# Patient Record
Sex: MALE | Race: BLACK OR AFRICAN AMERICAN | NOT HISPANIC OR LATINO | Employment: OTHER | URBAN - NONMETROPOLITAN AREA
[De-identification: names, ages, dates, MRNs, and addresses within clinical notes are randomized per-mention and may not be internally consistent; named-entity substitution may affect disease eponyms.]

---

## 2017-01-09 RX ORDER — METFORMIN HYDROCHLORIDE 750 MG/1
TABLET, EXTENDED RELEASE ORAL
Qty: 30 TABLET | Refills: 0 | Status: SHIPPED | OUTPATIENT
Start: 2017-01-09 | End: 2017-02-11 | Stop reason: SDUPTHER

## 2017-01-16 ENCOUNTER — OFFICE VISIT (OUTPATIENT)
Dept: FAMILY MEDICINE CLINIC | Facility: CLINIC | Age: 52
End: 2017-01-16

## 2017-01-16 DIAGNOSIS — E11.49 DM (DIABETES MELLITUS), TYPE 2 WITH NEUROLOGICAL COMPLICATIONS (HCC): ICD-10-CM

## 2017-01-16 DIAGNOSIS — Z23 ENCOUNTER FOR IMMUNIZATION: Primary | ICD-10-CM

## 2017-01-16 PROCEDURE — 90746 HEPB VACCINE 3 DOSE ADULT IM: CPT | Performed by: FAMILY MEDICINE

## 2017-01-16 PROCEDURE — G0010 ADMIN HEPATITIS B VACCINE: HCPCS | Performed by: FAMILY MEDICINE

## 2017-01-16 NOTE — MR AVS SNAPSHOT
Nirav Cordova Lelo   1/16/2017 9:15 AM   Office Visit    Dept Phone:  [phone]   Encounter #:  [id number]    Provider:  Brian Hinojosa MD   Department:  [facility]                Your Full Care Plan              Your Updated Medication List          This list is accurate as of: 1/16/17  3:13 PM.  Always use your most recent med list.                Alcohol Swabs 70 % pads       amitriptyline 25 MG tablet   Commonly known as:  ELAVIL       amLODIPine 10 MG tablet   Commonly known as:  NORVASC       ASPIRIN LOW DOSE 81 MG EC tablet   Generic drug:  aspirin   TAKE 1 TABLET BY MOUTH EVERY DAY       B-D UF III MINI PEN NEEDLES 31G X 5 MM misc   Generic drug:  Insulin Pen Needle       carvedilol 3.125 MG tablet   Commonly known as:  COREG       cyclobenzaprine 5 MG tablet   Commonly known as:  FLEXERIL   Take 1 tablet by mouth 3 (Three) Times a Day As Needed for muscle spasms.       diclofenac 50 MG EC tablet   Commonly known as:  VOLTAREN   Take 1 tablet by mouth 2 (Two) Times a Day.       freestyle lancets       FREESTYLE LITE test strip   Generic drug:  glucose blood       gabapentin 400 MG capsule   Commonly known as:  NEURONTIN   Take 1 capsule by mouth 3 (Three) Times a Day.       lisinopril 2.5 MG tablet   Commonly known as:  PRINIVIL,ZESTRIL   TAKE 1 TABLET BY MOUTH DAILY       metFORMIN  MG 24 hr tablet   Commonly known as:  GLUCOPHAGE-XR   TAKE 1 TABLET BY MOUTH DAILY WITH BREAKFAST       OLANZapine 10 MG tablet   Commonly known as:  zyPREXA       ondansetron 4 MG tablet   Commonly known as:  ZOFRAN   Take 1 tablet by mouth every 8 (eight) hours as needed for nausea or vomiting.       pantoprazole 40 MG EC tablet   Commonly known as:  PROTONIX       pravastatin 20 MG tablet   Commonly known as:  PRAVACHOL       sertraline 100 MG tablet   Commonly known as:  ZOLOFT       SYMBICORT 160-4.5 MCG/ACT inhaler   Generic drug:  budesonide-formoterol       TOUJEO SOLOSTAR 300 UNIT/ML solution pen-injector   Generic drug:  Insulin Glargine       VENTOLIN  (90 BASE) MCG/ACT inhaler   Generic drug:  albuterol               We Performed the Following     Hepatitis B Vaccine Adult IM       You Were Diagnosed With        Codes Comments    Encounter for immunization    -  Primary ICD-10-CM: Z23  ICD-9-CM: V03.89     DM (diabetes mellitus), type 2 with neurological complications     ICD-10-CM: E11.49  ICD-9-CM: 250.60       Instructions     None    Patient Instructions History      Upcoming Appointments     Visit Type Date Time Department    WALK-IN 1/16/2017  9:15 AM [facility] RESIDENT [facility]    OFFICE VISIT 3/17/2017  9:30 AM [facility]    OFFICE VISIT 5/18/2017  1:00 PM [facility]      MyChart Signup     Our records indicate that you have declined [facility] [employer] signup. If you would like to sign up for [employer], please email [facility] [email or web address] or call [phone] to obtain an activation code.             Other Info from Your Visit           Your Appointments     Mar 17, 2017  9:30 AM CDT   Office Visit with FLYNN Lund   [facility] ENDOCRINOLOGY (--)    [address]  [address] [place] [address] [phone]           Arrive 15 minutes prior to appointment.            May 18, 2017  1:00 PM CDT   Office Visit with Jerry Crowley MD PhD   [facility] CARDIOLOGY (--)    [address] [place] [address] [address]  [address] [place] [id number] [phone]           Arrive 15 minutes prior to appointment.              Other Notes About Your Plan     Risk Score - 4        Allergies     No Known Allergies      Vital Signs     Smoking Status                   Never Smoker           Problems and Diagnoses Noted     DM (diabetes mellitus), type 2 with neurological complications    Encounter for immunization    -  Primary      Immunizations Administered     Name Date     Hepatitis B

## 2017-01-16 NOTE — PROGRESS NOTES
I have reviewed the notes, assessments, and/or procedures performed by Dr. Hinojosa, I concur with her/his documentation of Nirav Lind.        This document has been electronically signed by Nannette Carcamo MD on January 16, 2017 9:56 AM

## 2017-01-17 ENCOUNTER — TELEPHONE (OUTPATIENT)
Dept: ENDOCRINOLOGY | Facility: CLINIC | Age: 52
End: 2017-01-17

## 2017-01-17 DIAGNOSIS — E11.9 CONTROLLED TYPE 2 DIABETES MELLITUS WITHOUT COMPLICATION, UNSPECIFIED LONG TERM INSULIN USE STATUS: ICD-10-CM

## 2017-01-17 DIAGNOSIS — E78.2 MIXED HYPERLIPIDEMIA: Primary | ICD-10-CM

## 2017-01-17 DIAGNOSIS — E55.9 VITAMIN D DEFICIENCY: ICD-10-CM

## 2017-01-17 NOTE — TELEPHONE ENCOUNTER
----- Message from FLYNN Lund sent at 1/17/2017  8:48 AM CST -----  Call patient with result---A1c was 6.0,. Kidney function normal,

## 2017-01-26 ENCOUNTER — TELEPHONE (OUTPATIENT)
Dept: ENDOCRINOLOGY | Facility: CLINIC | Age: 52
End: 2017-01-26

## 2017-01-26 NOTE — TELEPHONE ENCOUNTER
----- Message from FLYNN Lund sent at 1/26/2017  1:56 PM CST -----  Have him go to office and  a sample   ----- Message -----     From: Bernice Murguia MA     Sent: 1/26/2017   1:25 PM       To: FLYNN Lund    All are around 350$.  It is his deductible.  He is going to have to pay some of it down first.   ----- Message -----     From: FLYNN Lund     Sent: 1/26/2017  12:24 PM       To: Bernice Murguia MA    Having the pharmacy try tresiba, basaglar, lantus or levemir         ----- Message -----     From: Celina Rogers     Sent: 1/26/2017  11:57 AM       To: FLYNN Lund CALLED.   TOUJEO IS GOING TO COST MR HADLEY $335.84 OUT OF POCKET BECAUSE HE HASN'T MET HIS DEDUCTIBLE FOR THE YEAR YET.   HE CAN'T USE THE SAVINGS CARD BECAUSE MEDICARE DOESN'T ALLOW IT.  IS THERE  SOMETHING ELSE HE CAN TAKE FOR NOW?

## 2017-02-13 RX ORDER — METFORMIN HYDROCHLORIDE 750 MG/1
TABLET, EXTENDED RELEASE ORAL
Qty: 30 TABLET | Refills: 3 | Status: SHIPPED | OUTPATIENT
Start: 2017-02-13 | End: 2017-03-08

## 2017-03-08 ENCOUNTER — HOSPITAL ENCOUNTER (OUTPATIENT)
Facility: HOSPITAL | Age: 52
Setting detail: OBSERVATION
Discharge: HOME | End: 2017-03-09
Attending: EMERGENCY MEDICINE | Admitting: FAMILY MEDICINE

## 2017-03-08 DIAGNOSIS — R07.9 CHEST PAIN, UNSPECIFIED TYPE: Primary | ICD-10-CM

## 2017-03-08 PROBLEM — I20.0 UNSTABLE ANGINA (HCC): Status: ACTIVE | Noted: 2017-03-08

## 2017-03-08 PROBLEM — F32.5 MAJOR DEPRESSIVE DISORDER WITH SINGLE EPISODE, IN FULL REMISSION (HCC): Chronic | Status: ACTIVE | Noted: 2017-03-08

## 2017-03-08 PROBLEM — J44.9 COPD (CHRONIC OBSTRUCTIVE PULMONARY DISEASE): Chronic | Status: ACTIVE | Noted: 2017-03-08

## 2017-03-08 LAB
ALBUMIN SERPL-MCNC: 4.6 G/DL (ref 3.4–4.8)
ALBUMIN/GLOB SERPL: 1.4 G/DL (ref 1.1–1.8)
ALP SERPL-CCNC: 111 U/L (ref 38–126)
ALT SERPL W P-5'-P-CCNC: 68 U/L (ref 21–72)
ANION GAP SERPL CALCULATED.3IONS-SCNC: 11 MMOL/L (ref 5–15)
ANION GAP SERPL CALCULATED.3IONS-SCNC: 8 MMOL/L (ref 5–15)
AST SERPL-CCNC: 34 U/L (ref 17–59)
BASOPHILS # BLD AUTO: 0.03 10*3/MM3 (ref 0–0.2)
BASOPHILS # BLD AUTO: 0.04 10*3/MM3 (ref 0–0.2)
BASOPHILS NFR BLD AUTO: 0.3 % (ref 0–2)
BASOPHILS NFR BLD AUTO: 0.4 % (ref 0–2)
BILIRUB SERPL-MCNC: 0.4 MG/DL (ref 0.2–1.3)
BUN BLD-MCNC: 10 MG/DL (ref 7–21)
BUN BLD-MCNC: 11 MG/DL (ref 7–21)
BUN/CREAT SERPL: 11.1 (ref 7–25)
BUN/CREAT SERPL: 11.6 (ref 7–25)
CALCIUM SPEC-SCNC: 9.4 MG/DL (ref 8.4–10.2)
CALCIUM SPEC-SCNC: 9.7 MG/DL (ref 8.4–10.2)
CHLORIDE SERPL-SCNC: 100 MMOL/L (ref 95–110)
CHLORIDE SERPL-SCNC: 103 MMOL/L (ref 95–110)
CK MB SERPL-CCNC: 3.47 NG/ML (ref 0–5)
CK SERPL-CCNC: 244 U/L (ref 55–170)
CO2 SERPL-SCNC: 26 MMOL/L (ref 22–31)
CO2 SERPL-SCNC: 26 MMOL/L (ref 22–31)
CREAT BLD-MCNC: 0.9 MG/DL (ref 0.7–1.3)
CREAT BLD-MCNC: 0.95 MG/DL (ref 0.7–1.3)
DEPRECATED RDW RBC AUTO: 40.3 FL (ref 35.1–43.9)
DEPRECATED RDW RBC AUTO: 42.2 FL (ref 35.1–43.9)
EOSINOPHIL # BLD AUTO: 0.33 10*3/MM3 (ref 0–0.7)
EOSINOPHIL # BLD AUTO: 0.5 10*3/MM3 (ref 0–0.7)
EOSINOPHIL NFR BLD AUTO: 3.2 % (ref 0–7)
EOSINOPHIL NFR BLD AUTO: 4.7 % (ref 0–7)
ERYTHROCYTE [DISTWIDTH] IN BLOOD BY AUTOMATED COUNT: 12.9 % (ref 11.5–14.5)
ERYTHROCYTE [DISTWIDTH] IN BLOOD BY AUTOMATED COUNT: 13.2 % (ref 11.5–14.5)
GFR SERPL CREATININE-BSD FRML MDRD: 101 ML/MIN/1.73 (ref 56–130)
GFR SERPL CREATININE-BSD FRML MDRD: 107 ML/MIN/1.73 (ref 56–130)
GLOBULIN UR ELPH-MCNC: 3.4 GM/DL (ref 2.3–3.5)
GLUCOSE BLD-MCNC: 123 MG/DL (ref 60–100)
GLUCOSE BLD-MCNC: 99 MG/DL (ref 60–100)
GLUCOSE BLDC GLUCOMTR-MCNC: 129 MG/DL (ref 70–130)
HCT VFR BLD AUTO: 39.5 % (ref 39–49)
HCT VFR BLD AUTO: 43.5 % (ref 39–49)
HGB BLD-MCNC: 12.9 G/DL (ref 13.7–17.3)
HGB BLD-MCNC: 14.9 G/DL (ref 13.7–17.3)
HOLD SPECIMEN: NORMAL
IMM GRANULOCYTES # BLD: 0.03 10*3/MM3 (ref 0–0.02)
IMM GRANULOCYTES # BLD: 0.05 10*3/MM3 (ref 0–0.02)
IMM GRANULOCYTES NFR BLD: 0.3 % (ref 0–0.5)
IMM GRANULOCYTES NFR BLD: 0.5 % (ref 0–0.5)
INR PPP: 0.98 (ref 0.8–1.2)
LYMPHOCYTES # BLD AUTO: 2.16 10*3/MM3 (ref 0.6–4.2)
LYMPHOCYTES # BLD AUTO: 2.61 10*3/MM3 (ref 0.6–4.2)
LYMPHOCYTES NFR BLD AUTO: 20.8 % (ref 10–50)
LYMPHOCYTES NFR BLD AUTO: 24.3 % (ref 10–50)
MCH RBC QN AUTO: 28.4 PG (ref 26.5–34)
MCH RBC QN AUTO: 29.4 PG (ref 26.5–34)
MCHC RBC AUTO-ENTMCNC: 32.7 G/DL (ref 31.5–36.3)
MCHC RBC AUTO-ENTMCNC: 34.3 G/DL (ref 31.5–36.3)
MCV RBC AUTO: 85.8 FL (ref 80–98)
MCV RBC AUTO: 86.8 FL (ref 80–98)
MONOCYTES # BLD AUTO: 0.64 10*3/MM3 (ref 0–0.9)
MONOCYTES # BLD AUTO: 0.78 10*3/MM3 (ref 0–0.9)
MONOCYTES NFR BLD AUTO: 6 % (ref 0–12)
MONOCYTES NFR BLD AUTO: 7.5 % (ref 0–12)
NEUTROPHILS # BLD AUTO: 6.89 10*3/MM3 (ref 2–8.6)
NEUTROPHILS # BLD AUTO: 7.05 10*3/MM3 (ref 2–8.6)
NEUTROPHILS NFR BLD AUTO: 64.2 % (ref 37–80)
NEUTROPHILS NFR BLD AUTO: 67.8 % (ref 37–80)
NT-PROBNP SERPL-MCNC: 24.5 PG/ML (ref 0–900)
PLAT MORPH BLD: NORMAL
PLATELET # BLD AUTO: 279 10*3/MM3 (ref 150–450)
PLATELET # BLD AUTO: 298 10*3/MM3 (ref 150–450)
PMV BLD AUTO: 10.6 FL (ref 8–12)
PMV BLD AUTO: 9.3 FL (ref 8–12)
POTASSIUM BLD-SCNC: 4.3 MMOL/L (ref 3.5–5.1)
POTASSIUM BLD-SCNC: 4.9 MMOL/L (ref 3.5–5.1)
PROT SERPL-MCNC: 8 G/DL (ref 6.3–8.6)
PROTHROMBIN TIME: 13 SECONDS (ref 11.1–15.3)
RBC # BLD AUTO: 4.55 10*6/MM3 (ref 4.37–5.74)
RBC # BLD AUTO: 5.07 10*6/MM3 (ref 4.37–5.74)
RBC MORPH BLD: NORMAL
SODIUM BLD-SCNC: 137 MMOL/L (ref 137–145)
SODIUM BLD-SCNC: 137 MMOL/L (ref 137–145)
TROPONIN I SERPL-MCNC: <0.012 NG/ML
TROPONIN I SERPL-MCNC: <0.012 NG/ML
WBC MORPH BLD: NORMAL
WBC NRBC COR # BLD: 10.39 10*3/MM3 (ref 3.2–9.8)
WBC NRBC COR # BLD: 10.72 10*3/MM3 (ref 3.2–9.8)
WHOLE BLOOD HOLD SPECIMEN: NORMAL
WHOLE BLOOD HOLD SPECIMEN: NORMAL

## 2017-03-08 PROCEDURE — 93005 ELECTROCARDIOGRAM TRACING: CPT

## 2017-03-08 PROCEDURE — 85025 COMPLETE CBC W/AUTO DIFF WBC: CPT | Performed by: FAMILY MEDICINE

## 2017-03-08 PROCEDURE — G0378 HOSPITAL OBSERVATION PER HR: HCPCS

## 2017-03-08 PROCEDURE — 82962 GLUCOSE BLOOD TEST: CPT

## 2017-03-08 PROCEDURE — 99284 EMERGENCY DEPT VISIT MOD MDM: CPT

## 2017-03-08 PROCEDURE — 84484 ASSAY OF TROPONIN QUANT: CPT | Performed by: FAMILY MEDICINE

## 2017-03-08 PROCEDURE — 83880 ASSAY OF NATRIURETIC PEPTIDE: CPT | Performed by: EMERGENCY MEDICINE

## 2017-03-08 PROCEDURE — 85007 BL SMEAR W/DIFF WBC COUNT: CPT | Performed by: FAMILY MEDICINE

## 2017-03-08 PROCEDURE — 94640 AIRWAY INHALATION TREATMENT: CPT

## 2017-03-08 PROCEDURE — 84484 ASSAY OF TROPONIN QUANT: CPT | Performed by: EMERGENCY MEDICINE

## 2017-03-08 PROCEDURE — 82550 ASSAY OF CK (CPK): CPT | Performed by: EMERGENCY MEDICINE

## 2017-03-08 PROCEDURE — 93010 ELECTROCARDIOGRAM REPORT: CPT | Performed by: INTERNAL MEDICINE

## 2017-03-08 PROCEDURE — 85025 COMPLETE CBC W/AUTO DIFF WBC: CPT | Performed by: EMERGENCY MEDICINE

## 2017-03-08 PROCEDURE — 85610 PROTHROMBIN TIME: CPT | Performed by: EMERGENCY MEDICINE

## 2017-03-08 PROCEDURE — 94799 UNLISTED PULMONARY SVC/PX: CPT

## 2017-03-08 PROCEDURE — 82553 CREATINE MB FRACTION: CPT | Performed by: EMERGENCY MEDICINE

## 2017-03-08 PROCEDURE — 80053 COMPREHEN METABOLIC PANEL: CPT | Performed by: EMERGENCY MEDICINE

## 2017-03-08 RX ORDER — MORPHINE SULFATE 2 MG/ML
1 INJECTION, SOLUTION INTRAMUSCULAR; INTRAVENOUS EVERY 4 HOURS PRN
Status: DISCONTINUED | OUTPATIENT
Start: 2017-03-08 | End: 2017-03-09 | Stop reason: HOSPADM

## 2017-03-08 RX ORDER — ACETAMINOPHEN 325 MG/1
650 TABLET ORAL EVERY 4 HOURS PRN
Status: DISCONTINUED | OUTPATIENT
Start: 2017-03-08 | End: 2017-03-09 | Stop reason: HOSPADM

## 2017-03-08 RX ORDER — GABAPENTIN 400 MG/1
400 CAPSULE ORAL 3 TIMES DAILY
Status: DISCONTINUED | OUTPATIENT
Start: 2017-03-08 | End: 2017-03-09 | Stop reason: HOSPADM

## 2017-03-08 RX ORDER — ASPIRIN 81 MG/1
324 TABLET, CHEWABLE ORAL ONCE
Status: DISCONTINUED | OUTPATIENT
Start: 2017-03-08 | End: 2017-03-08

## 2017-03-08 RX ORDER — LISINOPRIL 2.5 MG/1
2.5 TABLET ORAL
Status: DISCONTINUED | OUTPATIENT
Start: 2017-03-09 | End: 2017-03-09 | Stop reason: HOSPADM

## 2017-03-08 RX ORDER — ALBUTEROL SULFATE 90 UG/1
2 AEROSOL, METERED RESPIRATORY (INHALATION) 2 TIMES DAILY
Status: DISCONTINUED | OUTPATIENT
Start: 2017-03-08 | End: 2017-03-08

## 2017-03-08 RX ORDER — NICOTINE POLACRILEX 4 MG
15 LOZENGE BUCCAL
Status: DISCONTINUED | OUTPATIENT
Start: 2017-03-08 | End: 2017-03-09 | Stop reason: HOSPADM

## 2017-03-08 RX ORDER — DEXTROSE MONOHYDRATE 25 G/50ML
25 INJECTION, SOLUTION INTRAVENOUS
Status: DISCONTINUED | OUTPATIENT
Start: 2017-03-08 | End: 2017-03-09 | Stop reason: HOSPADM

## 2017-03-08 RX ORDER — ALBUTEROL SULFATE 2.5 MG/3ML
2.5 SOLUTION RESPIRATORY (INHALATION)
Status: DISCONTINUED | OUTPATIENT
Start: 2017-03-08 | End: 2017-03-09 | Stop reason: HOSPADM

## 2017-03-08 RX ORDER — AMITRIPTYLINE HYDROCHLORIDE 25 MG/1
25 TABLET, FILM COATED ORAL 3 TIMES DAILY
Status: CANCELLED | OUTPATIENT
Start: 2017-03-08

## 2017-03-08 RX ORDER — PRAVASTATIN SODIUM 20 MG
20 TABLET ORAL NIGHTLY
Status: DISCONTINUED | OUTPATIENT
Start: 2017-03-08 | End: 2017-03-09 | Stop reason: HOSPADM

## 2017-03-08 RX ORDER — NALOXONE HCL 0.4 MG/ML
0.4 VIAL (ML) INJECTION
Status: DISCONTINUED | OUTPATIENT
Start: 2017-03-08 | End: 2017-03-09 | Stop reason: HOSPADM

## 2017-03-08 RX ORDER — OLANZAPINE 10 MG/1
10 TABLET ORAL DAILY
Status: DISCONTINUED | OUTPATIENT
Start: 2017-03-09 | End: 2017-03-09 | Stop reason: HOSPADM

## 2017-03-08 RX ORDER — SODIUM CHLORIDE 0.9 % (FLUSH) 0.9 %
10 SYRINGE (ML) INJECTION AS NEEDED
Status: DISCONTINUED | OUTPATIENT
Start: 2017-03-08 | End: 2017-03-09 | Stop reason: HOSPADM

## 2017-03-08 RX ORDER — CARVEDILOL 3.12 MG/1
3.12 TABLET ORAL 2 TIMES DAILY
Status: DISCONTINUED | OUTPATIENT
Start: 2017-03-08 | End: 2017-03-09 | Stop reason: HOSPADM

## 2017-03-08 RX ORDER — CYCLOBENZAPRINE HCL 5 MG
5 TABLET ORAL 3 TIMES DAILY PRN
Status: DISCONTINUED | OUTPATIENT
Start: 2017-03-08 | End: 2017-03-09 | Stop reason: HOSPADM

## 2017-03-08 RX ORDER — FAMOTIDINE 20 MG/1
20 TABLET, FILM COATED ORAL 2 TIMES DAILY
Status: DISCONTINUED | OUTPATIENT
Start: 2017-03-08 | End: 2017-03-09 | Stop reason: HOSPADM

## 2017-03-08 RX ORDER — AMLODIPINE BESYLATE 10 MG/1
10 TABLET ORAL DAILY
Status: DISCONTINUED | OUTPATIENT
Start: 2017-03-09 | End: 2017-03-09 | Stop reason: HOSPADM

## 2017-03-08 RX ORDER — ASPIRIN 81 MG/1
81 TABLET ORAL DAILY
Status: DISCONTINUED | OUTPATIENT
Start: 2017-03-09 | End: 2017-03-09 | Stop reason: HOSPADM

## 2017-03-08 RX ORDER — ONDANSETRON 2 MG/ML
4 INJECTION INTRAMUSCULAR; INTRAVENOUS EVERY 6 HOURS PRN
Status: DISCONTINUED | OUTPATIENT
Start: 2017-03-08 | End: 2017-03-09 | Stop reason: HOSPADM

## 2017-03-08 RX ORDER — SODIUM CHLORIDE 0.9 % (FLUSH) 0.9 %
1-10 SYRINGE (ML) INJECTION AS NEEDED
Status: DISCONTINUED | OUTPATIENT
Start: 2017-03-08 | End: 2017-03-09 | Stop reason: HOSPADM

## 2017-03-08 RX ADMIN — FAMOTIDINE 20 MG: 20 TABLET ORAL at 22:09

## 2017-03-08 RX ADMIN — GABAPENTIN 400 MG: 400 CAPSULE ORAL at 22:08

## 2017-03-08 RX ADMIN — NITROGLYCERIN 1 INCH: 20 OINTMENT TOPICAL at 19:06

## 2017-03-08 RX ADMIN — ALBUTEROL SULFATE 2.5 MG: 2.5 SOLUTION RESPIRATORY (INHALATION) at 22:50

## 2017-03-08 NOTE — ED NOTES
Pt states he drove home today from [facility]. Pt states that he went to [facility] [employer] and after eating he began to have chest pain. Pt states he thought it was indigestion. Pt states that his wife called the ambulance, pt was given X3 nitro in route along with X4 81mg ASA.       Ame Santos RN  03/08/17 3151

## 2017-03-09 VITALS
SYSTOLIC BLOOD PRESSURE: 138 MMHG | WEIGHT: 225.06 LBS | RESPIRATION RATE: 16 BRPM | OXYGEN SATURATION: 96 % | TEMPERATURE: 97.7 F | HEIGHT: 72 IN | DIASTOLIC BLOOD PRESSURE: 83 MMHG | BODY MASS INDEX: 30.48 KG/M2 | HEART RATE: 69 BPM

## 2017-03-09 LAB
ANION GAP SERPL CALCULATED.3IONS-SCNC: 6 MMOL/L (ref 5–15)
BASOPHILS # BLD AUTO: 0.03 10*3/MM3 (ref 0–0.2)
BASOPHILS NFR BLD AUTO: 0.4 % (ref 0–2)
BUN BLD-MCNC: 10 MG/DL (ref 7–21)
BUN/CREAT SERPL: 11.2 (ref 7–25)
CALCIUM SPEC-SCNC: 8.8 MG/DL (ref 8.4–10.2)
CHLORIDE SERPL-SCNC: 103 MMOL/L (ref 95–110)
CO2 SERPL-SCNC: 25 MMOL/L (ref 22–31)
CREAT BLD-MCNC: 0.89 MG/DL (ref 0.7–1.3)
DEPRECATED RDW RBC AUTO: 42 FL (ref 35.1–43.9)
EOSINOPHIL # BLD AUTO: 0.33 10*3/MM3 (ref 0–0.7)
EOSINOPHIL NFR BLD AUTO: 3.9 % (ref 0–7)
ERYTHROCYTE [DISTWIDTH] IN BLOOD BY AUTOMATED COUNT: 13.2 % (ref 11.5–14.5)
GFR SERPL CREATININE-BSD FRML MDRD: 108 ML/MIN/1.73 (ref 56–130)
GLUCOSE BLD-MCNC: 136 MG/DL (ref 60–100)
GLUCOSE BLDC GLUCOMTR-MCNC: 132 MG/DL (ref 70–130)
HCT VFR BLD AUTO: 37.5 % (ref 39–49)
HGB BLD-MCNC: 12.4 G/DL (ref 13.7–17.3)
IMM GRANULOCYTES # BLD: 0.02 10*3/MM3 (ref 0–0.02)
IMM GRANULOCYTES NFR BLD: 0.2 % (ref 0–0.5)
LYMPHOCYTES # BLD AUTO: 2.06 10*3/MM3 (ref 0.6–4.2)
LYMPHOCYTES NFR BLD AUTO: 24.3 % (ref 10–50)
MCH RBC QN AUTO: 28.5 PG (ref 26.5–34)
MCHC RBC AUTO-ENTMCNC: 33.1 G/DL (ref 31.5–36.3)
MCV RBC AUTO: 86.2 FL (ref 80–98)
MONOCYTES # BLD AUTO: 0.69 10*3/MM3 (ref 0–0.9)
MONOCYTES NFR BLD AUTO: 8.1 % (ref 0–12)
NEUTROPHILS # BLD AUTO: 5.34 10*3/MM3 (ref 2–8.6)
NEUTROPHILS NFR BLD AUTO: 63.1 % (ref 37–80)
PLATELET # BLD AUTO: 257 10*3/MM3 (ref 150–450)
PMV BLD AUTO: 9.7 FL (ref 8–12)
POTASSIUM BLD-SCNC: 3.9 MMOL/L (ref 3.5–5.1)
RBC # BLD AUTO: 4.35 10*6/MM3 (ref 4.37–5.74)
SODIUM BLD-SCNC: 134 MMOL/L (ref 137–145)
TROPONIN I SERPL-MCNC: <0.012 NG/ML
WBC NRBC COR # BLD: 8.47 10*3/MM3 (ref 3.2–9.8)

## 2017-03-09 PROCEDURE — 99219 PR INITIAL OBSERVATION CARE/DAY 50 MINUTES: CPT | Performed by: FAMILY MEDICINE

## 2017-03-09 PROCEDURE — G0378 HOSPITAL OBSERVATION PER HR: HCPCS

## 2017-03-09 PROCEDURE — 93010 ELECTROCARDIOGRAM REPORT: CPT | Performed by: INTERNAL MEDICINE

## 2017-03-09 PROCEDURE — 94799 UNLISTED PULMONARY SVC/PX: CPT

## 2017-03-09 PROCEDURE — 85025 COMPLETE CBC W/AUTO DIFF WBC: CPT | Performed by: FAMILY MEDICINE

## 2017-03-09 PROCEDURE — 80048 BASIC METABOLIC PNL TOTAL CA: CPT | Performed by: FAMILY MEDICINE

## 2017-03-09 PROCEDURE — 82962 GLUCOSE BLOOD TEST: CPT

## 2017-03-09 PROCEDURE — 94760 N-INVAS EAR/PLS OXIMETRY 1: CPT

## 2017-03-09 PROCEDURE — 84484 ASSAY OF TROPONIN QUANT: CPT | Performed by: FAMILY MEDICINE

## 2017-03-09 PROCEDURE — 93005 ELECTROCARDIOGRAM TRACING: CPT | Performed by: FAMILY MEDICINE

## 2017-03-09 RX ADMIN — OLANZAPINE 10 MG: 10 TABLET, FILM COATED ORAL at 08:41

## 2017-03-09 RX ADMIN — CARVEDILOL 3.12 MG: 3.12 TABLET, FILM COATED ORAL at 08:41

## 2017-03-09 RX ADMIN — AMLODIPINE BESYLATE 10 MG: 10 TABLET ORAL at 08:41

## 2017-03-09 RX ADMIN — FAMOTIDINE 20 MG: 20 TABLET ORAL at 08:41

## 2017-03-09 RX ADMIN — GABAPENTIN 400 MG: 400 CAPSULE ORAL at 08:42

## 2017-03-09 RX ADMIN — ASPIRIN 81 MG: 81 TABLET ORAL at 08:41

## 2017-03-09 RX ADMIN — SERTRALINE HYDROCHLORIDE 100 MG: 50 TABLET ORAL at 08:41

## 2017-03-09 RX ADMIN — ALBUTEROL SULFATE 2.5 MG: 2.5 SOLUTION RESPIRATORY (INHALATION) at 10:15

## 2017-03-09 RX ADMIN — LISINOPRIL 2.5 MG: 2.5 TABLET ORAL at 08:41

## 2017-03-09 NOTE — H&P
Patient Care Team:  Jacobo Burris MD as PCP - General (Family Medicine)  Bernice Murguia MA as Medical Assistant    Chief complaint Chest pain      Subjective      Onset of dull precordial pain yesterday after a meal. Pain accompanied by diaphoresis and dyspnea.    Chest Pain    This is a new problem. The current episode started yesterday. The onset quality is sudden. The problem occurs rarely. The problem has been resolved. The pain is present in the substernal region. The pain is at a severity of 5/10. The pain is moderate. The quality of the pain is described as burning. The pain does not radiate. Associated symptoms include diaphoresis, malaise/fatigue, near-syncope and shortness of breath. Pertinent negatives include no abdominal pain, cough, dizziness, fever, headaches, nausea, palpitations or weakness. The pain is aggravated by nothing. He has tried nitroglycerin and analgesics for the symptoms. The treatment provided significant relief. Risk factors include lack of exercise, male gender, stress and smoking/tobacco exposure.   His past medical history is significant for hypertension.   His family medical history is significant for CAD, heart disease, hypertension, early MI, stroke and sudden death. Prior diagnostic workup includes cardiac catherization.       Review of Systems   Constitutional: Positive for diaphoresis and malaise/fatigue. Negative for activity change, appetite change, fatigue and fever.   HENT: Negative for ear pain and sore throat.    Eyes: Negative for pain and visual disturbance.   Respiratory: Positive for shortness of breath. Negative for cough.    Cardiovascular: Positive for chest pain and near-syncope. Negative for palpitations.   Gastrointestinal: Negative for abdominal pain and nausea.   Endocrine: Negative for cold intolerance and heat intolerance.   Genitourinary: Negative for difficulty urinating and dysuria.   Musculoskeletal: Negative for arthralgias and gait  problem.   Skin: Negative for color change and rash.   Neurological: Negative for dizziness, weakness and headaches.   Hematological: Negative for adenopathy. Does not bruise/bleed easily.   Psychiatric/Behavioral: Negative for agitation, confusion and sleep disturbance.        Past Medical History   Diagnosis Date   • Abnormal computed tomography scan    • Adenomatous polyp of colon    • Allergic rhinitis    • Anemia    • Chronic back pain    • Coronary arteriosclerosis    • Diabetes mellitus    • Dizziness    • Dyspnea      unspecified   • Epigastric pain    • Essential hypertension    • Ex-smoker    • Hemangioma of liver    • Hyperlipidemia    • Infected hydrocele      with orchitis and epididymis   • Nausea    • Nausea with vomiting    • Obesity with body mass index of 30.0 - 39.9    • Pain in right knee    • Right upper quadrant pain    • Type II diabetes mellitus, uncontrolled    • Upper respiratory infection    • Urgency of urination    • Villous adenoma of colon      Past Surgical History   Procedure Laterality Date   • Cardiac catheterization  11/30/2015     No evidence of any obstructive disease in the circumflex, the RCA , or LAD. 1st diagonal branch in the midportion with 20-30% stenosis. Preserved LV systolic function with EF 55%.   • Colonoscopy w/ polypectomy  10/13/2014     A single polyp was found in the colon; removed by snare cautery polypectomy.   • Colonoscopy  01/21/2013     Normal   • Endoscopy  06/15/2016     Mildly severe esophagitis. Several biopsies obtained in the upper third of the esophagus.   • Incision and drainage abscess  10/29/2014     Incision and drainage of scrotal abscess. Hydrocelectomy, bottle procedure.   • Injection of medication  01/26/2016     Kenalog     Family History   Problem Relation Age of Onset   • Cancer Mother    • Heart disease Sister      Social History   Substance Use Topics   • Smoking status: Never Smoker   • Smokeless tobacco: None   • Alcohol use None      Prescriptions Prior to Admission   Medication Sig Dispense Refill Last Dose   • albuterol (VENTOLIN HFA) 108 (90 BASE) MCG/ACT inhaler Inhale 2 puffs 2 (two) times a day.   Past Month at Unknown time   • Alcohol Swabs 70 % pads USE UTD QID  11 3/8/2017 at Unknown time   • amitriptyline (ELAVIL) 25 MG tablet Take 25 mg by mouth 3 (three) times a day.   3/8/2017 at 1200   • ASPIRIN LOW DOSE 81 MG EC tablet TAKE 1 TABLET BY MOUTH EVERY DAY 30 tablet 0 3/8/2017 at 0800   • B-D UF III MINI PEN NEEDLES 31G X 5 MM misc USE QID UTD  11 3/8/2017 at Unknown time   • budesonide-formoterol (SYMBICORT) 160-4.5 MCG/ACT inhaler Inhale 2 puffs 2 (two) times a day.   3/7/2017 at Unknown time   • carvedilol (COREG) 3.125 MG tablet Take 3.125 mg by mouth 2 (two) times a day.   3/8/2017 at 1700    • cyclobenzaprine (FLEXERIL) 5 MG tablet Take 1 tablet by mouth 3 (Three) Times a Day As Needed for muscle spasms. 90 tablet 3 3/8/2017 at 1200   • diclofenac (VOLTAREN) 50 MG EC tablet Take 1 tablet by mouth 2 (Two) Times a Day. 30 tablet 0 3/8/2017 at 1700   • FREESTYLE LITE test strip USE TO CHECK BLOOD SUGAR QID  11 3/8/2017 at Unknown time   • gabapentin (NEURONTIN) 400 MG capsule Take 1 capsule by mouth 3 (Three) Times a Day. 90 capsule 2 3/8/2017 at 1200   • Insulin Glargine (TOUJEO SOLOSTAR) 300 UNIT/ML solution pen-injector Inject  under the skin daily.   3/8/2017 at 0800   • Lancets (FREESTYLE) lancets USE TO CHECK BLOOD SUGAR QID  11 3/8/2017 at Unknown time   • lisinopril (PRINIVIL,ZESTRIL) 2.5 MG tablet TAKE 1 TABLET BY MOUTH DAILY 30 tablet 3 3/8/2017 at 0800   • OLANZapine (ZYPREXA) 10 MG tablet Take 10 mg by mouth daily.   3/8/2017 at 0800   • ondansetron (ZOFRAN) 4 MG tablet Take 1 tablet by mouth every 8 (eight) hours as needed for nausea or vomiting. 30 tablet 0 Past Month at Unknown time   • pantoprazole (PROTONIX) 40 MG EC tablet Take 40 mg by mouth daily.   3/8/2017 at 0800   • pravastatin (PRAVACHOL) 20 MG tablet  Take 20 mg by mouth every night.   3/7/2017 at 2100   • sertraline (ZOLOFT) 100 MG tablet Take 100 mg by mouth daily.   Past Month at Unknown time   • amLODIPine (NORVASC) 10 MG tablet Take 10 mg by mouth daily.   More than a month at 0800     Allergies:  Review of patient's allergies indicates no known allergies.    Objective      Vital Signs  Temp:  [97.6 °F (36.4 °C)-98.6 °F (37 °C)] 97.9 °F (36.6 °C)  Heart Rate:  [56-73] 73  Resp:  [18-20] 18  BP: (102-140)/(65-94) 121/68    Physical Exam   Constitutional: He is oriented to person, place, and time. He appears well-developed and well-nourished.   HENT:   Head: Normocephalic and atraumatic.   Right Ear: External ear normal.   Left Ear: External ear normal.   Nose: Nose normal.   Mouth/Throat: Oropharynx is clear and moist.   Eyes: Conjunctivae and EOM are normal. Pupils are equal, round, and reactive to light. Right eye exhibits no discharge. Left eye exhibits no discharge. No scleral icterus.   Neck: Normal range of motion. Neck supple. No JVD present. No tracheal deviation present. No thyromegaly present.   Cardiovascular: Normal rate, regular rhythm, normal heart sounds and intact distal pulses.  Exam reveals no gallop and no friction rub.    No murmur heard.  Pulmonary/Chest: Effort normal and breath sounds normal. No stridor. No respiratory distress. He has no wheezes. He has no rales. He exhibits no tenderness.   Abdominal: Soft. Bowel sounds are normal. He exhibits no distension and no mass. There is no tenderness. There is no rebound and no guarding. No hernia.   Musculoskeletal: Normal range of motion. He exhibits no edema or deformity.   Lymphadenopathy:     He has no cervical adenopathy.   Neurological: He is alert and oriented to person, place, and time. He exhibits normal muscle tone. Coordination normal.   Skin: Skin is warm and dry. No erythema.   Psychiatric: He has a normal mood and affect. His behavior is normal. Judgment and thought content  normal.   Nursing note and vitals reviewed.    Results for NORMAN HADLEY (MRN [id number]) as of 3/9/2017 07:02   Ref. Range 2/1/2016 02:04 2/1/2016 06:45 7/15/2016 10:20 7/15/2016 13:17 3/8/2017 17:14   Creatine Kinase Latest Ref Range: 55 - 170 U/L 178 (H) 172 (H) 327 (H) 257 (H) 244 (H)       Results Review:   I reviewed the patient's new clinical results.  I reviewed the patient's new imaging results and agree with the interpretation.  I reviewed the patient's other test results and agree with the interpretation  I personally viewed and interpreted the patient's EKG/Telemetry data      Assessment/Plan     Principal Problem:    Unstable angina  Active Problems:    DM (diabetes mellitus), type 2 with neurological complications    Hyperlipidemia    Essential hypertension    Nausea    Chronic back pain    Major depressive disorder with single episode, in full remission    COPD (chronic obstructive pulmonary disease)      Assessment & Plan     Chest pain with negative cardiac enzymes. Will monitor and discuss with cardiology.    I discussed the patients findings and my recommendations with patient    Nguyễn Moran DO  03/09/17  7:03 AM             This document has been electronically signed by Nguyễn Moran DO on March 9, 2017 7:07 AM

## 2017-03-09 NOTE — DISCHARGE SUMMARY
[address] [address]  T - [phone]     DISCHARGE SUMMARY         PATIENT  DEMOGRAPHICS   PATIENT NAME: Nirav Lind                      PHYSICIAN: Jacobo Burris MD  : 1965  MRN: [id number]    ADMISSION/DISCHARGE INFO   ADMISSION DATE: 3/8/2017   DISCHARGE DATE: 3/9/2017    ADMISSION DIAGNOSES: Chest pain, unspecified type [R07.9]  DISCHARGE DIAGNOSES:     Principal Problem:    Unstable angina  Active Problems:    DM (diabetes mellitus), type 2 with neurological complications    Hyperlipidemia    Essential hypertension    Nausea    Chronic back pain    Major depressive disorder with single episode, in full remission    COPD (chronic obstructive pulmonary disease)      SERVICE: Family Medicine  ATTENDING PROVIDER: Dr. Moran  RESIDENT: Jacobo Burris MD     CONSULTS   Consult Orders (all)     Start     Ordered    17  Inpatient Consult to Cardiology  Once     Specialty:  Cardiology  Provider:  Bruce Holt MD    17  Family Practice - Resident (on-call MD unless specified)  Once     Specialty:  Family Medicine  Provider:  (Not yet assigned)    17          PROCEDURES     Imaging Results (last 24 hours)     ** No results found for the last 24 hours. **          No results found.    HISTORY OF PRESENT ILLNESS   Nirav Lind is a 51 y.o. male who presents with chest pain.  Medical history significant for diabetes mellitus type 2, hypertension, hyperlipidemia, coronary artery disease, history of tobacco abuse, presented to the emergency room with about a 2 hour history of worsening left sided substernal chest pain.     Patient states that he was eating [employer] at his house developed some left sided substernal chest pain, diaphoresis, some numbness and tingling down his left arm as well as bilateral lower extremities that lasted for approximately 1 hour before he decided that he needed to call EMS  and come to the hospital. Patient states that while on the ambulance. They gave him 3-4 nitroglycerin sublingual and his left-sided substernal chest pain started to ease. However, it did not alleviate completely until he got to the emergency department and they placed a nitroglycerin patch with paced on him.     Patient complains of some nausea, however, this is a chronic symptom for him. He denies any vomiting as stated above, had diaphoresis as well as a sharp squeezing chest pressure.    DIAGNOSTIC DATA     Lab Results   Component Value Date    CKTOTAL 244 (H) 03/08/2017    CKMB 3.47 03/08/2017    TROPONINI <0.012 03/09/2017         HOSPITAL COURSE   Pt was admitted for observation. His 3 sets of tropi were negative. Pt had EKG which did not have any acute changes of concern. Dr. Holt was consulted and stated that pt does not need any further cardiac work up. Pt chest wall is tender to touch. Will follow up on out patient basis.   At the time of discharge pt is stable, no shortness of air or dizziness.      DISCHARGE CONDITION   Stable    DISPOSITION   To Home      DISCHARGE MEDICATIONS      Nirav Lind   Home Medication Instructions KAROL:[id number]    Printed on:03/09/17 1148   Medication Information                      albuterol (VENTOLIN HFA) 108 (90 BASE) MCG/ACT inhaler  Inhale 2 puffs 2 (two) times a day.             Alcohol Swabs 70 % pads  USE UTD QID             amitriptyline (ELAVIL) 25 MG tablet  Take 25 mg by mouth 3 (three) times a day.             amLODIPine (NORVASC) 10 MG tablet  Take 10 mg by mouth daily.             ASPIRIN LOW DOSE 81 MG EC tablet  TAKE 1 TABLET BY MOUTH EVERY DAY             B-D UF III MINI PEN NEEDLES 31G X 5 MM misc  USE QID UTD             budesonide-formoterol (SYMBICORT) 160-4.5 MCG/ACT inhaler  Inhale 2 puffs 2 (two) times a day.             carvedilol (COREG) 3.125 MG tablet  Take 3.125 mg by mouth 2 (two) times a day.             cyclobenzaprine (FLEXERIL) 5  MG tablet  Take 1 tablet by mouth 3 (Three) Times a Day As Needed for muscle spasms.             diclofenac (VOLTAREN) 50 MG EC tablet  Take 1 tablet by mouth 2 (Two) Times a Day.             FREESTYLE LITE test strip  USE TO CHECK BLOOD SUGAR QID             gabapentin (NEURONTIN) 400 MG capsule  Take 1 capsule by mouth 3 (Three) Times a Day.             Insulin Glargine (TOUJEO SOLOSTAR) 300 UNIT/ML solution pen-injector  Inject  under the skin daily.             Lancets (FREESTYLE) lancets  USE TO CHECK BLOOD SUGAR QID             lisinopril (PRINIVIL,ZESTRIL) 2.5 MG tablet  TAKE 1 TABLET BY MOUTH DAILY             OLANZapine (ZYPREXA) 10 MG tablet  Take 10 mg by mouth daily.             ondansetron (ZOFRAN) 4 MG tablet  Take 1 tablet by mouth every 8 (eight) hours as needed for nausea or vomiting.             pantoprazole (PROTONIX) 40 MG EC tablet  Take 40 mg by mouth daily.             pravastatin (PRAVACHOL) 20 MG tablet  Take 20 mg by mouth every night.             sertraline (ZOLOFT) 100 MG tablet  Take 100 mg by mouth daily.                   INSTRUCTIONS   Activity: as tolerated     Diet: ADA, Heart healthy.     Special Instructions: Patient instructed to call M.D. or return to ED with worsening shortness of breath, chest pain, fever greater than 100.4°F or any other medical concerns.    FOLLOW UP   Follow-up Information     Follow up with Jacobo Burris MD Follow up in 1 day(s).    Specialties:  Family Medicine, Emergency Medicine    Contact information:    [address] [place] [address] [place] [address] [phone]          Follow up with Bruce Holt MD Follow up in 4 week(s).    Specialty:  Cardiology    Contact information:    [address] [place] [address] [address] [place] [address] [phone]           PENDING TEST RESULTS AT DISCHARGE      TIME   Time: 45 minutes were spent planning this discharge.          Dr. Moran  is the attending at time of discharge, He is aware of the patient's status and agrees with the  above discharge summary.             This document has been electronically signed by Jacobo Burris MD on March 9, 2017 11:48 AM

## 2017-03-09 NOTE — H&P
HISTORY AND PHYSICAL  NAME: Nirav Lind  : 1965  MRN: [id number]    DATE OF ADMISSION: 17    DATE & TIME SEEN: 17 8:56 PM    PCP: Jacobo Burris MD    CODE STATUS: Full Code    CHIEF COMPLAINT chest pain    HPI:  Nirav Lind is a 51 y.o. male who presents with chest pain.  Medical history significant for diabetes mellitus type 2, hypertension, hyperlipidemia, coronary artery disease, history of tobacco abuse, presented to the emergency room with about a 2 hour history of worsening left sided substernal chest pain.    Patient states that he was eating [employer] at his house developed some left sided substernal chest pain, diaphoresis, some numbness and tingling down his left arm as well as bilateral lower extremities that lasted for approximately 1 hour before he decided that he needed to call EMS and come to the hospital.  Patient states that while on the ambulance.  They gave him 3-4 nitroglycerin sublingual and his left-sided substernal chest pain started to ease.  However, it did not alleviate completely until he got to the emergency department and they placed a nitroglycerin patch with paced on him.    Patient complains of some nausea, however, this is a chronic symptom for him.  He denies any vomiting as stated above, had diaphoresis as well as a sharp squeezing chest pressure.    CONCURRENT MEDICAL HISTORY:  Past Medical History   Diagnosis Date   • Abnormal computed tomography scan    • Adenomatous polyp of colon    • Allergic rhinitis    • Anemia    • Chronic back pain    • Coronary arteriosclerosis    • Diabetes mellitus    • Dizziness    • Dyspnea      unspecified   • Epigastric pain    • Essential hypertension    • Ex-smoker    • Hemangioma of liver    • Hyperlipidemia    • Infected hydrocele      with orchitis and epididymis   • Nausea    • Nausea with vomiting    • Obesity with body mass index of 30.0 - 39.9    • Pain in right knee    • Right upper quadrant pain    • Type II  diabetes mellitus, uncontrolled    • Upper respiratory infection    • Urgency of urination    • Villous adenoma of colon        PAST SURGICAL HISTORY:  Past Surgical History   Procedure Laterality Date   • Cardiac catheterization  11/30/2015     No evidence of any obstructive disease in the circumflex, the RCA , or LAD. 1st diagonal branch in the midportion with 20-30% stenosis. Preserved LV systolic function with EF 55%.   • Colonoscopy w/ polypectomy  10/13/2014     A single polyp was found in the colon; removed by snare cautery polypectomy.   • Colonoscopy  01/21/2013     Normal   • Endoscopy  06/15/2016     Mildly severe esophagitis. Several biopsies obtained in the upper third of the esophagus.   • Incision and drainage abscess  10/29/2014     Incision and drainage of scrotal abscess. Hydrocelectomy, bottle procedure.   • Injection of medication  01/26/2016     Kenalog       FAMILY HISTORY:  Family History   Problem Relation Age of Onset   • Cancer Mother    • Heart disease Sister         SOCIAL HISTORY:  Social History     Social History   • Marital status:      Spouse name: N/A   • Number of children: N/A   • Years of education: N/A     Occupational History   • Not on file.     Social History Main Topics   • Smoking status: Never Smoker   • Smokeless tobacco: Not on file   • Alcohol use Not on file   • Drug use: Not on file   • Sexual activity: Not on file     Other Topics Concern   • Not on file     Social History Narrative       HOME MEDICATIONS:    (Not in a hospital admission)  Prior to Admission medications    Medication Sig Start Date End Date Taking? Authorizing Provider   albuterol (VENTOLIN HFA) 108 (90 BASE) MCG/ACT inhaler Inhale 2 puffs 2 (two) times a day.    Historical Provider, MD   Alcohol Swabs 70 % pads USE UTD QID 8/1/16   Historical Provider, MD   amitriptyline (ELAVIL) 25 MG tablet Take 25 mg by mouth 3 (three) times a day.    Historical Provider, MD   amLODIPine (NORVASC) 10 MG  tablet Take 10 mg by mouth daily.    Historical Provider, MD   ASPIRIN LOW DOSE 81 MG EC tablet TAKE 1 TABLET BY MOUTH EVERY DAY 8/31/16   Jacobo Burris MD   B-D UF III MINI PEN NEEDLES 31G X 5 MM misc USE QID UTD 7/2/16   Historical Provider, MD   budesonide-formoterol (SYMBICORT) 160-4.5 MCG/ACT inhaler Inhale 2 puffs 2 (two) times a day.    Historical Provider, MD   carvedilol (COREG) 3.125 MG tablet Take 3.125 mg by mouth 2 (two) times a day.    Historical Provider, MD   cyclobenzaprine (FLEXERIL) 5 MG tablet Take 1 tablet by mouth 3 (Three) Times a Day As Needed for muscle spasms. 11/4/16   Jacobo Burris MD   diclofenac (VOLTAREN) 50 MG EC tablet Take 1 tablet by mouth 2 (Two) Times a Day. 10/7/16   MD CHEN SandovalSTYLE LITE test strip USE TO CHECK BLOOD SUGAR QID 7/2/16   Historical Provider, MD   gabapentin (NEURONTIN) 400 MG capsule Take 1 capsule by mouth 3 (Three) Times a Day. 11/4/16   Jacobo Burris MD   Insulin Glargine (TOUJEO SOLOSTAR) 300 UNIT/ML solution pen-injector Inject  under the skin daily.    Historical Provider, MD   Lancets (FREESTYLE) lancets USE TO CHECK BLOOD SUGAR QID 7/2/16   Historical Provider, MD   lisinopril (PRINIVIL,ZESTRIL) 2.5 MG tablet TAKE 1 TABLET BY MOUTH DAILY 12/29/16   Jacobo Burris MD   OLANZapine (ZYPREXA) 10 MG tablet Take 10 mg by mouth daily.    Historical Provider, MD   ondansetron (ZOFRAN) 4 MG tablet Take 1 tablet by mouth every 8 (eight) hours as needed for nausea or vomiting. 9/9/16   Phillip Aguilar MD   pantoprazole (PROTONIX) 40 MG EC tablet Take 40 mg by mouth daily.    Historical Provider, MD   pravastatin (PRAVACHOL) 20 MG tablet Take 20 mg by mouth every night.    Historical Provider, MD   sertraline (ZOLOFT) 100 MG tablet Take 100 mg by mouth daily.    Historical Provider, MD   metFORMIN ER (GLUCOPHAGE-XR) 750 MG 24 hr tablet TAKE 1 TABLET BY MOUTH DAILY WITH BREAKFAST 2/13/17 3/8/17  Jacobo Burris MD     ALLERGIES:  Review of patient's allergies  indicates no known allergies.    REVIEW OF SYSTEMS  Review of Systems   Constitutional: Negative for activity change, appetite change, fatigue and fever.   HENT: Negative for ear pain and sore throat.    Eyes: Negative for pain and visual disturbance.   Respiratory: Positive for chest tightness. Negative for cough and shortness of breath.    Cardiovascular: Positive for chest pain. Negative for palpitations.   Gastrointestinal: Negative for abdominal pain and nausea.   Endocrine: Negative for cold intolerance and heat intolerance.   Genitourinary: Negative for difficulty urinating and dysuria.   Musculoskeletal: Negative for arthralgias and gait problem.   Skin: Negative for color change and rash.   Neurological: Negative for dizziness, weakness and headaches.   Hematological: Negative for adenopathy. Does not bruise/bleed easily.   Psychiatric/Behavioral: Negative for agitation, confusion and sleep disturbance.       PHYSICAL EXAM:  Temp:  [97.6 °F (36.4 °C)] 97.6 °F (36.4 °C)  Heart Rate:  [58-70] 58  Resp:  [18] 18  BP: (102-140)/(65-94) 102/65  Body mass index is 30.65 kg/(m^2).  Physical Exam   Constitutional: He is oriented to person, place, and time and well-developed, well-nourished, and in no distress. No distress.   HENT:   Head: Normocephalic and atraumatic.   Right Ear: External ear normal.   Left Ear: External ear normal.   Nose: Nose normal.   Mouth/Throat: Oropharynx is clear and moist.   Eyes: Conjunctivae and EOM are normal. Pupils are equal, round, and reactive to light.   Neck: Normal range of motion. Neck supple. No tracheal deviation present. No thyromegaly present.   Cardiovascular: Normal rate, regular rhythm, normal heart sounds and intact distal pulses.  Exam reveals no gallop and no friction rub.    No murmur heard.  Pulmonary/Chest: Effort normal and breath sounds normal. No respiratory distress. He has no wheezes. He has no rales. He exhibits no tenderness.   Abdominal: Soft. Bowel sounds  are normal. He exhibits no distension and no mass. There is no tenderness. There is no rebound and no guarding.   Musculoskeletal: Normal range of motion. He exhibits no edema or tenderness.    Nirav had a diabetic foot exam performed ([place] Test - Normal bilateral    . ) today.    Vascular Status -  His exam exhibits right foot vasculature normal. His exam exhibits no right foot edema. His exam exhibits left foot vasculature normal. His exam exhibits no left foot edema.   Skin Integrity  -  His right foot skin is intact.     Nirav 's left foot skin is intact. .  Neurological: He is alert and oriented to person, place, and time. Gait normal. GCS score is 15.   Skin: Skin is warm and dry. No rash noted. He is not diaphoretic. No erythema. No pallor.       DIAGNOSTIC DATA:   Lab Results (last 24 hours)     Procedure Component Value Units Date/Time    [facility] Horse Draw [[id number]] Collected:  03/08/17 1714    Specimen:  Blood Updated:  03/08/17 1724    Narrative:       The following orders were created for panel order [facility] Draw.  Procedure                               Abnormality         Status                     ---------                               -----------         ------                     Light Blue Top[[id number]]                                    In process                 Green Top (Gel)[[id number]]                                   In process                 Lavender Top[[id number]]                                      In process                 Gold Top - SST[[id number]]                                    In process                   Please view results for these tests on the individual orders.    Light Blue Top [[id number]] Collected:  03/08/17 1714    Specimen:  Blood Updated:  03/08/17 1724    Green Top (Gel) [[id number]] Collected:  03/08/17 1714    Specimen:  Blood Updated:  03/08/17 1725    Gold Top - SST [[id number]] Collected:  03/08/17 1714    Specimen:  Blood Updated:  03/08/17 1725    Lavender Top  [[id number]] Collected:  03/08/17 1714    Specimen:  Blood Updated:  03/08/17 1725    CK [[id number]]  (Abnormal) Collected:  03/08/17 1714    Specimen:  Blood Updated:  03/08/17 1745     Creatine Kinase 244 (H) U/L     CK-MB [[id number]]  (Normal) Collected:  03/08/17 1714    Specimen:  Blood Updated:  03/08/17 1759     CKMB 3.47 ng/mL     Troponin [[id number]]  (Normal) Collected:  03/08/17 1714    Specimen:  Blood Updated:  03/08/17 1759     Troponin I <0.012 ng/mL     Extra Tubes [[id number]] Collected:  03/08/17 1714    Specimen:  Blood from Blood, Venous Line Updated:  03/08/17 1825    Narrative:       The following orders were created for panel order Extra Tubes.  Procedure                               Abnormality         Status                     ---------                               -----------         ------                     Gold Top - SST[[id number]]                                    In process                   Please view results for these tests on the individual orders.    Gold Top - SST [[id number]] Collected:  03/08/17 1714    Specimen:  Blood Updated:  03/08/17 1825    CBC & Differential [[id number]] Collected:  03/08/17 1714    Specimen:  Blood Updated:  03/08/17 1829    Narrative:       The following orders were created for panel order CBC & Differential.  Procedure                               Abnormality         Status                     ---------                               -----------         ------                     CBC Auto Differential[[id number]]         Abnormal            Final result                 Please view results for these tests on the individual orders.    CBC Auto Differential [[id number]]  (Abnormal) Collected:  03/08/17 1714    Specimen:  Blood Updated:  03/08/17 1829     WBC 10.72 (H) 10*3/mm3      RBC 5.07 10*6/mm3      Hemoglobin 14.9 g/dL      Hematocrit 43.5 %      MCV 85.8 fL      MCH 29.4 pg      MCHC 34.3 g/dL      RDW 12.9 %      RDW-SD 40.3 fl      MPV 10.6 fL       Platelets 298 10*3/mm3      Neutrophil % 64.2 %      Lymphocyte % 24.3 %      Monocyte % 6.0 %      Eosinophil % 4.7 %      Basophil % 0.3 %      Immature Grans % 0.5 %      Neutrophils, Absolute 6.89 10*3/mm3      Lymphocytes, Absolute 2.61 10*3/mm3      Monocytes, Absolute 0.64 10*3/mm3      Eosinophils, Absolute 0.50 10*3/mm3      Basophils, Absolute 0.03 10*3/mm3      Immature Grans, Absolute 0.05 (H) 10*3/mm3     Protime-INR [[id number]]  (Normal) Collected:  03/08/17 1714    Specimen:  Blood Updated:  03/08/17 1832     Protime 13.0 Seconds      INR 0.98     Narrative:       Therapeutic range for most indications is 2.0-3.0 INR,  or 2.5-3.5 for mechanical heart valves.    Comprehensive Metabolic Panel [[id number]]  (Abnormal) Collected:  03/08/17 1714    Specimen:  Blood Updated:  03/08/17 1837     Glucose 123 (H) mg/dL      BUN 11 mg/dL      Creatinine 0.95 mg/dL      Sodium 137 mmol/L      Potassium 4.3 mmol/L      Chloride 100 mmol/L      CO2 26.0 mmol/L      Calcium 9.7 mg/dL      Total Protein 8.0 g/dL      Albumin 4.60 g/dL      ALT (SGPT) 68 U/L      AST (SGOT) 34 U/L      Alkaline Phosphatase 111 U/L      Total Bilirubin 0.4 mg/dL      eGFR  African Amer 101 mL/min/1.73      Globulin 3.4 gm/dL      A/G Ratio 1.4 g/dL      BUN/Creatinine Ratio 11.6      Anion Gap 11.0 mmol/L     BNP [[id number]]  (Normal) Collected:  03/08/17 1714    Specimen:  Blood Updated:  03/08/17 1848     proBNP 24.5 pg/mL            Imaging Results (last 24 hours)     ** No results found for the last 24 hours. **        I reviewed the patient's new clinical results.    ASSESSMENT AND PLAN: This is a 51 y.o. male with:    Active Hospital Problems (** Indicates Principal Problem)    Diagnosis Date Noted   • **Unstable angina [I20.0] 03/08/2017     Chest pain resolved by time of exam.  Will place patient in observation.  Cardiology, Dr. Holt has been consulted, discussed case with him, he'll see patient.  Will start FIORELLA protocol.   First troponin negative.  We will trend enzymes.  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  We'll give sliding scale moderate to high intensity fingerstick blood sugars 4 times a day.  Will adjust as needed.  Gabapentin started per patient's home medications for diabetic neuropathy     • Hyperlipidemia [E78.5] 08/19/2016     We'll start patient's home statin     • Essential hypertension [I10] 08/19/2016     *Patient's home antihypertensive medication.  We will add hydralazine as needed for systolic blood pressure greater than 160        Resolved Hospital Problems    Diagnosis Date Noted Date Resolved   No resolved problems to display.       Will monitor patient's hospital course and adjust treatment as hospital course dictates    DVT prophylaxis: SCDs and teds  Code status is Full Code   [facility] # [id number], reviewed and consistent with patient reported medications.    I discussed the patients findings and my recommendations with patient.     Nguyễn Moran MD is the attending on record at time of admission, he is aware of the patient's status and agrees with the above history and physical.          This document has been electronically signed by Phillip Aguilar MD on March 8, 2017 8:56 PM

## 2017-03-09 NOTE — ED PROVIDER NOTES
Subjective   Patient is a 51 y.o. male presenting with chest pain.   Chest Pain   Pain location:  L chest  Pain quality: aching and pressure    Pain radiates to:  Does not radiate  Pain severity:  Moderate  Onset quality:  Gradual  Duration:  3 hours  Timing:  Intermittent  Progression:  Waxing and waning  Chronicity:  Recurrent  Context: at rest    Context: not drug use, not eating, not intercourse, not lifting, not movement, not raising an arm, not stress and not trauma    Relieved by:  Nothing  Worsened by:  Nothing  Ineffective treatments:  Aspirin and nitroglycerin  Associated symptoms: diaphoresis, palpitations and shortness of breath    Associated symptoms: no abdominal pain, no altered mental status, no anorexia, no anxiety, no back pain, no claudication, no cough, no dizziness, no dysphagia, no fatigue, no fever, no headache, no heartburn, no lower extremity edema, no nausea, no near-syncope, no numbness, no orthopnea, no PND, no syncope, no vomiting and no weakness    Risk factors: coronary artery disease, hypertension and male sex    Risk factors: no diabetes mellitus, not obese and no smoking        Review of Systems   Constitutional: Positive for diaphoresis. Negative for activity change, appetite change, chills, fatigue and fever.   HENT: Negative for congestion, ear pain, sore throat and trouble swallowing.    Eyes: Negative.  Negative for discharge and redness.   Respiratory: Positive for shortness of breath. Negative for cough and chest tightness.    Cardiovascular: Positive for chest pain and palpitations. Negative for orthopnea, claudication, leg swelling, syncope, PND and near-syncope.   Gastrointestinal: Negative.  Negative for abdominal pain, anorexia, diarrhea, heartburn, nausea and vomiting.   Genitourinary: Negative for difficulty urinating, dysuria, flank pain and urgency.   Musculoskeletal: Negative.  Negative for arthralgias, back pain, joint swelling, myalgias and neck pain.   Skin:  Negative.  Negative for color change and rash.   Neurological: Negative.  Negative for dizziness, seizures, speech difficulty, weakness, numbness and headaches.   Psychiatric/Behavioral: Negative for behavioral problems.   All other systems reviewed and are negative.      Past Medical History   Diagnosis Date   • Abnormal computed tomography scan    • Adenomatous polyp of colon    • Allergic rhinitis    • Anemia    • Chronic back pain    • Coronary arteriosclerosis    • Diabetes mellitus    • Dizziness    • Dyspnea      unspecified   • Epigastric pain    • Essential hypertension    • Ex-smoker    • Hemangioma of liver    • Hyperlipidemia    • Infected hydrocele      with orchitis and epididymis   • Nausea    • Nausea with vomiting    • Obesity with body mass index of 30.0 - 39.9    • Pain in right knee    • Right upper quadrant pain    • Type II diabetes mellitus, uncontrolled    • Upper respiratory infection    • Urgency of urination    • Villous adenoma of colon        No Known Allergies    Past Surgical History   Procedure Laterality Date   • Cardiac catheterization  11/30/2015     No evidence of any obstructive disease in the circumflex, the RCA , or LAD. 1st diagonal branch in the midportion with 20-30% stenosis. Preserved LV systolic function with EF 55%.   • Colonoscopy w/ polypectomy  10/13/2014     A single polyp was found in the colon; removed by snare cautery polypectomy.   • Colonoscopy  01/21/2013     Normal   • Endoscopy  06/15/2016     Mildly severe esophagitis. Several biopsies obtained in the upper third of the esophagus.   • Incision and drainage abscess  10/29/2014     Incision and drainage of scrotal abscess. Hydrocelectomy, bottle procedure.   • Injection of medication  01/26/2016     Kenalog       Family History   Problem Relation Age of Onset   • Cancer Mother    • Heart disease Sister        Social History     Social History   • Marital status:      Spouse name: N/A   • Number of  children: N/A   • Years of education: N/A     Social History Main Topics   • Smoking status: Never Smoker   • Smokeless tobacco: None   • Alcohol use None   • Drug use: None   • Sexual activity: Not Asked     Other Topics Concern   • None     Social History Narrative           Objective   Physical Exam   Constitutional: He is oriented to person, place, and time. He appears well-developed and well-nourished.   HENT:   Head: Normocephalic and atraumatic.   Mouth/Throat: Oropharynx is clear and moist.   Eyes: Conjunctivae and EOM are normal. Pupils are equal, round, and reactive to light.   Neck: Normal range of motion. Neck supple.   Cardiovascular: Normal rate, regular rhythm and normal heart sounds.  Exam reveals no gallop and no friction rub.    No murmur heard.  Pulmonary/Chest: Effort normal and breath sounds normal. He has no wheezes. He has no rales.   Abdominal: Soft. Bowel sounds are normal. He exhibits no mass. There is no tenderness. There is no rebound and no guarding.   Musculoskeletal: Normal range of motion. He exhibits no tenderness.   Neurological: He is alert and oriented to person, place, and time. He has normal reflexes. No cranial nerve deficit.   Skin: Skin is warm and dry.   Nursing note and vitals reviewed.      Procedures         ED Course  ED Course      Labs Reviewed   CK - Abnormal; Notable for the following:        Result Value    Creatine Kinase 244 (*)     All other components within normal limits   COMPREHENSIVE METABOLIC PANEL - Abnormal; Notable for the following:     Glucose 123 (*)     All other components within normal limits   CBC WITH AUTO DIFFERENTIAL - Abnormal; Notable for the following:     WBC 10.72 (*)     Immature Grans, Absolute 0.05 (*)     All other components within normal limits   CBC WITH AUTO DIFFERENTIAL - Abnormal; Notable for the following:     WBC 10.39 (*)     Hemoglobin 12.9 (*)     Immature Grans, Absolute 0.03 (*)     All other components within normal  limits   BASIC METABOLIC PANEL - Abnormal; Notable for the following:     Glucose 136 (*)     Sodium 134 (*)     All other components within normal limits   CBC WITH AUTO DIFFERENTIAL - Abnormal; Notable for the following:     RBC 4.35 (*)     Hemoglobin 12.4 (*)     Hematocrit 37.5 (*)     All other components within normal limits   POCT GLUCOSE FINGERSTICK - Abnormal; Notable for the following:     Glucose 132 (*)     All other components within normal limits   CK MB - Normal   TROPONIN (IN-HOUSE) - Normal   BNP (IN-HOUSE) - Normal   PROTIME-INR - Normal    Narrative:     Therapeutic range for most indications is 2.0-3.0 INR,  or 2.5-3.5 for mechanical heart valves.   TROPONIN (IN-HOUSE) - Normal   TROPONIN (IN-HOUSE) - Normal   BASIC METABOLIC PANEL - Normal   SCAN SLIDE - Normal   POCT GLUCOSE FINGERSTICK - Normal   RAINBOW DRAW    Narrative:     The following orders were created for panel order [place] Draw.  Procedure                               Abnormality         Status                     ---------                               -----------         ------                     Light Blue Top[[id number]]                                    Final result               Green Top (Gel)[[id number]]                                   Final result               Lavender Top[[id number]]                                      Final result               Gold Top - SST[[id number]]                                    Final result                 Please view results for these tests on the individual orders.   LIGHT BLUE TOP   GREEN TOP   LAVENDER TOP   GOLD TOP - SST   EXTRA TUBES    Narrative:     The following orders were created for panel order Extra Tubes.  Procedure                               Abnormality         Status                     ---------                               -----------         ------                     Gold Top - SST[[id number]]                                    Final result                 Please view  results for these tests on the individual orders.   GOLD TOP - SST   CBC AND DIFFERENTIAL    Narrative:     The following orders were created for panel order CBC & Differential.  Procedure                               Abnormality         Status                     ---------                               -----------         ------                     CBC Auto Differential[[id number]]         Abnormal            Final result                 Please view results for these tests on the individual orders.   CBC AND DIFFERENTIAL    Narrative:     The following orders were created for panel order CBC & Differential.  Procedure                               Abnormality         Status                     ---------                               -----------         ------                     Scan Slide[[id number]]                    Normal              Final result               CBC Auto Differential[[id number]]         Abnormal            Final result                 Please view results for these tests on the individual orders.   CBC AND DIFFERENTIAL    Narrative:     The following orders were created for panel order CBC & Differential.  Procedure                               Abnormality         Status                     ---------                               -----------         ------                     CBC Auto Differential[[id number]]         Abnormal            Final result                 Please view results for these tests on the individual orders.       No orders to display       spoke with Dr Phillip Aguilar and will admit for CP, r/o        MDM    Final diagnoses:   Chest pain, unspecified type            Dayne Zimmer MD  03/09/17 2006

## 2017-03-09 NOTE — PLAN OF CARE
Problem: Patient Care Overview (Adult)  Goal: Plan of Care Review  Outcome: Ongoing (interventions implemented as appropriate)    03/09/17 0625   Coping/Psychosocial Response Interventions   Plan Of Care Reviewed With patient   Patient Care Overview   Progress improving   Outcome Evaluation   Outcome Summary/Follow up Plan pt rested comfortably throughout the night with no chest pain       Goal: Adult Individualization and Mutuality  Outcome: Ongoing (interventions implemented as appropriate)  Goal: Discharge Needs Assessment  Outcome: Ongoing (interventions implemented as appropriate)    Problem: Acute Coronary Syndrome (ACS) (Adult)  Goal: Signs and Symptoms of Listed Potential Problems Will be Absent or Manageable (Acute Coronary Syndrome)  Outcome: Ongoing (interventions implemented as appropriate)

## 2017-03-09 NOTE — PROGRESS NOTES
[address] [address]  T - [phone]     PROGRESS NOTE         SUBJECTIVE:   Patient Care Team:  Jacobo Burris MD as PCP - General (Family Medicine)  Bernice Murguia MA as Medical Assistant    Chief Complaint:     Chief Complaint   Patient presents with   • Chest Pain       Subjective     Patient is 51 y.o. male presents with chest pain, doing well. Cardio consult by Dr. Holt recommended to be evaluated for non cardiac etiology. Three sets of tropi is negative. Pt is chest pain free other than on palpation.       ROS/HISTORY/ CURRENT MEDICATIONS/OBJECTIVE/VS/PE:   Review of Systems:   Review of Systems   Constitutional: Negative for activity change, appetite change, fatigue and fever.   HENT: Negative for ear pain and sore throat.    Eyes: Negative for pain and visual disturbance.   Respiratory: Negative for cough and shortness of breath.    Cardiovascular: Negative for chest pain and palpitations.   Gastrointestinal: Negative for abdominal pain and nausea.   Endocrine: Negative for cold intolerance and heat intolerance.   Genitourinary: Negative for difficulty urinating and dysuria.   Musculoskeletal: Negative for arthralgias and gait problem.   Skin: Negative for color change and rash.   Neurological: Negative for dizziness, weakness and headaches.   Hematological: Negative for adenopathy. Does not bruise/bleed easily.   Psychiatric/Behavioral: Negative for agitation, confusion and sleep disturbance.         Current Medications:     Current Facility-Administered Medications   Medication Dose Route Frequency Provider Last Rate Last Dose   • acetaminophen (TYLENOL) tablet 650 mg  650 mg Oral Q4H PRN Phillip Aguilar MD       • albuterol (PROVENTIL) nebulizer solution 0.083% 2.5 mg/3mL  2.5 mg Nebulization BID - RT Phillip Aguilar MD       • [START ON 3/9/2017] amLODIPine (NORVASC) tablet 10 mg  10 mg Oral Daily Phillip Aguilar MD       • [START ON 3/9/2017]  aspirin EC tablet 81 mg  81 mg Oral Daily Phillip Aguilar MD       • carvedilol (COREG) tablet 3.125 mg  3.125 mg Oral BID Phillip Aguilar MD   3.125 mg at 03/08/17 2211   • cyclobenzaprine (FLEXERIL) tablet 5 mg  5 mg Oral TID PRN Phillip Aguilar MD       • dextrose (D50W) solution 25 g  25 g Intravenous Q15 Min PRN Phillip Aguilar MD       • dextrose (GLUTOSE) oral gel 15 g  15 g Oral Q15 Min PRN Phillip Aguilar MD       • famotidine (PEPCID) tablet 20 mg  20 mg Oral BID Phillip Aguilar MD   20 mg at 03/08/17 2209   • gabapentin (NEURONTIN) capsule 400 mg  400 mg Oral TID Phillip Aguilar MD   400 mg at 03/08/17 2208   • glucagon (human recombinant) (GLUCAGEN DIAGNOSTIC) injection 1 mg  1 mg Subcutaneous Q15 Min PRN Phillip Aguilar MD       • insulin aspart (novoLOG) injection 0-14 Units  0-14 Units Subcutaneous 4x Daily AC & at Bedtime Phillip Aguilar MD   0 Units at 03/08/17 2211   • [START ON 3/9/2017] lisinopril (PRINIVIL,ZESTRIL) tablet 2.5 mg  2.5 mg Oral Q24H Phillip Aguilar MD       • Morphine sulfate (PF) injection 1 mg  1 mg Intravenous Q4H PRN Phillip Aguilar MD        And   • naloxone (NARCAN) injection 0.4 mg  0.4 mg Intravenous Q5 Min PRN Phillip Aguilar MD       • [START ON 3/9/2017] OLANZapine (zyPREXA) tablet 10 mg  10 mg Oral Daily Phillip Aguilar MD       • ondansetron (ZOFRAN) injection 4 mg  4 mg Intravenous Q6H PRN Phillip Aguilar MD       • pravastatin (PRAVACHOL) tablet 20 mg  20 mg Oral Nightly Phillip Aguilar MD       • [START ON 3/9/2017] sertraline (ZOLOFT) tablet 100 mg  100 mg Oral Daily Phillip Aguilar MD       • sodium chloride 0.9 % flush 1-10 mL  1-10 mL Intravenous PRN Phillip Aguilar MD       • sodium chloride 0.9 % flush 10 mL  10 mL Intravenous PRN Dayne Zimmer MD           Objective     Physical Exam:     Vital Sign Min/Max for last 24 hours  Temp  Min: 97.6 °F (36.4 °C)  Max: 98 °F (36.7 °C)   BP  Min: 102/65  Max: 140/94   Pulse  Min: 56  Max: 70   Resp  Min: 18  Max: 18   SpO2  Min: 98 %  Max: 99 %    Flow (L/min)  Min: 2  Max: 2   Weight  Min: 225 lb 1 oz (102 kg)  Max: 226 lb (103 kg)       Physical Exam:    Physical Exam   Constitutional: He is oriented to person, place, and time. He appears well-developed and well-nourished. No distress.   HENT:   Head: Normocephalic.   Right Ear: External ear normal.   Left Ear: External ear normal.   Nose: Nose normal.   Mouth/Throat: Oropharynx is clear and moist. No oropharyngeal exudate.   Eyes: Conjunctivae and EOM are normal. Pupils are equal, round, and reactive to light. Right eye exhibits no discharge. Left eye exhibits no discharge.   Neck: Normal range of motion.   Cardiovascular: Normal rate, regular rhythm and normal heart sounds.    Left chest tenderness to palpation.    Pulmonary/Chest: Effort normal and breath sounds normal. No respiratory distress. He has no wheezes. He has no rales.   Abdominal: Soft. Bowel sounds are normal. He exhibits no distension. There is no tenderness.   Musculoskeletal: Normal range of motion. He exhibits no edema, tenderness or deformity.   Neurological: He is alert and oriented to person, place, and time.   Skin: Skin is warm and dry. He is not diaphoretic.   Psychiatric: He has a normal mood and affect. His behavior is normal.   Nursing note and vitals reviewed.           Results Review:     WBC WBC   Date Value Ref Range Status   03/08/2017 10.39 (H) 3.20 - 9.80 10*3/mm3 Final   03/08/2017 10.72 (H) 3.20 - 9.80 10*3/mm3 Final      HGB HEMOGLOBIN   Date Value Ref Range Status   03/08/2017 12.9 (L) 13.7 - 17.3 g/dL Final   03/08/2017 14.9 13.7 - 17.3 g/dL Final      HCT HEMATOCRIT   Date Value Ref Range Status   03/08/2017 39.5 39.0 - 49.0 % Final   03/08/2017 43.5 39.0 - 49.0 % Final      Platlets PLATELETS   Date Value Ref Range Status   03/08/2017 279 150 - 450 10*3/mm3 Final   03/08/2017 298 150 - 450 10*3/mm3 Final      MCV MCV   Date Value Ref Range Status   03/08/2017 86.8 80.0 - 98.0 fL Final   03/08/2017 85.8 80.0 -  98.0 fL Final            GLUCOSE   Date Value Ref Range Status   03/08/2017 99 60 - 100 mg/dL Final     SODIUM   Date Value Ref Range Status   03/08/2017 137 137 - 145 mmol/L Final     POTASSIUM   Date Value Ref Range Status   03/08/2017 4.9 3.5 - 5.1 mmol/L Final     CO2   Date Value Ref Range Status   03/08/2017 26.0 22.0 - 31.0 mmol/L Final     CHLORIDE   Date Value Ref Range Status   03/08/2017 103 95 - 110 mmol/L Final     ANION GAP   Date Value Ref Range Status   03/08/2017 8.0 5.0 - 15.0 mmol/L Final     CREATININE   Date Value Ref Range Status   03/08/2017 0.90 0.70 - 1.30 mg/dL Final     BUN   Date Value Ref Range Status   03/08/2017 10 7 - 21 mg/dL Final     BUN/CREATININE RATIO   Date Value Ref Range Status   03/08/2017 11.1 7.0 - 25.0 Final     CALCIUM   Date Value Ref Range Status   03/08/2017 9.4 8.4 - 10.2 mg/dL Final     ALKALINE PHOSPHATASE   Date Value Ref Range Status   03/08/2017 111 38 - 126 U/L Final     TOTAL PROTEIN   Date Value Ref Range Status   03/08/2017 8.0 6.3 - 8.6 g/dL Final     ALT (SGPT)   Date Value Ref Range Status   03/08/2017 68 21 - 72 U/L Final     AST (SGOT)   Date Value Ref Range Status   03/08/2017 34 17 - 59 U/L Final     TOTAL BILIRUBIN   Date Value Ref Range Status   03/08/2017 0.4 0.2 - 1.3 mg/dL Final     ALBUMIN   Date Value Ref Range Status   03/08/2017 4.60 3.40 - 4.80 g/dL Final     GLOBULIN   Date Value Ref Range Status   03/08/2017 3.4 2.3 - 3.5 gm/dL Final     A/G RATIO   Date Value Ref Range Status   03/08/2017 1.4 1.1 - 1.8 g/dL Final     Lab Results   Component Value Date    CKTOTAL 244 (H) 03/08/2017    CKMB 3.47 03/08/2017    TROPONINI <0.012 03/08/2017           Imaging Results (last 24 hours)     ** No results found for the last 24 hours. **           I reviewed the patient's new clinical results.  I reviewed the patient's new imaging results and agree with the interpretation.     ASSESSMENT/PLAN:   Assessment/Plan   Active Hospital Problems (** Indicates  Principal Problem)    Diagnosis Date Noted   • **Unstable angina [I20.0] 03/08/2017     Chest pain resolved by time of exam.  Will place patient in observation.  Cardiology, Dr. Holt has been consulted, discussed case with him, he'll see patient.  Will start FIORELLA protocol.  First troponin negative.  We will trend enzymes.  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  We'll give sliding scale moderate to high intensity fingerstick blood sugars 4 times a day.  Will adjust as needed.  Gabapentin started per patient's home medications for diabetic neuropathy     • Hyperlipidemia [E78.5] 08/19/2016     We'll start patient's home statin     • Essential hypertension [I10] 08/19/2016     *Patient's home antihypertensive medication.  We will add hydralazine as needed for systolic blood pressure greater than 160        Resolved Hospital Problems    Diagnosis Date Noted Date Resolved   No resolved problems to display.       DVT ppx: SCD/TEDs   I discussed the patients findings and my recommendations with patient.        Time: 20 mins           This document has been electronically signed by Jacobo Burris MD on March 8, 2017 10:13 PM
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
## 2017-03-16 ENCOUNTER — OFFICE VISIT (OUTPATIENT)
Dept: FAMILY MEDICINE CLINIC | Facility: CLINIC | Age: 52
End: 2017-03-16

## 2017-03-16 VITALS
HEART RATE: 67 BPM | WEIGHT: 222.2 LBS | OXYGEN SATURATION: 98 % | SYSTOLIC BLOOD PRESSURE: 148 MMHG | DIASTOLIC BLOOD PRESSURE: 90 MMHG | BODY MASS INDEX: 30.1 KG/M2 | HEIGHT: 72 IN

## 2017-03-16 DIAGNOSIS — Z78.9 INPATIENT HOSPITALIZATION WITHIN LAST 30 DAYS: Primary | ICD-10-CM

## 2017-03-16 DIAGNOSIS — G89.29 CHRONIC MIDLINE THORACIC BACK PAIN: ICD-10-CM

## 2017-03-16 DIAGNOSIS — M54.6 CHRONIC MIDLINE THORACIC BACK PAIN: ICD-10-CM

## 2017-03-16 PROCEDURE — 99213 OFFICE O/P EST LOW 20 MIN: CPT | Performed by: FAMILY MEDICINE

## 2017-03-16 RX ORDER — GABAPENTIN 400 MG/1
400 CAPSULE ORAL 3 TIMES DAILY
Qty: 90 CAPSULE | Refills: 5 | Status: SHIPPED | OUTPATIENT
Start: 2017-03-16 | End: 2018-01-26

## 2017-03-16 RX ORDER — METFORMIN HYDROCHLORIDE 750 MG/1
750 TABLET, EXTENDED RELEASE ORAL DAILY
COMMUNITY
Start: 2017-03-12 | End: 2017-09-14 | Stop reason: SDUPTHER

## 2017-03-17 ENCOUNTER — OFFICE VISIT (OUTPATIENT)
Dept: ENDOCRINOLOGY | Facility: CLINIC | Age: 52
End: 2017-03-17

## 2017-03-17 VITALS
BODY MASS INDEX: 29.47 KG/M2 | HEART RATE: 80 BPM | SYSTOLIC BLOOD PRESSURE: 132 MMHG | HEIGHT: 72 IN | DIASTOLIC BLOOD PRESSURE: 70 MMHG | WEIGHT: 217.6 LBS

## 2017-03-17 DIAGNOSIS — I10 ESSENTIAL HYPERTENSION: ICD-10-CM

## 2017-03-17 DIAGNOSIS — E78.2 MIXED HYPERLIPIDEMIA: ICD-10-CM

## 2017-03-17 DIAGNOSIS — IMO0002 UNCONTROLLED TYPE 2 DIABETES MELLITUS WITH COMPLICATION, WITH LONG-TERM CURRENT USE OF INSULIN: Primary | ICD-10-CM

## 2017-03-17 DIAGNOSIS — E55.9 VITAMIN D DEFICIENCY: ICD-10-CM

## 2017-03-17 PROCEDURE — 99214 OFFICE O/P EST MOD 30 MIN: CPT | Performed by: NURSE PRACTITIONER

## 2017-03-17 NOTE — PROGRESS NOTES
Subjective    Nirav Lind is a 51 y.o. male. he is here today for follow-up.    History of Present Illness     Duration/Timing: Diabetes mellitus type 2, onset of symptoms gradual   dm dx at age 51 y      constant     not controlled      severity high     Patient was admitted to the hospital for chest pain but cardiac was ruled out      Severity (Complications/Hospitalizations)  Secondary Macrovascular Complications: No CAD, No CVA, No PAD  Secondary Microvascular Complications: No Microalbuminuria, No Diabetic Retinopathy, No Diabetic Neuropathy     Context  Diabetes Regimen: Insulin, Oral Medications, Last HgbA1c 6.0 from Jan. 2017      Blood Glucose Readings     Blood glucose log ranging from 88 up to 144      Diet  adhering to 1800 calorie diet   Exercise: Does not exercise     Associated Signs/Symptoms  Hyperglycemic Symptoms: No polyuria, No polydipsia, No polyphagia  Hypoglycemic Episodes: No documented hypoglycemia since last visit                 The following portions of the patient's history were reviewed and updated as appropriate:   Past Medical History   Diagnosis Date   • Abnormal computed tomography scan    • Adenomatous polyp of colon    • Allergic rhinitis    • Anemia    • Chronic back pain    • Coronary arteriosclerosis    • Diabetes mellitus    • Dizziness    • Dyspnea      unspecified   • Epigastric pain    • Essential hypertension    • Ex-smoker    • Hemangioma of liver    • Hyperlipidemia    • Infected hydrocele      with orchitis and epididymis   • Nausea    • Nausea with vomiting    • Obesity with body mass index of 30.0 - 39.9    • Pain in right knee    • Right upper quadrant pain    • Type II diabetes mellitus, uncontrolled    • Upper respiratory infection    • Urgency of urination    • Villous adenoma of colon      Past Surgical History   Procedure Laterality Date   • Cardiac catheterization  11/30/2015     No evidence of any obstructive disease in the circumflex, the RCA , or LAD.  1st diagonal branch in the midportion with 20-30% stenosis. Preserved LV systolic function with EF 55%.   • Colonoscopy w/ polypectomy  10/13/2014     A single polyp was found in the colon; removed by snare cautery polypectomy.   • Colonoscopy  01/21/2013     Normal   • Endoscopy  06/15/2016     Mildly severe esophagitis. Several biopsies obtained in the upper third of the esophagus.   • Incision and drainage abscess  10/29/2014     Incision and drainage of scrotal abscess. Hydrocelectomy, bottle procedure.   • Injection of medication  01/26/2016     Kenalog     Family History   Problem Relation Age of Onset   • Cancer Mother    • Heart disease Sister        Current Outpatient Prescriptions   Medication Sig Dispense Refill   • albuterol (VENTOLIN HFA) 108 (90 BASE) MCG/ACT inhaler Inhale 2 puffs 2 (two) times a day.     • Alcohol Swabs 70 % pads USE UTD QID  11   • amitriptyline (ELAVIL) 25 MG tablet Take 25 mg by mouth 3 (three) times a day.     • amLODIPine (NORVASC) 10 MG tablet Take 10 mg by mouth daily.     • ASPIRIN LOW DOSE 81 MG EC tablet TAKE 1 TABLET BY MOUTH EVERY DAY 30 tablet 0   • B-D UF III MINI PEN NEEDLES 31G X 5 MM misc USE QID UTD  11   • budesonide-formoterol (SYMBICORT) 160-4.5 MCG/ACT inhaler Inhale 2 puffs 2 (two) times a day.     • carvedilol (COREG) 3.125 MG tablet Take 3.125 mg by mouth 2 (two) times a day.     • cyclobenzaprine (FLEXERIL) 5 MG tablet Take 1 tablet by mouth 3 (Three) Times a Day As Needed for muscle spasms. 90 tablet 3   • diclofenac (VOLTAREN) 50 MG EC tablet Take 1 tablet by mouth 2 (Two) Times a Day. 30 tablet 0   • FREESTYLE LITE test strip USE TO CHECK BLOOD SUGAR QID  11   • gabapentin (NEURONTIN) 400 MG capsule Take 1 capsule by mouth 3 (Three) Times a Day. 90 capsule 5   • Insulin Glargine (TOUJEO SOLOSTAR) 300 UNIT/ML solution pen-injector Inject  under the skin daily.     • Lancets (FREESTYLE) lancets USE TO CHECK BLOOD SUGAR QID  11   • lisinopril  (PRINIVIL,ZESTRIL) 2.5 MG tablet TAKE 1 TABLET BY MOUTH DAILY 30 tablet 3   • metFORMIN ER (GLUCOPHAGE-XR) 750 MG 24 hr tablet Take 750 mg by mouth Daily.     • OLANZapine (ZYPREXA) 10 MG tablet Take 10 mg by mouth daily.     • ondansetron (ZOFRAN) 4 MG tablet Take 1 tablet by mouth every 8 (eight) hours as needed for nausea or vomiting. 30 tablet 0   • pantoprazole (PROTONIX) 40 MG EC tablet Take 40 mg by mouth daily.     • pravastatin (PRAVACHOL) 20 MG tablet Take 20 mg by mouth every night.     • sertraline (ZOLOFT) 100 MG tablet Take 100 mg by mouth daily.       No current facility-administered medications for this visit.      No Known Allergies  Social History     Social History   • Marital status:      Spouse name: N/A   • Number of children: N/A   • Years of education: N/A     Social History Main Topics   • Smoking status: Never Smoker   • Smokeless tobacco: None   • Alcohol use None   • Drug use: None   • Sexual activity: Not Asked     Other Topics Concern   • None     Social History Narrative       Review of Systems  Review of Systems   Constitutional: Negative for activity change, appetite change, chills, diaphoresis and fatigue.   HENT: Negative for congestion, dental problem, drooling, ear discharge, ear pain, facial swelling, sneezing, sore throat, tinnitus, trouble swallowing and voice change.    Eyes: Negative for photophobia, pain, discharge, redness, itching and visual disturbance.   Respiratory: Negative for apnea, cough, choking, chest tightness and shortness of breath.    Cardiovascular: Negative for chest pain, palpitations and leg swelling.   Gastrointestinal: Negative for abdominal distention, abdominal pain, constipation, diarrhea, nausea and vomiting.   Endocrine: Negative for cold intolerance, heat intolerance, polydipsia, polyphagia and polyuria.   Genitourinary: Negative for difficulty urinating, dysuria, frequency, hematuria and urgency.   Musculoskeletal: Negative for  "arthralgias, back pain, gait problem, joint swelling, myalgias, neck pain and neck stiffness.   Skin: Negative for color change, pallor, rash and wound.   Allergic/Immunologic: Negative for environmental allergies, food allergies and immunocompromised state.   Neurological: Negative for dizziness, tremors, facial asymmetry, weakness, light-headedness, numbness and headaches.   Hematological: Negative for adenopathy. Does not bruise/bleed easily.   Psychiatric/Behavioral: Negative for agitation, behavioral problems, confusion, decreased concentration, dysphoric mood and sleep disturbance.        Objective      Visit Vitals   • /70   • Pulse 80   • Ht 72\" (182.9 cm)   • Wt 217 lb 9.6 oz (98.7 kg)   • BMI 29.51 kg/m2     Physical Exam   Constitutional: He is oriented to person, place, and time. He appears well-developed and well-nourished. No distress.   HENT:   Head: Normocephalic and atraumatic.   Right Ear: External ear normal.   Left Ear: External ear normal.   Nose: Nose normal.   Eyes: Conjunctivae and EOM are normal. Pupils are equal, round, and reactive to light.   Neck: Normal range of motion. Neck supple. No tracheal deviation present. No thyromegaly present.   Cardiovascular: Normal rate, regular rhythm and normal heart sounds.    No murmur heard.  Pulmonary/Chest: Effort normal and breath sounds normal. No respiratory distress. He has no wheezes.   Abdominal: Soft. Bowel sounds are normal. There is no tenderness. There is no rebound and no guarding.   Musculoskeletal: Normal range of motion. He exhibits no edema, tenderness or deformity.   Neurological: He is alert and oriented to person, place, and time. No cranial nerve deficit.   Skin: Skin is warm and dry. No rash noted.   Psychiatric: He has a normal mood and affect. His behavior is normal. Judgment and thought content normal.       Lab Review  GLUCOSE   Date Value   03/09/2017 136 mg/dL (H)   03/08/2017 99 mg/dL   03/08/2017 123 mg/dL (H) "   01/16/2017 93 mg/dl   11/04/2016 111 mg/dl (H)   07/18/2016 78 mg/dl     SODIUM (mmol/L)   Date Value   03/09/2017 134 (L)   03/08/2017 137   03/08/2017 137   01/16/2017 143   11/04/2016 140   07/18/2016 140     POTASSIUM (mmol/L)   Date Value   03/09/2017 3.9   03/08/2017 4.9   03/08/2017 4.3   01/16/2017 4.0   11/04/2016 4.2   07/18/2016 4.6     CHLORIDE (mmol/L)   Date Value   03/09/2017 103   03/08/2017 103   03/08/2017 100   01/16/2017 105   11/04/2016 105   07/18/2016 101     CO2 (mmol/L)   Date Value   03/09/2017 25.0   03/08/2017 26.0   03/08/2017 26.0   01/16/2017 27   11/04/2016 29   07/18/2016 27     BUN   Date Value   03/09/2017 10 mg/dL   03/08/2017 10 mg/dL   03/08/2017 11 mg/dL   01/16/2017 15 mg/dl   11/04/2016 12 mg/dl   07/18/2016 10 mg/dl     CREATININE   Date Value   03/09/2017 0.89 mg/dL   03/08/2017 0.90 mg/dL   03/08/2017 0.95 mg/dL   01/16/2017 1.0 mg/dl   11/04/2016 1.0 mg/dl   07/18/2016 0.9 mg/dl     HEMOGLOBIN A1C (%TotHgb)   Date Value   01/16/2017 6.0 (H)   11/04/2016 6.1 (H)   07/01/2016 >14.0 (H)     TRIGLYCERIDES (mg/dl)   Date Value   11/04/2016 111       Assessment/Plan      1. Uncontrolled type 2 diabetes mellitus with complication, with long-term current use of insulin    2. Mixed hyperlipidemia    3. Essential hypertension    4. Vitamin D deficiency    .    Medications prescribed:  Outpatient Encounter Prescriptions as of 3/17/2017   Medication Sig Dispense Refill   • albuterol (VENTOLIN HFA) 108 (90 BASE) MCG/ACT inhaler Inhale 2 puffs 2 (two) times a day.     • Alcohol Swabs 70 % pads USE UTD QID  11   • amitriptyline (ELAVIL) 25 MG tablet Take 25 mg by mouth 3 (three) times a day.     • amLODIPine (NORVASC) 10 MG tablet Take 10 mg by mouth daily.     • ASPIRIN LOW DOSE 81 MG EC tablet TAKE 1 TABLET BY MOUTH EVERY DAY 30 tablet 0   • B-D UF III MINI PEN NEEDLES 31G X 5 MM misc USE QID UTD  11   • budesonide-formoterol (SYMBICORT) 160-4.5 MCG/ACT inhaler Inhale 2 puffs 2  (two) times a day.     • carvedilol (COREG) 3.125 MG tablet Take 3.125 mg by mouth 2 (two) times a day.     • cyclobenzaprine (FLEXERIL) 5 MG tablet Take 1 tablet by mouth 3 (Three) Times a Day As Needed for muscle spasms. 90 tablet 3   • diclofenac (VOLTAREN) 50 MG EC tablet Take 1 tablet by mouth 2 (Two) Times a Day. 30 tablet 0   • FREESTYLE LITE test strip USE TO CHECK BLOOD SUGAR QID  11   • gabapentin (NEURONTIN) 400 MG capsule Take 1 capsule by mouth 3 (Three) Times a Day. 90 capsule 5   • Insulin Glargine (TOUJEO SOLOSTAR) 300 UNIT/ML solution pen-injector Inject  under the skin daily.     • Lancets (FREESTYLE) lancets USE TO CHECK BLOOD SUGAR QID  11   • lisinopril (PRINIVIL,ZESTRIL) 2.5 MG tablet TAKE 1 TABLET BY MOUTH DAILY 30 tablet 3   • metFORMIN ER (GLUCOPHAGE-XR) 750 MG 24 hr tablet Take 750 mg by mouth Daily.     • OLANZapine (ZYPREXA) 10 MG tablet Take 10 mg by mouth daily.     • ondansetron (ZOFRAN) 4 MG tablet Take 1 tablet by mouth every 8 (eight) hours as needed for nausea or vomiting. 30 tablet 0   • pantoprazole (PROTONIX) 40 MG EC tablet Take 40 mg by mouth daily.     • pravastatin (PRAVACHOL) 20 MG tablet Take 20 mg by mouth every night.     • sertraline (ZOLOFT) 100 MG tablet Take 100 mg by mouth daily.       No facility-administered encounter medications on file as of 3/17/2017.        Orders placed during this encounter include:  No orders of the defined types were placed in this encounter.        Glycemic Management  Toujeo 25 units in the morning---taking 20 units ---taking 13 units      if you ever wake up with a sugar less than 100 - back off by 3 units     ---------------------     Invokana 100 mg before breakfast --not taking            Metformin 500 mg tablets---take one with supper ----taking 750 mg                     invokamet 150/1000 mg twice daily ( it combines metformin and invokana )---stopped the invokamet due to low blood sugars      --------------------        once you  run out of nicoleuvia , you will use in its place trulicity 0.75 mg weekly ---stopped due to low Blood sugars   if nausea try to eat less      ------------------     novolog --not using --stopped      you are doing sliding scale   and  2 units for every 15 grams of carbohydrate     once glucose readings are staying in the low 100s we can try 1 unit for every 15 grams and if it stays in the low 100s we will only use sliding scale         Lipid Management  on pravachol 20 mg qhs      Nov. 2016     Lipids at goal         Blood Pressure Management     on amlodipine     On lisinopril 2.5 mg daily      added invokana--stopped        Microvascular Complication Monitoring: No Microalbuminuria, No Diabetic Retinopathy, No Diabetic Neuropathy        Preventive Care: Patient is not smoking        Weight Related: Obesity, Counseled on nutrition, Counseled on physical activity     Bone Health     Vitamin d def     Nov. 2016      Vit d - normal                    4. Follow-up: Return in about 4 months (around 7/17/2017) for Recheck.

## 2017-03-27 RX ORDER — CYCLOBENZAPRINE HCL 5 MG
5 TABLET ORAL 3 TIMES DAILY PRN
Qty: 90 TABLET | Refills: 3 | Status: SHIPPED | OUTPATIENT
Start: 2017-03-27 | End: 2017-09-14 | Stop reason: SDUPTHER

## 2017-03-28 RX ORDER — CYCLOBENZAPRINE HCL 5 MG
TABLET ORAL
Qty: 90 TABLET | Refills: 0 | OUTPATIENT
Start: 2017-03-28

## 2017-04-16 ENCOUNTER — HOSPITAL ENCOUNTER (EMERGENCY)
Facility: HOSPITAL | Age: 52
Discharge: HOME | End: 2017-04-16
Attending: EMERGENCY MEDICINE | Admitting: EMERGENCY MEDICINE

## 2017-04-16 ENCOUNTER — APPOINTMENT (OUTPATIENT)
Dept: GENERAL RADIOLOGY | Facility: HOSPITAL | Age: 52
End: 2017-04-16

## 2017-04-16 VITALS
BODY MASS INDEX: 29.12 KG/M2 | DIASTOLIC BLOOD PRESSURE: 87 MMHG | RESPIRATION RATE: 16 BRPM | HEIGHT: 72 IN | WEIGHT: 215 LBS | HEART RATE: 78 BPM | SYSTOLIC BLOOD PRESSURE: 128 MMHG | TEMPERATURE: 98.2 F | OXYGEN SATURATION: 98 %

## 2017-04-16 DIAGNOSIS — S62.609A: Primary | ICD-10-CM

## 2017-04-16 DIAGNOSIS — S67.22XA CRUSHING INJURY OF FINGER OF LEFT HAND, INITIAL ENCOUNTER: ICD-10-CM

## 2017-04-16 PROCEDURE — 99283 EMERGENCY DEPT VISIT LOW MDM: CPT

## 2017-04-16 PROCEDURE — 73140 X-RAY EXAM OF FINGER(S): CPT

## 2017-04-16 RX ORDER — HYDROCODONE BITARTRATE AND ACETAMINOPHEN 5; 325 MG/1; MG/1
1 TABLET ORAL ONCE
Status: COMPLETED | OUTPATIENT
Start: 2017-04-16 | End: 2017-04-16

## 2017-04-16 RX ORDER — HYDROCODONE BITARTRATE AND ACETAMINOPHEN 5; 325 MG/1; MG/1
1 TABLET ORAL EVERY 4 HOURS PRN
Qty: 20 TABLET | Refills: 0 | Status: SHIPPED | OUTPATIENT
Start: 2017-04-16 | End: 2018-10-03

## 2017-04-16 RX ADMIN — HYDROCODONE BITARTRATE AND ACETAMINOPHEN 1 TABLET: 5; 325 TABLET ORAL at 21:39

## 2017-04-17 ENCOUNTER — TELEPHONE (OUTPATIENT)
Dept: ORTHOPEDIC SURGERY | Facility: CLINIC | Age: 52
End: 2017-04-17

## 2017-04-17 NOTE — ED PROVIDER NOTES
Subjective   Patient is a 51 y.o. male presenting with upper extremity pain.   History provided by:  Patient  Upper Extremity Issue   Location:  Finger  Finger location:  L ring finger  Injury: yes    Time since incident:  2 hours  Mechanism of injury: crush    Crush injury:     Mechanism:  Door    Duration of crushing force:  2 hours    Approximate weight of object:  House door  Pain details:     Quality:  Pressure    Radiates to:  Does not radiate    Severity:  Mild    Onset quality:  Gradual    Duration:  2 hours    Timing:  Constant    Progression:  Unchanged  Handedness:  Right-handed  Dislocation: no    Foreign body present:  No foreign bodies  Prior injury to area:  No  Relieved by:  Nothing  Worsened by:  Movement  Ineffective treatments:  None tried  Associated symptoms: no back pain, no fatigue, no fever and no neck pain        Review of Systems   Constitutional: Negative for activity change, appetite change, fatigue and fever.   HENT: Negative for congestion, facial swelling, mouth sores, nosebleeds, sore throat and trouble swallowing.    Eyes: Negative for discharge, redness and itching.   Respiratory: Negative for apnea, cough and wheezing.    Cardiovascular: Negative for chest pain and palpitations.   Gastrointestinal: Negative for blood in stool and nausea.   Endocrine: Negative for cold intolerance, heat intolerance, polydipsia, polyphagia and polyuria.   Genitourinary: Negative for difficulty urinating, dysuria, flank pain, frequency and hematuria.   Musculoskeletal: Negative for back pain, gait problem, joint swelling and neck pain.        Pain to left fourth finger with a small bruise in the area   Skin: Negative.  Negative for color change, pallor and rash.   Allergic/Immunologic: Negative for environmental allergies.   Neurological: Negative for dizziness, seizures, syncope, speech difficulty, light-headedness, numbness and headaches.   Hematological: Negative for adenopathy.    Psychiatric/Behavioral: Negative for agitation, behavioral problems, confusion and sleep disturbance. The patient is not nervous/anxious.        Past Medical History:   Diagnosis Date   • Abnormal computed tomography scan    • Adenomatous polyp of colon    • Allergic rhinitis    • Anemia    • Chronic back pain    • Coronary arteriosclerosis    • Diabetes mellitus    • Dizziness    • Dyspnea     unspecified   • Epigastric pain    • Essential hypertension    • Ex-smoker    • Hemangioma of liver    • Hyperlipidemia    • Infected hydrocele     with orchitis and epididymis   • Nausea    • Nausea with vomiting    • Obesity with body mass index of 30.0 - 39.9    • Pain in right knee    • Right upper quadrant pain    • Type II diabetes mellitus, uncontrolled    • Upper respiratory infection    • Urgency of urination    • Villous adenoma of colon        No Known Allergies    Past Surgical History:   Procedure Laterality Date   • CARDIAC CATHETERIZATION  11/30/2015    No evidence of any obstructive disease in the circumflex, the RCA , or LAD. 1st diagonal branch in the midportion with 20-30% stenosis. Preserved LV systolic function with EF 55%.   • COLONOSCOPY  01/21/2013    Normal   • COLONOSCOPY W/ POLYPECTOMY  10/13/2014    A single polyp was found in the colon; removed by snare cautery polypectomy.   • ENDOSCOPY  06/15/2016    Mildly severe esophagitis. Several biopsies obtained in the upper third of the esophagus.   • INCISION AND DRAINAGE ABSCESS  10/29/2014    Incision and drainage of scrotal abscess. Hydrocelectomy, bottle procedure.   • INJECTION OF MEDICATION  01/26/2016    Kenalog       Family History   Problem Relation Age of Onset   • Cancer Mother    • Heart disease Sister        Social History     Social History   • Marital status:      Spouse name: N/A   • Number of children: N/A   • Years of education: N/A     Social History Main Topics   • Smoking status: Never Smoker   • Smokeless tobacco: None   •  Alcohol use Yes   • Drug use: Defer   • Sexual activity: Not Asked     Other Topics Concern   • None     Social History Narrative   • None           Objective   Physical Exam   Constitutional: He is oriented to person, place, and time. He appears well-developed and well-nourished.   HENT:   Head: Normocephalic and atraumatic.   Nose: Nose normal.   Mouth/Throat: Oropharynx is clear and moist.   Eyes: Conjunctivae and EOM are normal. Pupils are equal, round, and reactive to light.   Neck: Normal range of motion. Neck supple.   Cardiovascular: Normal rate, regular rhythm, normal heart sounds and intact distal pulses.    Pulmonary/Chest: Effort normal and breath sounds normal.   Abdominal: Soft. Bowel sounds are normal.   Musculoskeletal: Normal range of motion. He exhibits tenderness.   Pain tenderness to the left fourth finger with a small bruise status post injury with a house door   Neurological: He is alert and oriented to person, place, and time.   Skin: Skin is warm and dry.   Psychiatric: He has a normal mood and affect. His behavior is normal. Judgment and thought content normal.   Nursing note and vitals reviewed.      Procedures         ED Course  ED Course      Labs Reviewed - No data to display     XR Finger 2+ View Left   Final Result   Nondisplaced oblique fracture middle phalanx of   fourth finger.         Electronically signed by:  Abiel Kim MD  4/16/2017 9:19 PM   CDT Workstation: [id number]                    [facility]    Final diagnoses:   Fracture of finger of left hand, closed, initial encounter   Crushing injury of finger of left hand, initial encounter            Juancarlos Ovalle MD  04/17/17 0424

## 2017-04-18 ENCOUNTER — OFFICE VISIT (OUTPATIENT)
Dept: FAMILY MEDICINE CLINIC | Facility: CLINIC | Age: 52
End: 2017-04-18

## 2017-04-18 VITALS
SYSTOLIC BLOOD PRESSURE: 124 MMHG | DIASTOLIC BLOOD PRESSURE: 78 MMHG | OXYGEN SATURATION: 99 % | HEART RATE: 88 BPM | BODY MASS INDEX: 29.81 KG/M2 | HEIGHT: 72 IN | WEIGHT: 220.1 LBS

## 2017-04-18 DIAGNOSIS — J44.9 CHRONIC OBSTRUCTIVE PULMONARY DISEASE, UNSPECIFIED COPD TYPE (HCC): Chronic | ICD-10-CM

## 2017-04-18 DIAGNOSIS — E11.49 DM (DIABETES MELLITUS), TYPE 2 WITH NEUROLOGICAL COMPLICATIONS (HCC): Primary | ICD-10-CM

## 2017-04-18 PROCEDURE — 99213 OFFICE O/P EST LOW 20 MIN: CPT | Performed by: FAMILY MEDICINE

## 2017-04-18 RX ORDER — GABAPENTIN 300 MG/1
CAPSULE ORAL
Refills: 3 | COMMUNITY
Start: 2017-01-25 | End: 2017-06-19

## 2017-04-18 RX ORDER — FEXOFENADINE HCL 180 MG/1
180 TABLET ORAL DAILY
Qty: 30 TABLET | Refills: 1 | Status: SHIPPED | OUTPATIENT
Start: 2017-04-18 | End: 2018-05-31 | Stop reason: SDUPTHER

## 2017-04-18 RX ORDER — METOPROLOL SUCCINATE 25 MG/1
TABLET, EXTENDED RELEASE ORAL
Refills: 3 | COMMUNITY
Start: 2017-04-07 | End: 2018-10-29 | Stop reason: SDUPTHER

## 2017-04-18 RX ORDER — ATORVASTATIN CALCIUM 20 MG/1
20 TABLET, FILM COATED ORAL DAILY
Qty: 30 TABLET | Refills: 2 | Status: SHIPPED | OUTPATIENT
Start: 2017-04-18 | End: 2017-07-27 | Stop reason: SDUPTHER

## 2017-04-18 NOTE — PROGRESS NOTES
Subjective   Nirav Lind is a 51 y.o. male.     Patient is presented for his diabetes and COPD follow-up.  States that his diabetes is well-controlled his blood sugar usually runs between 110 to 130s.  His eye exam is current.  Patient was seen in the ED for a fall, was found to have nondisplaced oblique fracture middle phalanx of fourth finger.  Patient does have a splint placed on his finger.  State the pain is well-controlled.  Patient has a follow-up with orthopedic surgeon in 1 week.  Patient has no other complaint today is COPD is well controlled.    Diabetes   He presents for his follow-up diabetic visit. He has type 2 diabetes mellitus. Pertinent negatives for hypoglycemia include no confusion, dizziness or headaches. Pertinent negatives for diabetes include no blurred vision, no chest pain, no fatigue, no foot paresthesias, no foot ulcerations, no polydipsia, no polyphagia, no polyuria and no visual change. Pertinent negatives for hypoglycemia complications include no blackouts and no hospitalization. He is following a diabetic diet. Meal planning includes ADA exchanges. His breakfast blood glucose range is generally 110-130 mg/dl. Eye exam is not current.   COPD   This is a chronic problem. The current episode started more than 1 year ago. The problem occurs intermittently. Associated symptoms include arthralgias. Pertinent negatives include no abdominal pain, anorexia, change in bowel habit, chest pain, chills, congestion, coughing, fatigue, fever, headaches, joint swelling, neck pain, numbness, rash, sore throat, swollen glands, urinary symptoms or visual change. Nothing aggravates the symptoms. He has tried nothing for the symptoms.        The following portions of the patient's history were reviewed and updated as appropriate:   He  has a past medical history of Abnormal computed tomography scan; Adenomatous polyp of colon; Allergic rhinitis; Anemia; Chronic back pain; Coronary arteriosclerosis;  Diabetes mellitus; Dizziness; Dyspnea; Epigastric pain; Essential hypertension; Ex-smoker; Hemangioma of liver; Hyperlipidemia; Infected hydrocele; Nausea; Nausea with vomiting; Obesity with body mass index of 30.0 - 39.9; Pain in right knee; Right upper quadrant pain; Type II diabetes mellitus, uncontrolled; Upper respiratory infection; Urgency of urination; and Villous adenoma of colon.  He  does not have any pertinent problems on file.  He  has a past surgical history that includes Cardiac catheterization (11/30/2015); Colonoscopy w/ polypectomy (10/13/2014); Colonoscopy (01/21/2013); Esophagogastroduodenoscopy (06/15/2016); Incision and Drainage Abscess (10/29/2014); and Injection of Medication (01/26/2016).  His family history includes Cancer in his mother; Heart disease in his sister.  He  reports that he has never smoked. He does not have any smokeless tobacco history on file. He reports that he drinks alcohol. Drug use questions deferred to the physician.  Current Outpatient Prescriptions   Medication Sig Dispense Refill   • albuterol (VENTOLIN HFA) 108 (90 BASE) MCG/ACT inhaler Inhale 2 puffs 2 (two) times a day.     • Alcohol Swabs 70 % pads USE UTD QID  11   • amitriptyline (ELAVIL) 25 MG tablet Take 25 mg by mouth 3 (three) times a day.     • amLODIPine (NORVASC) 10 MG tablet Take 10 mg by mouth daily.     • ASPIRIN LOW DOSE 81 MG EC tablet TAKE 1 TABLET BY MOUTH EVERY DAY 30 tablet 0   • atorvastatin (LIPITOR) 20 MG tablet Take 1 tablet by mouth Daily. 30 tablet 2   • B-D UF III MINI PEN NEEDLES 31G X 5 MM misc USE QID UTD  11   • budesonide-formoterol (SYMBICORT) 160-4.5 MCG/ACT inhaler Inhale 2 puffs 2 (two) times a day.     • carvedilol (COREG) 3.125 MG tablet Take 3.125 mg by mouth 2 (two) times a day.     • cyclobenzaprine (FLEXERIL) 5 MG tablet Take 1 tablet by mouth 3 (Three) Times a Day As Needed for Muscle Spasms. 90 tablet 3   • diclofenac (VOLTAREN) 50 MG EC tablet Take 1 tablet by mouth 2  (Two) Times a Day. 30 tablet 0   • fexofenadine (ALLEGRA ALLERGY) 180 MG tablet Take 1 tablet by mouth Daily. 30 tablet 1   • FREESTYLE LITE test strip USE TO CHECK BLOOD SUGAR QID  11   • gabapentin (NEURONTIN) 300 MG capsule TK 1 C PO TID.  3   • gabapentin (NEURONTIN) 400 MG capsule Take 1 capsule by mouth 3 (Three) Times a Day. 90 capsule 5   • HYDROcodone-acetaminophen (NORCO) 5-325 MG per tablet Take 1 tablet by mouth Every 4 (Four) Hours As Needed for Moderate Pain (4-6). 20 tablet 0   • Insulin Glargine (TOUJEO SOLOSTAR) 300 UNIT/ML solution pen-injector Inject  under the skin daily.     • Lancets (FREESTYLE) lancets USE TO CHECK BLOOD SUGAR QID  11   • lisinopril (PRINIVIL,ZESTRIL) 2.5 MG tablet TAKE 1 TABLET BY MOUTH DAILY 30 tablet 3   • MAGNESIUM-OXIDE 400 (241.3 MG) MG tablet tablet TK 1 T PO BID  12   • metFORMIN ER (GLUCOPHAGE-XR) 750 MG 24 hr tablet Take 750 mg by mouth Daily.     • metoprolol succinate XL (TOPROL-XL) 25 MG 24 hr tablet TK 1 T PO BID  3   • OLANZapine (ZYPREXA) 10 MG tablet Take 10 mg by mouth daily.     • ondansetron (ZOFRAN) 4 MG tablet Take 1 tablet by mouth every 8 (eight) hours as needed for nausea or vomiting. 30 tablet 0   • pantoprazole (PROTONIX) 40 MG EC tablet Take 40 mg by mouth daily.     • sertraline (ZOLOFT) 100 MG tablet Take 100 mg by mouth daily.     • tiotropium (SPIRIVA HANDIHALER) 18 MCG per inhalation capsule Place 1 capsule into inhaler and inhale Daily. 30 capsule 2     No current facility-administered medications for this visit.      Current Outpatient Prescriptions on File Prior to Visit   Medication Sig   • albuterol (VENTOLIN HFA) 108 (90 BASE) MCG/ACT inhaler Inhale 2 puffs 2 (two) times a day.   • Alcohol Swabs 70 % pads USE UTD QID   • amitriptyline (ELAVIL) 25 MG tablet Take 25 mg by mouth 3 (three) times a day.   • amLODIPine (NORVASC) 10 MG tablet Take 10 mg by mouth daily.   • ASPIRIN LOW DOSE 81 MG EC tablet TAKE 1 TABLET BY MOUTH EVERY DAY   • B-D  UF III MINI PEN NEEDLES 31G X 5 MM misc USE QID UTD   • budesonide-formoterol (SYMBICORT) 160-4.5 MCG/ACT inhaler Inhale 2 puffs 2 (two) times a day.   • carvedilol (COREG) 3.125 MG tablet Take 3.125 mg by mouth 2 (two) times a day.   • cyclobenzaprine (FLEXERIL) 5 MG tablet Take 1 tablet by mouth 3 (Three) Times a Day As Needed for Muscle Spasms.   • diclofenac (VOLTAREN) 50 MG EC tablet Take 1 tablet by mouth 2 (Two) Times a Day.   • FREESTYLE LITE test strip USE TO CHECK BLOOD SUGAR QID   • gabapentin (NEURONTIN) 400 MG capsule Take 1 capsule by mouth 3 (Three) Times a Day.   • HYDROcodone-acetaminophen (NORCO) 5-325 MG per tablet Take 1 tablet by mouth Every 4 (Four) Hours As Needed for Moderate Pain (4-6).   • Insulin Glargine (TOUJEO SOLOSTAR) 300 UNIT/ML solution pen-injector Inject  under the skin daily.   • Lancets (FREESTYLE) lancets USE TO CHECK BLOOD SUGAR QID   • lisinopril (PRINIVIL,ZESTRIL) 2.5 MG tablet TAKE 1 TABLET BY MOUTH DAILY   • metFORMIN ER (GLUCOPHAGE-XR) 750 MG 24 hr tablet Take 750 mg by mouth Daily.   • OLANZapine (ZYPREXA) 10 MG tablet Take 10 mg by mouth daily.   • ondansetron (ZOFRAN) 4 MG tablet Take 1 tablet by mouth every 8 (eight) hours as needed for nausea or vomiting.   • pantoprazole (PROTONIX) 40 MG EC tablet Take 40 mg by mouth daily.   • sertraline (ZOLOFT) 100 MG tablet Take 100 mg by mouth daily.     No current facility-administered medications on file prior to visit.      He has No Known Allergies..    Review of Systems   Constitutional: Negative for chills, fatigue and fever.   HENT: Negative for congestion and sore throat.    Eyes: Negative for blurred vision.   Respiratory: Negative for cough.    Cardiovascular: Negative for chest pain.   Gastrointestinal: Negative for abdominal pain, anorexia and change in bowel habit.   Endocrine: Negative for polydipsia, polyphagia and polyuria.   Musculoskeletal: Positive for arthralgias. Negative for joint swelling and neck pain.    Skin: Negative for rash.   Neurological: Negative for dizziness, numbness and headaches.   Psychiatric/Behavioral: Negative for confusion.       Objective    Vitals:    04/18/17 1027   BP: 124/78   Pulse: 88   SpO2: 99%       Physical Exam   Constitutional: He is oriented to person, place, and time. He appears well-developed and well-nourished.   HENT:   Head: Normocephalic.   Eyes: EOM are normal. Pupils are equal, round, and reactive to light.   Neck: Normal range of motion.   Cardiovascular: Normal rate, regular rhythm and normal heart sounds.    No murmur heard.  Pulmonary/Chest: Effort normal and breath sounds normal. No respiratory distress. He has no wheezes.   Abdominal: Soft. Bowel sounds are normal.   Musculoskeletal: Normal range of motion.   Splint present on the finger.    Neurological: He is alert and oriented to person, place, and time. He has normal reflexes.   Vitals reviewed.      Assessment/Plan   Nirav was seen today for follow-up, diabetes and copd.    Diagnoses and all orders for this visit:    DM (diabetes mellitus), type 2 with neurological complications    Chronic obstructive pulmonary disease, unspecified COPD type    Other orders  -     atorvastatin (LIPITOR) 20 MG tablet; Take 1 tablet by mouth Daily.  -     tiotropium (SPIRIVA HANDIHALER) 18 MCG per inhalation capsule; Place 1 capsule into inhaler and inhale Daily.  -     fexofenadine (ALLEGRA ALLERGY) 180 MG tablet; Take 1 tablet by mouth Daily.    We'll continue with current medication.  Patient to continue follow-up with endocrinology.  Recommended to continue with ADA diet.  Also continue with exercise as tolerated.  Also continue with her current medication of albuterol Symbicort and Spiriva.  Recommended to continue have a follow-up with orthopedic for these fracture of his finger.          This document has been electronically signed by Jacobo Burris MD on April 26, 2017 9:57 AM      EMR Dragon/Transcription disclaimer:   Much  of this encounter note is an electronic transcription/translation of spoken language to printed text. The electronic translation of spoken language may permit erroneous, or at times, nonsensical words or phrases to be inadvertently transcribed; Although I have reviewed the note for such errors, some may still exist.

## 2017-04-27 ENCOUNTER — OFFICE VISIT (OUTPATIENT)
Dept: ORTHOPEDIC SURGERY | Facility: CLINIC | Age: 52
End: 2017-04-27

## 2017-04-27 VITALS — HEIGHT: 72 IN | WEIGHT: 222 LBS | BODY MASS INDEX: 30.07 KG/M2

## 2017-04-27 DIAGNOSIS — M79.645 FINGER PAIN, LEFT: Primary | ICD-10-CM

## 2017-04-27 DIAGNOSIS — S62.655A CLOSED NONDISPLACED FRACTURE OF MIDDLE PHALANX OF LEFT RING FINGER, INITIAL ENCOUNTER: ICD-10-CM

## 2017-04-27 PROCEDURE — 99213 OFFICE O/P EST LOW 20 MIN: CPT | Performed by: NURSE PRACTITIONER

## 2017-04-27 PROCEDURE — 26720 TREAT FINGER FRACTURE EACH: CPT | Performed by: NURSE PRACTITIONER

## 2017-04-27 NOTE — PROGRESS NOTES
Nirav Lind is a 52 y.o. male   Primary provider:  Jacobo Burris MD       Chief Complaint   Patient presents with   • Left Hand - Pain, Fracture       HISTORY OF PRESENT ILLNESS:    HPI Comments: Patient smashed finger in door on 4/16/2017 was seen in the ER and placed in splint.     Fracture   This is a new problem. The current episode started 1 to 4 weeks ago. The problem occurs constantly. The problem has been unchanged. Associated symptoms include numbness. Nothing aggravates the symptoms. Treatments tried: splint.        CONCURRENT MEDICAL HISTORY:    Past Medical History:   Diagnosis Date   • Abnormal computed tomography scan    • Adenomatous polyp of colon    • Allergic rhinitis    • Anemia    • Chronic back pain    • Coronary arteriosclerosis    • Diabetes mellitus    • Dizziness    • Dyspnea     unspecified   • Epigastric pain    • Essential hypertension    • Ex-smoker    • Hemangioma of liver    • Hyperlipidemia    • Infected hydrocele     with orchitis and epididymis   • Nausea    • Nausea with vomiting    • Obesity with body mass index of 30.0 - 39.9    • Pain in right knee    • Right upper quadrant pain    • Type II diabetes mellitus, uncontrolled    • Upper respiratory infection    • Urgency of urination    • Villous adenoma of colon        No Known Allergies      Current Outpatient Prescriptions:   •  albuterol (VENTOLIN HFA) 108 (90 BASE) MCG/ACT inhaler, Inhale 2 puffs 2 (two) times a day., Disp: , Rfl:   •  Alcohol Swabs 70 % pads, USE UTD QID, Disp: , Rfl: 11  •  amitriptyline (ELAVIL) 25 MG tablet, Take 25 mg by mouth 3 (three) times a day., Disp: , Rfl:   •  amLODIPine (NORVASC) 10 MG tablet, Take 10 mg by mouth daily., Disp: , Rfl:   •  ASPIRIN LOW DOSE 81 MG EC tablet, TAKE 1 TABLET BY MOUTH EVERY DAY, Disp: 30 tablet, Rfl: 0  •  atorvastatin (LIPITOR) 20 MG tablet, Take 1 tablet by mouth Daily., Disp: 30 tablet, Rfl: 2  •  B-D UF III MINI PEN NEEDLES 31G X 5 MM misc, USE QID UTD, Disp: ,  Rfl: 11  •  budesonide-formoterol (SYMBICORT) 160-4.5 MCG/ACT inhaler, Inhale 2 puffs 2 (two) times a day., Disp: , Rfl:   •  carvedilol (COREG) 3.125 MG tablet, Take 3.125 mg by mouth 2 (two) times a day., Disp: , Rfl:   •  cyclobenzaprine (FLEXERIL) 5 MG tablet, Take 1 tablet by mouth 3 (Three) Times a Day As Needed for Muscle Spasms., Disp: 90 tablet, Rfl: 3  •  diclofenac (VOLTAREN) 50 MG EC tablet, Take 1 tablet by mouth 2 (Two) Times a Day., Disp: 30 tablet, Rfl: 0  •  fexofenadine (ALLEGRA ALLERGY) 180 MG tablet, Take 1 tablet by mouth Daily., Disp: 30 tablet, Rfl: 1  •  FREESTYLE LITE test strip, USE TO CHECK BLOOD SUGAR QID, Disp: , Rfl: 11  •  gabapentin (NEURONTIN) 300 MG capsule, TK 1 C PO TID., Disp: , Rfl: 3  •  gabapentin (NEURONTIN) 400 MG capsule, Take 1 capsule by mouth 3 (Three) Times a Day., Disp: 90 capsule, Rfl: 5  •  HYDROcodone-acetaminophen (NORCO) 5-325 MG per tablet, Take 1 tablet by mouth Every 4 (Four) Hours As Needed for Moderate Pain (4-6)., Disp: 20 tablet, Rfl: 0  •  Insulin Glargine (TOUJEO SOLOSTAR) 300 UNIT/ML solution pen-injector, Inject  under the skin daily., Disp: , Rfl:   •  Lancets (FREESTYLE) lancets, USE TO CHECK BLOOD SUGAR QID, Disp: , Rfl: 11  •  lisinopril (PRINIVIL,ZESTRIL) 2.5 MG tablet, TAKE 1 TABLET BY MOUTH DAILY, Disp: 30 tablet, Rfl: 3  •  MAGNESIUM-OXIDE 400 (241.3 MG) MG tablet tablet, TK 1 T PO BID, Disp: , Rfl: 12  •  metFORMIN ER (GLUCOPHAGE-XR) 750 MG 24 hr tablet, Take 750 mg by mouth Daily., Disp: , Rfl:   •  metoprolol succinate XL (TOPROL-XL) 25 MG 24 hr tablet, TK 1 T PO BID, Disp: , Rfl: 3  •  OLANZapine (ZYPREXA) 10 MG tablet, Take 10 mg by mouth daily., Disp: , Rfl:   •  ondansetron (ZOFRAN) 4 MG tablet, Take 1 tablet by mouth every 8 (eight) hours as needed for nausea or vomiting., Disp: 30 tablet, Rfl: 0  •  pantoprazole (PROTONIX) 40 MG EC tablet, Take 40 mg by mouth daily., Disp: , Rfl:   •  sertraline (ZOLOFT) 100 MG tablet, Take 100 mg by  "mouth daily., Disp: , Rfl:   •  tiotropium (SPIRIVA HANDIHALER) 18 MCG per inhalation capsule, Place 1 capsule into inhaler and inhale Daily., Disp: 30 capsule, Rfl: 2    Past Surgical History:   Procedure Laterality Date   • CARDIAC CATHETERIZATION  11/30/2015    No evidence of any obstructive disease in the circumflex, the RCA , or LAD. 1st diagonal branch in the midportion with 20-30% stenosis. Preserved LV systolic function with EF 55%.   • COLONOSCOPY  01/21/2013    Normal   • COLONOSCOPY W/ POLYPECTOMY  10/13/2014    A single polyp was found in the colon; removed by snare cautery polypectomy.   • ENDOSCOPY  06/15/2016    Mildly severe esophagitis. Several biopsies obtained in the upper third of the esophagus.   • INCISION AND DRAINAGE ABSCESS  10/29/2014    Incision and drainage of scrotal abscess. Hydrocelectomy, bottle procedure.   • INJECTION OF MEDICATION  01/26/2016    Kenalog       Family History   Problem Relation Age of Onset   • Cancer Mother    • Heart disease Sister         Social History     Social History   • Marital status:      Spouse name: N/A   • Number of children: N/A   • Years of education: N/A     Occupational History   • Not on file.     Social History Main Topics   • Smoking status: Never Smoker   • Smokeless tobacco: Not on file   • Alcohol use Yes   • Drug use: Defer   • Sexual activity: Not on file     Other Topics Concern   • Not on file     Social History Narrative        Review of Systems   Respiratory: Positive for shortness of breath.    Musculoskeletal: Positive for back pain.   Neurological: Positive for numbness.   Psychiatric/Behavioral: Positive for sleep disturbance.   All other systems reviewed and are negative.      PHYSICAL EXAMINATION:       Ht 72\" (182.9 cm)  Wt 222 lb (101 kg)  BMI 30.11 kg/m2    Physical Exam   Constitutional: He is oriented to person, place, and time. Vital signs are normal. He appears well-developed and well-nourished. He is cooperative. "   HENT:   Head: Normocephalic and atraumatic.   Neck: Trachea normal and phonation normal.   Pulmonary/Chest: Effort normal. No respiratory distress.   Abdominal: Soft. Normal appearance. He exhibits no distension.   Neurological: He is alert and oriented to person, place, and time. GCS eye subscore is 4. GCS verbal subscore is 5. GCS motor subscore is 6.   Skin: Skin is warm, dry and intact.   Psychiatric: He has a normal mood and affect. His speech is normal and behavior is normal. Judgment and thought content normal. Cognition and memory are normal.   Vitals reviewed.      GAIT:     [x]  Normal  []  Antalgic    Assistive device: [x]  None  []  Walker     []  Crutches  []  Cane     []  Wheelchair  []  Stretcher    Right Hand Exam   Right hand exam is normal.      Left Hand Exam     Tenderness   Left hand tenderness location: middle phalanx      Range of Motion     Wrist   Extension: normal   Flexion: normal   Pronation: normal   Supination: normal     Muscle Strength   :  4/5     Other   Erythema: absent  Scars: absent  Sensation: normal  Pulse: present              Xr Finger 2+ View Left    Result Date: 4/28/2017  Narrative: AP lateral oblique view of the left fourth digit reveals a stable oblique fracture of the middle phalanx without any other acute radiological abnormality seen. 04/28/17 at 4:07 PM by FLYNN Rascon     Xr Finger 2+ View Left    Result Date: 4/16/2017  Narrative: [facility] [place] Patient Name: NORMAN HADLEY ATTENDING: MELITON OWENS REFERRING: MELITON OWENS ORDERING: MELITON OWENS ----------------------- PROCEDURE: Right fourth finger Date of exam: 4/16/2017 HISTORY: Smashed finger in door. Three views of the finger there obtained. A nondisplaced oblique fracture involving the middle phalanx of the fourth finger is seen. There is adjacent soft tissue swelling over the mid and distal aspect of the finger. No foreign bodies are seen.     Impression: Nondisplaced  oblique fracture middle phalanx of fourth finger. Electronically signed by:  Abiel Kmi MD  4/16/2017 9:19 PM CDT Workstation: [id number]          ASSESSMENT:    Diagnoses and all orders for this visit:    Finger pain, left    Closed nondisplaced fracture of middle phalanx of left ring finger, initial encounter          PLAN  Recommended conservative treatment of the nondisplaced oblique medial phalanx fracture today with continued use of the 4 mary alice splint.  Follow-up recommended in 2 weeks for recheck and repeat x-ray.  No Follow-up on file.    Lupillo Hernandez, APRN

## 2017-04-28 NOTE — PROGRESS NOTES
I have reviewed the notes, assessments, and/or procedures performed by Jacobo Burris MD , I concur with her/his documentation of Nirav Lind.

## 2017-05-01 RX ORDER — LISINOPRIL 2.5 MG/1
TABLET ORAL
Qty: 30 TABLET | Refills: 2 | Status: SHIPPED | OUTPATIENT
Start: 2017-05-01 | End: 2017-09-14 | Stop reason: SDUPTHER

## 2017-05-18 ENCOUNTER — OFFICE VISIT (OUTPATIENT)
Dept: CARDIOLOGY | Facility: CLINIC | Age: 52
End: 2017-05-18

## 2017-05-18 VITALS
OXYGEN SATURATION: 98 % | SYSTOLIC BLOOD PRESSURE: 129 MMHG | WEIGHT: 237 LBS | HEIGHT: 72 IN | DIASTOLIC BLOOD PRESSURE: 74 MMHG | HEART RATE: 69 BPM | BODY MASS INDEX: 32.1 KG/M2

## 2017-05-18 DIAGNOSIS — E78.2 MIXED HYPERLIPIDEMIA: Primary | ICD-10-CM

## 2017-05-18 DIAGNOSIS — I34.0 NON-RHEUMATIC MITRAL REGURGITATION: ICD-10-CM

## 2017-05-18 DIAGNOSIS — R07.2 PRECORDIAL PAIN: ICD-10-CM

## 2017-05-18 DIAGNOSIS — I36.1 NON-RHEUMATIC TRICUSPID VALVE INSUFFICIENCY: ICD-10-CM

## 2017-05-18 DIAGNOSIS — I10 ESSENTIAL HYPERTENSION: ICD-10-CM

## 2017-05-18 DIAGNOSIS — E78.5 HYPERLIPIDEMIA, UNSPECIFIED HYPERLIPIDEMIA TYPE: Primary | ICD-10-CM

## 2017-05-18 DIAGNOSIS — I25.10 CORONARY ARTERY DISEASE INVOLVING NATIVE CORONARY ARTERY OF NATIVE HEART WITHOUT ANGINA PECTORIS: ICD-10-CM

## 2017-05-18 PROCEDURE — 99214 OFFICE O/P EST MOD 30 MIN: CPT | Performed by: INTERNAL MEDICINE

## 2017-05-18 PROCEDURE — 93000 ELECTROCARDIOGRAM COMPLETE: CPT | Performed by: INTERNAL MEDICINE

## 2017-05-24 LAB
BH CV ECHO MEAS - ACS: 2.1 CM
BH CV ECHO MEAS - AO ISTHMUS: 2.8 CM
BH CV ECHO MEAS - AO MAX PG (FULL): 6.6 MMHG
BH CV ECHO MEAS - AO MAX PG: 13.4 MMHG
BH CV ECHO MEAS - AO MEAN PG (FULL): 2 MMHG
BH CV ECHO MEAS - AO MEAN PG: 6 MMHG
BH CV ECHO MEAS - AO ROOT AREA: 7.1 CM^2
BH CV ECHO MEAS - AO ROOT DIAM: 3 CM
BH CV ECHO MEAS - AO V2 MAX: 183 CM/SEC
BH CV ECHO MEAS - AO V2 MEAN: 112 CM/SEC
BH CV ECHO MEAS - AO V2 VTI: 35.3 CM
BH CV ECHO MEAS - ASC AORTA: 2.9 CM
BH CV ECHO MEAS - AVA(I,A): 2.2 CM^2
BH CV ECHO MEAS - AVA(I,D): 2.2 CM^2
BH CV ECHO MEAS - AVA(V,A): 2.2 CM^2
BH CV ECHO MEAS - AVA(V,D): 2.2 CM^2
BH CV ECHO MEAS - EDV(CUBED): 125 ML
BH CV ECHO MEAS - EDV(TEICH): 118.2 ML
BH CV ECHO MEAS - EF(CUBED): 80.5 %
BH CV ECHO MEAS - EF(TEICH): 72.8 %
BH CV ECHO MEAS - ESV(CUBED): 24.4 ML
BH CV ECHO MEAS - ESV(TEICH): 32.2 ML
BH CV ECHO MEAS - FS: 42 %
BH CV ECHO MEAS - IVS/LVPW: 1
BH CV ECHO MEAS - IVSD: 1.2 CM
BH CV ECHO MEAS - LA DIMENSION: 4.1 CM
BH CV ECHO MEAS - LA/AO: 1.4
BH CV ECHO MEAS - LV MASS(C)D: 233.7 GRAMS
BH CV ECHO MEAS - LV MAX PG: 6.8 MMHG
BH CV ECHO MEAS - LV MEAN PG: 4 MMHG
BH CV ECHO MEAS - LV V1 MAX: 130 CM/SEC
BH CV ECHO MEAS - LV V1 MEAN: 86.5 CM/SEC
BH CV ECHO MEAS - LV V1 VTI: 25 CM
BH CV ECHO MEAS - LVIDD: 5 CM
BH CV ECHO MEAS - LVIDS: 2.9 CM
BH CV ECHO MEAS - LVOT AREA (M): 3.1 CM^2
BH CV ECHO MEAS - LVOT AREA: 3.1 CM^2
BH CV ECHO MEAS - LVOT DIAM: 2 CM
BH CV ECHO MEAS - LVPWD: 1.2 CM
BH CV ECHO MEAS - MR MAX PG: 96.4 MMHG
BH CV ECHO MEAS - MR MAX VEL: 491 CM/SEC
BH CV ECHO MEAS - MV A MAX VEL: 57.8 CM/SEC
BH CV ECHO MEAS - MV DEC SLOPE: 719 CM/SEC^2
BH CV ECHO MEAS - MV E MAX VEL: 79 CM/SEC
BH CV ECHO MEAS - MV E/A: 1.4
BH CV ECHO MEAS - MV MAX PG: 4.5 MMHG
BH CV ECHO MEAS - MV MEAN PG: 1 MMHG
BH CV ECHO MEAS - MV P1/2T MAX VEL: 105 CM/SEC
BH CV ECHO MEAS - MV P1/2T: 42.8 MSEC
BH CV ECHO MEAS - MV V2 MAX: 106 CM/SEC
BH CV ECHO MEAS - MV V2 MEAN: 39.4 CM/SEC
BH CV ECHO MEAS - MV V2 VTI: 27 CM
BH CV ECHO MEAS - MVA P1/2T LCG: 2.1 CM^2
BH CV ECHO MEAS - MVA(P1/2T): 5.1 CM^2
BH CV ECHO MEAS - MVA(VTI): 2.9 CM^2
BH CV ECHO MEAS - PA MAX PG: 7.8 MMHG
BH CV ECHO MEAS - PA V2 MAX: 140 CM/SEC
BH CV ECHO MEAS - RAP SYSTOLE: 10 MMHG
BH CV ECHO MEAS - RVDD: 2.5 CM
BH CV ECHO MEAS - RVSP: 39 MMHG
BH CV ECHO MEAS - SV(AO): 249.5 ML
BH CV ECHO MEAS - SV(CUBED): 100.6 ML
BH CV ECHO MEAS - SV(LVOT): 78.5 ML
BH CV ECHO MEAS - SV(TEICH): 86 ML
BH CV ECHO MEAS - TR MAX VEL: 280 CM/SEC

## 2017-06-01 ENCOUNTER — HOSPITAL ENCOUNTER (OUTPATIENT)
Dept: NUCLEAR MEDICINE | Facility: HOSPITAL | Age: 52
Discharge: HOME | End: 2017-06-01
Attending: INTERNAL MEDICINE

## 2017-06-01 ENCOUNTER — HOSPITAL ENCOUNTER (OUTPATIENT)
Dept: CARDIOLOGY | Facility: HOSPITAL | Age: 52
Discharge: HOME | End: 2017-06-01
Attending: INTERNAL MEDICINE

## 2017-06-01 LAB
BH CV STRESS BP STAGE 1: NORMAL
BH CV STRESS COMMENTS STAGE 1: NORMAL
BH CV STRESS DOSE REGADENOSON STAGE 1: 0.4
BH CV STRESS DURATION MIN STAGE 1: 0
BH CV STRESS DURATION SEC STAGE 1: 10
BH CV STRESS HR STAGE 1: 53
BH CV STRESS PROTOCOL 1: NORMAL
BH CV STRESS RECOVERY BP: NORMAL MMHG
BH CV STRESS RECOVERY HR: 73 BPM
BH CV STRESS STAGE 1: 1
LV EF NUC BP: 72 %
MAXIMAL PREDICTED HEART RATE: 166 BPM
PERCENT MAX PREDICTED HR: 62.05 %
STRESS BASELINE BP: NORMAL MMHG
STRESS BASELINE HR: 52 BPM
STRESS PERCENT HR: 73 %
STRESS POST ESTIMATED WORKLOAD: 1 METS
STRESS POST PEAK BP: NORMAL MMHG
STRESS POST PEAK HR: 103 BPM
STRESS TARGET HR: 141 BPM

## 2017-06-01 PROCEDURE — 25010000002 REGADENOSON 0.4 MG/5ML SOLUTION: Performed by: INTERNAL MEDICINE

## 2017-06-01 PROCEDURE — 0 TECHNETIUM SESTAMIBI: Performed by: INTERNAL MEDICINE

## 2017-06-01 PROCEDURE — A9500 TC99M SESTAMIBI: HCPCS | Performed by: INTERNAL MEDICINE

## 2017-06-01 PROCEDURE — 93017 CV STRESS TEST TRACING ONLY: CPT

## 2017-06-01 PROCEDURE — 78452 HT MUSCLE IMAGE SPECT MULT: CPT

## 2017-06-01 PROCEDURE — 93016 CV STRESS TEST SUPVJ ONLY: CPT | Performed by: INTERNAL MEDICINE

## 2017-06-01 PROCEDURE — 93018 CV STRESS TEST I&R ONLY: CPT | Performed by: INTERNAL MEDICINE

## 2017-06-01 PROCEDURE — 78452 HT MUSCLE IMAGE SPECT MULT: CPT | Performed by: INTERNAL MEDICINE

## 2017-06-01 RX ORDER — 0.9 % SODIUM CHLORIDE 0.9 %
10 VIAL (ML) INJECTION AS NEEDED
Status: DISCONTINUED | OUTPATIENT
Start: 2017-06-01 | End: 2017-06-02 | Stop reason: HOSPADM

## 2017-06-01 RX ADMIN — SODIUM CHLORIDE 10 ML: 9 INJECTION INTRAMUSCULAR; INTRAVENOUS; SUBCUTANEOUS at 09:05

## 2017-06-01 RX ADMIN — Medication 1 DOSE: at 07:40

## 2017-06-01 RX ADMIN — REGADENOSON 0.4 MG: 0.08 INJECTION, SOLUTION INTRAVENOUS at 09:05

## 2017-06-01 RX ADMIN — Medication 1 DOSE: at 09:05

## 2017-06-13 RX ORDER — METFORMIN HYDROCHLORIDE 750 MG/1
TABLET, EXTENDED RELEASE ORAL
Qty: 30 TABLET | Refills: 2 | Status: SHIPPED | OUTPATIENT
Start: 2017-06-13 | End: 2017-06-29

## 2017-06-19 ENCOUNTER — OFFICE VISIT (OUTPATIENT)
Dept: FAMILY MEDICINE CLINIC | Facility: CLINIC | Age: 52
End: 2017-06-19

## 2017-06-19 VITALS
DIASTOLIC BLOOD PRESSURE: 82 MMHG | BODY MASS INDEX: 31.41 KG/M2 | WEIGHT: 231.9 LBS | OXYGEN SATURATION: 99 % | SYSTOLIC BLOOD PRESSURE: 122 MMHG | HEIGHT: 72 IN | TEMPERATURE: 97 F | HEART RATE: 63 BPM

## 2017-06-19 DIAGNOSIS — K21.9 GASTROESOPHAGEAL REFLUX DISEASE, ESOPHAGITIS PRESENCE NOT SPECIFIED: ICD-10-CM

## 2017-06-19 DIAGNOSIS — H66.91 RIGHT OTITIS MEDIA, UNSPECIFIED CHRONICITY, UNSPECIFIED OTITIS MEDIA TYPE: Primary | ICD-10-CM

## 2017-06-19 PROCEDURE — 99213 OFFICE O/P EST LOW 20 MIN: CPT | Performed by: FAMILY MEDICINE

## 2017-06-19 RX ORDER — AMOXICILLIN 500 MG/1
1000 CAPSULE ORAL 2 TIMES DAILY
Qty: 20 CAPSULE | Refills: 0 | Status: SHIPPED | OUTPATIENT
Start: 2017-06-19 | End: 2017-06-29

## 2017-06-19 RX ORDER — TOBRAMYCIN AND DEXAMETHASONE 3; 1 MG/ML; MG/ML
SUSPENSION/ DROPS OPHTHALMIC
COMMUNITY
Start: 2017-06-17 | End: 2017-06-29

## 2017-06-19 RX ORDER — PANTOPRAZOLE SODIUM 40 MG/1
40 TABLET, DELAYED RELEASE ORAL DAILY
Qty: 30 TABLET | Refills: 1 | Status: SHIPPED | OUTPATIENT
Start: 2017-06-19 | End: 2017-09-14 | Stop reason: SDUPTHER

## 2017-06-19 RX ORDER — IBUPROFEN 600 MG/1
TABLET ORAL
Refills: 0 | COMMUNITY
Start: 2017-05-05 | End: 2017-11-17

## 2017-06-19 NOTE — PROGRESS NOTES
Subjective   Nirav Lind is a 52 y.o. male.     Presented today complaining of right ear pain, started about 3 days ago, and continue to persist. Pt states that he has not notice any fever, and denies hearing loss.   States that his GERD was under control, but he has stopped taking his medication, he has been eating late at night and spicy foods, which has made his GERD worse.     Earache    There is pain in the right ear. This is a new problem. The current episode started in the past 7 days. The problem occurs constantly. The problem has been unchanged. There has been no fever. The pain is at a severity of 6/10. The pain is moderate. Associated symptoms include headaches. Pertinent negatives include no abdominal pain, coughing, diarrhea, ear discharge, hearing loss, neck pain, rash, rhinorrhea, sore throat or vomiting. He has tried nothing for the symptoms. The treatment provided no relief. There is no history of a chronic ear infection, hearing loss or a tympanostomy tube.   Heartburn   He complains of globus sensation and heartburn. He reports no abdominal pain, no belching, no chest pain, no choking, no coughing, no dysphagia, no early satiety, no hoarse voice, no nausea, no sore throat, no tooth decay, no water brash or no wheezing. This is a recurrent problem. The current episode started in the past 7 days. The problem occurs constantly. The heartburn wakes him from sleep. The heartburn limits his activity. Exacerbated by: spicy food. Associated symptoms include weight loss. Pertinent negatives include no anemia, fatigue, melena, muscle weakness or orthopnea. Risk factors include obesity and caffeine use. He has tried nothing for the symptoms. The treatment provided no relief. Past procedures include an EGD. Past procedures do not include an abdominal ultrasound, esophageal manometry or esophageal pH monitoring. Past invasive treatments do not include gastroplasty or gastroplication.        The following  portions of the patient's history were reviewed and updated as appropriate:   He  has a past medical history of Abnormal computed tomography scan; Adenomatous polyp of colon; Allergic rhinitis; Anemia; Chronic back pain; Coronary arteriosclerosis; Diabetes mellitus; Dizziness; Dyspnea; Epigastric pain; Essential hypertension; Ex-smoker; Hemangioma of liver; Hyperlipidemia; Infected hydrocele; Nausea; Nausea with vomiting; Obesity with body mass index of 30.0 - 39.9; Pain in right knee; Right upper quadrant pain; Type II diabetes mellitus, uncontrolled; Upper respiratory infection; Urgency of urination; and Villous adenoma of colon.  He  does not have any pertinent problems on file.  He  has a past surgical history that includes Cardiac catheterization (11/30/2015); Colonoscopy w/ polypectomy (10/13/2014); Colonoscopy (01/21/2013); Esophagogastroduodenoscopy (06/15/2016); Incision and Drainage Abscess (10/29/2014); and Injection of Medication (01/26/2016).  His family history includes Cancer in his mother; Heart disease in his sister.  He  reports that he has never smoked. He does not have any smokeless tobacco history on file. He reports that he drinks alcohol. Drug use questions deferred to the physician.  Current Outpatient Prescriptions   Medication Sig Dispense Refill   • albuterol (VENTOLIN HFA) 108 (90 BASE) MCG/ACT inhaler Inhale 2 puffs 2 (two) times a day.     • Alcohol Swabs 70 % pads USE UTD QID  11   • amitriptyline (ELAVIL) 25 MG tablet Take 25 mg by mouth 3 (three) times a day.     • amLODIPine (NORVASC) 10 MG tablet Take 10 mg by mouth daily.     • amoxicillin (AMOXIL) 500 MG capsule Take 2 capsules by mouth 2 (Two) Times a Day. 20 capsule 0   • ASPIRIN LOW DOSE 81 MG EC tablet TAKE 1 TABLET BY MOUTH EVERY DAY 30 tablet 0   • atorvastatin (LIPITOR) 20 MG tablet Take 1 tablet by mouth Daily. 30 tablet 2   • B-D UF III MINI PEN NEEDLES 31G X 5 MM misc USE QID UTD  11   • budesonide-formoterol  (SYMBICORT) 160-4.5 MCG/ACT inhaler Inhale 2 puffs 2 (two) times a day.     • carvedilol (COREG) 3.125 MG tablet Take 3.125 mg by mouth 2 (two) times a day.     • cyclobenzaprine (FLEXERIL) 5 MG tablet Take 1 tablet by mouth 3 (Three) Times a Day As Needed for Muscle Spasms. 90 tablet 3   • diclofenac (VOLTAREN) 50 MG EC tablet Take 1 tablet by mouth 2 (Two) Times a Day. 30 tablet 0   • fexofenadine (ALLEGRA ALLERGY) 180 MG tablet Take 1 tablet by mouth Daily. 30 tablet 1   • FREESTYLE LITE test strip USE TO CHECK BLOOD SUGAR QID  11   • gabapentin (NEURONTIN) 400 MG capsule Take 1 capsule by mouth 3 (Three) Times a Day. 90 capsule 5   • HYDROcodone-acetaminophen (NORCO) 5-325 MG per tablet Take 1 tablet by mouth Every 4 (Four) Hours As Needed for Moderate Pain (4-6). 20 tablet 0   • ibuprofen (ADVIL,MOTRIN) 600 MG tablet TK 1 T PO Q 8 H  0   • Insulin Glargine (TOUJEO SOLOSTAR) 300 UNIT/ML solution pen-injector Inject  under the skin daily.     • Lancets (FREESTYLE) lancets USE TO CHECK BLOOD SUGAR QID  11   • lisinopril (PRINIVIL,ZESTRIL) 2.5 MG tablet TAKE 1 TABLET BY MOUTH DAILY 30 tablet 2   • MAGNESIUM-OXIDE 400 (241.3 MG) MG tablet tablet TK 1 T PO BID  12   • metFORMIN ER (GLUCOPHAGE-XR) 750 MG 24 hr tablet Take 750 mg by mouth Daily.     • metFORMIN ER (GLUCOPHAGE-XR) 750 MG 24 hr tablet TAKE 1 TABLET BY MOUTH DAILY WITH BREAKFAST 30 tablet 2   • metoprolol succinate XL (TOPROL-XL) 25 MG 24 hr tablet TK 1 T PO BID  3   • OLANZapine (ZYPREXA) 10 MG tablet Take 10 mg by mouth daily.     • ondansetron (ZOFRAN) 4 MG tablet Take 1 tablet by mouth every 8 (eight) hours as needed for nausea or vomiting. 30 tablet 0   • pantoprazole (PROTONIX) 40 MG EC tablet Take 1 tablet by mouth Daily. 30 tablet 1   • sertraline (ZOLOFT) 100 MG tablet Take 100 mg by mouth daily.     • tiotropium (SPIRIVA HANDIHALER) 18 MCG per inhalation capsule Place 1 capsule into inhaler and inhale Daily. 30 capsule 2   •  tobramycin-dexamethasone (TOBRADEX) 0.3-0.1 % ophthalmic suspension        No current facility-administered medications for this visit.      Current Outpatient Prescriptions on File Prior to Visit   Medication Sig   • albuterol (VENTOLIN HFA) 108 (90 BASE) MCG/ACT inhaler Inhale 2 puffs 2 (two) times a day.   • Alcohol Swabs 70 % pads USE UTD QID   • amitriptyline (ELAVIL) 25 MG tablet Take 25 mg by mouth 3 (three) times a day.   • amLODIPine (NORVASC) 10 MG tablet Take 10 mg by mouth daily.   • ASPIRIN LOW DOSE 81 MG EC tablet TAKE 1 TABLET BY MOUTH EVERY DAY   • atorvastatin (LIPITOR) 20 MG tablet Take 1 tablet by mouth Daily.   • B-D UF III MINI PEN NEEDLES 31G X 5 MM misc USE QID UTD   • budesonide-formoterol (SYMBICORT) 160-4.5 MCG/ACT inhaler Inhale 2 puffs 2 (two) times a day.   • carvedilol (COREG) 3.125 MG tablet Take 3.125 mg by mouth 2 (two) times a day.   • cyclobenzaprine (FLEXERIL) 5 MG tablet Take 1 tablet by mouth 3 (Three) Times a Day As Needed for Muscle Spasms.   • diclofenac (VOLTAREN) 50 MG EC tablet Take 1 tablet by mouth 2 (Two) Times a Day.   • fexofenadine (ALLEGRA ALLERGY) 180 MG tablet Take 1 tablet by mouth Daily.   • FREESTYLE LITE test strip USE TO CHECK BLOOD SUGAR QID   • gabapentin (NEURONTIN) 400 MG capsule Take 1 capsule by mouth 3 (Three) Times a Day.   • HYDROcodone-acetaminophen (NORCO) 5-325 MG per tablet Take 1 tablet by mouth Every 4 (Four) Hours As Needed for Moderate Pain (4-6).   • Insulin Glargine (TOUJEO SOLOSTAR) 300 UNIT/ML solution pen-injector Inject  under the skin daily.   • Lancets (FREESTYLE) lancets USE TO CHECK BLOOD SUGAR QID   • lisinopril (PRINIVIL,ZESTRIL) 2.5 MG tablet TAKE 1 TABLET BY MOUTH DAILY   • MAGNESIUM-OXIDE 400 (241.3 MG) MG tablet tablet TK 1 T PO BID   • metFORMIN ER (GLUCOPHAGE-XR) 750 MG 24 hr tablet Take 750 mg by mouth Daily.   • metFORMIN ER (GLUCOPHAGE-XR) 750 MG 24 hr tablet TAKE 1 TABLET BY MOUTH DAILY WITH BREAKFAST   • metoprolol  succinate XL (TOPROL-XL) 25 MG 24 hr tablet TK 1 T PO BID   • OLANZapine (ZYPREXA) 10 MG tablet Take 10 mg by mouth daily.   • ondansetron (ZOFRAN) 4 MG tablet Take 1 tablet by mouth every 8 (eight) hours as needed for nausea or vomiting.   • sertraline (ZOLOFT) 100 MG tablet Take 100 mg by mouth daily.   • tiotropium (SPIRIVA HANDIHALER) 18 MCG per inhalation capsule Place 1 capsule into inhaler and inhale Daily.   • [DISCONTINUED] gabapentin (NEURONTIN) 300 MG capsule TK 1 C PO TID.   • [DISCONTINUED] pantoprazole (PROTONIX) 40 MG EC tablet Take 40 mg by mouth daily.     No current facility-administered medications on file prior to visit.      He has No Known Allergies..    Review of Systems   Constitutional: Positive for weight loss. Negative for chills and fatigue.   HENT: Positive for ear pain. Negative for ear discharge, hearing loss, hoarse voice, rhinorrhea and sore throat.    Respiratory: Negative for cough, choking and wheezing.    Cardiovascular: Negative for chest pain, palpitations and leg swelling.   Gastrointestinal: Positive for heartburn. Negative for abdominal pain, diarrhea, dysphagia, melena, nausea and vomiting.   Genitourinary: Negative for dysuria, frequency and urgency.   Musculoskeletal: Negative for muscle weakness and neck pain.   Skin: Negative for rash.   Neurological: Positive for headaches.       Objective    Vitals:    06/19/17 0913   BP: 122/82   Pulse: 63   Temp: 97 °F (36.1 °C)   SpO2: 99%       Physical Exam   Constitutional: He is oriented to person, place, and time. He appears well-developed.   HENT:   Head: Normocephalic.   Right Ear: No foreign bodies. Tympanic membrane is injected.   Eyes: Pupils are equal, round, and reactive to light.   Neck: Normal range of motion.   Cardiovascular: Normal rate, regular rhythm and normal heart sounds.    Pulmonary/Chest: Effort normal and breath sounds normal.   Abdominal: Soft. Bowel sounds are normal. He exhibits no distension. There is  no tenderness.   Musculoskeletal: Normal range of motion.   Neurological: He is alert and oriented to person, place, and time.   Vitals reviewed.      Assessment/Plan   Nirav was seen today for earache, heartburn and follow-up.    Diagnoses and all orders for this visit:    Right otitis media, unspecified chronicity, unspecified otitis media type    Gastroesophageal reflux disease, esophagitis presence not specified    Other orders  -     amoxicillin (AMOXIL) 500 MG capsule; Take 2 capsules by mouth 2 (Two) Times a Day.  -     pantoprazole (PROTONIX) 40 MG EC tablet; Take 1 tablet by mouth Daily.      Start with amoxicillin, call back in 1 week if it does not get better.   Start protonix for GERD, avoid the foods listed in the hand out. Avoid late night meals.           This document has been electronically signed by Jacobo Burris MD on June 19, 2017 9:45 AM

## 2017-06-19 NOTE — PATIENT INSTRUCTIONS
Food Choices for Gastroesophageal Reflux Disease, Adult  When you have gastroesophageal reflux disease (GERD), the foods you eat and your eating habits are very important. Choosing the right foods can help ease the discomfort of GERD.  WHAT GENERAL GUIDELINES DO I NEED TO FOLLOW?  · Choose fruits, vegetables, whole grains, low-fat dairy products, and low-fat meat, fish, and poultry.  · Limit fats such as oils, salad dressings, butter, nuts, and avocado.  · Keep a food diary to identify foods that cause symptoms.  · Avoid foods that cause reflux. These may be different for different people.  · Eat frequent small meals instead of three large meals each day.  · Eat your meals slowly, in a relaxed setting.  · Limit fried foods.  · Cook foods using methods other than frying.  · Avoid drinking alcohol.  · Avoid drinking large amounts of liquids with your meals.  · Avoid bending over or lying down until 2-3 hours after eating.  WHAT FOODS ARE NOT RECOMMENDED?  The following are some foods and drinks that may worsen your symptoms:  Vegetables  Tomatoes. Tomato juice. Tomato and spaghetti sauce. Chili peppers. Onion and garlic. Horseradish.  Fruits  Oranges, grapefruit, and lemon (fruit and juice).  Meats  High-fat meats, fish, and poultry. This includes hot dogs, ribs, ham, sausage, salami, and alvarez.  Dairy  Whole milk and chocolate milk. Sour cream. Cream. Butter. Ice cream. Cream cheese.   Beverages  Coffee and tea, with or without caffeine. Carbonated beverages or energy drinks.  Condiments  Hot sauce. Barbecue sauce.   Sweets/Desserts  Chocolate and cocoa. Donuts. Peppermint and spearmint.  Fats and Oils  High-fat foods, including French fries and potato chips.  Other  Vinegar. Strong spices, such as black pepper, white pepper, red pepper, cayenne, reeves powder, cloves, ginger, and chili powder.  The items listed above may not be a complete list of foods and beverages to avoid. Contact your dietitian for more  information.     This information is not intended to replace advice given to you by your health care provider. Make sure you discuss any questions you have with your health care provider.     Document Released: 12/18/2006 Document Revised: 01/08/2016 Document Reviewed: 10/22/2014  [employer] Patient Education ©2017 [employer]

## 2017-06-19 NOTE — PROGRESS NOTES
I discussed the case with the resident and I agree with their assessment includes GE Reflux and acute Otitis Media and plan as outlined by Dr. Burris .  Andrew Whitaker MD.

## 2017-06-22 ENCOUNTER — TELEPHONE (OUTPATIENT)
Dept: ENDOCRINOLOGY | Facility: CLINIC | Age: 52
End: 2017-06-22

## 2017-06-22 RX ORDER — BLOOD-GLUCOSE METER
200 KIT MISCELLANEOUS AS NEEDED
Qty: 30 EACH | Refills: 0 | Status: SHIPPED | OUTPATIENT
Start: 2017-06-22 | End: 2017-07-18 | Stop reason: SDUPTHER

## 2017-06-29 ENCOUNTER — APPOINTMENT (OUTPATIENT)
Dept: LAB | Facility: HOSPITAL | Age: 52
End: 2017-06-29

## 2017-06-29 ENCOUNTER — OFFICE VISIT (OUTPATIENT)
Dept: ORTHOPEDIC SURGERY | Facility: CLINIC | Age: 52
End: 2017-06-29

## 2017-06-29 VITALS — WEIGHT: 232 LBS | HEIGHT: 72 IN | BODY MASS INDEX: 31.42 KG/M2

## 2017-06-29 DIAGNOSIS — M79.645 FINGER PAIN, LEFT: ICD-10-CM

## 2017-06-29 DIAGNOSIS — S62.655D CLOSED NONDISPLACED FRACTURE OF MIDDLE PHALANX OF LEFT RING FINGER WITH ROUTINE HEALING, SUBSEQUENT ENCOUNTER: Primary | ICD-10-CM

## 2017-06-29 LAB
25(OH)D3 SERPL-MCNC: 34.5 NG/ML (ref 30–100)
ALBUMIN SERPL-MCNC: 4.6 G/DL (ref 3.4–4.8)
ALBUMIN UR-MCNC: 5 MG/L
ALBUMIN/GLOB SERPL: 1.2 G/DL (ref 1.1–1.8)
ALP SERPL-CCNC: 119 U/L (ref 38–126)
ALT SERPL W P-5'-P-CCNC: 77 U/L (ref 21–72)
ANION GAP SERPL CALCULATED.3IONS-SCNC: 15 MMOL/L (ref 5–15)
ARTICHOKE IGE QN: 41 MG/DL (ref 1–129)
AST SERPL-CCNC: 51 U/L (ref 17–59)
BILIRUB SERPL-MCNC: 0.6 MG/DL (ref 0.2–1.3)
BUN BLD-MCNC: 14 MG/DL (ref 7–21)
BUN/CREAT SERPL: 13.7 (ref 7–25)
CALCIUM SPEC-SCNC: 9.1 MG/DL (ref 8.4–10.2)
CHLORIDE SERPL-SCNC: 103 MMOL/L (ref 95–110)
CHOLEST SERPL-MCNC: 86 MG/DL (ref 0–199)
CO2 SERPL-SCNC: 25 MMOL/L (ref 22–31)
CREAT BLD-MCNC: 1.02 MG/DL (ref 0.7–1.3)
CREAT UR-MCNC: 484.6 MG/DL
CREAT UR-MCNC: 484.6 MG/DL
DEPRECATED RDW RBC AUTO: 42.3 FL (ref 35.1–43.9)
ERYTHROCYTE [DISTWIDTH] IN BLOOD BY AUTOMATED COUNT: 13.3 % (ref 11.5–14.5)
GFR SERPL CREATININE-BSD FRML MDRD: 92 ML/MIN/1.73 (ref 56–130)
GLOBULIN UR ELPH-MCNC: 3.9 GM/DL (ref 2.3–3.5)
GLUCOSE BLD-MCNC: 85 MG/DL (ref 60–100)
HBA1C MFR BLD: 6.15 % (ref 4–5.6)
HCT VFR BLD AUTO: 43.5 % (ref 39–49)
HDLC SERPL-MCNC: 30 MG/DL (ref 60–200)
HGB BLD-MCNC: 14.2 G/DL (ref 13.7–17.3)
LDLC/HDLC SERPL: 1.45 {RATIO} (ref 0–3.55)
MCH RBC QN AUTO: 28.4 PG (ref 26.5–34)
MCHC RBC AUTO-ENTMCNC: 32.6 G/DL (ref 31.5–36.3)
MCV RBC AUTO: 87 FL (ref 80–98)
MICROALBUMIN/CREAT UR: 10.3 MG/G (ref 0–30)
PLATELET # BLD AUTO: 255 10*3/MM3 (ref 150–450)
PMV BLD AUTO: 10 FL (ref 8–12)
POTASSIUM BLD-SCNC: 4.1 MMOL/L (ref 3.5–5.1)
PROT SERPL-MCNC: 8.5 G/DL (ref 6.3–8.6)
PROT UR-MCNC: 9.5 MG/DL
PROT/CREAT UR: 19.6 MG/G CREA (ref 0–200)
RBC # BLD AUTO: 5 10*6/MM3 (ref 4.37–5.74)
SODIUM BLD-SCNC: 143 MMOL/L (ref 137–145)
TRIGL SERPL-MCNC: 63 MG/DL (ref 20–199)
WBC NRBC COR # BLD: 8.67 10*3/MM3 (ref 3.2–9.8)

## 2017-06-29 PROCEDURE — 83036 HEMOGLOBIN GLYCOSYLATED A1C: CPT | Performed by: NURSE PRACTITIONER

## 2017-06-29 PROCEDURE — 80053 COMPREHEN METABOLIC PANEL: CPT | Performed by: NURSE PRACTITIONER

## 2017-06-29 PROCEDURE — 82043 UR ALBUMIN QUANTITATIVE: CPT | Performed by: NURSE PRACTITIONER

## 2017-06-29 PROCEDURE — 84156 ASSAY OF PROTEIN URINE: CPT | Performed by: NURSE PRACTITIONER

## 2017-06-29 PROCEDURE — 82570 ASSAY OF URINE CREATININE: CPT | Performed by: NURSE PRACTITIONER

## 2017-06-29 PROCEDURE — 80061 LIPID PANEL: CPT | Performed by: NURSE PRACTITIONER

## 2017-06-29 PROCEDURE — 85027 COMPLETE CBC AUTOMATED: CPT | Performed by: NURSE PRACTITIONER

## 2017-06-29 PROCEDURE — 82306 VITAMIN D 25 HYDROXY: CPT | Performed by: NURSE PRACTITIONER

## 2017-06-29 PROCEDURE — 99024 POSTOP FOLLOW-UP VISIT: CPT | Performed by: ORTHOPAEDIC SURGERY

## 2017-06-29 PROCEDURE — 36415 COLL VENOUS BLD VENIPUNCTURE: CPT | Performed by: NURSE PRACTITIONER

## 2017-07-05 ENCOUNTER — OFFICE VISIT (OUTPATIENT)
Dept: CARDIOLOGY | Facility: CLINIC | Age: 52
End: 2017-07-05

## 2017-07-05 VITALS
OXYGEN SATURATION: 94 % | SYSTOLIC BLOOD PRESSURE: 138 MMHG | BODY MASS INDEX: 31.02 KG/M2 | HEIGHT: 72 IN | DIASTOLIC BLOOD PRESSURE: 74 MMHG | WEIGHT: 229 LBS | HEART RATE: 71 BPM

## 2017-07-05 DIAGNOSIS — R07.2 PRECORDIAL PAIN: Primary | ICD-10-CM

## 2017-07-05 DIAGNOSIS — I25.10 CORONARY ARTERIOSCLEROSIS: ICD-10-CM

## 2017-07-05 PROCEDURE — 99213 OFFICE O/P EST LOW 20 MIN: CPT | Performed by: NURSE PRACTITIONER

## 2017-07-05 NOTE — PROGRESS NOTES
Subjective:     Chief Complaint   Patient presents with   • Chest Pain     Follow up for procedure results     History of Present Illness  Mr. Lind is a 52-year-old gentleman who presents to the office as indicated above.  Patient was seen in office by Dr. Crowley on May 18 regarding chest discomfort in the setting of moderate risk for progression of coronary artery disease.   The patient had undergone left heart catheterization in December 2015 noting first diagonal with lesion at 30%.     Dr. Crowley recommended myocardial perfusion scan as well as transthoracic echocardiogram which was carried out.  Review of Systems   Cardiovascular: Negative for chest pain and dyspnea on exertion.     Past Medical History:   Diagnosis Date   • Abnormal computed tomography scan    • Adenomatous polyp of colon    • Allergic rhinitis    • Anemia    • Chronic back pain    • Coronary arteriosclerosis    • Diabetes mellitus    • Dizziness    • Dyspnea     unspecified   • Epigastric pain    • Essential hypertension    • Ex-smoker    • Hemangioma of liver    • Hyperlipidemia    • Infected hydrocele     with orchitis and epididymis   • Nausea    • Nausea with vomiting    • Obesity with body mass index of 30.0 - 39.9    • Pain in right knee    • Right upper quadrant pain    • Type II diabetes mellitus, uncontrolled    • Upper respiratory infection    • Urgency of urination    • Villous adenoma of colon      Past Surgical History:   Procedure Laterality Date   • CARDIAC CATHETERIZATION  11/30/2015    No evidence of any obstructive disease in the circumflex, the RCA , or LAD. 1st diagonal branch in the midportion with 20-30% stenosis. Preserved LV systolic function with EF 55%.   • COLONOSCOPY  01/21/2013    Normal   • COLONOSCOPY W/ POLYPECTOMY  10/13/2014    A single polyp was found in the colon; removed by snare cautery polypectomy.   • ENDOSCOPY  06/15/2016    Mildly severe esophagitis. Several biopsies obtained in the upper  third of the esophagus.   • INCISION AND DRAINAGE ABSCESS  10/29/2014    Incision and drainage of scrotal abscess. Hydrocelectomy, bottle procedure.   • INJECTION OF MEDICATION  01/26/2016    Kenalog     Social History     Social History   • Marital status:      Spouse name: N/A   • Number of children: N/A   • Years of education: N/A     Social History Main Topics   • Smoking status: Never Smoker   • Smokeless tobacco: None   • Alcohol use Yes   • Drug use: Defer   • Sexual activity: Not Asked     Other Topics Concern   • None     Social History Narrative     Current Outpatient Prescriptions   Medication Sig Dispense Refill   • albuterol (VENTOLIN HFA) 108 (90 BASE) MCG/ACT inhaler Inhale 2 puffs 2 (two) times a day.     • Alcohol Swabs 70 % pads USE UTD QID  11   • amitriptyline (ELAVIL) 25 MG tablet Take 25 mg by mouth 3 (three) times a day.     • ASPIRIN LOW DOSE 81 MG EC tablet TAKE 1 TABLET BY MOUTH EVERY DAY 30 tablet 0   • atorvastatin (LIPITOR) 20 MG tablet Take 1 tablet by mouth Daily. 30 tablet 2   • B-D UF III MINI PEN NEEDLES 31G X 5 MM misc USE QID UTD  11   • budesonide-formoterol (SYMBICORT) 160-4.5 MCG/ACT inhaler Inhale 2 puffs 2 (two) times a day.     • cyclobenzaprine (FLEXERIL) 5 MG tablet Take 1 tablet by mouth 3 (Three) Times a Day As Needed for Muscle Spasms. 90 tablet 3   • diclofenac (VOLTAREN) 50 MG EC tablet Take 1 tablet by mouth 2 (Two) Times a Day. 30 tablet 0   • fexofenadine (ALLEGRA ALLERGY) 180 MG tablet Take 1 tablet by mouth Daily. 30 tablet 1   • FREESTYLE LITE test strip 200 each by Other route As Needed (prn). Use as instructed 30 each 0   • gabapentin (NEURONTIN) 400 MG capsule Take 1 capsule by mouth 3 (Three) Times a Day. 90 capsule 5   • HYDROcodone-acetaminophen (NORCO) 5-325 MG per tablet Take 1 tablet by mouth Every 4 (Four) Hours As Needed for Moderate Pain (4-6). 20 tablet 0   • ibuprofen (ADVIL,MOTRIN) 600 MG tablet TK 1 T PO Q 8 H  0   • Insulin Glargine  "(TOUJEO SOLOSTAR) 300 UNIT/ML solution pen-injector Inject  under the skin daily.     • Lancets (FREESTYLE) lancets USE TO CHECK BLOOD SUGAR QID  11   • lisinopril (PRINIVIL,ZESTRIL) 2.5 MG tablet TAKE 1 TABLET BY MOUTH DAILY 30 tablet 2   • MAGNESIUM-OXIDE 400 (241.3 MG) MG tablet tablet TK 1 T PO BID  12   • metFORMIN ER (GLUCOPHAGE-XR) 750 MG 24 hr tablet Take 750 mg by mouth Daily.     • metoprolol succinate XL (TOPROL-XL) 25 MG 24 hr tablet TK 1 T PO BID  3   • OLANZapine (ZYPREXA) 10 MG tablet Take 10 mg by mouth daily.     • ondansetron (ZOFRAN) 4 MG tablet Take 1 tablet by mouth every 8 (eight) hours as needed for nausea or vomiting. 30 tablet 0   • pantoprazole (PROTONIX) 40 MG EC tablet Take 1 tablet by mouth Daily. 30 tablet 1   • sertraline (ZOLOFT) 100 MG tablet Take 100 mg by mouth daily.     • tiotropium (SPIRIVA HANDIHALER) 18 MCG per inhalation capsule Place 1 capsule into inhaler and inhale Daily. 30 capsule 2     No current facility-administered medications for this visit.      Objective:     Vitals:    07/05/17 1458   BP: 138/74   BP Location: Left arm   Patient Position: Sitting   Cuff Size: Adult   Pulse: 71   SpO2: 94%   Weight: 229 lb (104 kg)   Height: 72\" (182.9 cm)      Physical Exam   Constitutional: He is oriented to person, place, and time. He appears well-developed and well-nourished. No distress.   HENT:   Head: Normocephalic.   Pulmonary/Chest: Effort normal. No respiratory distress.   Neurological: He is alert and oriented to person, place, and time.   Skin: Skin is warm and dry. He is not diaphoretic.   Psychiatric: He has a normal mood and affect. His behavior is normal. Judgment and thought content normal.   Vitals reviewed.    Cardiographics  Echocardiogram: 05/24/2017  · Left Ventricle: Left ventricular systolic function is normal. Estimated EF appears to be in the range of 61 - 65%.  · Left ventricular wall thickness is consistent with mild concentric hypertrophy  · Left " ventricular diastolic function is normal  · Right Ventricle: Normal right ventricular cavity size, wall thickness, systolic function and septal motion noted  · Trace-to-mild mitral valve regurgitation is present.  · Trace to mild tricuspid valve regurgitation is present  · Estimated right ventricular systolic pressure from tricuspid regurgitation is mildly elevated (35-45 mmHg).  · No evidence of pulmonary hypertension is present  · There is no evidence of pericardial effusion.      MPS: 06/01/2017  · Nuclear Study Description: A 1-day rest/stress protocol myocardial perfusion imaging study was performed  · Nuclear Perfusion Images: Overall image quality is excellent. Diaphragmatic attenuation artifact is present.  · Stress Description: A pharmacological stress test was performed using regadenoson without low-level exercise.  · Stress Findings: No ECG evidence of myocardial ischemia  · Study Impression: Myocardial perfusion imaging indicates a normal myocardial perfusion study with no evidence of ischemia  · Ventricle Size / Description: Left ventricular ejection fraction is hyperdynamic (Calculated EF > 70%).  · Impressions are consistent with a low risk study    Lab Results   Component Value Date    GLUCOSE 85 06/29/2017    CALCIUM 9.1 06/29/2017     06/29/2017    K 4.1 06/29/2017    CO2 25.0 06/29/2017     06/29/2017    BUN 14 06/29/2017    CREATININE 1.02 06/29/2017    EGFRIFAFRI 92 06/29/2017    BCR 13.7 06/29/2017    ANIONGAP 15.0 06/29/2017     Lab Results   Component Value Date    WBC 8.67 06/29/2017    HGB 14.2 06/29/2017    HCT 43.5 06/29/2017    MCV 87.0 06/29/2017     06/29/2017     Lab Results   Component Value Date    CHOL 86 06/29/2017     Lab Results   Component Value Date    TRIG 63 06/29/2017    TRIG 111 11/04/2016     Lab Results   Component Value Date    HDL 30 (L) 06/29/2017    HDL 29 (L) 11/04/2016     Lab Results   Component Value Date    LDLCALC 88 11/04/2016     No results  "found for: LDL  No results found for: HDLLDLRATIO  No components found for: CHOLHDL  Lab Results   Component Value Date    TSH 1.26 11/04/2016     The following portions of the patient's history were reviewed and updated as appropriate: allergies, current medications, past family history, past medical history, past social history, past surgical history and problem list.     Assessment:      Diagnosis Plan   1. Precordial pain     2. Coronary arteriosclerosis        Discussion with the patient regarding procedural results (transthoracic echocardiogram and myocardial perfusion study) all questions answered.     I have strongly encouraged the patient and continuation of following with his primary care provider and keeping his \"numbers under control\" as pertains to diabetes, hypertension, hyperlipidemia in order to better control progression of coronary artery disease.    We will be glad to see the patient back in cardiology as needed.     "

## 2017-07-17 ENCOUNTER — OFFICE VISIT (OUTPATIENT)
Dept: ENDOCRINOLOGY | Facility: CLINIC | Age: 52
End: 2017-07-17

## 2017-07-17 VITALS
HEART RATE: 66 BPM | SYSTOLIC BLOOD PRESSURE: 156 MMHG | HEIGHT: 72 IN | WEIGHT: 230 LBS | DIASTOLIC BLOOD PRESSURE: 70 MMHG | BODY MASS INDEX: 31.15 KG/M2

## 2017-07-17 DIAGNOSIS — E78.2 MIXED HYPERLIPIDEMIA: ICD-10-CM

## 2017-07-17 DIAGNOSIS — E55.9 VITAMIN D DEFICIENCY: ICD-10-CM

## 2017-07-17 DIAGNOSIS — E11.42 DIABETIC POLYNEUROPATHY ASSOCIATED WITH TYPE 2 DIABETES MELLITUS (HCC): ICD-10-CM

## 2017-07-17 DIAGNOSIS — IMO0002 UNCONTROLLED TYPE 2 DIABETES MELLITUS WITH COMPLICATION, WITH LONG-TERM CURRENT USE OF INSULIN: Primary | ICD-10-CM

## 2017-07-17 DIAGNOSIS — I10 ESSENTIAL HYPERTENSION: ICD-10-CM

## 2017-07-17 PROCEDURE — 99214 OFFICE O/P EST MOD 30 MIN: CPT | Performed by: NURSE PRACTITIONER

## 2017-07-17 RX ORDER — GABAPENTIN 300 MG/1
CAPSULE ORAL
Refills: 3 | COMMUNITY
Start: 2017-07-13 | End: 2017-10-18

## 2017-07-17 NOTE — PROGRESS NOTES
Subjective    Nirav Lind is a 52 y.o. male. he is here today for follow-up.    History of Present Illness     Duration/Timing: Diabetes mellitus type 2, onset of symptoms gradual   dm dx at age 51 y      constant     not controlled      severity high      Patient was admitted to the hospital for chest pain but cardiac was ruled out      Severity (Complications/Hospitalizations)  Secondary Macrovascular Complications: No CAD, No CVA, No PAD  Secondary Microvascular Complications: No Microalbuminuria, No Diabetic Retinopathy, No Diabetic Neuropathy     Context  Diabetes Regimen: Insulin, Oral Medications, Last HgbA1c 6.0 from Jan. 2017      Blood Glucose Readings     Blood glucose log ranging from 88 up to 144      Diet  adhering to 1800 calorie diet   Exercise: Does not exercise     Associated Signs/Symptoms  Hyperglycemic Symptoms: No polyuria, No polydipsia, No polyphagia  Hypoglycemic Episodes: No documented hypoglycemia since last visit                     The following portions of the patient's history were reviewed and updated as appropriate:   Past Medical History:   Diagnosis Date   • Abnormal computed tomography scan    • Adenomatous polyp of colon    • Allergic rhinitis    • Anemia    • Chronic back pain    • Coronary arteriosclerosis    • Diabetes mellitus    • Dizziness    • Dyspnea     unspecified   • Epigastric pain    • Essential hypertension    • Ex-smoker    • Hemangioma of liver    • Hyperlipidemia    • Infected hydrocele     with orchitis and epididymis   • Nausea    • Nausea with vomiting    • Obesity with body mass index of 30.0 - 39.9    • Pain in right knee    • Right upper quadrant pain    • Type II diabetes mellitus, uncontrolled    • Upper respiratory infection    • Urgency of urination    • Villous adenoma of colon      Past Surgical History:   Procedure Laterality Date   • CARDIAC CATHETERIZATION  11/30/2015    No evidence of any obstructive disease in the circumflex, the RCA , or  LAD. 1st diagonal branch in the midportion with 20-30% stenosis. Preserved LV systolic function with EF 55%.   • COLONOSCOPY  01/21/2013    Normal   • COLONOSCOPY W/ POLYPECTOMY  10/13/2014    A single polyp was found in the colon; removed by snare cautery polypectomy.   • ENDOSCOPY  06/15/2016    Mildly severe esophagitis. Several biopsies obtained in the upper third of the esophagus.   • INCISION AND DRAINAGE ABSCESS  10/29/2014    Incision and drainage of scrotal abscess. Hydrocelectomy, bottle procedure.   • INJECTION OF MEDICATION  01/26/2016    Kenalog     Family History   Problem Relation Age of Onset   • Cancer Mother    • Heart disease Sister        Current Outpatient Prescriptions   Medication Sig Dispense Refill   • albuterol (VENTOLIN HFA) 108 (90 BASE) MCG/ACT inhaler Inhale 2 puffs 2 (two) times a day.     • Alcohol Swabs 70 % pads USE UTD QID  11   • amitriptyline (ELAVIL) 25 MG tablet Take 25 mg by mouth 3 (three) times a day.     • ASPIRIN LOW DOSE 81 MG EC tablet TAKE 1 TABLET BY MOUTH EVERY DAY 30 tablet 0   • atorvastatin (LIPITOR) 20 MG tablet Take 1 tablet by mouth Daily. 30 tablet 2   • B-D UF III MINI PEN NEEDLES 31G X 5 MM misc USE QID UTD  11   • budesonide-formoterol (SYMBICORT) 160-4.5 MCG/ACT inhaler Inhale 2 puffs 2 (two) times a day.     • cyclobenzaprine (FLEXERIL) 5 MG tablet Take 1 tablet by mouth 3 (Three) Times a Day As Needed for Muscle Spasms. 90 tablet 3   • diclofenac (VOLTAREN) 50 MG EC tablet Take 1 tablet by mouth 2 (Two) Times a Day. 30 tablet 0   • fexofenadine (ALLEGRA ALLERGY) 180 MG tablet Take 1 tablet by mouth Daily. 30 tablet 1   • FREESTYLE LITE test strip 200 each by Other route As Needed (prn). Use as instructed 30 each 0   • gabapentin (NEURONTIN) 300 MG capsule TK 1 C PO TID  3   • gabapentin (NEURONTIN) 400 MG capsule Take 1 capsule by mouth 3 (Three) Times a Day. 90 capsule 5   • HYDROcodone-acetaminophen (NORCO) 5-325 MG per tablet Take 1 tablet by mouth  Every 4 (Four) Hours As Needed for Moderate Pain (4-6). 20 tablet 0   • ibuprofen (ADVIL,MOTRIN) 600 MG tablet TK 1 T PO Q 8 H  0   • Insulin Glargine (TOUJEO SOLOSTAR) 300 UNIT/ML solution pen-injector Inject  under the skin daily.     • Lancets (FREESTYLE) lancets USE TO CHECK BLOOD SUGAR QID  11   • lisinopril (PRINIVIL,ZESTRIL) 2.5 MG tablet TAKE 1 TABLET BY MOUTH DAILY 30 tablet 2   • MAGNESIUM-OXIDE 400 (241.3 MG) MG tablet tablet TK 1 T PO BID  12   • metFORMIN ER (GLUCOPHAGE-XR) 750 MG 24 hr tablet Take 750 mg by mouth Daily.     • metoprolol succinate XL (TOPROL-XL) 25 MG 24 hr tablet TK 1 T PO BID  3   • OLANZapine (ZYPREXA) 10 MG tablet Take 10 mg by mouth daily.     • ondansetron (ZOFRAN) 4 MG tablet Take 1 tablet by mouth every 8 (eight) hours as needed for nausea or vomiting. 30 tablet 0   • pantoprazole (PROTONIX) 40 MG EC tablet Take 1 tablet by mouth Daily. 30 tablet 1   • sertraline (ZOLOFT) 100 MG tablet Take 100 mg by mouth daily.     • tiotropium (SPIRIVA HANDIHALER) 18 MCG per inhalation capsule Place 1 capsule into inhaler and inhale Daily. 30 capsule 2     No current facility-administered medications for this visit.      No Known Allergies  Social History     Social History   • Marital status:      Spouse name: N/A   • Number of children: N/A   • Years of education: N/A     Social History Main Topics   • Smoking status: Never Smoker   • Smokeless tobacco: Never Used   • Alcohol use Yes   • Drug use: Defer   • Sexual activity: Not Asked     Other Topics Concern   • None     Social History Narrative       Review of Systems  Review of Systems   Constitutional: Negative for activity change, appetite change, chills, diaphoresis and fatigue.   HENT: Negative for congestion, dental problem, drooling, ear discharge, ear pain, facial swelling, sneezing, sore throat, tinnitus, trouble swallowing and voice change.    Eyes: Negative for photophobia, pain, discharge, redness, itching and visual  "disturbance.   Respiratory: Negative for apnea, cough, choking, chest tightness and shortness of breath.    Cardiovascular: Negative for chest pain, palpitations and leg swelling.   Gastrointestinal: Negative for abdominal distention, abdominal pain, constipation, diarrhea, nausea and vomiting.   Endocrine: Negative for cold intolerance, heat intolerance, polydipsia, polyphagia and polyuria.   Genitourinary: Negative for difficulty urinating, dysuria, frequency, hematuria and urgency.   Musculoskeletal: Negative for arthralgias, back pain, gait problem, joint swelling, myalgias, neck pain and neck stiffness.   Skin: Negative for color change, pallor, rash and wound.   Allergic/Immunologic: Negative for environmental allergies, food allergies and immunocompromised state.   Neurological: Negative for dizziness, tremors, facial asymmetry, weakness, light-headedness, numbness and headaches.   Hematological: Negative for adenopathy. Does not bruise/bleed easily.   Psychiatric/Behavioral: Negative for agitation, behavioral problems, confusion, decreased concentration and sleep disturbance.        Objective    /70 (BP Location: Left arm, Patient Position: Sitting, Cuff Size: Adult)  Pulse 66  Ht 72\" (182.9 cm)  Wt 230 lb (104 kg)  BMI 31.19 kg/m2  Physical Exam   Constitutional: He is oriented to person, place, and time. He appears well-developed and well-nourished. No distress.   HENT:   Head: Normocephalic and atraumatic.   Right Ear: External ear normal.   Left Ear: External ear normal.   Nose: Nose normal.   Eyes: Conjunctivae and EOM are normal. Pupils are equal, round, and reactive to light.   Neck: Normal range of motion. Neck supple. No tracheal deviation present. No thyromegaly present.   Cardiovascular: Normal rate, regular rhythm and normal heart sounds.    No murmur heard.  Pulmonary/Chest: Effort normal and breath sounds normal. No respiratory distress. He has no wheezes.   Abdominal: Soft. Bowel " sounds are normal. There is no tenderness. There is no rebound and no guarding.   Musculoskeletal: Normal range of motion. He exhibits no edema, tenderness or deformity.    Nirav had a diabetic foot exam performed today.   During the foot exam he had a monofilament test performed (decreased sensatin bilateral all points).    Neurological Sensory Findings - Unaltered hot/cold right ankle/foot discrimination and unaltered hot/cold left ankle/foot discrimination. Unaltered sharp/dull right ankle/foot discrimination and unaltered sharp/dull left ankle/foot discrimination. No right ankle/foot altered proprioception and no left ankle/foot altered proprioception    Vascular Status -  His exam exhibits no right foot edema. His exam exhibits no left foot edema.   Skin Integrity  -  He has right heel is dry and cracked.He has left heel dry and cracked..  Neurological: He is alert and oriented to person, place, and time. No cranial nerve deficit.   Skin: Skin is warm and dry. No rash noted.   Psychiatric: He has a normal mood and affect. His behavior is normal. Judgment and thought content normal.       Lab Review  Glucose (mg/dL)   Date Value   06/29/2017 85   03/09/2017 136 (H)   03/08/2017 99     Sodium (mmol/L)   Date Value   06/29/2017 143   03/09/2017 134 (L)   03/08/2017 137     Potassium (mmol/L)   Date Value   06/29/2017 4.1   03/09/2017 3.9   03/08/2017 4.9     Chloride (mmol/L)   Date Value   06/29/2017 103   03/09/2017 103   03/08/2017 103     CO2 (mmol/L)   Date Value   06/29/2017 25.0   03/09/2017 25.0   03/08/2017 26.0     BUN (mg/dL)   Date Value   06/29/2017 14   03/09/2017 10   03/08/2017 10     Creatinine (mg/dL)   Date Value   06/29/2017 1.02   03/09/2017 0.89   03/08/2017 0.90     Hemoglobin A1C   Date Value   06/29/2017 6.15 % (H)   01/16/2017 6.0 %TotHgb (H)   11/04/2016 6.1 %TotHgb (H)   07/01/2016 >14.0 %TotHgb (H)     Triglycerides   Date Value   06/29/2017 63 mg/dL   11/04/2016 111 mg/dl        Assessment/Plan      1. Uncontrolled type 2 diabetes mellitus with complication, with long-term current use of insulin    2. Mixed hyperlipidemia    3. Diabetic polyneuropathy associated with type 2 diabetes mellitus    4. Essential hypertension    5. Vitamin D deficiency     .    Medications prescribed:  Outpatient Encounter Prescriptions as of 7/17/2017   Medication Sig Dispense Refill   • albuterol (VENTOLIN HFA) 108 (90 BASE) MCG/ACT inhaler Inhale 2 puffs 2 (two) times a day.     • Alcohol Swabs 70 % pads USE UTD QID  11   • amitriptyline (ELAVIL) 25 MG tablet Take 25 mg by mouth 3 (three) times a day.     • ASPIRIN LOW DOSE 81 MG EC tablet TAKE 1 TABLET BY MOUTH EVERY DAY 30 tablet 0   • atorvastatin (LIPITOR) 20 MG tablet Take 1 tablet by mouth Daily. 30 tablet 2   • B-D UF III MINI PEN NEEDLES 31G X 5 MM misc USE QID UTD  11   • budesonide-formoterol (SYMBICORT) 160-4.5 MCG/ACT inhaler Inhale 2 puffs 2 (two) times a day.     • cyclobenzaprine (FLEXERIL) 5 MG tablet Take 1 tablet by mouth 3 (Three) Times a Day As Needed for Muscle Spasms. 90 tablet 3   • diclofenac (VOLTAREN) 50 MG EC tablet Take 1 tablet by mouth 2 (Two) Times a Day. 30 tablet 0   • fexofenadine (ALLEGRA ALLERGY) 180 MG tablet Take 1 tablet by mouth Daily. 30 tablet 1   • FREESTYLE LITE test strip 200 each by Other route As Needed (prn). Use as instructed 30 each 0   • gabapentin (NEURONTIN) 300 MG capsule TK 1 C PO TID  3   • gabapentin (NEURONTIN) 400 MG capsule Take 1 capsule by mouth 3 (Three) Times a Day. 90 capsule 5   • HYDROcodone-acetaminophen (NORCO) 5-325 MG per tablet Take 1 tablet by mouth Every 4 (Four) Hours As Needed for Moderate Pain (4-6). 20 tablet 0   • ibuprofen (ADVIL,MOTRIN) 600 MG tablet TK 1 T PO Q 8 H  0   • Insulin Glargine (TOUJEO SOLOSTAR) 300 UNIT/ML solution pen-injector Inject  under the skin daily.     • Lancets (FREESTYLE) lancets USE TO CHECK BLOOD SUGAR QID  11   • lisinopril (PRINIVIL,ZESTRIL) 2.5  MG tablet TAKE 1 TABLET BY MOUTH DAILY 30 tablet 2   • MAGNESIUM-OXIDE 400 (241.3 MG) MG tablet tablet TK 1 T PO BID  12   • metFORMIN ER (GLUCOPHAGE-XR) 750 MG 24 hr tablet Take 750 mg by mouth Daily.     • metoprolol succinate XL (TOPROL-XL) 25 MG 24 hr tablet TK 1 T PO BID  3   • OLANZapine (ZYPREXA) 10 MG tablet Take 10 mg by mouth daily.     • ondansetron (ZOFRAN) 4 MG tablet Take 1 tablet by mouth every 8 (eight) hours as needed for nausea or vomiting. 30 tablet 0   • pantoprazole (PROTONIX) 40 MG EC tablet Take 1 tablet by mouth Daily. 30 tablet 1   • sertraline (ZOLOFT) 100 MG tablet Take 100 mg by mouth daily.     • tiotropium (SPIRIVA HANDIHALER) 18 MCG per inhalation capsule Place 1 capsule into inhaler and inhale Daily. 30 capsule 2     No facility-administered encounter medications on file as of 7/17/2017.        Orders placed during this encounter include:  Orders Placed This Encounter   Procedures   • Comprehensive Metabolic Panel   • Hemoglobin A1c   • Vitamin D 25 Hydroxy   • CBC & Differential     Order Specific Question:   Manual Differential     Answer:   No   Glycemic Management    Lab Results   Component Value Date    HGBA1C 6.15 (H) 06/29/2017       Toujeo 25 units in the morning---taking 20 units ---taking 13 units      if you ever wake up with a sugar less than 100 - back off by 3 units     ---------------------     Invokana 100 mg before breakfast --not taking            Metformin 500 mg tablets---take one with supper ----taking 750 mg                     invokamet 150/1000 mg twice daily ( it combines metformin and invokana )---stopped the invokamet due to low blood sugars      --------------------        once you run out of januvia , you will use in its place trulicity 0.75 mg weekly ---stopped due to low Blood sugars   if nausea try to eat less      ------------------     novolog --not using --stopped      you are doing sliding scale   and  2 units for every 15 grams of carbohydrate      once glucose readings are staying in the low 100s we can try 1 unit for every 15 grams and if it stays in the low 100s we will only use sliding scale         Lipid Management  on pravachol 20 mg qhs      Nov. 2016     Lipids at goal     Total Cholesterol   Date Value Ref Range Status   06/29/2017 86 0 - 199 mg/dL Final     Triglycerides   Date Value Ref Range Status   06/29/2017 63 20 - 199 mg/dL Final     HDL Cholesterol   Date Value Ref Range Status   06/29/2017 30 (L) 60 - 200 mg/dL Final     LDL Cholesterol    Date Value Ref Range Status   06/29/2017 41 1 - 129 mg/dL Final             Blood Pressure Management     on amlodipine     On lisinopril 2.5 mg daily      added invokana--stopped        Microvascular Complication Monitoring: No Microalbuminuria, No Diabetic Retinopathy, positive  Diabetic Neuropathy       Neuropathy    Taking Neurontin     rx for shoes     Preventive Care: Patient is not smoking        Weight Related: Obesity, Counseled on nutrition, Counseled on physical activity     Bone Health     Vitamin d def     Nov. 2016      Vit d - normal                  4. Follow-up: Return in about 4 months (around 11/17/2017) for Recheck.

## 2017-07-18 ENCOUNTER — LAB (OUTPATIENT)
Dept: LAB | Facility: HOSPITAL | Age: 52
End: 2017-07-18

## 2017-07-18 ENCOUNTER — OFFICE VISIT (OUTPATIENT)
Dept: FAMILY MEDICINE CLINIC | Facility: CLINIC | Age: 52
End: 2017-07-18

## 2017-07-18 VITALS
SYSTOLIC BLOOD PRESSURE: 130 MMHG | BODY MASS INDEX: 30.88 KG/M2 | HEIGHT: 72 IN | WEIGHT: 228 LBS | HEART RATE: 68 BPM | DIASTOLIC BLOOD PRESSURE: 80 MMHG | OXYGEN SATURATION: 98 %

## 2017-07-18 DIAGNOSIS — Z11.59 NEED FOR HEPATITIS C SCREENING TEST: ICD-10-CM

## 2017-07-18 DIAGNOSIS — E11.49 DM (DIABETES MELLITUS), TYPE 2 WITH NEUROLOGICAL COMPLICATIONS (HCC): Primary | ICD-10-CM

## 2017-07-18 DIAGNOSIS — Z76.89 ENCOUNTER TO ESTABLISH CARE: ICD-10-CM

## 2017-07-18 LAB — HBA1C MFR BLD: 6.22 % (ref 4–5.6)

## 2017-07-18 PROCEDURE — 86803 HEPATITIS C AB TEST: CPT | Performed by: FAMILY MEDICINE

## 2017-07-18 PROCEDURE — 83036 HEMOGLOBIN GLYCOSYLATED A1C: CPT | Performed by: FAMILY MEDICINE

## 2017-07-18 PROCEDURE — 36415 COLL VENOUS BLD VENIPUNCTURE: CPT | Performed by: FAMILY MEDICINE

## 2017-07-18 PROCEDURE — 99213 OFFICE O/P EST LOW 20 MIN: CPT | Performed by: FAMILY MEDICINE

## 2017-07-18 RX ORDER — BLOOD-GLUCOSE METER
200 KIT MISCELLANEOUS AS NEEDED
Qty: 30 EACH | Refills: 0 | Status: SHIPPED | OUTPATIENT
Start: 2017-07-18 | End: 2017-12-26 | Stop reason: SDUPTHER

## 2017-07-18 NOTE — PROGRESS NOTES
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 Pt has a history of COPD, Depression, DM and HTN. Pt is seen by Tato David for his DM. DM is well controlled with last HbA1c of 6.15. Pts checks his sugars 2x a day and they usually run between . Pt is currently on Toujeo 13 units qAM and Metformin 750mg daily. Pt is agreeable to HbA1c. Pts COPD, HTN and depression are well controlled on current medication regimen. Pt is due for hepatitis C screening and is agreeable. Pt requested refills on test strips. No new complaints.    Preventative:  Over the past 2 weeks, have you felt down, depressed, or hopeless?No   Over the past 2 weeks, have you felt little interest or pleasure in doing things?No  Clinical depression screening refused by patient.No     Past Medical Hx:  Past Medical History:   Diagnosis Date   • Abnormal computed tomography scan    • Adenomatous polyp of colon    • Allergic rhinitis    • Anemia    • Chronic back pain    • Coronary arteriosclerosis    • Diabetes mellitus    • Dizziness    • Dyspnea     unspecified   • Epigastric pain    • Essential hypertension    • Ex-smoker    • Hemangioma of liver    • Hyperlipidemia    • Infected hydrocele     with orchitis and epididymis   • Nausea    • Nausea with vomiting    • Obesity with body mass index of 30.0 - 39.9    • Pain in right knee    • Right upper quadrant pain    • Type II diabetes mellitus, uncontrolled    • Upper respiratory infection    • Urgency of urination    • Villous adenoma of colon        Past Surgical Hx:  Past Surgical History:   Procedure Laterality Date   • CARDIAC CATHETERIZATION  11/30/2015    No evidence of any obstructive disease in the circumflex, the RCA , or LAD. 1st diagonal branch in the midportion with 20-30% stenosis. Preserved LV systolic function with EF 55%.   • COLONOSCOPY  01/21/2013    Normal   • COLONOSCOPY W/ POLYPECTOMY  10/13/2014     A single polyp was found in the colon; removed by snare cautery polypectomy.   • ENDOSCOPY  06/15/2016    Mildly severe esophagitis. Several biopsies obtained in the upper third of the esophagus.   • INCISION AND DRAINAGE ABSCESS  10/29/2014    Incision and drainage of scrotal abscess. Hydrocelectomy, bottle procedure.   • INJECTION OF MEDICATION  01/26/2016    Kenalog       Health Maintenance:  Health Maintenance   Topic Date Due   • MEDICARE ANNUAL WELLNESS  09/09/2016   • DIABETIC EYE EXAM  07/20/2017   • INFLUENZA VACCINE  08/01/2017   • HEMOGLOBIN A1C  12/29/2017   • LIPID PANEL  06/29/2018   • URINE MICROALBUMIN  06/29/2018   • DIABETIC FOOT EXAM  07/17/2018   • COLONOSCOPY  04/05/2026   • TDAP/TD VACCINES (2 - Td) 09/09/2026   • PNEUMOCOCCAL VACCINE (19-64 MEDIUM RISK)  Addressed   • HEPATITIS C SCREENING  Completed       Current Meds:    Current Outpatient Prescriptions:   •  albuterol (VENTOLIN HFA) 108 (90 BASE) MCG/ACT inhaler, Inhale 2 puffs 2 (two) times a day., Disp: , Rfl:   •  Alcohol Swabs 70 % pads, USE UTD QID, Disp: , Rfl: 11  •  amitriptyline (ELAVIL) 25 MG tablet, Take 25 mg by mouth 3 (three) times a day., Disp: , Rfl:   •  ASPIRIN LOW DOSE 81 MG EC tablet, TAKE 1 TABLET BY MOUTH EVERY DAY, Disp: 30 tablet, Rfl: 0  •  atorvastatin (LIPITOR) 20 MG tablet, Take 1 tablet by mouth Daily., Disp: 30 tablet, Rfl: 2  •  B-D UF III MINI PEN NEEDLES 31G X 5 MM misc, USE QID UTD, Disp: , Rfl: 11  •  budesonide-formoterol (SYMBICORT) 160-4.5 MCG/ACT inhaler, Inhale 2 puffs 2 (two) times a day., Disp: , Rfl:   •  cyclobenzaprine (FLEXERIL) 5 MG tablet, Take 1 tablet by mouth 3 (Three) Times a Day As Needed for Muscle Spasms., Disp: 90 tablet, Rfl: 3  •  diclofenac (VOLTAREN) 50 MG EC tablet, Take 1 tablet by mouth 2 (Two) Times a Day., Disp: 30 tablet, Rfl: 0  •  fexofenadine (ALLEGRA ALLERGY) 180 MG tablet, Take 1 tablet by mouth Daily., Disp: 30 tablet, Rfl: 1  •  FREESTYLE LITE test strip, 200 each  by Other route As Needed (prn). Use as instructed, Disp: 30 each, Rfl: 0  •  gabapentin (NEURONTIN) 400 MG capsule, Take 1 capsule by mouth 3 (Three) Times a Day., Disp: 90 capsule, Rfl: 5  •  Insulin Glargine (TOUJEO SOLOSTAR) 300 UNIT/ML solution pen-injector, Inject  under the skin daily., Disp: , Rfl:   •  Lancets (FREESTYLE) lancets, USE TO CHECK BLOOD SUGAR QID, Disp: , Rfl: 11  •  lisinopril (PRINIVIL,ZESTRIL) 2.5 MG tablet, TAKE 1 TABLET BY MOUTH DAILY, Disp: 30 tablet, Rfl: 2  •  MAGNESIUM-OXIDE 400 (241.3 MG) MG tablet tablet, TK 1 T PO BID, Disp: , Rfl: 12  •  metFORMIN ER (GLUCOPHAGE-XR) 750 MG 24 hr tablet, Take 750 mg by mouth Daily., Disp: , Rfl:   •  metoprolol succinate XL (TOPROL-XL) 25 MG 24 hr tablet, TK 1 T PO BID, Disp: , Rfl: 3  •  OLANZapine (ZYPREXA) 10 MG tablet, Take 10 mg by mouth daily., Disp: , Rfl:   •  ondansetron (ZOFRAN) 4 MG tablet, Take 1 tablet by mouth every 8 (eight) hours as needed for nausea or vomiting., Disp: 30 tablet, Rfl: 0  •  pantoprazole (PROTONIX) 40 MG EC tablet, Take 1 tablet by mouth Daily., Disp: 30 tablet, Rfl: 1  •  sertraline (ZOLOFT) 100 MG tablet, Take 100 mg by mouth daily., Disp: , Rfl:   •  tiotropium (SPIRIVA HANDIHALER) 18 MCG per inhalation capsule, Place 1 capsule into inhaler and inhale Daily., Disp: 30 capsule, Rfl: 2  •  gabapentin (NEURONTIN) 300 MG capsule, TK 1 C PO TID, Disp: , Rfl: 3  •  HYDROcodone-acetaminophen (NORCO) 5-325 MG per tablet, Take 1 tablet by mouth Every 4 (Four) Hours As Needed for Moderate Pain (4-6)., Disp: 20 tablet, Rfl: 0  •  ibuprofen (ADVIL,MOTRIN) 600 MG tablet, TK 1 T PO Q 8 H, Disp: , Rfl: 0    Allergies:  Review of patient's allergies indicates no known allergies.    Family Hx:  Family History   Problem Relation Age of Onset   • Cancer Mother    • Heart disease Sister         Social History:  Social History     Social History   • Marital status:      Spouse name: N/A   • Number of children: N/A   • Years of  "education: N/A     Occupational History   • Not on file.     Social History Main Topics   • Smoking status: Never Smoker   • Smokeless tobacco: Never Used   • Alcohol use Yes   • Drug use: Defer   • Sexual activity: Not on file     Other Topics Concern   • Not on file     Social History Narrative       Review of Systems  Review of Systems   Constitutional: Negative for chills and fever.   HENT: Negative for congestion and sore throat.    Eyes: Negative for pain, discharge and itching.   Respiratory: Negative for cough, shortness of breath, wheezing and stridor.    Cardiovascular: Negative for chest pain, palpitations and leg swelling.   Gastrointestinal: Negative for abdominal pain, blood in stool, constipation, diarrhea, nausea and vomiting.   Genitourinary: Negative for dysuria, flank pain and hematuria.   Musculoskeletal: Negative for neck pain and neck stiffness.   Skin: Negative for rash and wound.   Neurological: Negative for seizures and syncope.   Psychiatric/Behavioral: Negative for agitation and confusion.         Objective:     /80  Pulse 68  Ht 72\" (182.9 cm)  Wt 228 lb (103 kg)  SpO2 98%  BMI 30.92 kg/m2  Physical Exam   Constitutional: He is oriented to person, place, and time. He appears well-developed and well-nourished. No distress.   HENT:   Head: Normocephalic and atraumatic.   Right Ear: External ear normal.   Left Ear: External ear normal.   Nose: Nose normal.   Eyes: Conjunctivae are normal. Right eye exhibits no discharge. Left eye exhibits no discharge. No scleral icterus.   Neck: Normal range of motion. Neck supple. No tracheal deviation present. No thyromegaly present.   Cardiovascular: Normal rate, regular rhythm and normal heart sounds.  Exam reveals no gallop and no friction rub.    No murmur heard.  Pulmonary/Chest: Effort normal and breath sounds normal. No stridor. No respiratory distress. He has no wheezes. He has no rales.   Abdominal: Soft. Bowel sounds are normal. He " exhibits no distension. There is no tenderness.   Musculoskeletal: Normal range of motion. He exhibits no edema, tenderness or deformity.   Neurological: He is alert and oriented to person, place, and time.   Skin: Skin is warm and dry. No rash noted. He is not diaphoretic. No erythema.   Psychiatric: He has a normal mood and affect. His behavior is normal. Judgment and thought content normal.   Nursing note and vitals reviewed.                                                                     Assessment/Plan:     Diagnoses and all orders for this visit:    DM (diabetes mellitus), type 2 with neurological complications  -     FREESTYLE LITE test strip; 200 each by Other route As Needed (prn). Use as instructed  -     Hemoglobin A1c    Need for hepatitis C screening test  -     Hepatitis C antibody; Future    Encounter to establish care         Follow-up:     Return in about 3 months (around 10/18/2017).        GOALS:  Maintain medication compliance    Preventative:  Male Preventative: Cholesterol screening up to date  Vaccines:   Tetanus vaccine: not up to date - declined  Annual influenza vaccine: up to date   Pneumococcal vaccine: not up to date - declined    never smoker  occasional/rare  eat more fruits and vegetables, decrease soda or juice intake, increase water intake and increase physical activity    RISK SCORE: 3    Jeremy Mahmood MD PGY2  [place] [address]  Office: [phone]      This document has been electronically signed by Jeremy Mahmood MD on July 18, 2017 4:24 PM

## 2017-07-19 LAB — HCV AB SER DONR QL: NEGATIVE

## 2017-07-19 NOTE — PROGRESS NOTES
I have reviewed the notes, assessments, and/or procedures performed. I concur with her/his documentation of Nirav Lind.     Nguyễn Moran, DO

## 2017-07-26 ENCOUNTER — TELEPHONE (OUTPATIENT)
Dept: FAMILY MEDICINE CLINIC | Facility: CLINIC | Age: 52
End: 2017-07-26

## 2017-07-26 ENCOUNTER — TELEPHONE (OUTPATIENT)
Dept: ENDOCRINOLOGY | Facility: CLINIC | Age: 52
End: 2017-07-26

## 2017-07-26 NOTE — TELEPHONE ENCOUNTER
----- Message from Estrella Basurto sent at 7/26/2017  8:31 AM CDT -----  Regarding: MED ORDER  Contact: [phone]  PT IS NEEDING A REFILL ON HIS FREESTYLE LITE test strip HE IS COMPLETELY OUT AND IS NEEDING FOR THEM TO BE SENT TO [facility] [place]. HE ALSO SAID THAT HE HAS BEEN TRYING TO GET THESE FOR 6 WEEKS SO HE HAS NOT BEEN ABLE TO TEST. THANK YOU TP.

## 2017-07-27 RX ORDER — ATORVASTATIN CALCIUM 20 MG/1
20 TABLET, FILM COATED ORAL DAILY
Qty: 30 TABLET | Refills: 5 | Status: SHIPPED | OUTPATIENT
Start: 2017-07-27 | End: 2017-12-21 | Stop reason: SDUPTHER

## 2017-08-03 RX ORDER — FLURBIPROFEN SODIUM 0.3 MG/ML
SOLUTION/ DROPS OPHTHALMIC
Qty: 200 EACH | Refills: 11 | Status: SHIPPED | OUTPATIENT
Start: 2017-08-03 | End: 2018-01-07 | Stop reason: SDUPTHER

## 2017-09-14 ENCOUNTER — TELEPHONE (OUTPATIENT)
Dept: FAMILY MEDICINE CLINIC | Facility: CLINIC | Age: 52
End: 2017-09-14

## 2017-09-14 RX ORDER — CYCLOBENZAPRINE HCL 5 MG
5 TABLET ORAL 3 TIMES DAILY PRN
Qty: 90 TABLET | Refills: 3 | Status: SHIPPED | OUTPATIENT
Start: 2017-09-14 | End: 2018-01-07 | Stop reason: SDUPTHER

## 2017-09-14 RX ORDER — METFORMIN HYDROCHLORIDE 750 MG/1
750 TABLET, EXTENDED RELEASE ORAL DAILY
Qty: 30 TABLET | Refills: 6 | Status: SHIPPED | OUTPATIENT
Start: 2017-09-14 | End: 2018-01-15 | Stop reason: SDUPTHER

## 2017-09-14 RX ORDER — PANTOPRAZOLE SODIUM 40 MG/1
40 TABLET, DELAYED RELEASE ORAL DAILY
Qty: 30 TABLET | Refills: 2 | Status: SHIPPED | OUTPATIENT
Start: 2017-09-14 | End: 2017-12-20 | Stop reason: SDUPTHER

## 2017-09-14 RX ORDER — LISINOPRIL 2.5 MG/1
2.5 TABLET ORAL DAILY
Qty: 30 TABLET | Refills: 6 | Status: SHIPPED | OUTPATIENT
Start: 2017-09-14 | End: 2017-11-17

## 2017-09-14 NOTE — TELEPHONE ENCOUNTER
PT is requesting a refill of: METFORMIN, FLEXERIL, PANTOPRAZOLE, LISINOPRIL    Sent to: [facility]    Please send this at your earliest convenience or let PT know if there is any issues filling this Prescription.    Thanks.

## 2017-10-14 DIAGNOSIS — G89.29 CHRONIC MIDLINE THORACIC BACK PAIN: ICD-10-CM

## 2017-10-14 DIAGNOSIS — M54.6 CHRONIC MIDLINE THORACIC BACK PAIN: ICD-10-CM

## 2017-10-16 RX ORDER — GABAPENTIN 400 MG/1
CAPSULE ORAL
Qty: 90 CAPSULE | Refills: 0 | OUTPATIENT
Start: 2017-10-16

## 2017-10-16 RX ORDER — INSULIN GLARGINE 300 U/ML
INJECTION, SOLUTION SUBCUTANEOUS
Refills: 0 | OUTPATIENT
Start: 2017-10-16

## 2017-10-17 ENCOUNTER — TELEPHONE (OUTPATIENT)
Dept: ENDOCRINOLOGY | Facility: CLINIC | Age: 52
End: 2017-10-17

## 2017-10-17 NOTE — TELEPHONE ENCOUNTER
PT LEFT MSG ON ANSWERING MACHINE.   SAID IT'S ABOUT TOUJEO AND TO CALL HIM. [phone] (Routing comment)

## 2017-10-18 ENCOUNTER — OFFICE VISIT (OUTPATIENT)
Dept: FAMILY MEDICINE CLINIC | Facility: CLINIC | Age: 52
End: 2017-10-18

## 2017-10-18 ENCOUNTER — APPOINTMENT (OUTPATIENT)
Dept: LAB | Facility: HOSPITAL | Age: 52
End: 2017-10-18

## 2017-10-18 VITALS
DIASTOLIC BLOOD PRESSURE: 98 MMHG | HEART RATE: 68 BPM | OXYGEN SATURATION: 98 % | SYSTOLIC BLOOD PRESSURE: 140 MMHG | BODY MASS INDEX: 30.79 KG/M2 | HEIGHT: 72 IN | WEIGHT: 227.3 LBS

## 2017-10-18 DIAGNOSIS — F32.5 MAJOR DEPRESSIVE DISORDER WITH SINGLE EPISODE, IN FULL REMISSION (HCC): Chronic | ICD-10-CM

## 2017-10-18 DIAGNOSIS — M54.50 LOW BACK PAIN WITHOUT SCIATICA, UNSPECIFIED BACK PAIN LATERALITY, UNSPECIFIED CHRONICITY: ICD-10-CM

## 2017-10-18 DIAGNOSIS — E11.49 DM (DIABETES MELLITUS), TYPE 2 WITH NEUROLOGICAL COMPLICATIONS (HCC): Primary | ICD-10-CM

## 2017-10-18 DIAGNOSIS — J44.9 CHRONIC OBSTRUCTIVE PULMONARY DISEASE, UNSPECIFIED COPD TYPE (HCC): Chronic | ICD-10-CM

## 2017-10-18 LAB
25(OH)D3 SERPL-MCNC: 29.2 NG/ML (ref 30–100)
ALBUMIN SERPL-MCNC: 4.5 G/DL (ref 3.4–4.8)
ALBUMIN/GLOB SERPL: 1.2 G/DL (ref 1.1–1.8)
ALP SERPL-CCNC: 106 U/L (ref 38–126)
ALT SERPL W P-5'-P-CCNC: 63 U/L (ref 21–72)
ANION GAP SERPL CALCULATED.3IONS-SCNC: 11 MMOL/L (ref 5–15)
AST SERPL-CCNC: 48 U/L (ref 17–59)
BASOPHILS # BLD AUTO: 0.03 10*3/MM3 (ref 0–0.2)
BASOPHILS NFR BLD AUTO: 0.3 % (ref 0–2)
BILIRUB SERPL-MCNC: 0.4 MG/DL (ref 0.2–1.3)
BUN BLD-MCNC: 16 MG/DL (ref 7–21)
BUN/CREAT SERPL: 15.1 (ref 7–25)
CALCIUM SPEC-SCNC: 9.5 MG/DL (ref 8.4–10.2)
CHLORIDE SERPL-SCNC: 104 MMOL/L (ref 95–110)
CO2 SERPL-SCNC: 26 MMOL/L (ref 22–31)
CREAT BLD-MCNC: 1.06 MG/DL (ref 0.7–1.3)
DEPRECATED RDW RBC AUTO: 41.6 FL (ref 35.1–43.9)
EOSINOPHIL # BLD AUTO: 0.35 10*3/MM3 (ref 0–0.7)
EOSINOPHIL NFR BLD AUTO: 3.3 % (ref 0–7)
ERYTHROCYTE [DISTWIDTH] IN BLOOD BY AUTOMATED COUNT: 13.3 % (ref 11.5–14.5)
GFR SERPL CREATININE-BSD FRML MDRD: 88 ML/MIN/1.73 (ref 56–130)
GLOBULIN UR ELPH-MCNC: 3.9 GM/DL (ref 2.3–3.5)
GLUCOSE BLD-MCNC: 81 MG/DL (ref 60–100)
HBA1C MFR BLD: 6.2 % (ref 4–5.6)
HCT VFR BLD AUTO: 43 % (ref 39–49)
HGB BLD-MCNC: 14.5 G/DL (ref 13.7–17.3)
IMM GRANULOCYTES # BLD: 0.03 10*3/MM3 (ref 0–0.02)
IMM GRANULOCYTES NFR BLD: 0.3 % (ref 0–0.5)
LYMPHOCYTES # BLD AUTO: 2.11 10*3/MM3 (ref 0.6–4.2)
LYMPHOCYTES NFR BLD AUTO: 19.9 % (ref 10–50)
MCH RBC QN AUTO: 28.7 PG (ref 26.5–34)
MCHC RBC AUTO-ENTMCNC: 33.7 G/DL (ref 31.5–36.3)
MCV RBC AUTO: 85 FL (ref 80–98)
MONOCYTES # BLD AUTO: 0.75 10*3/MM3 (ref 0–0.9)
MONOCYTES NFR BLD AUTO: 7.1 % (ref 0–12)
NEUTROPHILS # BLD AUTO: 7.34 10*3/MM3 (ref 2–8.6)
NEUTROPHILS NFR BLD AUTO: 69.1 % (ref 37–80)
PLATELET # BLD AUTO: 304 10*3/MM3 (ref 150–450)
PMV BLD AUTO: 9.9 FL (ref 8–12)
POTASSIUM BLD-SCNC: 4.1 MMOL/L (ref 3.5–5.1)
PROT SERPL-MCNC: 8.4 G/DL (ref 6.3–8.6)
RBC # BLD AUTO: 5.06 10*6/MM3 (ref 4.37–5.74)
SODIUM BLD-SCNC: 141 MMOL/L (ref 137–145)
WBC NRBC COR # BLD: 10.61 10*3/MM3 (ref 3.2–9.8)

## 2017-10-18 PROCEDURE — 80053 COMPREHEN METABOLIC PANEL: CPT | Performed by: NURSE PRACTITIONER

## 2017-10-18 PROCEDURE — 99213 OFFICE O/P EST LOW 20 MIN: CPT | Performed by: FAMILY MEDICINE

## 2017-10-18 PROCEDURE — 85025 COMPLETE CBC W/AUTO DIFF WBC: CPT | Performed by: NURSE PRACTITIONER

## 2017-10-18 PROCEDURE — 83036 HEMOGLOBIN GLYCOSYLATED A1C: CPT | Performed by: NURSE PRACTITIONER

## 2017-10-18 PROCEDURE — 36415 COLL VENOUS BLD VENIPUNCTURE: CPT | Performed by: NURSE PRACTITIONER

## 2017-10-18 PROCEDURE — 82306 VITAMIN D 25 HYDROXY: CPT | Performed by: NURSE PRACTITIONER

## 2017-10-18 RX ORDER — ALBUTEROL SULFATE 90 UG/1
2 AEROSOL, METERED RESPIRATORY (INHALATION) 2 TIMES DAILY
Qty: 1 INHALER | Refills: 3 | Status: SHIPPED | OUTPATIENT
Start: 2017-10-18 | End: 2018-10-03 | Stop reason: SDUPTHER

## 2017-10-18 RX ORDER — BUDESONIDE AND FORMOTEROL FUMARATE DIHYDRATE 160; 4.5 UG/1; UG/1
2 AEROSOL RESPIRATORY (INHALATION)
Qty: 1 INHALER | Refills: 3 | Status: SHIPPED | OUTPATIENT
Start: 2017-10-18 | End: 2018-10-03 | Stop reason: SDUPTHER
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
## 2017-10-18 NOTE — PROGRESS NOTES
I discussed the case with the resident and I agree with their assessment which includes Diabetes ,Depression and COPD and plan as outlined by Dr. Mahmood.  Andrew Whitaker MD.

## 2017-11-17 ENCOUNTER — OFFICE VISIT (OUTPATIENT)
Dept: ENDOCRINOLOGY | Facility: CLINIC | Age: 52
End: 2017-11-17

## 2017-11-17 VITALS
HEART RATE: 70 BPM | DIASTOLIC BLOOD PRESSURE: 90 MMHG | OXYGEN SATURATION: 97 % | SYSTOLIC BLOOD PRESSURE: 150 MMHG | HEIGHT: 72 IN | BODY MASS INDEX: 30.36 KG/M2 | WEIGHT: 224.13 LBS

## 2017-11-17 DIAGNOSIS — E11.49 DM (DIABETES MELLITUS), TYPE 2 WITH NEUROLOGICAL COMPLICATIONS (HCC): Primary | ICD-10-CM

## 2017-11-17 DIAGNOSIS — E78.2 MIXED HYPERLIPIDEMIA: ICD-10-CM

## 2017-11-17 DIAGNOSIS — E55.9 VITAMIN D DEFICIENCY: ICD-10-CM

## 2017-11-17 DIAGNOSIS — I10 ESSENTIAL HYPERTENSION: ICD-10-CM

## 2017-11-17 PROCEDURE — 99214 OFFICE O/P EST MOD 30 MIN: CPT | Performed by: NURSE PRACTITIONER

## 2017-11-17 RX ORDER — LOSARTAN POTASSIUM 25 MG/1
25 TABLET ORAL DAILY
Refills: 12 | COMMUNITY
Start: 2017-11-03 | End: 2018-01-15

## 2017-12-06 ENCOUNTER — CLINICAL SUPPORT (OUTPATIENT)
Dept: FAMILY MEDICINE CLINIC | Facility: CLINIC | Age: 52
End: 2017-12-06

## 2017-12-06 DIAGNOSIS — Z23 NEED FOR INFLUENZA VACCINE: ICD-10-CM

## 2017-12-06 PROCEDURE — 90686 IIV4 VACC NO PRSV 0.5 ML IM: CPT | Performed by: FAMILY MEDICINE

## 2017-12-06 PROCEDURE — G0008 ADMIN INFLUENZA VIRUS VAC: HCPCS | Performed by: FAMILY MEDICINE

## 2017-12-21 RX ORDER — PANTOPRAZOLE SODIUM 40 MG/1
TABLET, DELAYED RELEASE ORAL
Qty: 30 TABLET | Refills: 0 | Status: SHIPPED | OUTPATIENT
Start: 2017-12-21 | End: 2018-01-21 | Stop reason: SDUPTHER

## 2017-12-21 RX ORDER — ATORVASTATIN CALCIUM 20 MG/1
TABLET, FILM COATED ORAL
Qty: 30 TABLET | Refills: 5 | Status: SHIPPED | OUTPATIENT
Start: 2017-12-21 | End: 2018-06-12 | Stop reason: SDUPTHER

## 2017-12-21 NOTE — TELEPHONE ENCOUNTER
Refill of patient's atorvastatin was sent to patient's pharmacy, [facility] in [place], [place]. Of note, patient is currently prescribed Olanzapine 10mg PO daily by Francisco J Horan MD which may contribute to an increase in triglycerides. Last lipid panel was in June 2017 where the only concerning finding was low levels of HDL.     Signature  Leslie Urban MD  [facility] Family Medicine Resident, PGY II        This document has been electronically signed by Leslie Urban MD on December 21, 2017 4:33 PM

## 2017-12-26 DIAGNOSIS — E11.49 DM (DIABETES MELLITUS), TYPE 2 WITH NEUROLOGICAL COMPLICATIONS (HCC): ICD-10-CM

## 2018-01-08 RX ORDER — FLURBIPROFEN SODIUM 0.3 MG/ML
SOLUTION/ DROPS OPHTHALMIC
Qty: 200 EACH | Refills: 0 | Status: SHIPPED | OUTPATIENT
Start: 2018-01-08 | End: 2018-04-04 | Stop reason: SDUPTHER

## 2018-01-08 RX ORDER — CYCLOBENZAPRINE HCL 5 MG
TABLET ORAL
Qty: 90 TABLET | Refills: 0 | Status: SHIPPED | OUTPATIENT
Start: 2018-01-08 | End: 2018-03-12 | Stop reason: SDUPTHER

## 2018-01-15 ENCOUNTER — OFFICE VISIT (OUTPATIENT)
Dept: FAMILY MEDICINE CLINIC | Facility: CLINIC | Age: 53
End: 2018-01-15

## 2018-01-15 VITALS
BODY MASS INDEX: 30.94 KG/M2 | WEIGHT: 228.4 LBS | DIASTOLIC BLOOD PRESSURE: 102 MMHG | SYSTOLIC BLOOD PRESSURE: 142 MMHG | HEIGHT: 72 IN | HEART RATE: 67 BPM | OXYGEN SATURATION: 96 %

## 2018-01-15 DIAGNOSIS — E11.42 DIABETIC POLYNEUROPATHY ASSOCIATED WITH TYPE 2 DIABETES MELLITUS (HCC): ICD-10-CM

## 2018-01-15 DIAGNOSIS — M25.522 LEFT ELBOW PAIN: ICD-10-CM

## 2018-01-15 DIAGNOSIS — I10 ESSENTIAL HYPERTENSION: ICD-10-CM

## 2018-01-15 DIAGNOSIS — E11.42 DIABETIC POLYNEUROPATHY ASSOCIATED WITH TYPE 2 DIABETES MELLITUS (HCC): Primary | ICD-10-CM

## 2018-01-15 PROCEDURE — 99213 OFFICE O/P EST LOW 20 MIN: CPT | Performed by: FAMILY MEDICINE

## 2018-01-15 RX ORDER — ISOPROPYL ALCOHOL 0.7 ML/1
SWAB TOPICAL
Refills: 3 | COMMUNITY
Start: 2017-11-26 | End: 2018-05-01 | Stop reason: SDUPTHER

## 2018-01-15 RX ORDER — MELOXICAM 7.5 MG/1
7.5 TABLET ORAL DAILY
Qty: 30 TABLET | Refills: 1 | Status: SHIPPED | OUTPATIENT
Start: 2018-01-15 | End: 2018-10-29

## 2018-01-15 RX ORDER — METFORMIN HYDROCHLORIDE 750 MG/1
750 TABLET, EXTENDED RELEASE ORAL DAILY
Qty: 30 TABLET | Refills: 6 | Status: SHIPPED | OUTPATIENT
Start: 2018-01-15 | End: 2018-08-15 | Stop reason: SDUPTHER

## 2018-01-15 RX ORDER — CARVEDILOL 3.12 MG/1
TABLET ORAL
Refills: 2 | COMMUNITY
Start: 2017-12-09 | End: 2018-01-26 | Stop reason: ALTCHOICE

## 2018-01-15 RX ORDER — BUSPIRONE HYDROCHLORIDE 5 MG/1
5 TABLET ORAL 2 TIMES DAILY
Refills: 3 | COMMUNITY
Start: 2017-12-06 | End: 2021-06-03 | Stop reason: SDUPTHER

## 2018-01-15 RX ORDER — LOSARTAN POTASSIUM 50 MG/1
50 TABLET ORAL DAILY
Qty: 30 TABLET | Refills: 3 | Status: SHIPPED | OUTPATIENT
Start: 2018-01-15 | End: 2018-05-15 | Stop reason: SDUPTHER

## 2018-01-15 RX ORDER — METFORMIN HYDROCHLORIDE 750 MG/1
750 TABLET, EXTENDED RELEASE ORAL DAILY
Qty: 30 TABLET | Refills: 6 | Status: SHIPPED | OUTPATIENT
Start: 2018-01-15 | End: 2018-01-15 | Stop reason: SDUPTHER

## 2018-01-15 RX ORDER — LANCETS 28 GAUGE
1 EACH MISCELLANEOUS 3 TIMES DAILY
Qty: 100 EACH | Refills: 11 | Status: SHIPPED | OUTPATIENT
Start: 2018-01-15 | End: 2018-01-15 | Stop reason: SDUPTHER

## 2018-01-15 RX ORDER — FLUTICASONE PROPIONATE 50 MCG
2 SPRAY, SUSPENSION (ML) NASAL DAILY
Qty: 9.9 ML | Refills: 3 | Status: SHIPPED | OUTPATIENT
Start: 2018-01-15 | End: 2020-06-12 | Stop reason: SDDI

## 2018-01-15 RX ORDER — LISINOPRIL 2.5 MG/1
TABLET ORAL
Refills: 6 | COMMUNITY
Start: 2017-12-12 | End: 2018-01-15 | Stop reason: CLARIF

## 2018-01-15 RX ORDER — LANCETS 28 GAUGE
1 EACH MISCELLANEOUS 3 TIMES DAILY
Qty: 100 EACH | Refills: 11 | Status: SHIPPED | OUTPATIENT
Start: 2018-01-15 | End: 2018-01-26 | Stop reason: CLARIF

## 2018-01-15 NOTE — PROGRESS NOTES
Subjective:     Nirav Lind is a 52 y.o. male who presents for follow up for DM, left elbow pain, decreased libido and sore left nostril. Pt has a history of well controlled DM and is currently on Toujeo 13 units qAM and metformin 750mg daily. Pt states his sugars have been running between 100-150 and his last HbA1c was 6.2 on 10/18. Pt reports he needs refills of his metformin and on the lancets. Pt reports that he has had intermittent pain in his left nostril over the last 2 months. Pt denies any epistaxis or trauma and has not treated it. Pt is agreeable to placing some vaseline on the painful area. Pt reports a 1 month history of pain in his left elbow which he thinks is do to sleeping on it funny. Pt has full range of motion and no point tenderness. Pt is agreeable to trying mobic at this time. Pt reports that recently he has had decreased libido. He also reports that his psychiatrist recently changed him to elavil. Pt counseled to speak to his psychiatrist concerning possible medication changes due to this side effect. Pt has no other current complaints.    Preventative:  Over the past 2 weeks, have you felt down, depressed, or hopeless?No   Over the past 2 weeks, have you felt little interest or pleasure in doing things?No  Clinical depression screening refused by patient.No     Past Medical Hx:  Past Medical History:   Diagnosis Date   • Abnormal computed tomography scan    • Adenomatous polyp of colon    • Allergic rhinitis    • Anemia    • Chronic back pain    • Coronary arteriosclerosis    • Diabetes mellitus    • Dizziness    • Dyspnea     unspecified   • Epigastric pain    • Essential hypertension    • Ex-smoker    • Hemangioma of liver    • Hyperlipidemia    • Infected hydrocele     with orchitis and epididymis   • Nausea    • Nausea with vomiting    • Obesity with body mass index of 30.0 - 39.9    • Pain in right knee    • Right upper quadrant pain    • Type II diabetes mellitus, uncontrolled     • Upper respiratory infection    • Urgency of urination    • Villous adenoma of colon        Past Surgical Hx:  Past Surgical History:   Procedure Laterality Date   • CARDIAC CATHETERIZATION  11/30/2015    No evidence of any obstructive disease in the circumflex, the RCA , or LAD. 1st diagonal branch in the midportion with 20-30% stenosis. Preserved LV systolic function with EF 55%.   • COLONOSCOPY  01/21/2013    Normal   • COLONOSCOPY W/ POLYPECTOMY  10/13/2014    A single polyp was found in the colon; removed by snare cautery polypectomy.   • ENDOSCOPY  06/15/2016    Mildly severe esophagitis. Several biopsies obtained in the upper third of the esophagus.   • INCISION AND DRAINAGE ABSCESS  10/29/2014    Incision and drainage of scrotal abscess. Hydrocelectomy, bottle procedure.   • INJECTION OF MEDICATION  01/26/2016    Kenalog       Health Maintenance:  Health Maintenance   Topic Date Due   • MEDICARE ANNUAL WELLNESS  09/09/2016   • HEMOGLOBIN A1C  04/18/2018   • LIPID PANEL  06/29/2018   • URINE MICROALBUMIN  06/29/2018   • DIABETIC FOOT EXAM  10/18/2018   • DIABETIC EYE EXAM  11/16/2018   • COLONOSCOPY  04/05/2026   • TDAP/TD VACCINES (2 - Td) 09/09/2026   • PNEUMOCOCCAL VACCINE (19-64 MEDIUM RISK)  Addressed   • HEPATITIS C SCREENING  Completed   • INFLUENZA VACCINE  Completed       Current Meds:    Current Outpatient Prescriptions:   •  albuterol (VENTOLIN HFA) 108 (90 Base) MCG/ACT inhaler, Inhale 2 puffs 2 (Two) Times a Day., Disp: 1 inhaler, Rfl: 3  •  Alcohol Swabs 70 % pads, USE UTD QID, Disp: , Rfl: 11  •  amitriptyline (ELAVIL) 25 MG tablet, Take 25 mg by mouth 3 (three) times a day., Disp: , Rfl:   •  ASPIRIN LOW DOSE 81 MG EC tablet, TAKE 1 TABLET BY MOUTH EVERY DAY, Disp: 30 tablet, Rfl: 0  •  atorvastatin (LIPITOR) 20 MG tablet, TAKE 1 TABLET BY MOUTH DAILY, Disp: 30 tablet, Rfl: 5  •  B-D UF III MINI PEN NEEDLES 31G X 5 MM misc, USE WITH INSULIN FOUR TIMES DAILY, Disp: 200 each, Rfl: 0  •   budesonide-formoterol (SYMBICORT) 160-4.5 MCG/ACT inhaler, Inhale 2 puffs 2 (Two) Times a Day., Disp: 1 inhaler, Rfl: 3  •  cyclobenzaprine (FLEXERIL) 5 MG tablet, TAKE 1 TABLET BY MOUTH THREE TIMES DAILY AS NEEDED FOR MUSCLE SPASMS, Disp: 90 tablet, Rfl: 0  •  fexofenadine (ALLEGRA ALLERGY) 180 MG tablet, Take 1 tablet by mouth Daily., Disp: 30 tablet, Rfl: 1  •  HYDROcodone-acetaminophen (NORCO) 5-325 MG per tablet, Take 1 tablet by mouth Every 4 (Four) Hours As Needed for Moderate Pain (4-6)., Disp: 20 tablet, Rfl: 0  •  Insulin Glargine (TOUJEO SOLOSTAR) 300 UNIT/ML solution pen-injector, Inject  under the skin daily., Disp: , Rfl:   •  MAGNESIUM-OXIDE 400 (241.3 MG) MG tablet tablet, TK 1 T PO BID, Disp: , Rfl: 12  •  metoprolol succinate XL (TOPROL-XL) 25 MG 24 hr tablet, TK 1 T PO BID, Disp: , Rfl: 3  •  OLANZapine (ZYPREXA) 10 MG tablet, Take 10 mg by mouth daily., Disp: , Rfl:   •  ondansetron (ZOFRAN) 4 MG tablet, Take 1 tablet by mouth every 8 (eight) hours as needed for nausea or vomiting., Disp: 30 tablet, Rfl: 0  •  ONE TOUCH ULTRA TEST test strip, TEST BLOOD SUGAR THREE TIMES DAILY., Disp: 200 each, Rfl: 3  •  pantoprazole (PROTONIX) 40 MG EC tablet, TAKE 1 TABLET BY MOUTH DAILY, Disp: 30 tablet, Rfl: 0  •  sertraline (ZOLOFT) 100 MG tablet, Take 100 mg by mouth daily., Disp: , Rfl:   •  tiotropium (SPIRIVA HANDIHALER) 18 MCG per inhalation capsule, Place 1 capsule into inhaler and inhale Daily., Disp: 30 capsule, Rfl: 2  •  Alcohol Swabs ( STERILE ALCOHOL PREP) pads, USE UTD QID, Disp: , Rfl: 3  •  busPIRone (BUSPAR) 5 MG tablet, TK 1 T PO BID, Disp: , Rfl: 3  •  carvedilol (COREG) 3.125 MG tablet, TK 1 T PO  BID, Disp: , Rfl: 2  •  fluticasone (FLONASE) 50 MCG/ACT nasal spray, 2 sprays into each nostril Daily., Disp: 9.9 mL, Rfl: 3  •  gabapentin (NEURONTIN) 400 MG capsule, Take 1 capsule by mouth 3 (Three) Times a Day., Disp: 90 capsule, Rfl: 5  •  Lancets (FREESTYLE) lancets, 1 each by Other  "route 3 (Three) Times a Day. Use as instructed, Disp: 100 each, Rfl: 11  •  losartan (COZAAR) 50 MG tablet, Take 1 tablet by mouth Daily., Disp: 30 tablet, Rfl: 3  •  meloxicam (MOBIC) 7.5 MG tablet, Take 1 tablet by mouth Daily., Disp: 30 tablet, Rfl: 1  •  metFORMIN ER (GLUCOPHAGE-XR) 750 MG 24 hr tablet, Take 1 tablet by mouth Daily., Disp: 30 tablet, Rfl: 6    Allergies:  Review of patient's allergies indicates no known allergies.    Family Hx:  Family History   Problem Relation Age of Onset   • Cancer Mother    • Heart disease Sister         Social History:  Social History     Social History   • Marital status:      Spouse name: N/A   • Number of children: N/A   • Years of education: N/A     Occupational History   • Not on file.     Social History Main Topics   • Smoking status: Never Smoker   • Smokeless tobacco: Never Used   • Alcohol use Yes   • Drug use: Defer   • Sexual activity: Not on file     Other Topics Concern   • Not on file     Social History Narrative       Review of Systems  Review of Systems   Constitutional: Negative for chills and fever.   HENT: Negative for congestion, nosebleeds and sore throat.         Nose pain   Eyes: Negative for discharge and itching.   Respiratory: Negative for cough, shortness of breath and wheezing.    Cardiovascular: Negative for chest pain, palpitations and leg swelling.   Gastrointestinal: Negative for diarrhea and vomiting.   Genitourinary: Negative for dysuria and hematuria.   Musculoskeletal: Positive for arthralgias (left elbow pain) and back pain (chronic low back pain). Negative for neck pain and neck stiffness.   Skin: Negative for rash and wound.   Neurological: Negative for seizures and syncope.   Psychiatric/Behavioral: Negative for agitation and confusion.        Decreased libido         Objective:     BP (!) 142/102 (BP Location: Right arm, Patient Position: Sitting, Cuff Size: Adult)  Pulse 67  Ht 182.9 cm (72\")  Wt 104 kg (228 lb 6.3 oz)  " SpO2 96%  BMI 30.98 kg/m2  Physical Exam   Constitutional: He is oriented to person, place, and time. He appears well-developed and well-nourished. No distress.   HENT:   Head: Normocephalic and atraumatic.   Right Ear: External ear normal.   Left Ear: External ear normal.   Nose: Nose normal. No nose lacerations, sinus tenderness or nasal deformity. No epistaxis.   Eyes: Conjunctivae and EOM are normal. Pupils are equal, round, and reactive to light. Right eye exhibits no discharge. Left eye exhibits no discharge. No scleral icterus.   Neck: Normal range of motion. Neck supple.   Cardiovascular: Normal rate, regular rhythm and normal heart sounds.    Pulmonary/Chest: Effort normal and breath sounds normal. No respiratory distress. He has no wheezes. He has no rales.   Abdominal: Soft. Bowel sounds are normal.   Musculoskeletal: Normal range of motion. He exhibits no edema, tenderness or deformity.   Neurological: He is alert and oriented to person, place, and time.   Skin: Skin is warm and dry. He is not diaphoretic.   Psychiatric: He has a normal mood and affect. His behavior is normal. Judgment and thought content normal.   Nursing note and vitals reviewed.                                                                     Assessment/Plan:     Diagnoses and all orders for this visit:    Diabetic polyneuropathy associated with type 2 diabetes mellitus  -     Discontinue: Lancets (FREESTYLE) lancets; 1 each by Other route 3 (Three) Times a Day. Use as instructed  -     Discontinue: metFORMIN ER (GLUCOPHAGE-XR) 750 MG 24 hr tablet; Take 1 tablet by mouth Daily.    Left elbow pain  -     meloxicam (MOBIC) 7.5 MG tablet; Take 1 tablet by mouth Daily.    Essential hypertension    Other orders  -     busPIRone (BUSPAR) 5 MG tablet; TK 1 T PO BID  -     carvedilol (COREG) 3.125 MG tablet; TK 1 T PO  BID  -     Discontinue: lisinopril (PRINIVIL,ZESTRIL) 2.5 MG tablet; TK 1 T PO D  -     Alcohol Swabs ( STERILE ALCOHOL  PREP) pads; USE UTD QID  -     fluticasone (FLONASE) 50 MCG/ACT nasal spray; 2 sprays into each nostril Daily.  -     losartan (COZAAR) 50 MG tablet; Take 1 tablet by mouth Daily.         Follow-up:     Return in about 4 weeks (around 2/12/2018) for Next scheduled follow up.        GOALS:  Maintain medicaiton compliance    Preventative:  Male Preventative: Exercises regularly  Cholesterol screening up to date  Vaccines:   Tetanus vaccine: not up to date - declined  Annual influenza vaccine: up to date   Pneumococcal vaccine: not up to date - declined    never smoker  occasional/rare  eat more fruits and vegetables, decrease soda or juice intake, increase water intake and increase physical activity    RISK SCORE: 3    Jeremy Mahmood MD PGY2  [facility] [place] [address]  Office: [phone]      This document has been electronically signed by Jeremy Mahmood MD on January 17, 2018 9:04 AM

## 2018-01-22 RX ORDER — PANTOPRAZOLE SODIUM 40 MG/1
TABLET, DELAYED RELEASE ORAL
Qty: 30 TABLET | Refills: 0 | Status: SHIPPED | OUTPATIENT
Start: 2018-01-22 | End: 2018-05-31 | Stop reason: SDUPTHER

## 2018-01-22 NOTE — PROGRESS NOTES
I have reviewed the notes, assessments, and/or procedures performed by Dr. Mahmood, I concur with her/his documentation of Nirav Lind.        This document has been electronically signed by Catie Dalton MD on January 22, 2018 3:52 AM

## 2018-01-24 ENCOUNTER — APPOINTMENT (OUTPATIENT)
Dept: GENERAL RADIOLOGY | Facility: HOSPITAL | Age: 53
End: 2018-01-24

## 2018-01-24 ENCOUNTER — HOSPITAL ENCOUNTER (EMERGENCY)
Facility: HOSPITAL | Age: 53
Discharge: HOME | End: 2018-01-25
Attending: EMERGENCY MEDICINE | Admitting: EMERGENCY MEDICINE

## 2018-01-24 DIAGNOSIS — R06.02 SOB (SHORTNESS OF BREATH): Primary | ICD-10-CM

## 2018-01-24 LAB
ALBUMIN SERPL-MCNC: 4.4 G/DL (ref 3.4–4.8)
ALBUMIN/GLOB SERPL: 1.2 G/DL (ref 1.1–1.8)
ALP SERPL-CCNC: 113 U/L (ref 38–126)
ALT SERPL W P-5'-P-CCNC: 65 U/L (ref 21–72)
ANION GAP SERPL CALCULATED.3IONS-SCNC: 13 MMOL/L (ref 5–15)
AST SERPL-CCNC: 40 U/L (ref 17–59)
BASOPHILS # BLD AUTO: 0.05 10*3/MM3 (ref 0–0.2)
BASOPHILS NFR BLD AUTO: 0.4 % (ref 0–2)
BILIRUB SERPL-MCNC: 0.1 MG/DL (ref 0.2–1.3)
BUN BLD-MCNC: 16 MG/DL (ref 7–21)
BUN/CREAT SERPL: 16.7 (ref 7–25)
CALCIUM SPEC-SCNC: 9.3 MG/DL (ref 8.4–10.2)
CHLORIDE SERPL-SCNC: 102 MMOL/L (ref 95–110)
CO2 SERPL-SCNC: 27 MMOL/L (ref 22–31)
CREAT BLD-MCNC: 0.96 MG/DL (ref 0.7–1.3)
DEPRECATED RDW RBC AUTO: 41 FL (ref 35.1–43.9)
EOSINOPHIL # BLD AUTO: 0.38 10*3/MM3 (ref 0–0.7)
EOSINOPHIL NFR BLD AUTO: 3.4 % (ref 0–7)
ERYTHROCYTE [DISTWIDTH] IN BLOOD BY AUTOMATED COUNT: 13.4 % (ref 11.5–14.5)
GFR SERPL CREATININE-BSD FRML MDRD: 99 ML/MIN/1.73 (ref 56–130)
GLOBULIN UR ELPH-MCNC: 3.8 GM/DL (ref 2.3–3.5)
GLUCOSE BLD-MCNC: 91 MG/DL (ref 60–100)
GLUCOSE BLDC GLUCOMTR-MCNC: 87 MG/DL (ref 70–130)
HCT VFR BLD AUTO: 42.9 % (ref 39–49)
HGB BLD-MCNC: 14.2 G/DL (ref 13.7–17.3)
HOLD SPECIMEN: NORMAL
HOLD SPECIMEN: NORMAL
IMM GRANULOCYTES # BLD: 0.05 10*3/MM3 (ref 0–0.02)
IMM GRANULOCYTES NFR BLD: 0.4 % (ref 0–0.5)
LYMPHOCYTES # BLD AUTO: 1.85 10*3/MM3 (ref 0.6–4.2)
LYMPHOCYTES NFR BLD AUTO: 16.4 % (ref 10–50)
MCH RBC QN AUTO: 28.1 PG (ref 26.5–34)
MCHC RBC AUTO-ENTMCNC: 33.1 G/DL (ref 31.5–36.3)
MCV RBC AUTO: 85 FL (ref 80–98)
MONOCYTES # BLD AUTO: 0.8 10*3/MM3 (ref 0–0.9)
MONOCYTES NFR BLD AUTO: 7.1 % (ref 0–12)
NEUTROPHILS # BLD AUTO: 8.12 10*3/MM3 (ref 2–8.6)
NEUTROPHILS NFR BLD AUTO: 72.3 % (ref 37–80)
NT-PROBNP SERPL-MCNC: <11.1 PG/ML (ref 0–900)
PLATELET # BLD AUTO: 267 10*3/MM3 (ref 150–450)
PMV BLD AUTO: 10 FL (ref 8–12)
POTASSIUM BLD-SCNC: 3.9 MMOL/L (ref 3.5–5.1)
PROT SERPL-MCNC: 8.2 G/DL (ref 6.3–8.6)
RBC # BLD AUTO: 5.05 10*6/MM3 (ref 4.37–5.74)
SODIUM BLD-SCNC: 142 MMOL/L (ref 137–145)
TROPONIN I SERPL-MCNC: <0.012 NG/ML
WBC NRBC COR # BLD: 11.25 10*3/MM3 (ref 3.2–9.8)
WHOLE BLOOD HOLD SPECIMEN: NORMAL
WHOLE BLOOD HOLD SPECIMEN: NORMAL

## 2018-01-24 PROCEDURE — 99284 EMERGENCY DEPT VISIT MOD MDM: CPT

## 2018-01-24 PROCEDURE — 93010 ELECTROCARDIOGRAM REPORT: CPT | Performed by: INTERNAL MEDICINE

## 2018-01-24 PROCEDURE — 36415 COLL VENOUS BLD VENIPUNCTURE: CPT

## 2018-01-24 PROCEDURE — 85025 COMPLETE CBC W/AUTO DIFF WBC: CPT | Performed by: EMERGENCY MEDICINE

## 2018-01-24 PROCEDURE — 80053 COMPREHEN METABOLIC PANEL: CPT | Performed by: EMERGENCY MEDICINE

## 2018-01-24 PROCEDURE — 82962 GLUCOSE BLOOD TEST: CPT

## 2018-01-24 PROCEDURE — 84484 ASSAY OF TROPONIN QUANT: CPT | Performed by: EMERGENCY MEDICINE

## 2018-01-24 PROCEDURE — 93005 ELECTROCARDIOGRAM TRACING: CPT | Performed by: EMERGENCY MEDICINE

## 2018-01-24 PROCEDURE — 83880 ASSAY OF NATRIURETIC PEPTIDE: CPT | Performed by: EMERGENCY MEDICINE

## 2018-01-24 PROCEDURE — 71046 X-RAY EXAM CHEST 2 VIEWS: CPT

## 2018-01-24 RX ORDER — SODIUM CHLORIDE 0.9 % (FLUSH) 0.9 %
10 SYRINGE (ML) INJECTION AS NEEDED
Status: DISCONTINUED | OUTPATIENT
Start: 2018-01-24 | End: 2018-01-25 | Stop reason: HOSPADM

## 2018-01-24 RX ADMIN — Medication 10 ML: at 23:34

## 2018-01-25 ENCOUNTER — TELEPHONE (OUTPATIENT)
Dept: FAMILY MEDICINE CLINIC | Facility: CLINIC | Age: 53
End: 2018-01-25

## 2018-01-25 ENCOUNTER — EPISODE CHANGES (OUTPATIENT)
Dept: CASE MANAGEMENT | Facility: OTHER | Age: 53
End: 2018-01-25

## 2018-01-25 VITALS
OXYGEN SATURATION: 100 % | TEMPERATURE: 99.7 F | DIASTOLIC BLOOD PRESSURE: 88 MMHG | HEIGHT: 72 IN | WEIGHT: 223 LBS | RESPIRATION RATE: 18 BRPM | BODY MASS INDEX: 30.2 KG/M2 | HEART RATE: 57 BPM | SYSTOLIC BLOOD PRESSURE: 126 MMHG

## 2018-01-25 LAB — TROPONIN I SERPL-MCNC: <0.012 NG/ML

## 2018-01-25 NOTE — ED NOTES
Pt feeling weak and dizzy at home.  Pt arrived by EMS and symptoms have now resolved.  Pt states his PCP has recently changed his BP medications and he is also diabetic.     Radha Chapa RN  01/24/18 2044

## 2018-01-25 NOTE — ED PROVIDER NOTES
Subjective   History of Present Illness  Patient is a 52-year-old male who arrives via EMS.  Apparently he had eaten a cheeseburger was lying on the couch had several seconds of shortness of breath or which he took his inhaler and got better.  I also checked his blood sugar and it was 97 which she says is little bit low for him.  Took his blood pressure at home which is what it which his wife read in the 200's systolic and it normalized by the time EMS had arrived and they thought perhaps that he had an erroneous reading.  He also had perhaps a minute of chest pain.  Cardiac catheterization in 2015 showed the 20th 30% stenosis of the first diagonal branch.  Normal LV.  Nuclear stress test last year was normal.  Review of Systems   Constitutional: Negative for activity change, appetite change, chills, diaphoresis, fatigue, fever and unexpected weight change.   Gastrointestinal: Negative for abdominal distention, abdominal pain, anal bleeding, blood in stool, constipation, diarrhea, nausea, rectal pain and vomiting.   Skin: Negative for color change, pallor, rash and wound.   All other systems reviewed and are negative.      Past Medical History:   Diagnosis Date   • Abnormal computed tomography scan    • Adenomatous polyp of colon    • Allergic rhinitis    • Anemia    • Chronic back pain    • Coronary arteriosclerosis    • Diabetes mellitus    • Dizziness    • Dyspnea     unspecified   • Epigastric pain    • Essential hypertension    • Ex-smoker    • Hemangioma of liver    • Hyperlipidemia    • Infected hydrocele     with orchitis and epididymis   • Nausea    • Nausea with vomiting    • Obesity with body mass index of 30.0 - 39.9    • Pain in right knee    • Right upper quadrant pain    • Type II diabetes mellitus, uncontrolled    • Upper respiratory infection    • Urgency of urination    • Villous adenoma of colon        No Known Allergies    Past Surgical History:   Procedure Laterality Date   • CARDIAC  CATHETERIZATION  11/30/2015    No evidence of any obstructive disease in the circumflex, the RCA , or LAD. 1st diagonal branch in the midportion with 20-30% stenosis. Preserved LV systolic function with EF 55%.   • COLONOSCOPY  01/21/2013    Normal   • COLONOSCOPY W/ POLYPECTOMY  10/13/2014    A single polyp was found in the colon; removed by snare cautery polypectomy.   • ENDOSCOPY  06/15/2016    Mildly severe esophagitis. Several biopsies obtained in the upper third of the esophagus.   • INCISION AND DRAINAGE ABSCESS  10/29/2014    Incision and drainage of scrotal abscess. Hydrocelectomy, bottle procedure.   • INJECTION OF MEDICATION  01/26/2016    Kenalog       Family History   Problem Relation Age of Onset   • Cancer Mother    • Heart disease Sister        Social History     Social History   • Marital status:      Spouse name: N/A   • Number of children: N/A   • Years of education: N/A     Social History Main Topics   • Smoking status: Never Smoker   • Smokeless tobacco: Never Used   • Alcohol use Yes   • Drug use: Defer   • Sexual activity: Not Asked     Other Topics Concern   • None     Social History Narrative           Objective   Physical Exam   Constitutional: He is oriented to person, place, and time. He appears well-developed and well-nourished. No distress.   HENT:   Head: Normocephalic and atraumatic.   Mouth/Throat: Oropharynx is clear and moist.   Cardiovascular: Normal rate, regular rhythm and normal heart sounds.    Pulmonary/Chest: Effort normal and breath sounds normal. No respiratory distress. He has no wheezes.   Abdominal: Soft. Bowel sounds are normal. He exhibits no distension and no mass. There is no tenderness. There is no rebound and no guarding.   Neurological: He is alert and oriented to person, place, and time. No cranial nerve deficit.   Skin: Skin is warm and dry. No rash noted. He is not diaphoretic. No erythema. No pallor.   Psychiatric: He has a normal mood and affect. His  behavior is normal. Judgment and thought content normal.   Nursing note and vitals reviewed.      ECG 12 Lead    Date/Time: 1/24/2018 9:00 PM  Performed by: KAMERON DUNBAR  Authorized by: KAMERON DUNBAR   Interpreted by physician  Rhythm: sinus bradycardia  Rate: bradycardic  BPM: 59  QRS axis: normal  Clinical impression: normal ECG               ED Course  ED Course        Imaging lab work reviewed.  Patient complains the emergency department.  2 sets troponins were negative.  He'll be discharged to home to follow up with his doctor tomorrow.          MDM    Final diagnoses:   SOB (shortness of breath)            Kameron Dunbar MD  01/25/18 0029       Kameron Dunbar MD  01/27/18 0058

## 2018-01-25 NOTE — DISCHARGE INSTRUCTIONS
Shortness of Breath, Adult  Shortness of breath is when a person has trouble breathing enough air, or when a person feels like she or he is having trouble breathing in enough air. Shortness of breath could be a sign of medical problem.  Follow these instructions at home:  Pay attention to any changes in your symptoms. Take these actions to help with your condition:  · Do not smoke. Smoking is a common cause of shortness of breath. If you smoke and you need help quitting, ask your health care provider.  · Avoid things that can irritate your airways, such as:  ¨ Mold.  ¨ Dust.  ¨ Air pollution.  ¨ Chemical fumes.  ¨ Things that can cause allergy symptoms (allergens), if you have allergies.  · Keep your living space clean and free of mold and dust.  · Rest as needed. Slowly return to your usual activities.  · Take over-the-counter and prescription medicines, including oxygen and inhaled medicines, only as told by your health care provider.  · Keep all follow-up visits as told by your health care provider. This is important.  Contact a health care provider if:  · Your condition does not improve as soon as expected.  · You have a hard time doing your normal activities, even after you rest.  · You have new symptoms.  Get help right away if:  · Your shortness of breath gets worse.  · You have shortness of breath when you are resting.  · You feel light-headed or you faint.  · You have a cough that is not controlled with medicines.  · You cough up blood.  · You have pain with breathing.  · You have pain in your chest, arms, shoulders, or abdomen.  · You have a fever.  · You cannot walk up stairs or exercise the way that you normally do.  This information is not intended to replace advice given to you by your health care provider. Make sure you discuss any questions you have with your health care provider.  Document Released: 09/12/2002 Document Revised: 07/08/2017 Document Reviewed: 05/25/2017  [employer] Patient  Education © 2017 [employer]

## 2018-01-26 ENCOUNTER — OFFICE VISIT (OUTPATIENT)
Dept: FAMILY MEDICINE CLINIC | Facility: CLINIC | Age: 53
End: 2018-01-26

## 2018-01-26 ENCOUNTER — TELEPHONE (OUTPATIENT)
Dept: FAMILY MEDICINE CLINIC | Facility: CLINIC | Age: 53
End: 2018-01-26

## 2018-01-26 VITALS
SYSTOLIC BLOOD PRESSURE: 140 MMHG | OXYGEN SATURATION: 96 % | WEIGHT: 223 LBS | HEART RATE: 80 BPM | BODY MASS INDEX: 30.2 KG/M2 | HEIGHT: 72 IN | DIASTOLIC BLOOD PRESSURE: 84 MMHG

## 2018-01-26 DIAGNOSIS — E11.49 DM (DIABETES MELLITUS), TYPE 2 WITH NEUROLOGICAL COMPLICATIONS (HCC): ICD-10-CM

## 2018-01-26 DIAGNOSIS — I10 ESSENTIAL HYPERTENSION: ICD-10-CM

## 2018-01-26 DIAGNOSIS — R06.00 DYSPNEA, UNSPECIFIED TYPE: Primary | ICD-10-CM

## 2018-01-26 PROCEDURE — 99213 OFFICE O/P EST LOW 20 MIN: CPT | Performed by: FAMILY MEDICINE

## 2018-01-26 RX ORDER — DEXTROMETHORPHAN HYDROBROMIDE AND PROMETHAZINE HYDROCHLORIDE 15; 6.25 MG/5ML; MG/5ML
1.25 SYRUP ORAL 4 TIMES DAILY PRN
Qty: 180 ML | Refills: 0 | Status: SHIPPED | OUTPATIENT
Start: 2018-01-26 | End: 2018-12-14 | Stop reason: SDUPTHER

## 2018-01-26 NOTE — PROGRESS NOTES
Subjective:     Nirav Lind is a 52 y.o. male who presents for ER follow up for dyspnea. Pt reports he was sitting on his couch eating a cheeseburger when he became dyspnic. He took a dose of albuterol and felt better. Pt checked his blood sugar which was in the low 90s and blood pressure which he said had a systolic of 200. They called EMS due to the blood pressure. EMS found he had a normal blood pressure. Was found to be normal in the ED as well.  Since this episode pt has not had any episodes of dyspnea. Pt sugars have been well controlled in the 110-120s. Pts blood pressure has been normal as well. Pt denies dyspnea, chest pain, headaches, blurry vision, nausea, vomiting or diarrhea. Pt has no other current complaints. Pt requests refill on his cough syrup.    Preventative:  Over the past 2 weeks, have you felt down, depressed, or hopeless?No   Over the past 2 weeks, have you felt little interest or pleasure in doing things?No  Clinical depression screening refused by patient.No     Past Medical Hx:  Past Medical History:   Diagnosis Date   • Abnormal computed tomography scan    • Adenomatous polyp of colon    • Allergic rhinitis    • Anemia    • Chronic back pain    • Coronary arteriosclerosis    • Diabetes mellitus    • Dizziness    • Dyspnea     unspecified   • Epigastric pain    • Essential hypertension    • Ex-smoker    • Hemangioma of liver    • Hyperlipidemia    • Infected hydrocele     with orchitis and epididymis   • Nausea    • Nausea with vomiting    • Obesity with body mass index of 30.0 - 39.9    • Pain in right knee    • Right upper quadrant pain    • Type II diabetes mellitus, uncontrolled    • Upper respiratory infection    • Urgency of urination    • Villous adenoma of colon        Past Surgical Hx:  Past Surgical History:   Procedure Laterality Date   • CARDIAC CATHETERIZATION  11/30/2015    No evidence of any obstructive disease in the circumflex, the RCA , or LAD. 1st diagonal branch  in the midportion with 20-30% stenosis. Preserved LV systolic function with EF 55%.   • COLONOSCOPY  01/21/2013    Normal   • COLONOSCOPY W/ POLYPECTOMY  10/13/2014    A single polyp was found in the colon; removed by snare cautery polypectomy.   • ENDOSCOPY  06/15/2016    Mildly severe esophagitis. Several biopsies obtained in the upper third of the esophagus.   • INCISION AND DRAINAGE ABSCESS  10/29/2014    Incision and drainage of scrotal abscess. Hydrocelectomy, bottle procedure.   • INJECTION OF MEDICATION  01/26/2016    Kenalog       Health Maintenance:  Health Maintenance   Topic Date Due   • MEDICARE ANNUAL WELLNESS  09/09/2016   • HEMOGLOBIN A1C  04/18/2018   • LIPID PANEL  06/29/2018   • URINE MICROALBUMIN  06/29/2018   • DIABETIC FOOT EXAM  10/18/2018   • DIABETIC EYE EXAM  11/16/2018   • COLONOSCOPY  04/05/2026   • TDAP/TD VACCINES (2 - Td) 09/09/2026   • PNEUMOCOCCAL VACCINE (19-64 MEDIUM RISK)  Addressed   • HEPATITIS C SCREENING  Completed   • INFLUENZA VACCINE  Completed       Current Meds:    Current Outpatient Prescriptions:   •  albuterol (VENTOLIN HFA) 108 (90 Base) MCG/ACT inhaler, Inhale 2 puffs 2 (Two) Times a Day., Disp: 1 inhaler, Rfl: 3  •  Alcohol Swabs ( STERILE ALCOHOL PREP) pads, USE UTD QID, Disp: , Rfl: 3  •  Alcohol Swabs 70 % pads, USE UTD QID, Disp: , Rfl: 11  •  amitriptyline (ELAVIL) 25 MG tablet, Take 25 mg by mouth 3 (three) times a day., Disp: , Rfl:   •  ASPIRIN LOW DOSE 81 MG EC tablet, TAKE 1 TABLET BY MOUTH EVERY DAY, Disp: 30 tablet, Rfl: 0  •  atorvastatin (LIPITOR) 20 MG tablet, TAKE 1 TABLET BY MOUTH DAILY, Disp: 30 tablet, Rfl: 5  •  B-D UF III MINI PEN NEEDLES 31G X 5 MM misc, USE WITH INSULIN FOUR TIMES DAILY, Disp: 200 each, Rfl: 0  •  budesonide-formoterol (SYMBICORT) 160-4.5 MCG/ACT inhaler, Inhale 2 puffs 2 (Two) Times a Day., Disp: 1 inhaler, Rfl: 3  •  busPIRone (BUSPAR) 5 MG tablet, TK 1 T PO BID, Disp: , Rfl: 3  •  cyclobenzaprine (FLEXERIL) 5 MG tablet,  TAKE 1 TABLET BY MOUTH THREE TIMES DAILY AS NEEDED FOR MUSCLE SPASMS, Disp: 90 tablet, Rfl: 0  •  fexofenadine (ALLEGRA ALLERGY) 180 MG tablet, Take 1 tablet by mouth Daily., Disp: 30 tablet, Rfl: 1  •  fluticasone (FLONASE) 50 MCG/ACT nasal spray, 2 sprays into each nostril Daily., Disp: 9.9 mL, Rfl: 3  •  HYDROcodone-acetaminophen (NORCO) 5-325 MG per tablet, Take 1 tablet by mouth Every 4 (Four) Hours As Needed for Moderate Pain (4-6)., Disp: 20 tablet, Rfl: 0  •  Insulin Glargine (TOUJEO SOLOSTAR) 300 UNIT/ML solution pen-injector, Inject  under the skin daily., Disp: , Rfl:   •  losartan (COZAAR) 50 MG tablet, Take 1 tablet by mouth Daily., Disp: 30 tablet, Rfl: 3  •  MAGNESIUM-OXIDE 400 (241.3 MG) MG tablet tablet, TK 1 T PO BID, Disp: , Rfl: 12  •  meloxicam (MOBIC) 7.5 MG tablet, Take 1 tablet by mouth Daily., Disp: 30 tablet, Rfl: 1  •  metFORMIN ER (GLUCOPHAGE-XR) 750 MG 24 hr tablet, Take 1 tablet by mouth Daily., Disp: 30 tablet, Rfl: 6  •  metoprolol succinate XL (TOPROL-XL) 25 MG 24 hr tablet, TK 1 T PO BID, Disp: , Rfl: 3  •  OLANZapine (ZYPREXA) 10 MG tablet, Take 10 mg by mouth daily., Disp: , Rfl:   •  ondansetron (ZOFRAN) 4 MG tablet, Take 1 tablet by mouth every 8 (eight) hours as needed for nausea or vomiting., Disp: 30 tablet, Rfl: 0  •  ONE TOUCH ULTRA TEST test strip, TEST BLOOD SUGAR THREE TIMES DAILY., Disp: 200 each, Rfl: 3  •  pantoprazole (PROTONIX) 40 MG EC tablet, TAKE 1 TABLET BY MOUTH DAILY, Disp: 30 tablet, Rfl: 0  •  sertraline (ZOLOFT) 100 MG tablet, Take 100 mg by mouth daily., Disp: , Rfl:   •  tiotropium (SPIRIVA HANDIHALER) 18 MCG per inhalation capsule, Place 1 capsule into inhaler and inhale Daily., Disp: 30 capsule, Rfl: 2  •  promethazine-dextromethorphan (PROMETHAZINE-DM) 6.25-15 MG/5ML syrup, Take 1.3 mL by mouth 4 (Four) Times a Day As Needed for Cough., Disp: 180 mL, Rfl: 0    Allergies:  Review of patient's allergies indicates no known allergies.    Family Hx:  Family  "History   Problem Relation Age of Onset   • Cancer Mother    • Heart disease Sister         Social History:  Social History     Social History   • Marital status:      Spouse name: N/A   • Number of children: N/A   • Years of education: N/A     Occupational History   • Not on file.     Social History Main Topics   • Smoking status: Never Smoker   • Smokeless tobacco: Never Used   • Alcohol use Yes   • Drug use: Defer   • Sexual activity: Defer     Other Topics Concern   • Not on file     Social History Narrative       Review of Systems  Review of Systems   Constitutional: Negative for chills and fever.   HENT: Negative for congestion, nosebleeds and sore throat.    Eyes: Negative for discharge and itching.   Respiratory: Negative for cough, shortness of breath and wheezing.    Cardiovascular: Negative for chest pain, palpitations and leg swelling.   Gastrointestinal: Negative for diarrhea and vomiting.   Genitourinary: Negative for dysuria and hematuria.   Musculoskeletal: Positive for back pain (chronic low back pain). Negative for neck pain and neck stiffness.   Skin: Negative for rash and wound.   Neurological: Negative for seizures and syncope.   Psychiatric/Behavioral: Negative for agitation and confusion.         Objective:     /84 (BP Location: Left arm, Patient Position: Sitting)  Pulse 80  Ht 181.6 cm (71.5\")  Wt 101 kg (223 lb)  SpO2 96%  BMI 30.67 kg/m2  Physical Exam   Constitutional: He is oriented to person, place, and time. He appears well-developed and well-nourished. No distress.   HENT:   Head: Normocephalic and atraumatic.   Right Ear: External ear normal.   Left Ear: External ear normal.   Nose: Nose normal. No nose lacerations, sinus tenderness or nasal deformity. No epistaxis.   Eyes: Conjunctivae and EOM are normal. Pupils are equal, round, and reactive to light. Right eye exhibits no discharge. Left eye exhibits no discharge. No scleral icterus.   Neck: Normal range of " motion. Neck supple.   Cardiovascular: Normal rate, regular rhythm and normal heart sounds.    Pulmonary/Chest: Effort normal and breath sounds normal. No respiratory distress. He has no wheezes. He has no rales.   Abdominal: Soft. Bowel sounds are normal.   Musculoskeletal: Normal range of motion. He exhibits no edema, tenderness or deformity.   Neurological: He is alert and oriented to person, place, and time.   Skin: Skin is warm and dry. He is not diaphoretic.   Psychiatric: He has a normal mood and affect. His behavior is normal. Judgment and thought content normal.   Nursing note and vitals reviewed.                                                                     Assessment/Plan:     Diagnoses and all orders for this visit:    Dyspnea, unspecified type    Essential hypertension    DM (diabetes mellitus), type 2 with neurological complications    Other orders  -     promethazine-dextromethorphan (PROMETHAZINE-DM) 6.25-15 MG/5ML syrup; Take 1.3 mL by mouth 4 (Four) Times a Day As Needed for Cough.       Follow-up:     Return in about 4 weeks (around 2/23/2018) for Next scheduled follow up.        GOALS:  Maintain medication compliance    Preventative:  Male Preventative: Cholesterol screening up to date  Vaccines:   Tetanus vaccine: not up to date - declined  Annual influenza vaccine: up to date   Pneumococcal vaccine: not up to date - declined    never smoker  occasional/rare  eat more fruits and vegetables, decrease soda or juice intake, increase water intake and increase physical activity    RISK SCORE: 3    Jeremy Mahmood MD PGY2  [place] [address]  Office: [phone]      This document has been electronically signed by Jeremy Mahmood MD on January 26, 2018 2:35 PM

## 2018-01-26 NOTE — TELEPHONE ENCOUNTER
[facility] CALLED WANTING CLARIFICATION ON SCRIPT. PREMETHIZINE(SP?) DM SAYS 1.3 ML 4 TIMES DAILY WANTS TO KNOW IF IT SHOULD BE HIGHER ML. PLEASE CALL [phone]

## 2018-02-13 RX ORDER — INSULIN GLARGINE 300 U/ML
INJECTION, SOLUTION SUBCUTANEOUS
Qty: 3 PEN | Refills: 5 | Status: SHIPPED | OUTPATIENT
Start: 2018-02-13 | End: 2018-11-16 | Stop reason: SDUPTHER

## 2018-02-16 ENCOUNTER — OFFICE VISIT (OUTPATIENT)
Dept: FAMILY MEDICINE CLINIC | Facility: CLINIC | Age: 53
End: 2018-02-16

## 2018-02-16 ENCOUNTER — LAB (OUTPATIENT)
Dept: LAB | Facility: HOSPITAL | Age: 53
End: 2018-02-16

## 2018-02-16 VITALS
WEIGHT: 230.13 LBS | BODY MASS INDEX: 31.17 KG/M2 | OXYGEN SATURATION: 98 % | HEART RATE: 69 BPM | SYSTOLIC BLOOD PRESSURE: 142 MMHG | DIASTOLIC BLOOD PRESSURE: 80 MMHG | HEIGHT: 72 IN

## 2018-02-16 DIAGNOSIS — R53.83 FATIGUE, UNSPECIFIED TYPE: ICD-10-CM

## 2018-02-16 DIAGNOSIS — E11.49 DM (DIABETES MELLITUS), TYPE 2 WITH NEUROLOGICAL COMPLICATIONS (HCC): Primary | ICD-10-CM

## 2018-02-16 DIAGNOSIS — I10 ESSENTIAL HYPERTENSION: ICD-10-CM

## 2018-02-16 LAB
HBA1C MFR BLD: 6.1 % (ref 4–5.6)
T4 FREE SERPL-MCNC: 0.73 NG/DL (ref 0.78–2.19)
TSH SERPL DL<=0.05 MIU/L-ACNC: 1.95 MIU/ML (ref 0.46–4.68)

## 2018-02-16 PROCEDURE — 36415 COLL VENOUS BLD VENIPUNCTURE: CPT | Performed by: FAMILY MEDICINE

## 2018-02-16 PROCEDURE — 84443 ASSAY THYROID STIM HORMONE: CPT

## 2018-02-16 PROCEDURE — 84439 ASSAY OF FREE THYROXINE: CPT

## 2018-02-16 PROCEDURE — 83036 HEMOGLOBIN GLYCOSYLATED A1C: CPT | Performed by: FAMILY MEDICINE

## 2018-02-16 PROCEDURE — 99213 OFFICE O/P EST LOW 20 MIN: CPT | Performed by: FAMILY MEDICINE

## 2018-02-16 RX ORDER — HYDROCHLOROTHIAZIDE 12.5 MG/1
12.5 TABLET ORAL DAILY
Qty: 30 TABLET | Refills: 3 | Status: SHIPPED | OUTPATIENT
Start: 2018-02-16 | End: 2018-03-12 | Stop reason: CLARIF

## 2018-02-16 NOTE — PROGRESS NOTES
Subjective:     Nirav Lind is a 52 y.o. male who presents for follow up for HTN and DM. Pt has a history of well controlled DM on metformin 750 daily and toujeo 13 units qAM. He states his sugars run between  and his last HbA1c was 6.2 Pt is due for another HbA1c. Pt has a history of HTN and has been taking losartan 50mg qD. Pt blood pressure has remained slightly elevated and he is agreeable to adding HCTZ 12.5 at this time. Pt denies any chest pain, dyspnea, headaches or blurry vision. Pt reports a a several month history of fatigue. Pt is agreeable to checking his TSH and free T4.    Preventative:  Over the past 2 weeks, have you felt down, depressed, or hopeless?No   Over the past 2 weeks, have you felt little interest or pleasure in doing things?No  Clinical depression screening refused by patient.No     Past Medical Hx:  Past Medical History:   Diagnosis Date   • Abnormal computed tomography scan    • Adenomatous polyp of colon    • Allergic rhinitis    • Anemia    • Chronic back pain    • Coronary arteriosclerosis    • Diabetes mellitus    • Dizziness    • Dyspnea     unspecified   • Epigastric pain    • Essential hypertension    • Ex-smoker    • Hemangioma of liver    • Hyperlipidemia    • Infected hydrocele     with orchitis and epididymis   • Nausea    • Nausea with vomiting    • Obesity with body mass index of 30.0 - 39.9    • Pain in right knee    • Right upper quadrant pain    • Type II diabetes mellitus, uncontrolled    • Upper respiratory infection    • Urgency of urination    • Villous adenoma of colon        Past Surgical Hx:  Past Surgical History:   Procedure Laterality Date   • CARDIAC CATHETERIZATION  11/30/2015    No evidence of any obstructive disease in the circumflex, the RCA , or LAD. 1st diagonal branch in the midportion with 20-30% stenosis. Preserved LV systolic function with EF 55%.   • COLONOSCOPY  01/21/2013    Normal   • COLONOSCOPY W/ POLYPECTOMY  10/13/2014    A  single polyp was found in the colon; removed by snare cautery polypectomy.   • ENDOSCOPY  06/15/2016    Mildly severe esophagitis. Several biopsies obtained in the upper third of the esophagus.   • INCISION AND DRAINAGE ABSCESS  10/29/2014    Incision and drainage of scrotal abscess. Hydrocelectomy, bottle procedure.   • INJECTION OF MEDICATION  01/26/2016    Kenalog       Health Maintenance:  Health Maintenance   Topic Date Due   • MEDICARE ANNUAL WELLNESS  09/09/2016   • LIPID PANEL  06/29/2018   • URINE MICROALBUMIN  06/29/2018   • HEMOGLOBIN A1C  08/16/2018   • DIABETIC FOOT EXAM  10/18/2018   • DIABETIC EYE EXAM  11/16/2018   • COLONOSCOPY  04/05/2026   • TDAP/TD VACCINES (2 - Td) 09/09/2026   • PNEUMOCOCCAL VACCINE (19-64 MEDIUM RISK)  Addressed   • HEPATITIS C SCREENING  Completed   • INFLUENZA VACCINE  Completed       Current Meds:    Current Outpatient Prescriptions:   •  albuterol (VENTOLIN HFA) 108 (90 Base) MCG/ACT inhaler, Inhale 2 puffs 2 (Two) Times a Day., Disp: 1 inhaler, Rfl: 3  •  Alcohol Swabs ( STERILE ALCOHOL PREP) pads, USE UTD QID, Disp: , Rfl: 3  •  Alcohol Swabs 70 % pads, USE UTD QID, Disp: , Rfl: 11  •  amitriptyline (ELAVIL) 25 MG tablet, Take 25 mg by mouth 3 (three) times a day., Disp: , Rfl:   •  ASPIRIN LOW DOSE 81 MG EC tablet, TAKE 1 TABLET BY MOUTH EVERY DAY, Disp: 30 tablet, Rfl: 0  •  atorvastatin (LIPITOR) 20 MG tablet, TAKE 1 TABLET BY MOUTH DAILY, Disp: 30 tablet, Rfl: 5  •  B-D UF III MINI PEN NEEDLES 31G X 5 MM misc, USE WITH INSULIN FOUR TIMES DAILY, Disp: 200 each, Rfl: 0  •  budesonide-formoterol (SYMBICORT) 160-4.5 MCG/ACT inhaler, Inhale 2 puffs 2 (Two) Times a Day., Disp: 1 inhaler, Rfl: 3  •  busPIRone (BUSPAR) 5 MG tablet, TK 1 T PO BID, Disp: , Rfl: 3  •  cyclobenzaprine (FLEXERIL) 5 MG tablet, TAKE 1 TABLET BY MOUTH THREE TIMES DAILY AS NEEDED FOR MUSCLE SPASMS, Disp: 90 tablet, Rfl: 0  •  fexofenadine (ALLEGRA ALLERGY) 180 MG tablet, Take 1 tablet by mouth  Daily., Disp: 30 tablet, Rfl: 1  •  fluticasone (FLONASE) 50 MCG/ACT nasal spray, 2 sprays into each nostril Daily., Disp: 9.9 mL, Rfl: 3  •  HYDROcodone-acetaminophen (NORCO) 5-325 MG per tablet, Take 1 tablet by mouth Every 4 (Four) Hours As Needed for Moderate Pain (4-6)., Disp: 20 tablet, Rfl: 0  •  losartan (COZAAR) 50 MG tablet, Take 1 tablet by mouth Daily., Disp: 30 tablet, Rfl: 3  •  MAGNESIUM-OXIDE 400 (241.3 MG) MG tablet tablet, TK 1 T PO BID, Disp: , Rfl: 12  •  meloxicam (MOBIC) 7.5 MG tablet, Take 1 tablet by mouth Daily., Disp: 30 tablet, Rfl: 1  •  metFORMIN ER (GLUCOPHAGE-XR) 750 MG 24 hr tablet, Take 1 tablet by mouth Daily., Disp: 30 tablet, Rfl: 6  •  metoprolol succinate XL (TOPROL-XL) 25 MG 24 hr tablet, TK 1 T PO BID, Disp: , Rfl: 3  •  OLANZapine (ZYPREXA) 10 MG tablet, Take 10 mg by mouth daily., Disp: , Rfl:   •  ondansetron (ZOFRAN) 4 MG tablet, Take 1 tablet by mouth every 8 (eight) hours as needed for nausea or vomiting., Disp: 30 tablet, Rfl: 0  •  ONE TOUCH ULTRA TEST test strip, TEST BLOOD SUGAR THREE TIMES DAILY., Disp: 200 each, Rfl: 3  •  pantoprazole (PROTONIX) 40 MG EC tablet, TAKE 1 TABLET BY MOUTH DAILY, Disp: 30 tablet, Rfl: 0  •  promethazine-dextromethorphan (PROMETHAZINE-DM) 6.25-15 MG/5ML syrup, Take 1.3 mL by mouth 4 (Four) Times a Day As Needed for Cough., Disp: 180 mL, Rfl: 0  •  sertraline (ZOLOFT) 100 MG tablet, Take 100 mg by mouth daily., Disp: , Rfl:   •  tiotropium (SPIRIVA HANDIHALER) 18 MCG per inhalation capsule, Place 1 capsule into inhaler and inhale Daily., Disp: 30 capsule, Rfl: 2  •  TOUJEO SOLOSTAR 300 UNIT/ML solution pen-injector, INJECT 25 UNITS EVERY MORNING, Disp: 3 pen, Rfl: 5  •  hydrochlorothiazide (HYDRODIURIL) 12.5 MG tablet, Take 1 tablet by mouth Daily., Disp: 30 tablet, Rfl: 3    Allergies:  Review of patient's allergies indicates no known allergies.    Family Hx:  Family History   Problem Relation Age of Onset   • Cancer Mother    • Heart  "disease Sister         Social History:  Social History     Social History   • Marital status:      Spouse name: N/A   • Number of children: N/A   • Years of education: N/A     Occupational History   • Not on file.     Social History Main Topics   • Smoking status: Never Smoker   • Smokeless tobacco: Never Used   • Alcohol use Yes   • Drug use: Defer   • Sexual activity: Defer     Other Topics Concern   • Not on file     Social History Narrative       Review of Systems  Review of Systems   Constitutional: Positive for fatigue. Negative for chills and fever.   HENT: Negative for congestion, nosebleeds and sore throat.    Eyes: Negative for discharge and itching.   Respiratory: Negative for cough, shortness of breath and wheezing.    Cardiovascular: Negative for chest pain, palpitations and leg swelling.   Gastrointestinal: Negative for diarrhea and vomiting.   Genitourinary: Negative for dysuria and hematuria.   Musculoskeletal: Positive for back pain (chronic low back pain). Negative for neck pain and neck stiffness.   Skin: Negative for rash and wound.   Neurological: Negative for seizures and syncope.   Psychiatric/Behavioral: Negative for agitation and confusion.         Objective:     /80 (BP Location: Left arm, Patient Position: Sitting, Cuff Size: Adult)  Pulse 69  Ht 181.6 cm (71.5\")  Wt 104 kg (230 lb 2 oz)  SpO2 98%  BMI 31.65 kg/m2  Physical Exam   Constitutional: He is oriented to person, place, and time. He appears well-developed and well-nourished. No distress.   HENT:   Head: Normocephalic and atraumatic.   Right Ear: External ear normal.   Left Ear: External ear normal.   Nose: Nose normal. No nose lacerations, sinus tenderness or nasal deformity. No epistaxis.   Mouth/Throat: Oropharynx is clear and moist. No oropharyngeal exudate.   Eyes: Conjunctivae are normal. Right eye exhibits no discharge. Left eye exhibits no discharge. No scleral icterus.   Neck: Normal range of motion. Neck " supple.   Cardiovascular: Normal rate, regular rhythm and normal heart sounds.    Pulmonary/Chest: Effort normal and breath sounds normal. No respiratory distress. He has no wheezes. He has no rales.   Abdominal: Soft. Bowel sounds are normal.   Musculoskeletal: Normal range of motion. He exhibits no edema, tenderness or deformity.      Skin Integrity  -  His right foot skin is intact.     Nirav 's left foot skin is intact. .  Neurological: He is alert and oriented to person, place, and time.   Skin: Skin is warm and dry. He is not diaphoretic.   Psychiatric: He has a normal mood and affect. His behavior is normal. Judgment and thought content normal.   Nursing note and vitals reviewed.                                                                     Assessment/Plan:     Diagnoses and all orders for this visit:    DM (diabetes mellitus), type 2 with neurological complications  -     Hemoglobin A1c    Essential hypertension  -     hydrochlorothiazide (HYDRODIURIL) 12.5 MG tablet; Take 1 tablet by mouth Daily.    Fatigue, unspecified type  -     TSH; Future  -     T4, free; Future       Follow-up:     Return in about 4 weeks (around 3/16/2018) for Next scheduled follow up.        GOALS:  Maintain medication compliance    Preventative:  Male Preventative: Cholesterol screening up to date  Vaccines:   Tetanus vaccine: not up to date - declined  Annual influenza vaccine: up to date   Pneumococcal vaccine: not up to date - declined    never smoker  occasional/rare  eat more fruits and vegetables, decrease soda or juice intake, increase water intake and increase physical activity    RISK SCORE: 3    Jeremy Mahmood MD PGY2  [place] [address]  Office: [phone]      This document has been electronically signed by Jeremy Mahmood MD on February 18, 2018 7:22 PM

## 2018-02-16 NOTE — PROGRESS NOTES
I discussed the case with the resident and I agree with their assessment and plan as outlined by Dr. Mahmood.  Andrew Whitaker MD.

## 2018-03-12 ENCOUNTER — OFFICE VISIT (OUTPATIENT)
Dept: FAMILY MEDICINE CLINIC | Facility: CLINIC | Age: 53
End: 2018-03-12

## 2018-03-12 VITALS
WEIGHT: 238 LBS | DIASTOLIC BLOOD PRESSURE: 90 MMHG | BODY MASS INDEX: 33.32 KG/M2 | SYSTOLIC BLOOD PRESSURE: 140 MMHG | OXYGEN SATURATION: 96 % | HEART RATE: 64 BPM | HEIGHT: 71 IN

## 2018-03-12 DIAGNOSIS — I10 ESSENTIAL HYPERTENSION: Primary | ICD-10-CM

## 2018-03-12 PROCEDURE — 99213 OFFICE O/P EST LOW 20 MIN: CPT | Performed by: FAMILY MEDICINE

## 2018-03-12 RX ORDER — ONDANSETRON 4 MG/1
4 TABLET, FILM COATED ORAL EVERY 8 HOURS PRN
Qty: 30 TABLET | Refills: 0 | Status: SHIPPED | OUTPATIENT
Start: 2018-03-12 | End: 2018-08-13 | Stop reason: SDUPTHER

## 2018-03-12 RX ORDER — CYCLOBENZAPRINE HCL 5 MG
5 TABLET ORAL 3 TIMES DAILY PRN
Qty: 90 TABLET | Refills: 0 | Status: SHIPPED | OUTPATIENT
Start: 2018-03-12 | End: 2018-05-31 | Stop reason: SDUPTHER

## 2018-03-12 RX ORDER — AMLODIPINE BESYLATE 5 MG/1
5 TABLET ORAL DAILY
Qty: 30 TABLET | Refills: 3 | Status: SHIPPED | OUTPATIENT
Start: 2018-03-12 | End: 2018-04-13 | Stop reason: ALTCHOICE

## 2018-03-12 RX ORDER — LOSARTAN POTASSIUM 25 MG/1
TABLET ORAL
COMMUNITY
Start: 2018-03-01 | End: 2018-03-16 | Stop reason: SDUPTHER

## 2018-03-12 NOTE — PROGRESS NOTES
I have reviewed the notes, assessments, and/or procedures performed by Dr. Mahmood, I concur with her/his documentation of Nirav Lind.

## 2018-03-12 NOTE — PROGRESS NOTES
Subjective:     Nirav Lind is a 52 y.o. male who presents for follow up for HTN. At last visiton 2/16 pt was agreeable to adding HCTZ 12.5 to his losartan 50mg for his poorly controlled HTN. States has not been taking his HCTZ because it made him feel sick. Decreased appetite, nausea, abdominal pain which all resolved after stopping the HCTZ. Pt is agreeable to adding Norvasc 5mg daily at this time. Pt denies any chest pain, dyspnea, headache or blurry vision. Pt counseled that if he has any issues with medications he should report that to our office and not wait till his next appointment. Pt has no other current complaints. Pt requests refill of his zofran and his flexeril.    Preventative:  Over the past 2 weeks, have you felt down, depressed, or hopeless?No   Over the past 2 weeks, have you felt little interest or pleasure in doing things?No  Clinical depression screening refused by patient.No     Past Medical Hx:  Past Medical History:   Diagnosis Date   • Abnormal computed tomography scan    • Adenomatous polyp of colon    • Allergic rhinitis    • Anemia    • Chronic back pain    • Coronary arteriosclerosis    • Diabetes mellitus    • Dizziness    • Dyspnea     unspecified   • Epigastric pain    • Essential hypertension    • Ex-smoker    • Hemangioma of liver    • Hyperlipidemia    • Infected hydrocele     with orchitis and epididymis   • Nausea    • Nausea with vomiting    • Obesity with body mass index of 30.0 - 39.9    • Pain in right knee    • Right upper quadrant pain    • Type II diabetes mellitus, uncontrolled    • Upper respiratory infection    • Urgency of urination    • Villous adenoma of colon        Past Surgical Hx:  Past Surgical History:   Procedure Laterality Date   • CARDIAC CATHETERIZATION  11/30/2015    No evidence of any obstructive disease in the circumflex, the RCA , or LAD. 1st diagonal branch in the midportion with 20-30% stenosis. Preserved LV systolic function with EF 55%.   •  COLONOSCOPY  01/21/2013    Normal   • COLONOSCOPY W/ POLYPECTOMY  10/13/2014    A single polyp was found in the colon; removed by snare cautery polypectomy.   • ENDOSCOPY  06/15/2016    Mildly severe esophagitis. Several biopsies obtained in the upper third of the esophagus.   • INCISION AND DRAINAGE ABSCESS  10/29/2014    Incision and drainage of scrotal abscess. Hydrocelectomy, bottle procedure.   • INJECTION OF MEDICATION  01/26/2016    Kenalog       Health Maintenance:  Health Maintenance   Topic Date Due   • MEDICARE ANNUAL WELLNESS  09/09/2016   • LIPID PANEL  06/29/2018   • URINE MICROALBUMIN  06/29/2018   • HEMOGLOBIN A1C  08/16/2018   • DIABETIC FOOT EXAM  10/19/2018   • DIABETIC EYE EXAM  11/16/2018   • COLONOSCOPY  04/05/2026   • TDAP/TD VACCINES (2 - Td) 09/09/2026   • PNEUMOCOCCAL VACCINE (19-64 MEDIUM RISK)  Addressed   • HEPATITIS C SCREENING  Completed   • INFLUENZA VACCINE  Completed       Current Meds:    Current Outpatient Prescriptions:   •  albuterol (VENTOLIN HFA) 108 (90 Base) MCG/ACT inhaler, Inhale 2 puffs 2 (Two) Times a Day., Disp: 1 inhaler, Rfl: 3  •  Alcohol Swabs ( STERILE ALCOHOL PREP) pads, USE UTD QID, Disp: , Rfl: 3  •  Alcohol Swabs 70 % pads, USE UTD QID, Disp: , Rfl: 11  •  amitriptyline (ELAVIL) 25 MG tablet, Take 25 mg by mouth 3 (three) times a day., Disp: , Rfl:   •  ASPIRIN LOW DOSE 81 MG EC tablet, TAKE 1 TABLET BY MOUTH EVERY DAY, Disp: 30 tablet, Rfl: 0  •  atorvastatin (LIPITOR) 20 MG tablet, TAKE 1 TABLET BY MOUTH DAILY, Disp: 30 tablet, Rfl: 5  •  B-D UF III MINI PEN NEEDLES 31G X 5 MM misc, USE WITH INSULIN FOUR TIMES DAILY, Disp: 200 each, Rfl: 0  •  budesonide-formoterol (SYMBICORT) 160-4.5 MCG/ACT inhaler, Inhale 2 puffs 2 (Two) Times a Day., Disp: 1 inhaler, Rfl: 3  •  busPIRone (BUSPAR) 5 MG tablet, TK 1 T PO BID, Disp: , Rfl: 3  •  cyclobenzaprine (FLEXERIL) 5 MG tablet, Take 1 tablet by mouth 3 (Three) Times a Day As Needed for Muscle Spasms. for muscle  spams, Disp: 90 tablet, Rfl: 0  •  fexofenadine (ALLEGRA ALLERGY) 180 MG tablet, Take 1 tablet by mouth Daily., Disp: 30 tablet, Rfl: 1  •  fluticasone (FLONASE) 50 MCG/ACT nasal spray, 2 sprays into each nostril Daily., Disp: 9.9 mL, Rfl: 3  •  HYDROcodone-acetaminophen (NORCO) 5-325 MG per tablet, Take 1 tablet by mouth Every 4 (Four) Hours As Needed for Moderate Pain (4-6)., Disp: 20 tablet, Rfl: 0  •  losartan (COZAAR) 25 MG tablet, , Disp: , Rfl:   •  losartan (COZAAR) 50 MG tablet, Take 1 tablet by mouth Daily., Disp: 30 tablet, Rfl: 3  •  MAGNESIUM-OXIDE 400 (241.3 MG) MG tablet tablet, TK 1 T PO BID, Disp: , Rfl: 12  •  meloxicam (MOBIC) 7.5 MG tablet, Take 1 tablet by mouth Daily., Disp: 30 tablet, Rfl: 1  •  metFORMIN ER (GLUCOPHAGE-XR) 750 MG 24 hr tablet, Take 1 tablet by mouth Daily., Disp: 30 tablet, Rfl: 6  •  metoprolol succinate XL (TOPROL-XL) 25 MG 24 hr tablet, TK 1 T PO BID, Disp: , Rfl: 3  •  OLANZapine (ZYPREXA) 10 MG tablet, Take 10 mg by mouth daily., Disp: , Rfl:   •  ondansetron (ZOFRAN) 4 MG tablet, Take 1 tablet by mouth Every 8 (Eight) Hours As Needed for Nausea or Vomiting., Disp: 30 tablet, Rfl: 0  •  ONE TOUCH ULTRA TEST test strip, TEST BLOOD SUGAR THREE TIMES DAILY., Disp: 200 each, Rfl: 3  •  pantoprazole (PROTONIX) 40 MG EC tablet, TAKE 1 TABLET BY MOUTH DAILY, Disp: 30 tablet, Rfl: 0  •  promethazine-dextromethorphan (PROMETHAZINE-DM) 6.25-15 MG/5ML syrup, Take 1.3 mL by mouth 4 (Four) Times a Day As Needed for Cough., Disp: 180 mL, Rfl: 0  •  sertraline (ZOLOFT) 100 MG tablet, Take 100 mg by mouth daily., Disp: , Rfl:   •  tiotropium (SPIRIVA HANDIHALER) 18 MCG per inhalation capsule, Place 1 capsule into inhaler and inhale Daily., Disp: 30 capsule, Rfl: 2  •  TOUJEO SOLOSTAR 300 UNIT/ML solution pen-injector, INJECT 25 UNITS EVERY MORNING, Disp: 3 pen, Rfl: 5  •  amLODIPine (NORVASC) 5 MG tablet, Take 1 tablet by mouth Daily., Disp: 30 tablet, Rfl: 3    Allergies:  Review of  "patient's allergies indicates no known allergies.    Family Hx:  Family History   Problem Relation Age of Onset   • Cancer Mother    • Heart disease Sister         Social History:  Social History     Social History   • Marital status:      Spouse name: N/A   • Number of children: N/A   • Years of education: N/A     Occupational History   • Not on file.     Social History Main Topics   • Smoking status: Never Smoker   • Smokeless tobacco: Never Used   • Alcohol use Yes   • Drug use: Unknown   • Sexual activity: Defer     Other Topics Concern   • Not on file     Social History Narrative   • No narrative on file       Review of Systems  Review of Systems   Constitutional: Negative for chills, fatigue and fever.   HENT: Negative for congestion, nosebleeds and sore throat.    Eyes: Negative for discharge and itching.   Respiratory: Negative for cough, shortness of breath and wheezing.    Cardiovascular: Negative for chest pain, palpitations and leg swelling.   Gastrointestinal: Negative for diarrhea and vomiting.   Genitourinary: Negative for dysuria and hematuria.   Musculoskeletal: Positive for back pain (chronic low back pain). Negative for neck pain and neck stiffness.   Skin: Negative for rash and wound.   Neurological: Negative for seizures and syncope.   Psychiatric/Behavioral: Negative for agitation and confusion.         Objective:     /90   Pulse 64   Ht 180.3 cm (71\")   Wt 108 kg (238 lb)   SpO2 96%   BMI 33.19 kg/m²   Physical Exam   Constitutional: He is oriented to person, place, and time. He appears well-developed and well-nourished. No distress.   HENT:   Head: Normocephalic and atraumatic.   Right Ear: External ear normal.   Left Ear: External ear normal.   Nose: Nose normal. No nose lacerations, sinus tenderness or nasal deformity. No epistaxis.   Mouth/Throat: Oropharynx is clear and moist. No oropharyngeal exudate.   Eyes: Conjunctivae are normal. Right eye exhibits no discharge. " Left eye exhibits no discharge. No scleral icterus.   Neck: Normal range of motion. Neck supple.   Cardiovascular: Normal rate, regular rhythm and normal heart sounds.    Pulmonary/Chest: Effort normal and breath sounds normal. No respiratory distress. He has no wheezes. He has no rales.   Abdominal: Soft. Bowel sounds are normal.   Musculoskeletal: Normal range of motion. He exhibits no edema, tenderness or deformity.     Skin Integrity  -  His right foot skin is not intact.  His left foot skin is not intact..  Neurological: He is alert and oriented to person, place, and time.   Skin: Skin is warm and dry. He is not diaphoretic.   Psychiatric: He has a normal mood and affect. His behavior is normal. Judgment and thought content normal.   Nursing note and vitals reviewed.                                                                     Assessment/Plan:     Diagnoses and all orders for this visit:    Essential hypertension  -     amLODIPine (NORVASC) 5 MG tablet; Take 1 tablet by mouth Daily.    Other orders  -     cyclobenzaprine (FLEXERIL) 5 MG tablet; Take 1 tablet by mouth 3 (Three) Times a Day As Needed for Muscle Spasms. for muscle spams  -     ondansetron (ZOFRAN) 4 MG tablet; Take 1 tablet by mouth Every 8 (Eight) Hours As Needed for Nausea or Vomiting.         Follow-up:     Return in about 4 weeks (around 4/9/2018) for Next scheduled follow up HTN.        GOALS:  Maintain medication compliance    Preventative:  Male Preventative: Cholesterol screening up to date  Vaccines:   Tetanus vaccine: not up to date - declined  Annual influenza vaccine: up to date   Pneumococcal vaccine: not up to date - declined    never smoker  occasional/rare  eat more fruits and vegetables, decrease soda or juice intake, increase water intake and increase physical activity    RISK SCORE: 3    Jeremy Mahmood MD PGY2  [place] [address]  Office:  [phone]      This document has been electronically signed by Jeremy Mahmood MD on March 12, 2018 1:33 PM

## 2018-03-14 ENCOUNTER — APPOINTMENT (OUTPATIENT)
Dept: LAB | Facility: HOSPITAL | Age: 53
End: 2018-03-14

## 2018-03-14 LAB
25(OH)D3 SERPL-MCNC: 22 NG/ML (ref 30–100)
ALBUMIN SERPL-MCNC: 4.4 G/DL (ref 3.4–4.8)
ALBUMIN/GLOB SERPL: 1.2 G/DL (ref 1.1–1.8)
ALP SERPL-CCNC: 110 U/L (ref 38–126)
ALT SERPL W P-5'-P-CCNC: 75 U/L (ref 21–72)
ANION GAP SERPL CALCULATED.3IONS-SCNC: 13 MMOL/L (ref 5–15)
ARTICHOKE IGE QN: 55 MG/DL (ref 1–129)
AST SERPL-CCNC: 60 U/L (ref 17–59)
BASOPHILS # BLD AUTO: 0.04 10*3/MM3 (ref 0–0.2)
BASOPHILS NFR BLD AUTO: 0.5 % (ref 0–2)
BILIRUB SERPL-MCNC: 0.4 MG/DL (ref 0.2–1.3)
BUN BLD-MCNC: 13 MG/DL (ref 7–21)
BUN/CREAT SERPL: 13.3 (ref 7–25)
CALCIUM SPEC-SCNC: 9.2 MG/DL (ref 8.4–10.2)
CHLORIDE SERPL-SCNC: 101 MMOL/L (ref 95–110)
CHOLEST SERPL-MCNC: 113 MG/DL (ref 0–199)
CO2 SERPL-SCNC: 29 MMOL/L (ref 22–31)
CREAT BLD-MCNC: 0.98 MG/DL (ref 0.7–1.3)
DEPRECATED RDW RBC AUTO: 41.7 FL (ref 35.1–43.9)
EOSINOPHIL # BLD AUTO: 0.35 10*3/MM3 (ref 0–0.7)
EOSINOPHIL NFR BLD AUTO: 4.2 % (ref 0–7)
ERYTHROCYTE [DISTWIDTH] IN BLOOD BY AUTOMATED COUNT: 13.4 % (ref 11.5–14.5)
GFR SERPL CREATININE-BSD FRML MDRD: 97 ML/MIN/1.73 (ref 56–130)
GLOBULIN UR ELPH-MCNC: 3.8 GM/DL (ref 2.3–3.5)
GLUCOSE BLD-MCNC: 103 MG/DL (ref 60–100)
HBA1C MFR BLD: 6.1 % (ref 4–5.6)
HCT VFR BLD AUTO: 44.7 % (ref 39–49)
HDLC SERPL-MCNC: 32 MG/DL (ref 60–200)
HGB BLD-MCNC: 15 G/DL (ref 13.7–17.3)
IMM GRANULOCYTES # BLD: 0.04 10*3/MM3 (ref 0–0.02)
IMM GRANULOCYTES NFR BLD: 0.5 % (ref 0–0.5)
LDLC/HDLC SERPL: 1.98 {RATIO} (ref 0–3.55)
LYMPHOCYTES # BLD AUTO: 2.09 10*3/MM3 (ref 0.6–4.2)
LYMPHOCYTES NFR BLD AUTO: 25.4 % (ref 10–50)
MCH RBC QN AUTO: 28.7 PG (ref 26.5–34)
MCHC RBC AUTO-ENTMCNC: 33.6 G/DL (ref 31.5–36.3)
MCV RBC AUTO: 85.5 FL (ref 80–98)
MONOCYTES # BLD AUTO: 0.6 10*3/MM3 (ref 0–0.9)
MONOCYTES NFR BLD AUTO: 7.3 % (ref 0–12)
NEUTROPHILS # BLD AUTO: 5.12 10*3/MM3 (ref 2–8.6)
NEUTROPHILS NFR BLD AUTO: 62.1 % (ref 37–80)
PLATELET # BLD AUTO: 260 10*3/MM3 (ref 150–450)
PMV BLD AUTO: 10.2 FL (ref 8–12)
POTASSIUM BLD-SCNC: 4.2 MMOL/L (ref 3.5–5.1)
PROT SERPL-MCNC: 8.2 G/DL (ref 6.3–8.6)
RBC # BLD AUTO: 5.23 10*6/MM3 (ref 4.37–5.74)
SODIUM BLD-SCNC: 143 MMOL/L (ref 137–145)
TRIGL SERPL-MCNC: 89 MG/DL (ref 20–199)
WBC NRBC COR # BLD: 8.24 10*3/MM3 (ref 3.2–9.8)

## 2018-03-14 PROCEDURE — 80053 COMPREHEN METABOLIC PANEL: CPT | Performed by: NURSE PRACTITIONER

## 2018-03-14 PROCEDURE — 82306 VITAMIN D 25 HYDROXY: CPT | Performed by: NURSE PRACTITIONER

## 2018-03-14 PROCEDURE — 80061 LIPID PANEL: CPT | Performed by: NURSE PRACTITIONER

## 2018-03-14 PROCEDURE — 36415 COLL VENOUS BLD VENIPUNCTURE: CPT | Performed by: NURSE PRACTITIONER

## 2018-03-14 PROCEDURE — 83036 HEMOGLOBIN GLYCOSYLATED A1C: CPT | Performed by: NURSE PRACTITIONER

## 2018-03-14 PROCEDURE — 85025 COMPLETE CBC W/AUTO DIFF WBC: CPT | Performed by: NURSE PRACTITIONER

## 2018-03-16 ENCOUNTER — OFFICE VISIT (OUTPATIENT)
Dept: ENDOCRINOLOGY | Facility: CLINIC | Age: 53
End: 2018-03-16

## 2018-03-16 VITALS
BODY MASS INDEX: 33.04 KG/M2 | WEIGHT: 236 LBS | HEART RATE: 74 BPM | HEIGHT: 71 IN | DIASTOLIC BLOOD PRESSURE: 76 MMHG | SYSTOLIC BLOOD PRESSURE: 124 MMHG

## 2018-03-16 DIAGNOSIS — E11.49 DM (DIABETES MELLITUS), TYPE 2 WITH NEUROLOGICAL COMPLICATIONS (HCC): Primary | ICD-10-CM

## 2018-03-16 DIAGNOSIS — I10 ESSENTIAL HYPERTENSION: ICD-10-CM

## 2018-03-16 DIAGNOSIS — E78.2 MIXED HYPERLIPIDEMIA: ICD-10-CM

## 2018-03-16 DIAGNOSIS — E11.42 DIABETIC POLYNEUROPATHY ASSOCIATED WITH TYPE 2 DIABETES MELLITUS (HCC): ICD-10-CM

## 2018-03-16 DIAGNOSIS — E55.9 VITAMIN D DEFICIENCY: ICD-10-CM

## 2018-03-16 PROCEDURE — 99214 OFFICE O/P EST MOD 30 MIN: CPT | Performed by: NURSE PRACTITIONER

## 2018-03-16 NOTE — PROGRESS NOTES
Subjective    Nirav Lind is a 52 y.o. male. he is here today for follow-up.    History of Present Illness         Duration/Timing: Diabetes mellitus type 2, onset of symptoms gradual   dm dx at age 51 y      constant     not controlled      severity high      Patient was admitted to the hospital for chest pain but cardiac was ruled out      Severity (Complications/Hospitalizations)  Secondary Macrovascular Complications: No CAD, No CVA, No PAD  Secondary Microvascular Complications: No Microalbuminuria, No Diabetic Retinopathy, No Diabetic Neuropathy     Context  Diabetes Regimen: Insulin, Oral Medications, Last HgbA1c 6.0 from Jan. 2017            Lab Results   Component Value Date     HGBA1C 6.2 (H) 10/18/2017         Blood Glucose Readings     Blood glucose log ranging from 88 up to 144      Diet  adhering to 1800 calorie diet   Exercise: Does not exercise     Associated Signs/Symptoms  Hyperglycemic Symptoms: No polyuria, No polydipsia, No polyphagia  Hypoglycemic Episodes: No documented hypoglycemia since last visit                      The following portions of the patient's history were reviewed and updated as appropriate:   Past Medical History:   Diagnosis Date   • Abnormal computed tomography scan    • Adenomatous polyp of colon    • Allergic rhinitis    • Anemia    • Chronic back pain    • Coronary arteriosclerosis    • Diabetes mellitus    • Dizziness    • Dyspnea     unspecified   • Epigastric pain    • Essential hypertension    • Ex-smoker    • Hemangioma of liver    • Hyperlipidemia    • Infected hydrocele     with orchitis and epididymis   • Nausea    • Nausea with vomiting    • Obesity with body mass index of 30.0 - 39.9    • Pain in right knee    • Right upper quadrant pain    • Type II diabetes mellitus, uncontrolled    • Upper respiratory infection    • Urgency of urination    • Villous adenoma of colon      Past Surgical History:   Procedure Laterality Date   • CARDIAC CATHETERIZATION   11/30/2015    No evidence of any obstructive disease in the circumflex, the RCA , or LAD. 1st diagonal branch in the midportion with 20-30% stenosis. Preserved LV systolic function with EF 55%.   • COLONOSCOPY  01/21/2013    Normal   • COLONOSCOPY W/ POLYPECTOMY  10/13/2014    A single polyp was found in the colon; removed by snare cautery polypectomy.   • ENDOSCOPY  06/15/2016    Mildly severe esophagitis. Several biopsies obtained in the upper third of the esophagus.   • INCISION AND DRAINAGE ABSCESS  10/29/2014    Incision and drainage of scrotal abscess. Hydrocelectomy, bottle procedure.   • INJECTION OF MEDICATION  01/26/2016    Kenalog     Family History   Problem Relation Age of Onset   • Cancer Mother    • Heart disease Sister        Current Outpatient Prescriptions   Medication Sig Dispense Refill   • albuterol (VENTOLIN HFA) 108 (90 Base) MCG/ACT inhaler Inhale 2 puffs 2 (Two) Times a Day. 1 inhaler 3   • Alcohol Swabs (HM STERILE ALCOHOL PREP) pads USE UTD QID  3   • Alcohol Swabs 70 % pads USE UTD QID  11   • amitriptyline (ELAVIL) 25 MG tablet Take 25 mg by mouth 3 (three) times a day.     • amLODIPine (NORVASC) 5 MG tablet Take 1 tablet by mouth Daily. 30 tablet 3   • ASPIRIN LOW DOSE 81 MG EC tablet TAKE 1 TABLET BY MOUTH EVERY DAY 30 tablet 0   • atorvastatin (LIPITOR) 20 MG tablet TAKE 1 TABLET BY MOUTH DAILY 30 tablet 5   • B-D UF III MINI PEN NEEDLES 31G X 5 MM misc USE WITH INSULIN FOUR TIMES DAILY 200 each 0   • budesonide-formoterol (SYMBICORT) 160-4.5 MCG/ACT inhaler Inhale 2 puffs 2 (Two) Times a Day. 1 inhaler 3   • busPIRone (BUSPAR) 5 MG tablet TK 1 T PO BID  3   • cyclobenzaprine (FLEXERIL) 5 MG tablet Take 1 tablet by mouth 3 (Three) Times a Day As Needed for Muscle Spasms. for muscle spams 90 tablet 0   • fexofenadine (ALLEGRA ALLERGY) 180 MG tablet Take 1 tablet by mouth Daily. 30 tablet 1   • fluticasone (FLONASE) 50 MCG/ACT nasal spray 2 sprays into each nostril Daily. 9.9 mL 3   •  HYDROcodone-acetaminophen (NORCO) 5-325 MG per tablet Take 1 tablet by mouth Every 4 (Four) Hours As Needed for Moderate Pain (4-6). 20 tablet 0   • insulin aspart (NOVOLOG FLEXPEN) 100 UNIT/ML solution pen-injector sc pen Inject 8 Units under the skin 3 (Three) Times a Day With Meals. 2 pen 11   • losartan (COZAAR) 50 MG tablet Take 1 tablet by mouth Daily. 30 tablet 3   • MAGNESIUM-OXIDE 400 (241.3 MG) MG tablet tablet TK 1 T PO BID  12   • meloxicam (MOBIC) 7.5 MG tablet Take 1 tablet by mouth Daily. 30 tablet 1   • metFORMIN ER (GLUCOPHAGE-XR) 750 MG 24 hr tablet Take 1 tablet by mouth Daily. 30 tablet 6   • metoprolol succinate XL (TOPROL-XL) 25 MG 24 hr tablet TK 1 T PO BID  3   • OLANZapine (ZYPREXA) 10 MG tablet Take 10 mg by mouth daily.     • ondansetron (ZOFRAN) 4 MG tablet Take 1 tablet by mouth Every 8 (Eight) Hours As Needed for Nausea or Vomiting. 30 tablet 0   • ONE TOUCH ULTRA TEST test strip TEST BLOOD SUGAR THREE TIMES DAILY. 200 each 3   • pantoprazole (PROTONIX) 40 MG EC tablet TAKE 1 TABLET BY MOUTH DAILY 30 tablet 0   • promethazine-dextromethorphan (PROMETHAZINE-DM) 6.25-15 MG/5ML syrup Take 1.3 mL by mouth 4 (Four) Times a Day As Needed for Cough. 180 mL 0   • sertraline (ZOLOFT) 100 MG tablet Take 100 mg by mouth daily.     • tiotropium (SPIRIVA HANDIHALER) 18 MCG per inhalation capsule Place 1 capsule into inhaler and inhale Daily. 30 capsule 2   • TOUJEO SOLOSTAR 300 UNIT/ML solution pen-injector INJECT 25 UNITS EVERY MORNING 3 pen 5     No current facility-administered medications for this visit.      No Known Allergies  Social History     Social History   • Marital status:      Social History Main Topics   • Smoking status: Never Smoker   • Smokeless tobacco: Never Used   • Alcohol use Yes   • Drug use: Unknown   • Sexual activity: Defer     Other Topics Concern   • Not on file       Review of Systems  Review of Systems   Constitutional: Negative for activity change, appetite  "change, diaphoresis and fatigue.   HENT: Negative for facial swelling, sneezing, sore throat, tinnitus, trouble swallowing and voice change.    Eyes: Negative for photophobia, pain, discharge, redness, itching and visual disturbance.   Respiratory: Negative for apnea, cough, choking, chest tightness and shortness of breath.    Cardiovascular: Negative for chest pain, palpitations and leg swelling.   Gastrointestinal: Negative for abdominal distention, abdominal pain, constipation, diarrhea, nausea and vomiting.   Endocrine: Negative for cold intolerance, heat intolerance, polydipsia, polyphagia and polyuria.   Genitourinary: Negative for difficulty urinating, dysuria, frequency, hematuria and urgency.   Musculoskeletal: Negative for arthralgias, back pain, gait problem, joint swelling, myalgias, neck pain and neck stiffness.   Skin: Negative for color change, pallor, rash and wound.   Neurological: Negative for dizziness, tremors, weakness, light-headedness, numbness and headaches.   Hematological: Negative for adenopathy. Does not bruise/bleed easily.   Psychiatric/Behavioral: Negative for behavioral problems, confusion and sleep disturbance.        Objective    /76 (BP Location: Right arm, Patient Position: Sitting, Cuff Size: Adult)   Pulse 74   Ht 180.3 cm (71\")   Wt 107 kg (236 lb)   BMI 32.92 kg/m²   Physical Exam   Constitutional: He is oriented to person, place, and time. He appears well-developed and well-nourished. No distress.   HENT:   Head: Normocephalic and atraumatic.   Right Ear: External ear normal.   Left Ear: External ear normal.   Nose: Nose normal.   Eyes: Conjunctivae and EOM are normal. Pupils are equal, round, and reactive to light.   Neck: Normal range of motion. Neck supple. No tracheal deviation present. No thyromegaly present.   Cardiovascular: Normal rate, regular rhythm and normal heart sounds.    No murmur heard.  Pulmonary/Chest: Effort normal and breath sounds normal. No " respiratory distress. He has no wheezes.   Abdominal: Soft. Bowel sounds are normal. There is no tenderness. There is no rebound and no guarding.   Musculoskeletal: Normal range of motion. He exhibits no edema, tenderness or deformity.   Neurological: He is alert and oriented to person, place, and time. No cranial nerve deficit.   Skin: Skin is warm and dry. No rash noted.   Psychiatric: He has a normal mood and affect. His behavior is normal. Judgment and thought content normal.       Lab Review  Glucose (mg/dL)   Date Value   03/14/2018 103 (H)   01/24/2018 91   10/18/2017 81     Sodium (mmol/L)   Date Value   03/14/2018 143   01/24/2018 142   10/18/2017 141     Potassium (mmol/L)   Date Value   03/14/2018 4.2   01/24/2018 3.9   10/18/2017 4.1     Chloride (mmol/L)   Date Value   03/14/2018 101   01/24/2018 102   10/18/2017 104     CO2 (mmol/L)   Date Value   03/14/2018 29.0   01/24/2018 27.0   10/18/2017 26.0     BUN (mg/dL)   Date Value   03/14/2018 13   01/24/2018 16   10/18/2017 16     Creatinine (mg/dL)   Date Value   03/14/2018 0.98   01/24/2018 0.96   10/18/2017 1.06     Hemoglobin A1C (%)   Date Value   03/14/2018 6.1 (H)   02/16/2018 6.1 (H)   10/18/2017 6.2 (H)     Triglycerides   Date Value   03/14/2018 89 mg/dL   06/29/2017 63 mg/dL   11/04/2016 111 mg/dl     LDL Cholesterol    Date Value   03/14/2018 55 mg/dL   06/29/2017 41 mg/dL   11/04/2016 88 mg/dl       Assessment/Plan      1. DM (diabetes mellitus), type 2 with neurological complications    2. Diabetic polyneuropathy associated with type 2 diabetes mellitus    3. Mixed hyperlipidemia    4. Essential hypertension    5. Vitamin D deficiency    .    Medications prescribed:  Outpatient Encounter Prescriptions as of 3/16/2018   Medication Sig Dispense Refill   • albuterol (VENTOLIN HFA) 108 (90 Base) MCG/ACT inhaler Inhale 2 puffs 2 (Two) Times a Day. 1 inhaler 3   • Alcohol Swabs (HM STERILE ALCOHOL PREP) pads USE UTD QID  3   • Alcohol Swabs 70 %  pads USE UTD QID  11   • amitriptyline (ELAVIL) 25 MG tablet Take 25 mg by mouth 3 (three) times a day.     • amLODIPine (NORVASC) 5 MG tablet Take 1 tablet by mouth Daily. 30 tablet 3   • ASPIRIN LOW DOSE 81 MG EC tablet TAKE 1 TABLET BY MOUTH EVERY DAY 30 tablet 0   • atorvastatin (LIPITOR) 20 MG tablet TAKE 1 TABLET BY MOUTH DAILY 30 tablet 5   • B-D UF III MINI PEN NEEDLES 31G X 5 MM misc USE WITH INSULIN FOUR TIMES DAILY 200 each 0   • budesonide-formoterol (SYMBICORT) 160-4.5 MCG/ACT inhaler Inhale 2 puffs 2 (Two) Times a Day. 1 inhaler 3   • busPIRone (BUSPAR) 5 MG tablet TK 1 T PO BID  3   • cyclobenzaprine (FLEXERIL) 5 MG tablet Take 1 tablet by mouth 3 (Three) Times a Day As Needed for Muscle Spasms. for muscle spams 90 tablet 0   • fexofenadine (ALLEGRA ALLERGY) 180 MG tablet Take 1 tablet by mouth Daily. 30 tablet 1   • fluticasone (FLONASE) 50 MCG/ACT nasal spray 2 sprays into each nostril Daily. 9.9 mL 3   • HYDROcodone-acetaminophen (NORCO) 5-325 MG per tablet Take 1 tablet by mouth Every 4 (Four) Hours As Needed for Moderate Pain (4-6). 20 tablet 0   • insulin aspart (NOVOLOG FLEXPEN) 100 UNIT/ML solution pen-injector sc pen Inject 8 Units under the skin 3 (Three) Times a Day With Meals. 2 pen 11   • losartan (COZAAR) 50 MG tablet Take 1 tablet by mouth Daily. 30 tablet 3   • MAGNESIUM-OXIDE 400 (241.3 MG) MG tablet tablet TK 1 T PO BID  12   • meloxicam (MOBIC) 7.5 MG tablet Take 1 tablet by mouth Daily. 30 tablet 1   • metFORMIN ER (GLUCOPHAGE-XR) 750 MG 24 hr tablet Take 1 tablet by mouth Daily. 30 tablet 6   • metoprolol succinate XL (TOPROL-XL) 25 MG 24 hr tablet TK 1 T PO BID  3   • OLANZapine (ZYPREXA) 10 MG tablet Take 10 mg by mouth daily.     • ondansetron (ZOFRAN) 4 MG tablet Take 1 tablet by mouth Every 8 (Eight) Hours As Needed for Nausea or Vomiting. 30 tablet 0   • ONE TOUCH ULTRA TEST test strip TEST BLOOD SUGAR THREE TIMES DAILY. 200 each 3   • pantoprazole (PROTONIX) 40 MG EC tablet  TAKE 1 TABLET BY MOUTH DAILY 30 tablet 0   • promethazine-dextromethorphan (PROMETHAZINE-DM) 6.25-15 MG/5ML syrup Take 1.3 mL by mouth 4 (Four) Times a Day As Needed for Cough. 180 mL 0   • sertraline (ZOLOFT) 100 MG tablet Take 100 mg by mouth daily.     • tiotropium (SPIRIVA HANDIHALER) 18 MCG per inhalation capsule Place 1 capsule into inhaler and inhale Daily. 30 capsule 2   • TOUJEO SOLOSTAR 300 UNIT/ML solution pen-injector INJECT 25 UNITS EVERY MORNING 3 pen 5   • [DISCONTINUED] losartan (COZAAR) 25 MG tablet        No facility-administered encounter medications on file as of 3/16/2018.        Orders placed during this encounter include:  Orders Placed This Encounter   Procedures   • Comprehensive Metabolic Panel   • Hemoglobin A1c   • Vitamin D 25 Hydroxy   • Vitamin B12   • Protein / Creatinine Ratio, Urine - Urine, Clean Catch   • Microalbumin / Creatinine Urine Ratio - Urine, Clean Catch   • CBC & Differential     Order Specific Question:   Manual Differential     Answer:   No     Glycemic Management     Lab Results   Component Value Date    HGBA1C 6.1 (H) 03/14/2018             Toujeo 25 units in the morning---taking 20 units ---taking 13 units      if you ever wake up with a sugar less than 100 - back off by 3 units     ---------------------     Invokana 100 mg before breakfast --not taking            Metformin 500 mg tablets---take one with supper ----taking 750 mg                     invokamet 150/1000 mg twice daily ( it combines metformin and invokana )---stopped the invokamet due to low blood sugars      --------------------        once you run out of januvia , you will use in its place trulicity 0.75 mg weekly ---stopped due to low Blood sugars   if nausea try to eat less      ------------------     novolog --not using --stopped     Use for sliding scale         2 units for every 15 grams of carbohydrate     once glucose readings are staying in the low 100s we can try 1 unit for every 15 grams and  if it stays in the low 100s we will only use sliding scale         Lipid Management  on pravachol 20 mg qhs      Nov. 2016     Lipids at goal      Total Cholesterol   Date Value Ref Range Status   03/14/2018 113 0 - 199 mg/dL Final     Triglycerides   Date Value Ref Range Status   03/14/2018 89 20 - 199 mg/dL Final     HDL Cholesterol   Date Value Ref Range Status   03/14/2018 32 (L) 60 - 200 mg/dL Final      LDL - 55            Blood Pressure Management     on amlodipine 5 mg daily      On lisinopril 2.5 mg daily --stopped due to allergic reaction     On metoprolol         Microvascular Complication Monitoring: No Microalbuminuria, No Diabetic Retinopathy, positive  Diabetic Neuropathy        Neuropathy     Taking Neurontin            Preventive Care: Patient is not smoking        Weight Related: Obesity, Counseled on nutrition, Counseled on physical activity    BMI -- 32. 9     Dietary changes    Handout given diet and diabetes      Bone Health     Vitamin d def     Nov. 2016      Vit d - normal      Vitamin d Oct 2017     29     Start otc 1000 units daily       Component      Latest Ref Rng & Units 3/14/2018   25 Hydroxy, Vitamin D      30.0 - 100.0 ng/ml 22.0 (L)         not taking states cannot remember           4. Follow-up: Return in about 4 months (around 7/16/2018) for Recheck.

## 2018-03-16 NOTE — PATIENT INSTRUCTIONS
Diabetes Mellitus and Food  It is important for you to manage your blood sugar (glucose) level. Your blood glucose level can be greatly affected by what you eat. Eating healthier foods in the appropriate amounts throughout the day at about the same time each day will help you control your blood glucose level. It can also help slow or prevent worsening of your diabetes mellitus. Healthy eating may even help you improve the level of your blood pressure and reach or maintain a healthy weight.  General recommendations for healthful eating and cooking habits include:  · Eating meals and snacks regularly. Avoid going long periods of time without eating to lose weight.  · Eating a diet that consists mainly of plant-based foods, such as fruits, vegetables, nuts, legumes, and whole grains.  · Using low-heat cooking methods, such as baking, instead of high-heat cooking methods, such as deep frying.  Work with your dietitian to make sure you understand how to use the Nutrition Facts information on food labels.  How can food affect me?  Carbohydrates   Carbohydrates affect your blood glucose level more than any other type of food. Your dietitian will help you determine how many carbohydrates to eat at each meal and teach you how to count carbohydrates. Counting carbohydrates is important to keep your blood glucose at a healthy level, especially if you are using insulin or taking certain medicines for diabetes mellitus.  Alcohol   Alcohol can cause sudden decreases in blood glucose (hypoglycemia), especially if you use insulin or take certain medicines for diabetes mellitus. Hypoglycemia can be a life-threatening condition. Symptoms of hypoglycemia (sleepiness, dizziness, and disorientation) are similar to symptoms of having too much alcohol.  If your health care provider has given you approval to drink alcohol, do so in moderation and use the following guidelines:  · Women should not have more than one drink per day, and men  should not have more than two drinks per day. One drink is equal to:  ¨ 12 oz of beer.  ¨ 5 oz of wine.  ¨ 1½ oz of hard liquor.  · Do not drink on an empty stomach.  · Keep yourself hydrated. Have water, diet soda, or unsweetened iced tea.  · Regular soda, juice, and other mixers might contain a lot of carbohydrates and should be counted.  What foods are not recommended?  As you make food choices, it is important to remember that all foods are not the same. Some foods have fewer nutrients per serving than other foods, even though they might have the same number of calories or carbohydrates. It is difficult to get your body what it needs when you eat foods with fewer nutrients. Examples of foods that you should avoid that are high in calories and carbohydrates but low in nutrients include:  · Trans fats (most processed foods list trans fats on the Nutrition Facts label).  · Regular soda.  · Juice.  · Candy.  · Sweets, such as cake, pie, doughnuts, and cookies.  · Fried foods.  What foods can I eat?  Eat nutrient-rich foods, which will nourish your body and keep you healthy. The food you should eat also will depend on several factors, including:  · The calories you need.  · The medicines you take.  · Your weight.  · Your blood glucose level.  · Your blood pressure level.  · Your cholesterol level.  You should eat a variety of foods, including:  · Protein.  ¨ Lean cuts of meat.  ¨ Proteins low in saturated fats, such as fish, egg whites, and beans. Avoid processed meats.  · Fruits and vegetables.  ¨ Fruits and vegetables that may help control blood glucose levels, such as apples, mangoes, and yams.  · Dairy products.  ¨ Choose fat-free or low-fat dairy products, such as milk, yogurt, and cheese.  · Grains, bread, pasta, and rice.  ¨ Choose whole grain products, such as multigrain bread, whole oats, and brown rice. These foods may help control blood pressure.  · Fats.  ¨ Foods containing healthful fats, such as nuts,  avocado, olive oil, canola oil, and fish.  Does everyone with diabetes mellitus have the same meal plan?  Because every person with diabetes mellitus is different, there is not one meal plan that works for everyone. It is very important that you meet with a dietitian who will help you create a meal plan that is just right for you.  This information is not intended to replace advice given to you by your health care provider. Make sure you discuss any questions you have with your health care provider.  Document Released: 09/14/2006 Document Revised: 05/25/2017 Document Reviewed: 11/14/2014  [email or web address] Interactive Patient Education © 2017 [employer]

## 2018-03-19 ENCOUNTER — TELEPHONE (OUTPATIENT)
Dept: ENDOCRINOLOGY | Facility: CLINIC | Age: 53
End: 2018-03-19

## 2018-03-21 ENCOUNTER — TELEPHONE (OUTPATIENT)
Dept: ENDOCRINOLOGY | Facility: CLINIC | Age: 53
End: 2018-03-21

## 2018-03-21 ENCOUNTER — TELEPHONE (OUTPATIENT)
Dept: FAMILY MEDICINE CLINIC | Facility: CLINIC | Age: 53
End: 2018-03-21

## 2018-03-21 NOTE — TELEPHONE ENCOUNTER
Lelo Nirav Cordova  Male, 52 y.o., 1965  PCP:   Jeremy Mahmood MD  Language:   English  Need Interp:   No  Last Weight:   107 kg (236 lb)  Phone:   M: [phone] H: [phone]  Allergies  No Known Allergies  Health Maintenance:   Due  FYI:   None  Primary Ins.:   MEDICARE  MRN:   [id number]  MyChart:   Pending  Pharmacy:   [facility] [address] [place] [address] AT [facility] - [phone]  - [phone] FX [[id number]]  Preferred Lab:   None  Next Appt with Me:   None  Next Appt Date by Dept:   07/16/2018     medication     Kaylee Castillo routed conversation to You 11 minutes ago (12:37 PM)      [facility] [address] [place] [address] [facility] AT [facility] - [phone] PH - [phone]   Kaylee Castillo 14 minutes ago (12:34 PM)      Incoming call:  RODERICK CALLED FROM [facility] AND CAN BE REACHED -[phone]       Kaylee Castillo 14 minutes ago (12:34 PM)      Summer called from [facility] and said that his Novolog Flexpen is not covered by his [employer] insurance and wants to know if Tato can prescribe something else for him. Summer can be reached at [phone].     Documentation

## 2018-04-04 RX ORDER — FLURBIPROFEN SODIUM 0.3 MG/ML
SOLUTION/ DROPS OPHTHALMIC
Qty: 150 EACH | Refills: 11 | Status: SHIPPED | OUTPATIENT
Start: 2018-04-04 | End: 2018-07-16 | Stop reason: SDUPTHER

## 2018-04-13 ENCOUNTER — OFFICE VISIT (OUTPATIENT)
Dept: FAMILY MEDICINE CLINIC | Facility: CLINIC | Age: 53
End: 2018-04-13

## 2018-04-13 VITALS
OXYGEN SATURATION: 97 % | HEART RATE: 76 BPM | SYSTOLIC BLOOD PRESSURE: 118 MMHG | WEIGHT: 235 LBS | HEIGHT: 71 IN | DIASTOLIC BLOOD PRESSURE: 76 MMHG | BODY MASS INDEX: 32.9 KG/M2

## 2018-04-13 DIAGNOSIS — B34.9 VIRAL ILLNESS: ICD-10-CM

## 2018-04-13 DIAGNOSIS — I10 ESSENTIAL HYPERTENSION: Primary | ICD-10-CM

## 2018-04-13 PROCEDURE — 99213 OFFICE O/P EST LOW 20 MIN: CPT | Performed by: FAMILY MEDICINE

## 2018-04-13 NOTE — PROGRESS NOTES
Subjective:     Nirav Lind is a 52 y.o. male who presents for follow up for HTN. Pt has a history of HTN and is currently on losartan 50mg daily and metoprolol 25mg BID. At last visit pt had norvasc added on to his regimen which was taken off by his cardiologist and his metoprolol was increased from daily to BID. Blood pressure controlled. No headaches, blurry vision, chest pain. Pt reports a 3-4 day history of cough, congestion, N/V/D and mild wheezing. Pt reports he has had to increase the use of his inhalers. Pt states he had 1 episode of vomiting which has resolved. Pt reports multiple episodes of nonbloody watery diarrhea which has resolved. Pt reports cough and congestion has resolved. Pt counseled likely is getting over a viral illness and to keep hydrated and practice good hand hygeine. Pt counseled to contact the office if symptoms return. Pt has no other current complaints. Pt does not request any refills.    Preventative:  Over the past 2 weeks, have you felt down, depressed, or hopeless?No   Over the past 2 weeks, have you felt little interest or pleasure in doing things?No  Clinical depression screening refused by patient.No     Past Medical Hx:  Past Medical History:   Diagnosis Date   • Abnormal computed tomography scan    • Adenomatous polyp of colon    • Allergic rhinitis    • Anemia    • Chronic back pain    • Coronary arteriosclerosis    • Diabetes mellitus    • Dizziness    • Dyspnea     unspecified   • Epigastric pain    • Essential hypertension    • Ex-smoker    • Hemangioma of liver    • Hyperlipidemia    • Infected hydrocele     with orchitis and epididymis   • Nausea    • Nausea with vomiting    • Obesity with body mass index of 30.0 - 39.9    • Pain in right knee    • Right upper quadrant pain    • Type II diabetes mellitus, uncontrolled    • Upper respiratory infection    • Urgency of urination    • Villous adenoma of colon        Past Surgical Hx:  Past Surgical History:    Procedure Laterality Date   • CARDIAC CATHETERIZATION  11/30/2015    No evidence of any obstructive disease in the circumflex, the RCA , or LAD. 1st diagonal branch in the midportion with 20-30% stenosis. Preserved LV systolic function with EF 55%.   • COLONOSCOPY  01/21/2013    Normal   • COLONOSCOPY W/ POLYPECTOMY  10/13/2014    A single polyp was found in the colon; removed by snare cautery polypectomy.   • ENDOSCOPY  06/15/2016    Mildly severe esophagitis. Several biopsies obtained in the upper third of the esophagus.   • INCISION AND DRAINAGE ABSCESS  10/29/2014    Incision and drainage of scrotal abscess. Hydrocelectomy, bottle procedure.   • INJECTION OF MEDICATION  01/26/2016    Kenalog       Health Maintenance:  Health Maintenance   Topic Date Due   • MEDICARE ANNUAL WELLNESS  09/09/2016   • URINE MICROALBUMIN  06/29/2018   • INFLUENZA VACCINE  08/01/2018   • HEMOGLOBIN A1C  09/14/2018   • DIABETIC FOOT EXAM  10/19/2018   • DIABETIC EYE EXAM  11/16/2018   • LIPID PANEL  03/14/2019   • COLONOSCOPY  04/05/2026   • TDAP/TD VACCINES (2 - Td) 09/09/2026   • PNEUMOCOCCAL VACCINE (19-64 MEDIUM RISK)  Addressed   • HEPATITIS C SCREENING  Completed       Current Meds:    Current Outpatient Prescriptions:   •  albuterol (VENTOLIN HFA) 108 (90 Base) MCG/ACT inhaler, Inhale 2 puffs 2 (Two) Times a Day., Disp: 1 inhaler, Rfl: 3  •  Alcohol Swabs ( STERILE ALCOHOL PREP) pads, USE UTD QID, Disp: , Rfl: 3  •  amitriptyline (ELAVIL) 25 MG tablet, Take 25 mg by mouth 3 (three) times a day., Disp: , Rfl:   •  ASPIRIN LOW DOSE 81 MG EC tablet, TAKE 1 TABLET BY MOUTH EVERY DAY, Disp: 30 tablet, Rfl: 0  •  atorvastatin (LIPITOR) 20 MG tablet, TAKE 1 TABLET BY MOUTH DAILY, Disp: 30 tablet, Rfl: 5  •  B-D UF III MINI PEN NEEDLES 31G X 5 MM misc, USE WITH INSULIN FOUR TIMES DAILY, Disp: 150 each, Rfl: 11  •  budesonide-formoterol (SYMBICORT) 160-4.5 MCG/ACT inhaler, Inhale 2 puffs 2 (Two) Times a Day., Disp: 1 inhaler, Rfl:  3  •  busPIRone (BUSPAR) 5 MG tablet, TK 1 T PO BID, Disp: , Rfl: 3  •  cyclobenzaprine (FLEXERIL) 5 MG tablet, Take 1 tablet by mouth 3 (Three) Times a Day As Needed for Muscle Spasms. for muscle spams, Disp: 90 tablet, Rfl: 0  •  fexofenadine (ALLEGRA ALLERGY) 180 MG tablet, Take 1 tablet by mouth Daily., Disp: 30 tablet, Rfl: 1  •  fluticasone (FLONASE) 50 MCG/ACT nasal spray, 2 sprays into each nostril Daily., Disp: 9.9 mL, Rfl: 3  •  HYDROcodone-acetaminophen (NORCO) 5-325 MG per tablet, Take 1 tablet by mouth Every 4 (Four) Hours As Needed for Moderate Pain (4-6)., Disp: 20 tablet, Rfl: 0  •  insulin aspart (NOVOLOG FLEXPEN) 100 UNIT/ML solution pen-injector sc pen, Inject 8 Units under the skin 3 (Three) Times a Day With Meals., Disp: 2 pen, Rfl: 11  •  losartan (COZAAR) 50 MG tablet, Take 1 tablet by mouth Daily., Disp: 30 tablet, Rfl: 3  •  MAGNESIUM-OXIDE 400 (241.3 MG) MG tablet tablet, TK 1 T PO BID, Disp: , Rfl: 12  •  meloxicam (MOBIC) 7.5 MG tablet, Take 1 tablet by mouth Daily., Disp: 30 tablet, Rfl: 1  •  metFORMIN ER (GLUCOPHAGE-XR) 750 MG 24 hr tablet, Take 1 tablet by mouth Daily., Disp: 30 tablet, Rfl: 6  •  metoprolol succinate XL (TOPROL-XL) 25 MG 24 hr tablet, TK 1 T PO BID, Disp: , Rfl: 3  •  OLANZapine (ZYPREXA) 10 MG tablet, Take 10 mg by mouth daily., Disp: , Rfl:   •  ondansetron (ZOFRAN) 4 MG tablet, Take 1 tablet by mouth Every 8 (Eight) Hours As Needed for Nausea or Vomiting., Disp: 30 tablet, Rfl: 0  •  ONE TOUCH ULTRA TEST test strip, TEST BLOOD SUGAR THREE TIMES DAILY., Disp: 200 each, Rfl: 3  •  pantoprazole (PROTONIX) 40 MG EC tablet, TAKE 1 TABLET BY MOUTH DAILY, Disp: 30 tablet, Rfl: 0  •  promethazine-dextromethorphan (PROMETHAZINE-DM) 6.25-15 MG/5ML syrup, Take 1.3 mL by mouth 4 (Four) Times a Day As Needed for Cough., Disp: 180 mL, Rfl: 0  •  sertraline (ZOLOFT) 100 MG tablet, Take 100 mg by mouth daily., Disp: , Rfl:   •  tiotropium (SPIRIVA HANDIHALER) 18 MCG per  "inhalation capsule, Place 1 capsule into inhaler and inhale Daily., Disp: 30 capsule, Rfl: 2  •  TOUJEO SOLOSTAR 300 UNIT/ML solution pen-injector, INJECT 25 UNITS EVERY MORNING, Disp: 3 pen, Rfl: 5    Allergies:  Review of patient's allergies indicates no known allergies.    Family Hx:  Family History   Problem Relation Age of Onset   • Cancer Mother    • Heart disease Sister         Social History:  Social History     Social History   • Marital status:      Spouse name: N/A   • Number of children: N/A   • Years of education: N/A     Occupational History   • Not on file.     Social History Main Topics   • Smoking status: Never Smoker   • Smokeless tobacco: Never Used   • Alcohol use Yes   • Drug use: Unknown   • Sexual activity: Defer     Other Topics Concern   • Not on file     Social History Narrative   • No narrative on file       Review of Systems  Review of Systems   Constitutional: Negative for chills, fatigue and fever.   HENT: Positive for congestion. Negative for nosebleeds and sore throat.    Eyes: Negative for discharge and itching.   Respiratory: Positive for cough and wheezing. Negative for shortness of breath.    Cardiovascular: Negative for chest pain, palpitations and leg swelling.   Gastrointestinal: Positive for diarrhea, nausea and vomiting.   Genitourinary: Negative for dysuria and hematuria.   Musculoskeletal: Positive for back pain (chronic low back pain). Negative for neck pain and neck stiffness.   Skin: Negative for rash and wound.   Neurological: Negative for seizures and syncope.   Psychiatric/Behavioral: Negative for agitation and confusion.         Objective:     /76 (BP Location: Left arm, Patient Position: Sitting, Cuff Size: Large Adult)   Pulse 76   Ht 180.3 cm (71\")   Wt 107 kg (235 lb)   SpO2 97%   BMI 32.78 kg/m²   Physical Exam   Constitutional: He is oriented to person, place, and time. He appears well-developed and well-nourished. No distress.   HENT:   Head: " Normocephalic and atraumatic.   Right Ear: External ear normal.   Left Ear: External ear normal.   Nose: Nose normal. No nose lacerations, sinus tenderness or nasal deformity. No epistaxis.   Mouth/Throat: Oropharynx is clear and moist. No oropharyngeal exudate.   Eyes: Conjunctivae are normal. Right eye exhibits no discharge. Left eye exhibits no discharge. No scleral icterus.   Neck: Normal range of motion. Neck supple.   Cardiovascular: Normal rate, regular rhythm and normal heart sounds.    Pulmonary/Chest: Effort normal and breath sounds normal. No respiratory distress. He has no wheezes. He has no rales.   Abdominal: Soft. Bowel sounds are normal.   Musculoskeletal: Normal range of motion. He exhibits no edema, tenderness or deformity.   Neurological: He is alert and oriented to person, place, and time.   Skin: Skin is warm and dry. He is not diaphoretic.   Psychiatric: He has a normal mood and affect. His behavior is normal. Judgment and thought content normal.   Nursing note and vitals reviewed.                                                                     Assessment/Plan:     Diagnoses and all orders for this visit:    Essential hypertension  -     Continue current regimen    Viral illness  - stay hydrated and practice good hand hygiene.       Follow-up:     Return in about 3 months (around 7/13/2018) for Next scheduled follow up.        GOALS:  Maintain medication compliance    Preventative:  Male Preventative: Cholesterol screening up to date  Vaccines:   Tetanus vaccine: not up to date - declined  Annual influenza vaccine: up to date   Pneumococcal vaccine: not up to date - declined    never smokeer  does not drink  eat more fruits and vegetables, decrease soda or juice intake, increase water intake and increase physical activity    RISK SCORE: 3    Jeremy Mahmood MD PGY2  [place] [address]  Office:  [phone]      This document has been electronically signed by Jeremy Mahmood MD on April 13, 2018 3:12 PM

## 2018-04-25 ENCOUNTER — PATIENT OUTREACH (OUTPATIENT)
Dept: CASE MANAGEMENT | Facility: OTHER | Age: 53
End: 2018-04-25

## 2018-04-25 NOTE — OUTREACH NOTE
Spoke with patient for follow up. Stated he will see his PCP on April 30. No new questions at time of call.

## 2018-04-30 ENCOUNTER — OFFICE VISIT (OUTPATIENT)
Dept: FAMILY MEDICINE CLINIC | Facility: CLINIC | Age: 53
End: 2018-04-30

## 2018-04-30 VITALS
WEIGHT: 238.06 LBS | HEART RATE: 60 BPM | OXYGEN SATURATION: 97 % | HEIGHT: 71 IN | DIASTOLIC BLOOD PRESSURE: 82 MMHG | SYSTOLIC BLOOD PRESSURE: 132 MMHG | BODY MASS INDEX: 33.33 KG/M2

## 2018-04-30 DIAGNOSIS — Z00.00 INITIAL MEDICARE ANNUAL WELLNESS VISIT: Primary | ICD-10-CM

## 2018-04-30 DIAGNOSIS — F32.5 MAJOR DEPRESSIVE DISORDER WITH SINGLE EPISODE, IN FULL REMISSION (HCC): Chronic | ICD-10-CM

## 2018-04-30 PROCEDURE — G0402 INITIAL PREVENTIVE EXAM: HCPCS | Performed by: FAMILY MEDICINE

## 2018-04-30 RX ORDER — SERTRALINE HYDROCHLORIDE 100 MG/1
200 TABLET, FILM COATED ORAL DAILY
Qty: 60 TABLET | Refills: 3 | Status: SHIPPED | OUTPATIENT
Start: 2018-04-30 | End: 2020-06-12

## 2018-04-30 NOTE — PROGRESS NOTES
QUICK REFERENCE INFORMATION:  The ABCs of the Annual Wellness Visit    Initial Medicare Wellness Visit    HEALTH RISK ASSESSMENT    1965    Recent Hospitalizations:  No hospitalization(s) within the last year..        Current Medical Providers:  Patient Care Team:  Jeremy Mahmood MD as PCP - General (Family Medicine)  FLYNN Lund as PCP - Claims Attributed  Bernice Murguia MA as Medical Assistant  Leslie Urban MD as Resident (Family Medicine)  Pj Dubon MD as Resident (Family Medicine)  Olive Murphy MD as Resident (Family Medicine)  Bill Jean MD as Resident (Family Medicine)  Binh Espinal MD as Resident (Family Medicine)  Lora Jj, RN as Care Coordinator (Population Health)        Smoking Status:  History   Smoking Status   • Never Smoker   Smokeless Tobacco   • Never Used       Alcohol Consumption:  History   Alcohol Use   • Yes       Depression Screen:   PHQ-2/PHQ-9 Depression Screening 4/30/2018   Little interest or pleasure in doing things 3   Feeling down, depressed, or hopeless 3   Trouble falling or staying asleep, or sleeping too much 3   Feeling tired or having little energy 3   Poor appetite or overeating 3   Feeling bad about yourself - or that you are a failure or have let yourself or your family down 3   Trouble concentrating on things, such as reading the newspaper or watching television 3   Moving or speaking so slowly that other people could have noticed. Or the opposite - being so fidgety or restless that you have been moving around a lot more than usual 3   Thoughts that you would be better off dead, or of hurting yourself in some way 1   Total Score 25   If you checked off any problems, how difficult have these problems made it for you to do your work, take care of things at home, or get along with other people? Extremely dIfficult     Pt reports symptoms have been worse over the last several months. Pt is agreeable to increase  zoloft from 100mg daily to 200mg daily.      Health Habits and Functional and Cognitive Screening:  No flowsheet data found.        Does the patient have evidence of cognitive impairment? No    Asiprin use counseling: Taking ASA appropriately as indicated      Recent Lab Results:    Visual Acuity:  Last eye exam 11/16/17    Age-appropriate Screening Schedule:  Refer to the list below for future screening recommendations based on patient's age, sex and/or medical conditions. Orders for these recommended tests are listed in the plan section. The patient has been provided with a written plan.    Health Maintenance   Topic Date Due   • URINE MICROALBUMIN  06/29/2018   • INFLUENZA VACCINE  08/01/2018   • HEMOGLOBIN A1C  09/14/2018   • DIABETIC FOOT EXAM  10/19/2018   • DIABETIC EYE EXAM  11/16/2018   • LIPID PANEL  03/14/2019   • COLONOSCOPY  04/05/2026   • TDAP/TD VACCINES (2 - Td) 09/09/2026   • PNEUMOCOCCAL VACCINE (19-64 MEDIUM RISK)  Addressed        Subjective   History of Present Illness    Nirav Lind is a 52 y.o. male who presents for an Annual Wellness Visit.    The following portions of the patient's history were reviewed and updated as appropriate: allergies, current medications, past family history, past medical history, past social history, past surgical history and problem list.    Outpatient Medications Prior to Visit   Medication Sig Dispense Refill   • albuterol (VENTOLIN HFA) 108 (90 Base) MCG/ACT inhaler Inhale 2 puffs 2 (Two) Times a Day. 1 inhaler 3   • Alcohol Swabs (HM STERILE ALCOHOL PREP) pads USE UTD QID  3   • amitriptyline (ELAVIL) 25 MG tablet Take 25 mg by mouth 3 (three) times a day.     • ASPIRIN LOW DOSE 81 MG EC tablet TAKE 1 TABLET BY MOUTH EVERY DAY 30 tablet 0   • atorvastatin (LIPITOR) 20 MG tablet TAKE 1 TABLET BY MOUTH DAILY 30 tablet 5   • B-D UF III MINI PEN NEEDLES 31G X 5 MM misc USE WITH INSULIN FOUR TIMES DAILY 150 each 11   • budesonide-formoterol (SYMBICORT) 160-4.5  MCG/ACT inhaler Inhale 2 puffs 2 (Two) Times a Day. 1 inhaler 3   • busPIRone (BUSPAR) 5 MG tablet TK 1 T PO BID  3   • cyclobenzaprine (FLEXERIL) 5 MG tablet Take 1 tablet by mouth 3 (Three) Times a Day As Needed for Muscle Spasms. for muscle spams 90 tablet 0   • fexofenadine (ALLEGRA ALLERGY) 180 MG tablet Take 1 tablet by mouth Daily. 30 tablet 1   • fluticasone (FLONASE) 50 MCG/ACT nasal spray 2 sprays into each nostril Daily. 9.9 mL 3   • HYDROcodone-acetaminophen (NORCO) 5-325 MG per tablet Take 1 tablet by mouth Every 4 (Four) Hours As Needed for Moderate Pain (4-6). 20 tablet 0   • insulin aspart (NOVOLOG FLEXPEN) 100 UNIT/ML solution pen-injector sc pen Inject 8 Units under the skin 3 (Three) Times a Day With Meals. 2 pen 11   • losartan (COZAAR) 50 MG tablet Take 1 tablet by mouth Daily. 30 tablet 3   • MAGNESIUM-OXIDE 400 (241.3 MG) MG tablet tablet TK 1 T PO BID  12   • meloxicam (MOBIC) 7.5 MG tablet Take 1 tablet by mouth Daily. 30 tablet 1   • metFORMIN ER (GLUCOPHAGE-XR) 750 MG 24 hr tablet Take 1 tablet by mouth Daily. 30 tablet 6   • metoprolol succinate XL (TOPROL-XL) 25 MG 24 hr tablet TK 1 T PO BID  3   • OLANZapine (ZYPREXA) 10 MG tablet Take 10 mg by mouth daily.     • ondansetron (ZOFRAN) 4 MG tablet Take 1 tablet by mouth Every 8 (Eight) Hours As Needed for Nausea or Vomiting. 30 tablet 0   • ONE TOUCH ULTRA TEST test strip TEST BLOOD SUGAR THREE TIMES DAILY. 200 each 3   • pantoprazole (PROTONIX) 40 MG EC tablet TAKE 1 TABLET BY MOUTH DAILY 30 tablet 0   • promethazine-dextromethorphan (PROMETHAZINE-DM) 6.25-15 MG/5ML syrup Take 1.3 mL by mouth 4 (Four) Times a Day As Needed for Cough. 180 mL 0   • tiotropium (SPIRIVA HANDIHALER) 18 MCG per inhalation capsule Place 1 capsule into inhaler and inhale Daily. 30 capsule 2   • TOUJEO SOLOSTAR 300 UNIT/ML solution pen-injector INJECT 25 UNITS EVERY MORNING 3 pen 5   • sertraline (ZOLOFT) 100 MG tablet Take 100 mg by mouth daily.       No  "facility-administered medications prior to visit.        Patient Active Problem List   Diagnosis   • DM (diabetes mellitus), type 2 with neurological complications   • Hyperlipidemia   • Essential hypertension   • Vitamin D deficiency   • Villous adenoma of colon   • Urgency of urination   • Upper respiratory infection   • Right upper quadrant pain   • Pain in right knee   • Nausea with vomiting   • Nausea   • Infected hydrocele   • Hemangioma of liver   • Ex-smoker   • Epigastric pain   • Dyspnea   • Dizziness   • Coronary arteriosclerosis   • Chronic back pain   • Anemia   • Allergic rhinitis   • Abnormal computed tomography scan   • Weakness   • Back pain   • Body mass index (BMI) of 30.0 to 39.9   • Low back pain   • Spondylolisthesis at L3-L4 level   • DDD (degenerative disc disease), lumbar   • Unstable angina   • Major depressive disorder with single episode, in full remission   • COPD (chronic obstructive pulmonary disease)   • Finger pain, left   • Precordial pain   • Diabetic polyneuropathy associated with type 2 diabetes mellitus       Advance Care Planning:  has NO advance directive - not interested in additional information    Identification of Risk Factors:  Risk factors include: weight , cardiovascular risk, alcohol use and depression.    Review of Systems    Compared to one year ago, the patient feels his physical health is the same.  Compared to one year ago, the patient feels his mental health is the same.    Objective     Physical Exam    Vitals:    04/30/18 1012   BP: 132/82   BP Location: Left arm   Patient Position: Sitting   Cuff Size: Adult   Pulse: 60   SpO2: 97%   Weight: 108 kg (238 lb 1 oz)   Height: 180.3 cm (71\")       Patient's Body mass index is 33.2 kg/m². BMI is above normal parameters. Follow-up plan includes:  exercise counseling.      Assessment/Plan   Patient Self-Management and Personalized Health Advice  The patient has been provided with information about: diet and exercise " and preventive services including:   · Exercise counseling provided.    Visit Diagnoses:    ICD-10-CM ICD-9-CM   1. Initial Medicare annual wellness visit Z00.00 V70.0   2. Major depressive disorder with single episode, in full remission F32.5 296.26       No orders of the defined types were placed in this encounter.      Outpatient Encounter Prescriptions as of 4/30/2018   Medication Sig Dispense Refill   • albuterol (VENTOLIN HFA) 108 (90 Base) MCG/ACT inhaler Inhale 2 puffs 2 (Two) Times a Day. 1 inhaler 3   • Alcohol Swabs ( STERILE ALCOHOL PREP) pads USE UTD QID  3   • amitriptyline (ELAVIL) 25 MG tablet Take 25 mg by mouth 3 (three) times a day.     • ASPIRIN LOW DOSE 81 MG EC tablet TAKE 1 TABLET BY MOUTH EVERY DAY 30 tablet 0   • atorvastatin (LIPITOR) 20 MG tablet TAKE 1 TABLET BY MOUTH DAILY 30 tablet 5   • B-D UF III MINI PEN NEEDLES 31G X 5 MM misc USE WITH INSULIN FOUR TIMES DAILY 150 each 11   • budesonide-formoterol (SYMBICORT) 160-4.5 MCG/ACT inhaler Inhale 2 puffs 2 (Two) Times a Day. 1 inhaler 3   • busPIRone (BUSPAR) 5 MG tablet TK 1 T PO BID  3   • cyclobenzaprine (FLEXERIL) 5 MG tablet Take 1 tablet by mouth 3 (Three) Times a Day As Needed for Muscle Spasms. for muscle spams 90 tablet 0   • fexofenadine (ALLEGRA ALLERGY) 180 MG tablet Take 1 tablet by mouth Daily. 30 tablet 1   • fluticasone (FLONASE) 50 MCG/ACT nasal spray 2 sprays into each nostril Daily. 9.9 mL 3   • HYDROcodone-acetaminophen (NORCO) 5-325 MG per tablet Take 1 tablet by mouth Every 4 (Four) Hours As Needed for Moderate Pain (4-6). 20 tablet 0   • insulin aspart (NOVOLOG FLEXPEN) 100 UNIT/ML solution pen-injector sc pen Inject 8 Units under the skin 3 (Three) Times a Day With Meals. 2 pen 11   • losartan (COZAAR) 50 MG tablet Take 1 tablet by mouth Daily. 30 tablet 3   • MAGNESIUM-OXIDE 400 (241.3 MG) MG tablet tablet TK 1 T PO BID  12   • meloxicam (MOBIC) 7.5 MG tablet Take 1 tablet by mouth Daily. 30 tablet 1   •  metFORMIN ER (GLUCOPHAGE-XR) 750 MG 24 hr tablet Take 1 tablet by mouth Daily. 30 tablet 6   • metoprolol succinate XL (TOPROL-XL) 25 MG 24 hr tablet TK 1 T PO BID  3   • OLANZapine (ZYPREXA) 10 MG tablet Take 10 mg by mouth daily.     • ondansetron (ZOFRAN) 4 MG tablet Take 1 tablet by mouth Every 8 (Eight) Hours As Needed for Nausea or Vomiting. 30 tablet 0   • ONE TOUCH ULTRA TEST test strip TEST BLOOD SUGAR THREE TIMES DAILY. 200 each 3   • pantoprazole (PROTONIX) 40 MG EC tablet TAKE 1 TABLET BY MOUTH DAILY 30 tablet 0   • promethazine-dextromethorphan (PROMETHAZINE-DM) 6.25-15 MG/5ML syrup Take 1.3 mL by mouth 4 (Four) Times a Day As Needed for Cough. 180 mL 0   • sertraline (ZOLOFT) 100 MG tablet Take 2 tablets by mouth Daily. 60 tablet 3   • tiotropium (SPIRIVA HANDIHALER) 18 MCG per inhalation capsule Place 1 capsule into inhaler and inhale Daily. 30 capsule 2   • TOUJEO SOLOSTAR 300 UNIT/ML solution pen-injector INJECT 25 UNITS EVERY MORNING 3 pen 5   • [DISCONTINUED] sertraline (ZOLOFT) 100 MG tablet Take 100 mg by mouth daily.       No facility-administered encounter medications on file as of 4/30/2018.        Reviewed use of high risk medication in the elderly: yes  Reviewed for potential of harmful drug interactions in the elderly: yes    Follow Up:  Return in about 10 weeks (around 7/9/2018) for Next scheduled follow up.     An After Visit Summary and PPPS with all of these plans were given to the patient.           Goals        Patient Stated    • Exercise 150 minutes per week (moderate activity) (pt-stated)            - increasing exercise, walking  - improve diet            Jeremy Mahmood MD PGY2  [place] [address]  Office: [phone]      This document has been electronically signed by Jeremy Mahmood MD on April 30, 2018 1:49 PM

## 2018-05-01 RX ORDER — ISOPROPYL ALCOHOL 0.7 ML/1
1 SWAB TOPICAL 4 TIMES DAILY
Qty: 200 EACH | Refills: 3 | Status: SHIPPED | OUTPATIENT
Start: 2018-05-01 | End: 2018-07-16 | Stop reason: SDUPTHER

## 2018-05-15 RX ORDER — LOSARTAN POTASSIUM 50 MG/1
50 TABLET ORAL DAILY
Qty: 30 TABLET | Refills: 0 | Status: SHIPPED | OUTPATIENT
Start: 2018-05-15 | End: 2018-06-12 | Stop reason: SDUPTHER

## 2018-05-31 ENCOUNTER — TELEPHONE (OUTPATIENT)
Dept: FAMILY MEDICINE CLINIC | Facility: CLINIC | Age: 53
End: 2018-05-31

## 2018-05-31 ENCOUNTER — OFFICE VISIT (OUTPATIENT)
Dept: FAMILY MEDICINE CLINIC | Facility: CLINIC | Age: 53
End: 2018-05-31

## 2018-05-31 VITALS
BODY MASS INDEX: 33.94 KG/M2 | SYSTOLIC BLOOD PRESSURE: 122 MMHG | HEIGHT: 71 IN | OXYGEN SATURATION: 98 % | HEART RATE: 69 BPM | WEIGHT: 242.44 LBS | DIASTOLIC BLOOD PRESSURE: 72 MMHG

## 2018-05-31 DIAGNOSIS — I10 ESSENTIAL HYPERTENSION: Primary | ICD-10-CM

## 2018-05-31 DIAGNOSIS — E11.49 DM (DIABETES MELLITUS), TYPE 2 WITH NEUROLOGICAL COMPLICATIONS (HCC): ICD-10-CM

## 2018-05-31 PROCEDURE — 99213 OFFICE O/P EST LOW 20 MIN: CPT | Performed by: FAMILY MEDICINE

## 2018-05-31 RX ORDER — FEXOFENADINE HCL 180 MG/1
180 TABLET ORAL DAILY
Qty: 30 TABLET | Refills: 1 | Status: SHIPPED | OUTPATIENT
Start: 2018-05-31 | End: 2018-10-03 | Stop reason: SDUPTHER

## 2018-05-31 RX ORDER — PANTOPRAZOLE SODIUM 40 MG/1
40 TABLET, DELAYED RELEASE ORAL DAILY
Qty: 30 TABLET | Refills: 2 | Status: SHIPPED | OUTPATIENT
Start: 2018-05-31 | End: 2019-03-04 | Stop reason: SDUPTHER

## 2018-05-31 RX ORDER — CYCLOBENZAPRINE HCL 5 MG
5 TABLET ORAL 3 TIMES DAILY PRN
Qty: 90 TABLET | Refills: 0 | Status: SHIPPED | OUTPATIENT
Start: 2018-05-31 | End: 2018-10-29

## 2018-05-31 NOTE — PROGRESS NOTES
Subjective:     Nirav Lind is a 53 y.o. male who presents for follow up for HTN. Pt has a history of well controlled HTN on losartan 50mg daily and toprol 25mg daily. Pt denies any chest pain, dyspnea, blurry vision or headache. Pt is agreeable to continuing current regimen. Pt declines zoster vaccination. Pt requests refill of allegra, DM test strips, protonix and flexeril. Pt has no new complaints.    Patient's Body mass index is 33.81 kg/m². BMI is above normal parameters. Recommendations include: exercise counseling and nutrition counseling.    Preventative:  Over the past 2 weeks, have you felt down, depressed, or hopeless?No   Over the past 2 weeks, have you felt little interest or pleasure in doing things?No  Clinical depression screening refused by patient.No     Past Medical Hx:  Past Medical History:   Diagnosis Date   • Abnormal computed tomography scan    • Adenomatous polyp of colon    • Allergic rhinitis    • Anemia    • Chronic back pain    • Coronary arteriosclerosis    • Diabetes mellitus    • Dizziness    • Dyspnea     unspecified   • Epigastric pain    • Essential hypertension    • Ex-smoker    • Hemangioma of liver    • Hyperlipidemia    • Infected hydrocele     with orchitis and epididymis   • Nausea    • Nausea with vomiting    • Obesity with body mass index of 30.0 - 39.9    • Pain in right knee    • Right upper quadrant pain    • Type II diabetes mellitus, uncontrolled    • Upper respiratory infection    • Urgency of urination    • Villous adenoma of colon        Past Surgical Hx:  Past Surgical History:   Procedure Laterality Date   • CARDIAC CATHETERIZATION  11/30/2015    No evidence of any obstructive disease in the circumflex, the RCA , or LAD. 1st diagonal branch in the midportion with 20-30% stenosis. Preserved LV systolic function with EF 55%.   • COLONOSCOPY  01/21/2013    Normal   • COLONOSCOPY W/ POLYPECTOMY  10/13/2014    A single polyp was found in the colon; removed by  snare cautery polypectomy.   • ENDOSCOPY  06/15/2016    Mildly severe esophagitis. Several biopsies obtained in the upper third of the esophagus.   • INCISION AND DRAINAGE ABSCESS  10/29/2014    Incision and drainage of scrotal abscess. Hydrocelectomy, bottle procedure.   • INJECTION OF MEDICATION  01/26/2016    Kenalog       Health Maintenance:  Health Maintenance   Topic Date Due   • ZOSTER VACCINE (1 of 2) 05/01/2013   • URINE MICROALBUMIN  06/29/2018   • INFLUENZA VACCINE  08/01/2018   • HEMOGLOBIN A1C  09/14/2018   • DIABETIC FOOT EXAM  10/19/2018   • DIABETIC EYE EXAM  11/16/2018   • LIPID PANEL  03/14/2019   • MEDICARE ANNUAL WELLNESS  04/30/2019   • COLONOSCOPY  04/05/2026   • TDAP/TD VACCINES (2 - Td) 09/09/2026   • PNEUMOCOCCAL VACCINE (19-64 MEDIUM RISK)  Addressed   • HEPATITIS C SCREENING  Completed       Current Meds:    Current Outpatient Prescriptions:   •  albuterol (VENTOLIN HFA) 108 (90 Base) MCG/ACT inhaler, Inhale 2 puffs 2 (Two) Times a Day., Disp: 1 inhaler, Rfl: 3  •  Alcohol Swabs ( STERILE ALCOHOL PREP) pads, 1 each 4 (Four) Times a Day., Disp: 200 each, Rfl: 3  •  amitriptyline (ELAVIL) 25 MG tablet, Take 25 mg by mouth 3 (three) times a day., Disp: , Rfl:   •  ASPIRIN LOW DOSE 81 MG EC tablet, TAKE 1 TABLET BY MOUTH EVERY DAY, Disp: 30 tablet, Rfl: 0  •  atorvastatin (LIPITOR) 20 MG tablet, TAKE 1 TABLET BY MOUTH DAILY, Disp: 30 tablet, Rfl: 5  •  B-D UF III MINI PEN NEEDLES 31G X 5 MM misc, USE WITH INSULIN FOUR TIMES DAILY, Disp: 150 each, Rfl: 11  •  budesonide-formoterol (SYMBICORT) 160-4.5 MCG/ACT inhaler, Inhale 2 puffs 2 (Two) Times a Day., Disp: 1 inhaler, Rfl: 3  •  busPIRone (BUSPAR) 5 MG tablet, TK 1 T PO BID, Disp: , Rfl: 3  •  cyclobenzaprine (FLEXERIL) 5 MG tablet, Take 1 tablet by mouth 3 (Three) Times a Day As Needed for Muscle Spasms. for muscle spams, Disp: 90 tablet, Rfl: 0  •  fexofenadine (ALLEGRA ALLERGY) 180 MG tablet, Take 1 tablet by mouth Daily., Disp: 30  tablet, Rfl: 1  •  fluticasone (FLONASE) 50 MCG/ACT nasal spray, 2 sprays into each nostril Daily., Disp: 9.9 mL, Rfl: 3  •  glucose blood (ONE TOUCH ULTRA TEST) test strip, 1 each by Other route As Needed (blood sugar). Use as instructed, Disp: 200 each, Rfl: 3  •  HYDROcodone-acetaminophen (NORCO) 5-325 MG per tablet, Take 1 tablet by mouth Every 4 (Four) Hours As Needed for Moderate Pain (4-6)., Disp: 20 tablet, Rfl: 0  •  insulin aspart (NOVOLOG FLEXPEN) 100 UNIT/ML solution pen-injector sc pen, Inject 8 Units under the skin 3 (Three) Times a Day With Meals., Disp: 2 pen, Rfl: 11  •  losartan (COZAAR) 50 MG tablet, TAKE 1 TABLET BY MOUTH DAILY, Disp: 30 tablet, Rfl: 0  •  MAGNESIUM-OXIDE 400 (241.3 MG) MG tablet tablet, TK 1 T PO BID, Disp: , Rfl: 12  •  meloxicam (MOBIC) 7.5 MG tablet, Take 1 tablet by mouth Daily., Disp: 30 tablet, Rfl: 1  •  metFORMIN ER (GLUCOPHAGE-XR) 750 MG 24 hr tablet, Take 1 tablet by mouth Daily., Disp: 30 tablet, Rfl: 6  •  metoprolol succinate XL (TOPROL-XL) 25 MG 24 hr tablet, TK 1 T PO BID, Disp: , Rfl: 3  •  OLANZapine (ZYPREXA) 10 MG tablet, Take 10 mg by mouth daily., Disp: , Rfl:   •  ondansetron (ZOFRAN) 4 MG tablet, Take 1 tablet by mouth Every 8 (Eight) Hours As Needed for Nausea or Vomiting., Disp: 30 tablet, Rfl: 0  •  pantoprazole (PROTONIX) 40 MG EC tablet, Take 1 tablet by mouth Daily., Disp: 30 tablet, Rfl: 2  •  promethazine-dextromethorphan (PROMETHAZINE-DM) 6.25-15 MG/5ML syrup, Take 1.3 mL by mouth 4 (Four) Times a Day As Needed for Cough., Disp: 180 mL, Rfl: 0  •  sertraline (ZOLOFT) 100 MG tablet, Take 2 tablets by mouth Daily., Disp: 60 tablet, Rfl: 3  •  tiotropium (SPIRIVA HANDIHALER) 18 MCG per inhalation capsule, Place 1 capsule into inhaler and inhale Daily., Disp: 30 capsule, Rfl: 2  •  TOUJEO SOLOSTAR 300 UNIT/ML solution pen-injector, INJECT 25 UNITS EVERY MORNING, Disp: 3 pen, Rfl: 5    Allergies:  Patient has no known allergies.    Family Hx:  Family  "History   Problem Relation Age of Onset   • Cancer Mother    • Heart disease Sister         Social History:  Social History     Social History   • Marital status:      Spouse name: N/A   • Number of children: N/A   • Years of education: N/A     Occupational History   • Not on file.     Social History Main Topics   • Smoking status: Never Smoker   • Smokeless tobacco: Never Used   • Alcohol use Yes   • Drug use: Unknown   • Sexual activity: Defer     Other Topics Concern   • Not on file     Social History Narrative   • No narrative on file       Review of Systems  Review of Systems   Constitutional: Negative for chills, fatigue and fever.   HENT: Negative for congestion, nosebleeds and sore throat.    Eyes: Negative for discharge and itching.   Respiratory: Negative for cough, shortness of breath and wheezing.    Cardiovascular: Negative for chest pain, palpitations and leg swelling.   Gastrointestinal: Negative for diarrhea, nausea and vomiting.   Genitourinary: Negative for dysuria and hematuria.   Musculoskeletal: Positive for back pain (chronic low back pain). Negative for neck pain and neck stiffness.   Skin: Negative for rash and wound.   Neurological: Negative for seizures and syncope.   Psychiatric/Behavioral: Negative for agitation and confusion.         Objective:     /72 (BP Location: Left arm, Patient Position: Sitting, Cuff Size: Adult)   Pulse 69   Ht 180.3 cm (71\")   Wt 110 kg (242 lb 7 oz)   SpO2 98%   BMI 33.81 kg/m²   Physical Exam   Constitutional: He is oriented to person, place, and time. He appears well-developed and well-nourished. No distress.   HENT:   Head: Normocephalic and atraumatic.   Right Ear: External ear normal.   Left Ear: External ear normal.   Nose: Nose normal. No nose lacerations, sinus tenderness or nasal deformity. No epistaxis.   Eyes: Conjunctivae are normal. Right eye exhibits no discharge. Left eye exhibits no discharge. No scleral icterus.   Neck: Normal " range of motion. Neck supple.   Cardiovascular: Normal rate, regular rhythm and normal heart sounds.    Pulmonary/Chest: Effort normal and breath sounds normal. No respiratory distress. He has no wheezes. He has no rales.   Abdominal: Soft. Bowel sounds are normal.   Musculoskeletal: Normal range of motion. He exhibits no edema, tenderness or deformity.   Neurological: He is alert and oriented to person, place, and time.   Skin: Skin is warm and dry. He is not diaphoretic.   Psychiatric: He has a normal mood and affect. His behavior is normal. Judgment and thought content normal.   Nursing note and vitals reviewed.                                                                     Assessment/Plan:     Diagnoses and all orders for this visit:    Essential hypertension        -     Continue current regimen    DM (diabetes mellitus), type 2 with neurological complications  -     glucose blood (ONE TOUCH ULTRA TEST) test strip; 1 each by Other route As Needed (blood sugar). Use as instructed    Other orders  -     fexofenadine (ALLEGRA ALLERGY) 180 MG tablet; Take 1 tablet by mouth Daily.  -     pantoprazole (PROTONIX) 40 MG EC tablet; Take 1 tablet by mouth Daily.  -     cyclobenzaprine (FLEXERIL) 5 MG tablet; Take 1 tablet by mouth 3 (Three) Times a Day As Needed for Muscle Spasms. for muscle spams         Follow-up:     Return in about 3 months (around 8/31/2018) for Next scheduled follow up.        Goals        Patient Stated    • Exercise 150 minutes per week (moderate activity) (pt-stated)            - increasing exercise, walking  - improve diet              Preventative:  Male Preventative: Cholesterol screening up to date  Vaccines:   Tetanus vaccine: not up to date - declined  Annual influenza vaccine: up to date   Pneumococcal vaccine: not up to date - declined    never smoker  occasional/rare  eat more fruits and vegetables, decrease soda or juice intake, increase water intake and increase physical  activity    RISK SCORE: 3    Jeremy Mahmood MD PGY2  [place] [address]  Office: [phone]      This document has been electronically signed by Jeremy Mahmood MD on May 31, 2018 11:24 AM

## 2018-05-31 NOTE — PROGRESS NOTES
I have reviewed the notes, assessments, and/or procedures performed by Jeremy Mahmood MD , I concur with her/his documentation and assessment and plan for Nirav Lind.        This document has been electronically signed by Nannette Carcamo MD on May 31, 2018 9:34 AM

## 2018-06-01 DIAGNOSIS — E11.49 DM (DIABETES MELLITUS), TYPE 2 WITH NEUROLOGICAL COMPLICATIONS (HCC): ICD-10-CM

## 2018-06-07 ENCOUNTER — HOSPITAL ENCOUNTER (EMERGENCY)
Facility: HOSPITAL | Age: 53
Discharge: LEFT BEFORE DISCHARGE | End: 2018-06-07
Attending: EMERGENCY MEDICINE | Admitting: EMERGENCY MEDICINE

## 2018-06-07 ENCOUNTER — APPOINTMENT (OUTPATIENT)
Dept: GENERAL RADIOLOGY | Facility: HOSPITAL | Age: 53
End: 2018-06-07

## 2018-06-07 VITALS
WEIGHT: 230 LBS | HEIGHT: 69 IN | SYSTOLIC BLOOD PRESSURE: 101 MMHG | OXYGEN SATURATION: 94 % | HEART RATE: 56 BPM | TEMPERATURE: 98.8 F | DIASTOLIC BLOOD PRESSURE: 58 MMHG | BODY MASS INDEX: 34.07 KG/M2 | RESPIRATION RATE: 18 BRPM

## 2018-06-07 DIAGNOSIS — R07.9 CHEST PAIN, UNSPECIFIED TYPE: Primary | ICD-10-CM

## 2018-06-07 LAB
ALBUMIN SERPL-MCNC: 4 G/DL (ref 3.4–4.8)
ALBUMIN/GLOB SERPL: 1.1 G/DL (ref 1.1–1.8)
ALP SERPL-CCNC: 90 U/L (ref 38–126)
ALT SERPL W P-5'-P-CCNC: 52 U/L (ref 21–72)
ANION GAP SERPL CALCULATED.3IONS-SCNC: 10 MMOL/L (ref 5–15)
AST SERPL-CCNC: 30 U/L (ref 17–59)
BASOPHILS # BLD AUTO: 0.04 10*3/MM3 (ref 0–0.2)
BASOPHILS NFR BLD AUTO: 0.5 % (ref 0–2)
BILIRUB SERPL-MCNC: 0.3 MG/DL (ref 0.2–1.3)
BUN BLD-MCNC: 14 MG/DL (ref 7–21)
BUN/CREAT SERPL: 15.6 (ref 7–25)
CALCIUM SPEC-SCNC: 8.7 MG/DL (ref 8.4–10.2)
CHLORIDE SERPL-SCNC: 104 MMOL/L (ref 95–110)
CK MB SERPL-CCNC: 4.1 NG/ML (ref 0–5)
CO2 SERPL-SCNC: 26 MMOL/L (ref 22–31)
CREAT BLD-MCNC: 0.9 MG/DL (ref 0.7–1.3)
D-DIMER, QUANTITATIVE (MAD,POW, STR): <270 NG/ML (FEU) (ref 0–470)
DEPRECATED RDW RBC AUTO: 41.7 FL (ref 35.1–43.9)
EOSINOPHIL # BLD AUTO: 0.27 10*3/MM3 (ref 0–0.7)
EOSINOPHIL NFR BLD AUTO: 3.4 % (ref 0–7)
ERYTHROCYTE [DISTWIDTH] IN BLOOD BY AUTOMATED COUNT: 13.4 % (ref 11.5–14.5)
GFR SERPL CREATININE-BSD FRML MDRD: 106 ML/MIN/1.73 (ref 56–130)
GLOBULIN UR ELPH-MCNC: 3.5 GM/DL (ref 2.3–3.5)
GLUCOSE BLD-MCNC: 91 MG/DL (ref 60–100)
HCT VFR BLD AUTO: 41.9 % (ref 39–49)
HGB BLD-MCNC: 13.5 G/DL (ref 13.7–17.3)
IMM GRANULOCYTES # BLD: 0.05 10*3/MM3 (ref 0–0.02)
IMM GRANULOCYTES NFR BLD: 0.6 % (ref 0–0.5)
INR PPP: 1.07 (ref 0.8–1.2)
LYMPHOCYTES # BLD AUTO: 1.38 10*3/MM3 (ref 0.6–4.2)
LYMPHOCYTES NFR BLD AUTO: 17.4 % (ref 10–50)
MCH RBC QN AUTO: 27.6 PG (ref 26.5–34)
MCHC RBC AUTO-ENTMCNC: 32.2 G/DL (ref 31.5–36.3)
MCV RBC AUTO: 85.5 FL (ref 80–98)
MONOCYTES # BLD AUTO: 0.72 10*3/MM3 (ref 0–0.9)
MONOCYTES NFR BLD AUTO: 9.1 % (ref 0–12)
NEUTROPHILS # BLD AUTO: 5.46 10*3/MM3 (ref 2–8.6)
NEUTROPHILS NFR BLD AUTO: 69 % (ref 37–80)
NT-PROBNP SERPL-MCNC: 71.3 PG/ML (ref 0–900)
PLATELET # BLD AUTO: 253 10*3/MM3 (ref 150–450)
PMV BLD AUTO: 9.8 FL (ref 8–12)
POTASSIUM BLD-SCNC: 4 MMOL/L (ref 3.5–5.1)
PROT SERPL-MCNC: 7.5 G/DL (ref 6.3–8.6)
PROTHROMBIN TIME: 13.7 SECONDS (ref 11.1–15.3)
RBC # BLD AUTO: 4.9 10*6/MM3 (ref 4.37–5.74)
SODIUM BLD-SCNC: 140 MMOL/L (ref 137–145)
TROPONIN I SERPL-MCNC: <0.012 NG/ML
WBC NRBC COR # BLD: 7.92 10*3/MM3 (ref 3.2–9.8)

## 2018-06-07 PROCEDURE — 82553 CREATINE MB FRACTION: CPT | Performed by: PHYSICIAN ASSISTANT

## 2018-06-07 PROCEDURE — 85610 PROTHROMBIN TIME: CPT | Performed by: PHYSICIAN ASSISTANT

## 2018-06-07 PROCEDURE — 80053 COMPREHEN METABOLIC PANEL: CPT | Performed by: PHYSICIAN ASSISTANT

## 2018-06-07 PROCEDURE — 83880 ASSAY OF NATRIURETIC PEPTIDE: CPT | Performed by: PHYSICIAN ASSISTANT

## 2018-06-07 PROCEDURE — 84484 ASSAY OF TROPONIN QUANT: CPT | Performed by: PHYSICIAN ASSISTANT

## 2018-06-07 PROCEDURE — 85025 COMPLETE CBC W/AUTO DIFF WBC: CPT | Performed by: PHYSICIAN ASSISTANT

## 2018-06-07 PROCEDURE — 71045 X-RAY EXAM CHEST 1 VIEW: CPT

## 2018-06-07 PROCEDURE — 85379 FIBRIN DEGRADATION QUANT: CPT | Performed by: PHYSICIAN ASSISTANT

## 2018-06-07 PROCEDURE — 96374 THER/PROPH/DIAG INJ IV PUSH: CPT

## 2018-06-07 PROCEDURE — 99284 EMERGENCY DEPT VISIT MOD MDM: CPT

## 2018-06-07 PROCEDURE — 93005 ELECTROCARDIOGRAM TRACING: CPT

## 2018-06-07 PROCEDURE — 25010000002 ONDANSETRON PER 1 MG: Performed by: PHYSICIAN ASSISTANT

## 2018-06-07 PROCEDURE — 93005 ELECTROCARDIOGRAM TRACING: CPT | Performed by: EMERGENCY MEDICINE

## 2018-06-07 PROCEDURE — 93010 ELECTROCARDIOGRAM REPORT: CPT | Performed by: INTERNAL MEDICINE

## 2018-06-07 RX ORDER — ONDANSETRON 2 MG/ML
4 INJECTION INTRAMUSCULAR; INTRAVENOUS ONCE
Status: COMPLETED | OUTPATIENT
Start: 2018-06-07 | End: 2018-06-07

## 2018-06-07 RX ORDER — NITROGLYCERIN 0.4 MG/1
0.4 TABLET SUBLINGUAL
Status: DISCONTINUED | OUTPATIENT
Start: 2018-06-07 | End: 2018-06-07 | Stop reason: HOSPADM

## 2018-06-07 RX ORDER — ASPIRIN 81 MG/1
324 TABLET, CHEWABLE ORAL ONCE
Status: COMPLETED | OUTPATIENT
Start: 2018-06-07 | End: 2018-06-07

## 2018-06-07 RX ORDER — SODIUM CHLORIDE 0.9 % (FLUSH) 0.9 %
10 SYRINGE (ML) INJECTION AS NEEDED
Status: DISCONTINUED | OUTPATIENT
Start: 2018-06-07 | End: 2018-06-07 | Stop reason: HOSPADM

## 2018-06-07 RX ADMIN — NITROGLYCERIN 0.4 MG: 0.4 TABLET SUBLINGUAL at 15:06

## 2018-06-07 RX ADMIN — ASPIRIN 81 MG 324 MG: 81 TABLET ORAL at 12:57

## 2018-06-07 RX ADMIN — ONDANSETRON 4 MG: 2 INJECTION, SOLUTION INTRAMUSCULAR; INTRAVENOUS at 12:57

## 2018-06-07 NOTE — ED NOTES
Expressed to the pt how important it is that he stay for treatment. Pt refused to stay and receive treatment.      Ame Santos, RN  06/07/18 [id number]

## 2018-06-07 NOTE — ED PROVIDER NOTES
Subjective     History provided by:  Patient   used: No    Chest Pain   Pain location:  L chest  Pain quality: aching, sharp and shooting    Pain radiates to:  L arm  Pain severity:  Mild  Onset quality:  Gradual  Duration: since he woke up this morning. comes and goes.   Timing:  Intermittent  Progression:  Unchanged  Chronicity:  Recurrent  Context: at rest    Context: not breathing, not drug use, not eating, not intercourse, not lifting, not movement, not raising an arm, not stress and not trauma    Relieved by:  Nitroglycerin  Worsened by:  Nothing  Associated symptoms: nausea    Associated symptoms: no abdominal pain, no AICD problem, no altered mental status, no anorexia, no anxiety, no back pain, no claudication, no cough, no diaphoresis, no dizziness, no dysphagia, no fatigue, no fever, no headache, no heartburn, no lower extremity edema, no near-syncope, no numbness, no orthopnea, no palpitations, no PND, no shortness of breath, no syncope, no vomiting and no weakness    Risk factors: coronary artery disease, diabetes mellitus, high cholesterol, hypertension, male sex and obesity    Risk factors: no aortic disease, no birth control, no Goldy-Danlos syndrome, no immobilization, no Marfan's syndrome, not pregnant, no prior DVT/PE, no smoking and no surgery        Review of Systems   Constitutional: Negative.  Negative for diaphoresis, fatigue and fever.   HENT: Negative.  Negative for trouble swallowing.    Eyes: Negative.    Respiratory: Negative.  Negative for cough and shortness of breath.    Cardiovascular: Positive for chest pain. Negative for palpitations, orthopnea, claudication, leg swelling, syncope, PND and near-syncope.   Gastrointestinal: Positive for nausea. Negative for abdominal distention, abdominal pain, anal bleeding, anorexia, blood in stool, constipation, diarrhea, heartburn, rectal pain and vomiting.   Endocrine: Negative.    Genitourinary: Negative.     Musculoskeletal: Negative.  Negative for back pain.   Skin: Negative.    Allergic/Immunologic: Negative.    Neurological: Negative.  Negative for dizziness, weakness, numbness and headaches.   Hematological: Negative.    Psychiatric/Behavioral: Negative.        Past Medical History:   Diagnosis Date   • Abnormal computed tomography scan    • Adenomatous polyp of colon    • Allergic rhinitis    • Anemia    • Chronic back pain    • Coronary arteriosclerosis    • Diabetes mellitus    • Dizziness    • Dyspnea     unspecified   • Epigastric pain    • Essential hypertension    • Ex-smoker    • Hemangioma of liver    • Hyperlipidemia    • Infected hydrocele     with orchitis and epididymis   • Nausea    • Nausea with vomiting    • Obesity with body mass index of 30.0 - 39.9    • Pain in right knee    • Right upper quadrant pain    • Type II diabetes mellitus, uncontrolled    • Upper respiratory infection    • Urgency of urination    • Villous adenoma of colon        No Known Allergies    Past Surgical History:   Procedure Laterality Date   • CARDIAC CATHETERIZATION  11/30/2015    No evidence of any obstructive disease in the circumflex, the RCA , or LAD. 1st diagonal branch in the midportion with 20-30% stenosis. Preserved LV systolic function with EF 55%.   • COLONOSCOPY  01/21/2013    Normal   • COLONOSCOPY W/ POLYPECTOMY  10/13/2014    A single polyp was found in the colon; removed by snare cautery polypectomy.   • ENDOSCOPY  06/15/2016    Mildly severe esophagitis. Several biopsies obtained in the upper third of the esophagus.   • INCISION AND DRAINAGE ABSCESS  10/29/2014    Incision and drainage of scrotal abscess. Hydrocelectomy, bottle procedure.   • INJECTION OF MEDICATION  01/26/2016    Kenalog       Family History   Problem Relation Age of Onset   • Cancer Mother    • Heart disease Sister        Social History     Social History   • Marital status:      Social History Main Topics   • Smoking status:  Never Smoker   • Smokeless tobacco: Never Used   • Alcohol use Yes   • Drug use: Unknown   • Sexual activity: Defer     Other Topics Concern   • Not on file           Objective   Physical Exam   Constitutional: He is oriented to person, place, and time. He appears well-developed and well-nourished. No distress.   HENT:   Head: Normocephalic and atraumatic.   Mouth/Throat: No oropharyngeal exudate.   Eyes: Conjunctivae and EOM are normal. Pupils are equal, round, and reactive to light. Right eye exhibits no discharge. Left eye exhibits no discharge. No scleral icterus.   Neck: Normal range of motion. Neck supple. No JVD present. No tracheal deviation present. No thyromegaly present.   Cardiovascular: Normal rate, regular rhythm, normal heart sounds and intact distal pulses.  Exam reveals no gallop and no friction rub.    No murmur heard.  Pulmonary/Chest: Effort normal and breath sounds normal. No stridor. No respiratory distress. He has no wheezes. He has no rales. He exhibits no tenderness.   Abdominal: Soft. Bowel sounds are normal. He exhibits no distension and no mass. There is no tenderness. There is no rebound and no guarding. No hernia.   Musculoskeletal: Normal range of motion. He exhibits no edema, tenderness or deformity.   Lymphadenopathy:     He has no cervical adenopathy.   Neurological: He is alert and oriented to person, place, and time. He has normal reflexes. He displays normal reflexes. No cranial nerve deficit or sensory deficit. He exhibits normal muscle tone. Coordination normal.   Skin: Skin is warm and dry. Capillary refill takes less than 2 seconds. No rash noted. He is not diaphoretic. No erythema. No pallor.   Psychiatric: He has a normal mood and affect. His behavior is normal. Judgment and thought content normal.   Nursing note and vitals reviewed.      Procedures           ED Course      Labs Reviewed   CBC WITH AUTO DIFFERENTIAL - Abnormal; Notable for the following:        Result Value     Hemoglobin 13.5 (*)     Immature Grans % 0.6 (*)     Immature Grans, Absolute 0.05 (*)     All other components within normal limits   COMPREHENSIVE METABOLIC PANEL - Normal   PROTIME-INR - Normal    Narrative:     Therapeutic range for most indications is 2.0-3.0 INR,  or 2.5-3.5 for mechanical heart valves.   D-DIMER, QUANTITATIVE - Normal    Narrative:     Dimer values <500 ng/ml FEU are FDA approved as aid in diagnosis of deep venous thrombosis and pulmonary embolism.  This test should not be used in an exclusion strategy with pretest probability alone.    A recent guideline regarding diagnosis for pulmonary thomboembolism recommends an adjusted exclusion criterion of age x 10 ng/ml FEU for patients >50 years of age ([employer] Intern Med 2015; 163: 701-711).   BNP (IN-HOUSE) - Normal   CK MB - Normal   TROPONIN (IN-HOUSE) - Normal   CBC AND DIFFERENTIAL    Narrative:     The following orders were created for panel order CBC & Differential.  Procedure                               Abnormality         Status                     ---------                               -----------         ------                     CBC Auto Differential[[id number]]        Abnormal            Final result                 Please view results for these tests on the individual orders.     Xr Chest 1 View    Result Date: 6/7/2018  Narrative: Chest single view CLINICAL INDICATION: Shortness of breath. Chest pain COMPARISON: January 24, 2018. FINDINGS: Cardiac silhouette is normal in size. Pulmonary vascularity is unremarkable. No focal infiltrate or consolidation.  No pleural effusion.  No pneumothorax. Increased basilar markings secondary to poor inspiratory effort.     Impression: CONCLUSION: No evidence of active disease. Electronically signed by:  Doc Schuster MD  6/7/2018 12:59 PM CDT Workstation: [id number]      Patient is okay with being observed overnight. We will admit him for his chest pain to family practice.     3:38pm Patient states  that he has to leave right now, he got a call and theres an emergency so he can no longer stay overnight. Risks of leaving explained to the patient and he is aware and is still going to sign out AMA. He is refusing overnight observation at this time.   Advised patient to return to ED if symptoms worsen.           HEART Score (for prediction of 6-week risk of major adverse cardiac event) reviewed and/or performed as part of the patient evaluation and treatment planning process.  The result associated with this review/performance is: 4       MDM      Final diagnoses:   Chest pain, unspecified type            GENIE Neal  06/07/18 [id number]

## 2018-06-12 RX ORDER — ATORVASTATIN CALCIUM 20 MG/1
TABLET, FILM COATED ORAL
Qty: 30 TABLET | Refills: 0 | Status: SHIPPED | OUTPATIENT
Start: 2018-06-12 | End: 2018-07-27 | Stop reason: SDUPTHER

## 2018-06-12 RX ORDER — LOSARTAN POTASSIUM 50 MG/1
50 TABLET ORAL DAILY
Qty: 30 TABLET | Refills: 0 | Status: SHIPPED | OUTPATIENT
Start: 2018-06-12 | End: 2018-07-16 | Stop reason: SDUPTHER

## 2018-07-12 ENCOUNTER — APPOINTMENT (OUTPATIENT)
Dept: LAB | Facility: HOSPITAL | Age: 53
End: 2018-07-12

## 2018-07-12 LAB
25(OH)D3 SERPL-MCNC: 33.9 NG/ML (ref 30–100)
ALBUMIN SERPL-MCNC: 4.3 G/DL (ref 3.4–4.8)
ALBUMIN UR-MCNC: 7.9 MG/L
ALBUMIN/GLOB SERPL: 1.2 G/DL (ref 1.1–1.8)
ALP SERPL-CCNC: 104 U/L (ref 38–126)
ALT SERPL W P-5'-P-CCNC: 55 U/L (ref 21–72)
ANION GAP SERPL CALCULATED.3IONS-SCNC: 9 MMOL/L (ref 5–15)
AST SERPL-CCNC: 56 U/L (ref 17–59)
BASOPHILS # BLD AUTO: 0.03 10*3/MM3 (ref 0–0.2)
BASOPHILS NFR BLD AUTO: 0.4 % (ref 0–2)
BILIRUB SERPL-MCNC: 0.3 MG/DL (ref 0.2–1.3)
BUN BLD-MCNC: 13 MG/DL (ref 7–21)
BUN/CREAT SERPL: 12.6 (ref 7–25)
CALCIUM SPEC-SCNC: 8.9 MG/DL (ref 8.4–10.2)
CHLORIDE SERPL-SCNC: 104 MMOL/L (ref 95–110)
CO2 SERPL-SCNC: 25 MMOL/L (ref 22–31)
CREAT BLD-MCNC: 1.03 MG/DL (ref 0.7–1.3)
CREAT UR-MCNC: 211.6 MG/DL
CREAT UR-MCNC: 211.6 MG/DL
DEPRECATED RDW RBC AUTO: 41.9 FL (ref 35.1–43.9)
EOSINOPHIL # BLD AUTO: 0.31 10*3/MM3 (ref 0–0.7)
EOSINOPHIL NFR BLD AUTO: 3.9 % (ref 0–7)
ERYTHROCYTE [DISTWIDTH] IN BLOOD BY AUTOMATED COUNT: 13.2 % (ref 11.5–14.5)
GFR SERPL CREATININE-BSD FRML MDRD: 91 ML/MIN/1.73 (ref 56–130)
GLOBULIN UR ELPH-MCNC: 3.7 GM/DL (ref 2.3–3.5)
GLUCOSE BLD-MCNC: 91 MG/DL (ref 60–100)
HBA1C MFR BLD: 6.2 % (ref 4–5.6)
HCT VFR BLD AUTO: 43.1 % (ref 39–49)
HGB BLD-MCNC: 14.2 G/DL (ref 13.7–17.3)
IMM GRANULOCYTES # BLD: 0.03 10*3/MM3 (ref 0–0.02)
IMM GRANULOCYTES NFR BLD: 0.4 % (ref 0–0.5)
LYMPHOCYTES # BLD AUTO: 2.12 10*3/MM3 (ref 0.6–4.2)
LYMPHOCYTES NFR BLD AUTO: 27 % (ref 10–50)
MCH RBC QN AUTO: 28.6 PG (ref 26.5–34)
MCHC RBC AUTO-ENTMCNC: 32.9 G/DL (ref 31.5–36.3)
MCV RBC AUTO: 86.7 FL (ref 80–98)
MICROALBUMIN/CREAT UR: 37.3 MG/G (ref 0–30)
MONOCYTES # BLD AUTO: 0.47 10*3/MM3 (ref 0–0.9)
MONOCYTES NFR BLD AUTO: 6 % (ref 0–12)
NEUTROPHILS # BLD AUTO: 4.89 10*3/MM3 (ref 2–8.6)
NEUTROPHILS NFR BLD AUTO: 62.3 % (ref 37–80)
PLATELET # BLD AUTO: 276 10*3/MM3 (ref 150–450)
PMV BLD AUTO: 9.6 FL (ref 8–12)
POTASSIUM BLD-SCNC: 4.4 MMOL/L (ref 3.5–5.1)
PROT SERPL-MCNC: 8 G/DL (ref 6.3–8.6)
PROT UR-MCNC: 12.5 MG/DL
PROT/CREAT UR: 59.1 MG/G CREA (ref 0–200)
RBC # BLD AUTO: 4.97 10*6/MM3 (ref 4.37–5.74)
SODIUM BLD-SCNC: 138 MMOL/L (ref 137–145)
VIT B12 BLD-MCNC: 433 PG/ML (ref 239–931)
WBC NRBC COR # BLD: 7.85 10*3/MM3 (ref 3.2–9.8)

## 2018-07-12 PROCEDURE — 82043 UR ALBUMIN QUANTITATIVE: CPT | Performed by: NURSE PRACTITIONER

## 2018-07-12 PROCEDURE — 82607 VITAMIN B-12: CPT | Performed by: NURSE PRACTITIONER

## 2018-07-12 PROCEDURE — 84156 ASSAY OF PROTEIN URINE: CPT | Performed by: NURSE PRACTITIONER

## 2018-07-12 PROCEDURE — 36415 COLL VENOUS BLD VENIPUNCTURE: CPT | Performed by: NURSE PRACTITIONER

## 2018-07-12 PROCEDURE — 82570 ASSAY OF URINE CREATININE: CPT | Performed by: NURSE PRACTITIONER

## 2018-07-12 PROCEDURE — 85025 COMPLETE CBC W/AUTO DIFF WBC: CPT | Performed by: NURSE PRACTITIONER

## 2018-07-12 PROCEDURE — 82306 VITAMIN D 25 HYDROXY: CPT | Performed by: NURSE PRACTITIONER

## 2018-07-12 PROCEDURE — 80053 COMPREHEN METABOLIC PANEL: CPT | Performed by: NURSE PRACTITIONER

## 2018-07-12 PROCEDURE — 83036 HEMOGLOBIN GLYCOSYLATED A1C: CPT | Performed by: NURSE PRACTITIONER

## 2018-07-16 ENCOUNTER — OFFICE VISIT (OUTPATIENT)
Dept: ENDOCRINOLOGY | Facility: CLINIC | Age: 53
End: 2018-07-16

## 2018-07-16 VITALS
WEIGHT: 251 LBS | HEIGHT: 69 IN | BODY MASS INDEX: 37.18 KG/M2 | HEART RATE: 69 BPM | SYSTOLIC BLOOD PRESSURE: 128 MMHG | DIASTOLIC BLOOD PRESSURE: 80 MMHG

## 2018-07-16 DIAGNOSIS — E78.2 MIXED HYPERLIPIDEMIA: ICD-10-CM

## 2018-07-16 DIAGNOSIS — I10 ESSENTIAL HYPERTENSION: ICD-10-CM

## 2018-07-16 DIAGNOSIS — E55.9 VITAMIN D DEFICIENCY: ICD-10-CM

## 2018-07-16 DIAGNOSIS — E11.49 DM (DIABETES MELLITUS), TYPE 2 WITH NEUROLOGICAL COMPLICATIONS (HCC): Primary | ICD-10-CM

## 2018-07-16 PROCEDURE — 99214 OFFICE O/P EST MOD 30 MIN: CPT | Performed by: NURSE PRACTITIONER

## 2018-07-16 RX ORDER — ISOPROPYL ALCOHOL 0.7 ML/1
1 SWAB TOPICAL 4 TIMES DAILY
Qty: 200 EACH | Refills: 3 | Status: SHIPPED | OUTPATIENT
Start: 2018-07-16

## 2018-07-16 RX ORDER — FLURBIPROFEN SODIUM 0.3 MG/ML
SOLUTION/ DROPS OPHTHALMIC
Qty: 150 EACH | Refills: 11 | Status: SHIPPED | OUTPATIENT
Start: 2018-07-16 | End: 2019-08-22 | Stop reason: SDUPTHER

## 2018-07-16 RX ORDER — LOSARTAN POTASSIUM 50 MG/1
50 TABLET ORAL DAILY
Qty: 30 TABLET | Refills: 0 | Status: SHIPPED | OUTPATIENT
Start: 2018-07-16 | End: 2018-08-15 | Stop reason: SDUPTHER

## 2018-07-16 NOTE — PROGRESS NOTES
Subjective    Nirav Lind is a 53 y.o. male. he is here today for follow-up.    History of Present Illness         Duration/Timing: Diabetes mellitus type 2, onset of symptoms gradual   dm dx at age 51 y      constant     not controlled      severity high      Patient was admitted to the hospital for chest pain but cardiac was ruled out      Severity (Complications/Hospitalizations)  Secondary Macrovascular Complications: No CAD, No CVA, No PAD  Secondary Microvascular Complications: No Microalbuminuria, No Diabetic Retinopathy, No Diabetic Neuropathy     Context  Diabetes Regimen: Insulin, Oral Medications,       Lab Results   Component Value Date    HGBA1C 6.2 (H) 07/12/2018             Blood Glucose Readings     Blood glucose log ranging from 88 up to 144      Diet  adhering to 1800 calorie diet   Exercise: Does not exercise     Associated Signs/Symptoms  Hyperglycemic Symptoms: No polyuria, No polydipsia, No polyphagia  Hypoglycemic Episodes: No documented hypoglycemia since last visit                      The following portions of the patient's history were reviewed and updated as appropriate:   Past Medical History:   Diagnosis Date   • Abnormal computed tomography scan    • Adenomatous polyp of colon    • Allergic rhinitis    • Anemia    • Chronic back pain    • Coronary arteriosclerosis    • Diabetes mellitus (CMS/HCC)    • Dizziness    • Dyspnea     unspecified   • Epigastric pain    • Essential hypertension    • Ex-smoker    • Hemangioma of liver    • Hyperlipidemia    • Infected hydrocele     with orchitis and epididymis   • Nausea    • Nausea with vomiting    • Obesity with body mass index of 30.0 - 39.9    • Pain in right knee    • Right upper quadrant pain    • Type II diabetes mellitus, uncontrolled (CMS/HCC)    • Upper respiratory infection    • Urgency of urination    • Villous adenoma of colon      Past Surgical History:   Procedure Laterality Date   • CARDIAC CATHETERIZATION  11/30/2015     No evidence of any obstructive disease in the circumflex, the RCA , or LAD. 1st diagonal branch in the midportion with 20-30% stenosis. Preserved LV systolic function with EF 55%.   • COLONOSCOPY  01/21/2013    Normal   • COLONOSCOPY W/ POLYPECTOMY  10/13/2014    A single polyp was found in the colon; removed by snare cautery polypectomy.   • ENDOSCOPY  06/15/2016    Mildly severe esophagitis. Several biopsies obtained in the upper third of the esophagus.   • INCISION AND DRAINAGE ABSCESS  10/29/2014    Incision and drainage of scrotal abscess. Hydrocelectomy, bottle procedure.   • INJECTION OF MEDICATION  01/26/2016    Kenalog     Family History   Problem Relation Age of Onset   • Cancer Mother    • Heart disease Sister        Current Outpatient Prescriptions   Medication Sig Dispense Refill   • albuterol (VENTOLIN HFA) 108 (90 Base) MCG/ACT inhaler Inhale 2 puffs 2 (Two) Times a Day. 1 inhaler 3   • Alcohol Swabs ( STERILE ALCOHOL PREP) pads 1 each 4 (Four) Times a Day. 200 each 3   • amitriptyline (ELAVIL) 25 MG tablet Take 25 mg by mouth 3 (three) times a day.     • ASPIRIN LOW DOSE 81 MG EC tablet TAKE 1 TABLET BY MOUTH EVERY DAY 30 tablet 0   • atorvastatin (LIPITOR) 20 MG tablet TAKE 1 TABLET BY MOUTH DAILY 30 tablet 0   • B-D UF III MINI PEN NEEDLES 31G X 5 MM misc Injecting once daily up to 3 times daily 150 each 11   • budesonide-formoterol (SYMBICORT) 160-4.5 MCG/ACT inhaler Inhale 2 puffs 2 (Two) Times a Day. 1 inhaler 3   • busPIRone (BUSPAR) 5 MG tablet TK 1 T PO BID  3   • cyclobenzaprine (FLEXERIL) 5 MG tablet Take 1 tablet by mouth 3 (Three) Times a Day As Needed for Muscle Spasms. for muscle spams 90 tablet 0   • fexofenadine (ALLEGRA ALLERGY) 180 MG tablet Take 1 tablet by mouth Daily. 30 tablet 1   • fluticasone (FLONASE) 50 MCG/ACT nasal spray 2 sprays into each nostril Daily. 9.9 mL 3   • glucose blood (ONE TOUCH ULTRA TEST) test strip 1 each by Other route 3 (Three) Times a Day. Use as  instructed 200 each 3   • HYDROcodone-acetaminophen (NORCO) 5-325 MG per tablet Take 1 tablet by mouth Every 4 (Four) Hours As Needed for Moderate Pain (4-6). 20 tablet 0   • insulin aspart (NOVOLOG FLEXPEN) 100 UNIT/ML solution pen-injector sc pen Inject 8 Units under the skin 3 (Three) Times a Day With Meals. 2 pen 11   • losartan (COZAAR) 50 MG tablet TAKE 1 TABLET BY MOUTH DAILY 30 tablet 0   • MAGNESIUM-OXIDE 400 (241.3 MG) MG tablet tablet TK 1 T PO BID  12   • meloxicam (MOBIC) 7.5 MG tablet Take 1 tablet by mouth Daily. 30 tablet 1   • metFORMIN ER (GLUCOPHAGE-XR) 750 MG 24 hr tablet Take 1 tablet by mouth Daily. 30 tablet 6   • metoprolol succinate XL (TOPROL-XL) 25 MG 24 hr tablet TK 1 T PO BID  3   • OLANZapine (ZYPREXA) 10 MG tablet Take 10 mg by mouth daily.     • ondansetron (ZOFRAN) 4 MG tablet Take 1 tablet by mouth Every 8 (Eight) Hours As Needed for Nausea or Vomiting. 30 tablet 0   • pantoprazole (PROTONIX) 40 MG EC tablet Take 1 tablet by mouth Daily. 30 tablet 2   • promethazine-dextromethorphan (PROMETHAZINE-DM) 6.25-15 MG/5ML syrup Take 1.3 mL by mouth 4 (Four) Times a Day As Needed for Cough. 180 mL 0   • sertraline (ZOLOFT) 100 MG tablet Take 2 tablets by mouth Daily. 60 tablet 3   • tiotropium (SPIRIVA HANDIHALER) 18 MCG per inhalation capsule Place 1 capsule into inhaler and inhale Daily. 30 capsule 2   • TOUJEO SOLOSTAR 300 UNIT/ML solution pen-injector INJECT 25 UNITS EVERY MORNING 3 pen 5     No current facility-administered medications for this visit.      No Known Allergies  Social History     Social History   • Marital status:      Social History Main Topics   • Smoking status: Never Smoker   • Smokeless tobacco: Never Used   • Alcohol use Yes   • Drug use: Unknown   • Sexual activity: Defer     Other Topics Concern   • Not on file       Review of Systems  Review of Systems   Constitutional: Negative for activity change, appetite change, diaphoresis and fatigue.   HENT:  "Negative for facial swelling, sneezing, sore throat, tinnitus, trouble swallowing and voice change.    Eyes: Negative for photophobia, pain, discharge, redness, itching and visual disturbance.   Respiratory: Negative for apnea, cough, choking, chest tightness and shortness of breath.    Cardiovascular: Negative for chest pain, palpitations and leg swelling.   Gastrointestinal: Negative for abdominal distention, abdominal pain, constipation, diarrhea, nausea and vomiting.   Endocrine: Negative for cold intolerance, heat intolerance, polydipsia, polyphagia and polyuria.   Genitourinary: Negative for difficulty urinating, dysuria, frequency, hematuria and urgency.   Musculoskeletal: Negative for arthralgias, back pain, gait problem, joint swelling, myalgias, neck pain and neck stiffness.   Skin: Negative for color change, pallor, rash and wound.   Neurological: Negative for dizziness, tremors, weakness, light-headedness, numbness and headaches.   Hematological: Negative for adenopathy. Does not bruise/bleed easily.   Psychiatric/Behavioral: Negative for behavioral problems, confusion and sleep disturbance.        Objective    /80 (BP Location: Right arm, Patient Position: Sitting, Cuff Size: Adult)   Pulse 69   Ht 175.3 cm (69\")   Wt 114 kg (251 lb)   BMI 37.07 kg/m²   Physical Exam   Constitutional: He is oriented to person, place, and time. He appears well-developed and well-nourished. No distress.   HENT:   Head: Normocephalic and atraumatic.   Right Ear: External ear normal.   Left Ear: External ear normal.   Nose: Nose normal.   Eyes: Conjunctivae and EOM are normal. Pupils are equal, round, and reactive to light.   Neck: Normal range of motion. Neck supple. No tracheal deviation present. No thyromegaly present.   Cardiovascular: Normal rate, regular rhythm and normal heart sounds.    No murmur heard.  Pulmonary/Chest: Effort normal and breath sounds normal. No respiratory distress. He has no wheezes. "   Abdominal: Soft. Bowel sounds are normal. There is no tenderness. There is no rebound and no guarding.   Musculoskeletal: Normal range of motion. He exhibits no edema, tenderness or deformity.   Neurological: He is alert and oriented to person, place, and time. No cranial nerve deficit.   Skin: Skin is warm and dry. No rash noted.   Psychiatric: He has a normal mood and affect. His behavior is normal. Judgment and thought content normal.       Lab Review  Glucose (mg/dL)   Date Value   07/12/2018 91   06/07/2018 91   03/14/2018 103 (H)     Sodium (mmol/L)   Date Value   07/12/2018 138   06/07/2018 140   03/14/2018 143     Potassium (mmol/L)   Date Value   07/12/2018 4.4   06/07/2018 4.0   03/14/2018 4.2     Chloride (mmol/L)   Date Value   07/12/2018 104   06/07/2018 104   03/14/2018 101     CO2 (mmol/L)   Date Value   07/12/2018 25.0   06/07/2018 26.0   03/14/2018 29.0     BUN (mg/dL)   Date Value   07/12/2018 13   06/07/2018 14   03/14/2018 13     Creatinine (mg/dL)   Date Value   07/12/2018 1.03   06/07/2018 0.90   03/14/2018 0.98     Hemoglobin A1C (%)   Date Value   07/12/2018 6.2 (H)   03/14/2018 6.1 (H)   02/16/2018 6.1 (H)     Triglycerides   Date Value   03/14/2018 89 mg/dL   06/29/2017 63 mg/dL   11/04/2016 111 mg/dl     LDL Cholesterol    Date Value   03/14/2018 55 mg/dL   06/29/2017 41 mg/dL   11/04/2016 88 mg/dl       Assessment/Plan      1. DM (diabetes mellitus), type 2 with neurological complications (CMS/[facility])    2. Mixed hyperlipidemia    3. Essential hypertension    4. Vitamin D deficiency    .    Medications prescribed:  Outpatient Encounter Prescriptions as of 7/16/2018   Medication Sig Dispense Refill   • albuterol (VENTOLIN HFA) 108 (90 Base) MCG/ACT inhaler Inhale 2 puffs 2 (Two) Times a Day. 1 inhaler 3   • Alcohol Swabs ( STERILE ALCOHOL PREP) pads 1 each 4 (Four) Times a Day. 200 each 3   • amitriptyline (ELAVIL) 25 MG tablet Take 25 mg by mouth 3 (three) times a day.     • ASPIRIN LOW  DOSE 81 MG EC tablet TAKE 1 TABLET BY MOUTH EVERY DAY 30 tablet 0   • atorvastatin (LIPITOR) 20 MG tablet TAKE 1 TABLET BY MOUTH DAILY 30 tablet 0   • B-D UF III MINI PEN NEEDLES 31G X 5 MM misc Injecting once daily up to 3 times daily 150 each 11   • budesonide-formoterol (SYMBICORT) 160-4.5 MCG/ACT inhaler Inhale 2 puffs 2 (Two) Times a Day. 1 inhaler 3   • busPIRone (BUSPAR) 5 MG tablet TK 1 T PO BID  3   • cyclobenzaprine (FLEXERIL) 5 MG tablet Take 1 tablet by mouth 3 (Three) Times a Day As Needed for Muscle Spasms. for muscle spams 90 tablet 0   • fexofenadine (ALLEGRA ALLERGY) 180 MG tablet Take 1 tablet by mouth Daily. 30 tablet 1   • fluticasone (FLONASE) 50 MCG/ACT nasal spray 2 sprays into each nostril Daily. 9.9 mL 3   • glucose blood (ONE TOUCH ULTRA TEST) test strip 1 each by Other route 3 (Three) Times a Day. Use as instructed 200 each 3   • HYDROcodone-acetaminophen (NORCO) 5-325 MG per tablet Take 1 tablet by mouth Every 4 (Four) Hours As Needed for Moderate Pain (4-6). 20 tablet 0   • insulin aspart (NOVOLOG FLEXPEN) 100 UNIT/ML solution pen-injector sc pen Inject 8 Units under the skin 3 (Three) Times a Day With Meals. 2 pen 11   • losartan (COZAAR) 50 MG tablet TAKE 1 TABLET BY MOUTH DAILY 30 tablet 0   • MAGNESIUM-OXIDE 400 (241.3 MG) MG tablet tablet TK 1 T PO BID  12   • meloxicam (MOBIC) 7.5 MG tablet Take 1 tablet by mouth Daily. 30 tablet 1   • metFORMIN ER (GLUCOPHAGE-XR) 750 MG 24 hr tablet Take 1 tablet by mouth Daily. 30 tablet 6   • metoprolol succinate XL (TOPROL-XL) 25 MG 24 hr tablet TK 1 T PO BID  3   • OLANZapine (ZYPREXA) 10 MG tablet Take 10 mg by mouth daily.     • ondansetron (ZOFRAN) 4 MG tablet Take 1 tablet by mouth Every 8 (Eight) Hours As Needed for Nausea or Vomiting. 30 tablet 0   • pantoprazole (PROTONIX) 40 MG EC tablet Take 1 tablet by mouth Daily. 30 tablet 2   • promethazine-dextromethorphan (PROMETHAZINE-DM) 6.25-15 MG/5ML syrup Take 1.3 mL by mouth 4 (Four) Times  a Day As Needed for Cough. 180 mL 0   • sertraline (ZOLOFT) 100 MG tablet Take 2 tablets by mouth Daily. 60 tablet 3   • tiotropium (SPIRIVA HANDIHALER) 18 MCG per inhalation capsule Place 1 capsule into inhaler and inhale Daily. 30 capsule 2   • TOUJEO SOLOSTAR 300 UNIT/ML solution pen-injector INJECT 25 UNITS EVERY MORNING 3 pen 5   • [DISCONTINUED] Alcohol Swabs ( STERILE ALCOHOL PREP) pads 1 each 4 (Four) Times a Day. 200 each 3   • [DISCONTINUED] B-D UF III MINI PEN NEEDLES 31G X 5 MM misc USE WITH INSULIN FOUR TIMES DAILY 150 each 11     No facility-administered encounter medications on file as of 7/16/2018.        Orders placed during this encounter include:  Orders Placed This Encounter   Procedures   • Comprehensive Metabolic Panel   • Hemoglobin A1c   • Lipid Panel   • Vitamin D 25 Hydroxy   • CBC & Differential     Order Specific Question:   Manual Differential     Answer:   No     Glycemic Management      Lab Results   Component Value Date    HGBA1C 6.2 (H) 07/12/2018                   Toujeo 25 units in the morning---taking 20 units ---taking 13 units      if you ever wake up with a sugar less than 100 - back off by 3 units     ---------------------     Invokana 100 mg before breakfast --not taking            Metformin 500 mg tablets---take one with supper ----taking 750 mg                     invokamet 150/1000 mg twice daily ( it combines metformin and invokana )---stopped the invokamet due to low blood sugars      --------------------        once you run out of januvia , you will use in its place trulicity 0.75 mg weekly ---stopped due to low Blood sugars   if nausea try to eat less      ------------------     novolog --not using      Use for sliding scale            2 units for every 15 grams of carbohydrate     once glucose readings are staying in the low 100s we can try 1 unit for every 15 grams and if it stays in the low 100s we will only use sliding scale         Lipid Management  on pravachol  20 mg qhs      Nov. 2016     Lipids at goal            Total Cholesterol   Date Value Ref Range Status   03/14/2018 113 0 - 199 mg/dL Final      Triglycerides   Date Value Ref Range Status   03/14/2018 89 20 - 199 mg/dL Final            HDL Cholesterol   Date Value Ref Range Status   03/14/2018 32 (L) 60 - 200 mg/dL Final       LDL - 55            Blood Pressure Management     on amlodipine 5 mg daily      On lisinopril 2.5 mg daily --stopped due to allergic reaction     On metoprolol         Microvascular Complication Monitoring: No Microalbuminuria, No Diabetic Retinopathy, positive  Diabetic Neuropathy        Neuropathy     Taking Neurontin       Component      Latest Ref Rng & Units 7/12/2018 7/12/2018           9:34 AM  9:34 AM   Protein/Creatinine Ratio, Urine      0.0 - 200.0 mg/G Crea 59.1    Creatinine, Urine      mg/dL 211.6 211.6   Total Protein, Urine      mg/dL 12.5    Microalbumin/Creatinine Ratio      0.0 - 30.0 mg/g  37.3 (H)   Microalbumin, Urine      mg/L  7.9                Preventive Care: Patient is not smoking        Weight Related: Obesity, Counseled on nutrition, Counseled on physical activity     Patient's Body mass index is 37.07 kg/m². BMI is above normal parameters. Recommendations include: educational material.       Dietary changes     Handout given diet and diabetes      Bone Health     Vitamin d def     Nov. 2016      Vit d - normal      Vitamin d Oct 2017     29     Start otc 1000 units daily         Component      Latest Ref Rng & Units 3/14/2018   25 Hydroxy, Vitamin D      30.0 - 100.0 ng/ml 22.0 (L)          not taking states cannot remember         Component      Latest Ref Rng & Units 7/12/2018   25 Hydroxy, Vitamin D      30.0 - 100.0 ng/ml 33.9         Lab Results   Component Value Date    [id number] 433 07/12/2018         4. Follow-up: Return in about 4 months (around 11/16/2018) for Recheck.

## 2018-07-16 NOTE — PATIENT INSTRUCTIONS
Diabetes Mellitus and Nutrition  When you have diabetes (diabetes mellitus), it is very important to have healthy eating habits because your blood sugar (glucose) levels are greatly affected by what you eat and drink. Eating healthy foods in the appropriate amounts, at about the same times every day, can help you:  · Control your blood glucose.  · Lower your risk of heart disease.  · Improve your blood pressure.  · Reach or maintain a healthy weight.    Every person with diabetes is different, and each person has different needs for a meal plan. Your health care provider may recommend that you work with a diet and nutrition specialist (dietitian) to make a meal plan that is best for you. Your meal plan may vary depending on factors such as:  · The calories you need.  · The medicines you take.  · Your weight.  · Your blood glucose, blood pressure, and cholesterol levels.  · Your activity level.  · Other health conditions you have, such as heart or kidney disease.    How do carbohydrates affect me?  Carbohydrates affect your blood glucose level more than any other type of food. Eating carbohydrates naturally increases the amount of glucose in your blood. Carbohydrate counting is a method for keeping track of how many carbohydrates you eat. Counting carbohydrates is important to keep your blood glucose at a healthy level, especially if you use insulin or take certain oral diabetes medicines.  It is important to know how many carbohydrates you can safely have in each meal. This is different for every person. Your dietitian can help you calculate how many carbohydrates you should have at each meal and for snack.  Foods that contain carbohydrates include:  · Bread, cereal, rice, pasta, and crackers.  · Potatoes and corn.  · Peas, beans, and lentils.  · Milk and yogurt.  · Fruit and juice.  · Desserts, such as cakes, cookies, ice cream, and candy.    How does alcohol affect me?  Alcohol can cause a sudden decrease in blood  "glucose (hypoglycemia), especially if you use insulin or take certain oral diabetes medicines. Hypoglycemia can be a life-threatening condition. Symptoms of hypoglycemia (sleepiness, dizziness, and confusion) are similar to symptoms of having too much alcohol.  If your health care provider says that alcohol is safe for you, follow these guidelines:  · Limit alcohol intake to no more than 1 drink per day for nonpregnant women and 2 drinks per day for men. One drink equals 12 oz of beer, 5 oz of wine, or 1½ oz of hard liquor.  · Do not drink on an empty stomach.  · Keep yourself hydrated with water, diet soda, or unsweetened iced tea.  · Keep in mind that regular soda, juice, and other mixers may contain a lot of sugar and must be counted as carbohydrates.    What are tips for following this plan?  Reading food labels  · Start by checking the serving size on the label. The amount of calories, carbohydrates, fats, and other nutrients listed on the label are based on one serving of the food. Many foods contain more than one serving per package.  · Check the total grams (g) of carbohydrates in one serving. You can calculate the number of servings of carbohydrates in one serving by dividing the total carbohydrates by 15. For example, if a food has 30 g of total carbohydrates, it would be equal to 2 servings of carbohydrates.  · Check the number of grams (g) of saturated and trans fats in one serving. Choose foods that have low or no amount of these fats.  · Check the number of milligrams (mg) of sodium in one serving. Most people should limit total sodium intake to less than 2,300 mg per day.  · Always check the nutrition information of foods labeled as \"low-fat\" or \"nonfat\". These foods may be higher in added sugar or refined carbohydrates and should be avoided.  · Talk to your dietitian to identify your daily goals for nutrients listed on the label.  Shopping  · Avoid buying canned, premade, or processed foods. These " foods tend to be high in fat, sodium, and added sugar.  · Shop around the outside edge of the grocery store. This includes fresh fruits and vegetables, bulk grains, fresh meats, and fresh dairy.  Cooking  · Use low-heat cooking methods, such as baking, instead of high-heat cooking methods like deep frying.  · Cook using healthy oils, such as olive, canola, or sunflower oil.  · Avoid cooking with butter, cream, or high-fat meats.  Meal planning  · Eat meals and snacks regularly, preferably at the same times every day. Avoid going long periods of time without eating.  · Eat foods high in fiber, such as fresh fruits, vegetables, beans, and whole grains. Talk to your dietitian about how many servings of carbohydrates you can eat at each meal.  · Eat 4-6 ounces of lean protein each day, such as lean meat, chicken, fish, eggs, or tofu. 1 ounce is equal to 1 ounce of meat, chicken, or fish, 1 egg, or 1/4 cup of tofu.  · Eat some foods each day that contain healthy fats, such as avocado, nuts, seeds, and fish.  Lifestyle    · Check your blood glucose regularly.  · Exercise at least 30 minutes 5 or more days each week, or as told by your health care provider.  · Take medicines as told by your health care provider.  · Do not use any products that contain nicotine or tobacco, such as cigarettes and e-cigarettes. If you need help quitting, ask your health care provider.  · Work with a counselor or diabetes educator to identify strategies to manage stress and any emotional and social challenges.  What are some questions to ask my health care provider?  · Do I need to meet with a diabetes educator?  · Do I need to meet with a dietitian?  · What number can I call if I have questions?  · When are the best times to check my blood glucose?  Where to find more information:  · American Diabetes Association: diabetes.org/food-and-fitness/food  · Academy of Nutrition and Dietetics:  www.eatright.org/resources/health/diseases-and-conditions/diabetes  · National [place] of Diabetes and Digestive and Kidney Diseases (NIH): www.niddk.nih.gov/health-information/diabetes/overview/diet-eating-physical-activity  Summary  · A healthy meal plan will help you control your blood glucose and maintain a healthy lifestyle.  · Working with a diet and nutrition specialist (dietitian) can help you make a meal plan that is best for you.  · Keep in mind that carbohydrates and alcohol have immediate effects on your blood glucose levels. It is important to count carbohydrates and to use alcohol carefully.  This information is not intended to replace advice given to you by your health care provider. Make sure you discuss any questions you have with your health care provider.  Document Released: 09/14/2006 Document Revised: 01/22/2018 Document Reviewed: 01/22/2018  [employer] Patient Education © 2018 [employer]

## 2018-07-20 ENCOUNTER — HOSPITAL ENCOUNTER (EMERGENCY)
Facility: HOSPITAL | Age: 53
Discharge: HOME | End: 2018-07-20
Attending: EMERGENCY MEDICINE | Admitting: EMERGENCY MEDICINE

## 2018-07-20 ENCOUNTER — APPOINTMENT (OUTPATIENT)
Dept: GENERAL RADIOLOGY | Facility: HOSPITAL | Age: 53
End: 2018-07-20

## 2018-07-20 VITALS
TEMPERATURE: 98.3 F | DIASTOLIC BLOOD PRESSURE: 72 MMHG | WEIGHT: 245 LBS | RESPIRATION RATE: 18 BRPM | HEIGHT: 71 IN | BODY MASS INDEX: 34.3 KG/M2 | SYSTOLIC BLOOD PRESSURE: 123 MMHG | OXYGEN SATURATION: 98 % | HEART RATE: 60 BPM

## 2018-07-20 DIAGNOSIS — K21.9 GASTROESOPHAGEAL REFLUX DISEASE, ESOPHAGITIS PRESENCE NOT SPECIFIED: ICD-10-CM

## 2018-07-20 DIAGNOSIS — R07.9 CHEST PAIN, UNSPECIFIED TYPE: Primary | ICD-10-CM

## 2018-07-20 LAB
ALBUMIN SERPL-MCNC: 4.3 G/DL (ref 3.4–4.8)
ALBUMIN/GLOB SERPL: 1.1 G/DL (ref 1.1–1.8)
ALP SERPL-CCNC: 101 U/L (ref 38–126)
ALT SERPL W P-5'-P-CCNC: 62 U/L (ref 21–72)
ANION GAP SERPL CALCULATED.3IONS-SCNC: 10 MMOL/L (ref 5–15)
AST SERPL-CCNC: 41 U/L (ref 17–59)
BASOPHILS # BLD AUTO: 0.03 10*3/MM3 (ref 0–0.2)
BASOPHILS NFR BLD AUTO: 0.3 % (ref 0–2)
BILIRUB SERPL-MCNC: 0.4 MG/DL (ref 0.2–1.3)
BILIRUB UR QL STRIP: NEGATIVE
BUN BLD-MCNC: 14 MG/DL (ref 7–21)
BUN/CREAT SERPL: 14 (ref 7–25)
CALCIUM SPEC-SCNC: 8.9 MG/DL (ref 8.4–10.2)
CHLORIDE SERPL-SCNC: 103 MMOL/L (ref 95–110)
CLARITY UR: CLEAR
CO2 SERPL-SCNC: 26 MMOL/L (ref 22–31)
COLOR UR: YELLOW
CREAT BLD-MCNC: 1 MG/DL (ref 0.7–1.3)
DEPRECATED RDW RBC AUTO: 41.5 FL (ref 35.1–43.9)
EOSINOPHIL # BLD AUTO: 0.35 10*3/MM3 (ref 0–0.7)
EOSINOPHIL NFR BLD AUTO: 3.6 % (ref 0–7)
ERYTHROCYTE [DISTWIDTH] IN BLOOD BY AUTOMATED COUNT: 13.2 % (ref 11.5–14.5)
GFR SERPL CREATININE-BSD FRML MDRD: 94 ML/MIN/1.73 (ref 56–130)
GLOBULIN UR ELPH-MCNC: 3.8 GM/DL (ref 2.3–3.5)
GLUCOSE BLD-MCNC: 111 MG/DL (ref 60–100)
GLUCOSE UR STRIP-MCNC: NEGATIVE MG/DL
HCT VFR BLD AUTO: 41.9 % (ref 39–49)
HGB BLD-MCNC: 13.8 G/DL (ref 13.7–17.3)
HGB UR QL STRIP.AUTO: NEGATIVE
HOLD SPECIMEN: NORMAL
HOLD SPECIMEN: NORMAL
IMM GRANULOCYTES # BLD: 0.05 10*3/MM3 (ref 0–0.02)
IMM GRANULOCYTES NFR BLD: 0.5 % (ref 0–0.5)
KETONES UR QL STRIP: NEGATIVE
LEUKOCYTE ESTERASE UR QL STRIP.AUTO: NEGATIVE
LIPASE SERPL-CCNC: 131 U/L (ref 23–300)
LYMPHOCYTES # BLD AUTO: 2.16 10*3/MM3 (ref 0.6–4.2)
LYMPHOCYTES NFR BLD AUTO: 22.3 % (ref 10–50)
MCH RBC QN AUTO: 28.3 PG (ref 26.5–34)
MCHC RBC AUTO-ENTMCNC: 32.9 G/DL (ref 31.5–36.3)
MCV RBC AUTO: 85.9 FL (ref 80–98)
MONOCYTES # BLD AUTO: 0.79 10*3/MM3 (ref 0–0.9)
MONOCYTES NFR BLD AUTO: 8.2 % (ref 0–12)
NEUTROPHILS # BLD AUTO: 6.31 10*3/MM3 (ref 2–8.6)
NEUTROPHILS NFR BLD AUTO: 65.1 % (ref 37–80)
NITRITE UR QL STRIP: NEGATIVE
NT-PROBNP SERPL-MCNC: 28.6 PG/ML (ref 0–900)
PH UR STRIP.AUTO: 5.5 [PH] (ref 5–9)
PLATELET # BLD AUTO: 251 10*3/MM3 (ref 150–450)
PMV BLD AUTO: 9.7 FL (ref 8–12)
POTASSIUM BLD-SCNC: 4 MMOL/L (ref 3.5–5.1)
PROT SERPL-MCNC: 8.1 G/DL (ref 6.3–8.6)
PROT UR QL STRIP: NEGATIVE
RBC # BLD AUTO: 4.88 10*6/MM3 (ref 4.37–5.74)
SODIUM BLD-SCNC: 139 MMOL/L (ref 137–145)
SP GR UR STRIP: 1.02 (ref 1–1.03)
TROPONIN I SERPL-MCNC: <0.012 NG/ML
TROPONIN I SERPL-MCNC: <0.012 NG/ML
UROBILINOGEN UR QL STRIP: NORMAL
WBC NRBC COR # BLD: 9.69 10*3/MM3 (ref 3.2–9.8)
WHOLE BLOOD HOLD SPECIMEN: NORMAL
WHOLE BLOOD HOLD SPECIMEN: NORMAL

## 2018-07-20 PROCEDURE — 83690 ASSAY OF LIPASE: CPT | Performed by: EMERGENCY MEDICINE

## 2018-07-20 PROCEDURE — 84484 ASSAY OF TROPONIN QUANT: CPT | Performed by: EMERGENCY MEDICINE

## 2018-07-20 PROCEDURE — 80053 COMPREHEN METABOLIC PANEL: CPT | Performed by: EMERGENCY MEDICINE

## 2018-07-20 PROCEDURE — 93010 ELECTROCARDIOGRAM REPORT: CPT | Performed by: INTERNAL MEDICINE

## 2018-07-20 PROCEDURE — 93005 ELECTROCARDIOGRAM TRACING: CPT

## 2018-07-20 PROCEDURE — 93005 ELECTROCARDIOGRAM TRACING: CPT | Performed by: EMERGENCY MEDICINE

## 2018-07-20 PROCEDURE — 96374 THER/PROPH/DIAG INJ IV PUSH: CPT

## 2018-07-20 PROCEDURE — 83880 ASSAY OF NATRIURETIC PEPTIDE: CPT | Performed by: EMERGENCY MEDICINE

## 2018-07-20 PROCEDURE — 81003 URINALYSIS AUTO W/O SCOPE: CPT | Performed by: EMERGENCY MEDICINE

## 2018-07-20 PROCEDURE — 85025 COMPLETE CBC W/AUTO DIFF WBC: CPT

## 2018-07-20 PROCEDURE — 99285 EMERGENCY DEPT VISIT HI MDM: CPT

## 2018-07-20 PROCEDURE — 71046 X-RAY EXAM CHEST 2 VIEWS: CPT

## 2018-07-20 RX ORDER — RANITIDINE 150 MG/1
150 TABLET ORAL 2 TIMES DAILY
Qty: 14 TABLET | Refills: 0 | Status: SHIPPED | OUTPATIENT
Start: 2018-07-20 | End: 2018-07-27

## 2018-07-20 RX ORDER — SUCRALFATE 1 G/1
1 TABLET ORAL 4 TIMES DAILY
Qty: 28 TABLET | Refills: 0 | Status: SHIPPED | OUTPATIENT
Start: 2018-07-20 | End: 2018-07-27

## 2018-07-20 RX ORDER — SODIUM CHLORIDE 0.9 % (FLUSH) 0.9 %
10 SYRINGE (ML) INJECTION AS NEEDED
Status: DISCONTINUED | OUTPATIENT
Start: 2018-07-20 | End: 2018-07-20 | Stop reason: HOSPADM

## 2018-07-20 RX ORDER — ASPIRIN 325 MG
325 TABLET ORAL ONCE
Status: COMPLETED | OUTPATIENT
Start: 2018-07-20 | End: 2018-07-20

## 2018-07-20 RX ORDER — ALUMINA, MAGNESIA, AND SIMETHICONE 2400; 2400; 240 MG/30ML; MG/30ML; MG/30ML
15 SUSPENSION ORAL ONCE
Status: COMPLETED | OUTPATIENT
Start: 2018-07-20 | End: 2018-07-20

## 2018-07-20 RX ORDER — FAMOTIDINE 10 MG/ML
20 INJECTION, SOLUTION INTRAVENOUS EVERY 12 HOURS SCHEDULED
Status: DISCONTINUED | OUTPATIENT
Start: 2018-07-20 | End: 2018-07-20 | Stop reason: HOSPADM

## 2018-07-20 RX ADMIN — LIDOCAINE HYDROCHLORIDE 15 ML: 20 SOLUTION ORAL; TOPICAL at 15:24

## 2018-07-20 RX ADMIN — ALUMINUM HYDROXIDE, MAGNESIUM HYDROXIDE, AND DIMETHICONE 15 ML: 400; 400; 40 SUSPENSION ORAL at 15:24

## 2018-07-20 RX ADMIN — ASPIRIN 325 MG: 325 TABLET, COATED ORAL at 13:55

## 2018-07-20 RX ADMIN — FAMOTIDINE 20 MG: 10 INJECTION, SOLUTION INTRAVENOUS at 15:27

## 2018-07-23 ENCOUNTER — TELEPHONE (OUTPATIENT)
Dept: FAMILY MEDICINE CLINIC | Facility: CLINIC | Age: 53
End: 2018-07-23

## 2018-07-30 RX ORDER — ATORVASTATIN CALCIUM 20 MG/1
TABLET, FILM COATED ORAL
Qty: 90 TABLET | Refills: 0 | Status: SHIPPED | OUTPATIENT
Start: 2018-07-30 | End: 2018-07-31 | Stop reason: SDUPTHER

## 2018-07-31 RX ORDER — ATORVASTATIN CALCIUM 20 MG/1
20 TABLET, FILM COATED ORAL DAILY
Qty: 90 TABLET | Refills: 0 | Status: SHIPPED | OUTPATIENT
Start: 2018-07-31 | End: 2018-10-28 | Stop reason: SDUPTHER

## 2018-08-13 RX ORDER — ONDANSETRON 4 MG/1
TABLET, FILM COATED ORAL
Qty: 30 TABLET | Refills: 0 | Status: SHIPPED | OUTPATIENT
Start: 2018-08-13 | End: 2019-10-18

## 2018-08-15 DIAGNOSIS — E11.42 DIABETIC POLYNEUROPATHY ASSOCIATED WITH TYPE 2 DIABETES MELLITUS (HCC): ICD-10-CM

## 2018-08-15 RX ORDER — LOSARTAN POTASSIUM 50 MG/1
50 TABLET ORAL DAILY
Qty: 90 TABLET | Refills: 0 | Status: SHIPPED | OUTPATIENT
Start: 2018-08-15 | End: 2018-10-29 | Stop reason: SDUPTHER

## 2018-08-15 RX ORDER — METFORMIN HYDROCHLORIDE 750 MG/1
TABLET, EXTENDED RELEASE ORAL
Qty: 90 TABLET | Refills: 1 | Status: SHIPPED | OUTPATIENT
Start: 2018-08-15 | End: 2019-02-12 | Stop reason: SDUPTHER

## 2018-09-21 ENCOUNTER — TELEPHONE (OUTPATIENT)
Dept: FAMILY MEDICINE CLINIC | Facility: CLINIC | Age: 53
End: 2018-09-21

## 2018-10-03 ENCOUNTER — OFFICE VISIT (OUTPATIENT)
Dept: FAMILY MEDICINE CLINIC | Facility: CLINIC | Age: 53
End: 2018-10-03

## 2018-10-03 ENCOUNTER — LAB (OUTPATIENT)
Dept: LAB | Facility: HOSPITAL | Age: 53
End: 2018-10-03

## 2018-10-03 VITALS
HEIGHT: 71 IN | SYSTOLIC BLOOD PRESSURE: 124 MMHG | HEART RATE: 73 BPM | OXYGEN SATURATION: 95 % | BODY MASS INDEX: 33.19 KG/M2 | WEIGHT: 237.1 LBS | DIASTOLIC BLOOD PRESSURE: 82 MMHG

## 2018-10-03 DIAGNOSIS — N40.1 BENIGN PROSTATIC HYPERPLASIA WITH WEAK URINARY STREAM: ICD-10-CM

## 2018-10-03 DIAGNOSIS — R39.12 BENIGN PROSTATIC HYPERPLASIA WITH WEAK URINARY STREAM: ICD-10-CM

## 2018-10-03 DIAGNOSIS — R10.11 RIGHT UPPER QUADRANT ABDOMINAL PAIN: Primary | ICD-10-CM

## 2018-10-03 DIAGNOSIS — J44.9 CHRONIC OBSTRUCTIVE PULMONARY DISEASE, UNSPECIFIED COPD TYPE (HCC): Chronic | ICD-10-CM

## 2018-10-03 LAB — PSA SERPL-MCNC: 4.97 NG/ML (ref 0–4)

## 2018-10-03 PROCEDURE — 84153 ASSAY OF PSA TOTAL: CPT

## 2018-10-03 PROCEDURE — 99213 OFFICE O/P EST LOW 20 MIN: CPT | Performed by: FAMILY MEDICINE

## 2018-10-03 PROCEDURE — 36415 COLL VENOUS BLD VENIPUNCTURE: CPT

## 2018-10-03 RX ORDER — BUDESONIDE AND FORMOTEROL FUMARATE DIHYDRATE 160; 4.5 UG/1; UG/1
2 AEROSOL RESPIRATORY (INHALATION)
Qty: 1 INHALER | Refills: 3 | Status: SHIPPED | OUTPATIENT
Start: 2018-10-03 | End: 2020-06-12 | Stop reason: SDDI

## 2018-10-03 RX ORDER — ALBUTEROL SULFATE 90 UG/1
2 AEROSOL, METERED RESPIRATORY (INHALATION) EVERY 4 HOURS PRN
Qty: 1 INHALER | Refills: 3 | Status: SHIPPED | OUTPATIENT
Start: 2018-10-03 | End: 2020-12-04 | Stop reason: SDUPTHER

## 2018-10-03 RX ORDER — FEXOFENADINE HCL 180 MG/1
180 TABLET ORAL DAILY
Qty: 30 TABLET | Refills: 1 | Status: SHIPPED | OUTPATIENT
Start: 2018-10-03 | End: 2019-03-05 | Stop reason: SDUPTHER

## 2018-10-03 RX ORDER — TAMSULOSIN HYDROCHLORIDE 0.4 MG/1
1 CAPSULE ORAL NIGHTLY
Qty: 30 CAPSULE | Refills: 0 | Status: SHIPPED | OUTPATIENT
Start: 2018-10-03 | End: 2019-06-12 | Stop reason: SDUPTHER

## 2018-10-03 NOTE — PROGRESS NOTES
Subjective:     Nirav Lind is a 53 y.o. male who presents for initial evaluation for RUQ abdominal pain and urological symptoms. Pt has a history of well controlled DM and is seen by endocrinology with his next appointmen next month. Pt has a history of well controlled HTN on toprol 25mg daily and losartan 50mg daily. Pt denies headache, chest pain, blurry vision. Pt reports a 2-3 month history of RUQ abdominal pain. Pain usually occurs after eating and is worse with fattier food. Pt has associated N/V/D. Pt is agreeable for RUQ US. Pt reports a 1 month history of increased urinary urgency. Pt is getting up 1-3 times a night to urinate. Pt reports difficulty initiating urination, weak stream and is only urinating a small amount. Pt is agreeable to starting flomax and obtaining a PSA.       Preventative:  Over the past 2 weeks, have you felt down, depressed, or hopeless?No   Over the past 2 weeks, have you felt little interest or pleasure in doing things?No  Clinical depression screening refused by patient.No     Past Medical Hx:  Past Medical History:   Diagnosis Date   • Abnormal computed tomography scan    • Adenomatous polyp of colon    • Allergic rhinitis    • Anemia    • Chronic back pain    • Coronary arteriosclerosis    • Diabetes mellitus (CMS/HCC)    • Dizziness    • Dyspnea     unspecified   • Epigastric pain    • Essential hypertension    • Ex-smoker    • Hemangioma of liver    • Hyperlipidemia    • Infected hydrocele     with orchitis and epididymis   • Nausea    • Nausea with vomiting    • Obesity with body mass index of 30.0 - 39.9    • Pain in right knee    • Right upper quadrant pain    • Type II diabetes mellitus, uncontrolled (CMS/HCC)    • Upper respiratory infection    • Urgency of urination    • Villous adenoma of colon        Past Surgical Hx:  Past Surgical History:   Procedure Laterality Date   • CARDIAC CATHETERIZATION  11/30/2015    No evidence of any obstructive disease in the  circumflex, the RCA , or LAD. 1st diagonal branch in the midportion with 20-30% stenosis. Preserved LV systolic function with EF 55%.   • COLONOSCOPY  01/21/2013    Normal   • COLONOSCOPY W/ POLYPECTOMY  10/13/2014    A single polyp was found in the colon; removed by snare cautery polypectomy.   • ENDOSCOPY  06/15/2016    Mildly severe esophagitis. Several biopsies obtained in the upper third of the esophagus.   • INCISION AND DRAINAGE ABSCESS  10/29/2014    Incision and drainage of scrotal abscess. Hydrocelectomy, bottle procedure.   • INJECTION OF MEDICATION  01/26/2016    Kenalog       Health Maintenance:  Health Maintenance   Topic Date Due   • ZOSTER VACCINE (1 of 2) 05/01/2013   • INFLUENZA VACCINE  08/01/2018   • DIABETIC FOOT EXAM  10/19/2018   • DIABETIC EYE EXAM  11/16/2018   • HEMOGLOBIN A1C  01/12/2019   • LIPID PANEL  03/14/2019   • MEDICARE ANNUAL WELLNESS  04/30/2019   • URINE MICROALBUMIN  07/12/2019   • COLONOSCOPY  04/05/2026   • TDAP/TD VACCINES (2 - Td) 09/09/2026   • PNEUMOCOCCAL VACCINE (19-64 MEDIUM RISK)  Addressed   • HEPATITIS C SCREENING  Completed       Current Meds:    Current Outpatient Prescriptions:   •  albuterol (VENTOLIN HFA) 108 (90 Base) MCG/ACT inhaler, Inhale 2 puffs Every 4 (Four) Hours As Needed for Wheezing., Disp: 1 inhaler, Rfl: 3  •  Alcohol Swabs ( STERILE ALCOHOL PREP) pads, 1 each 4 (Four) Times a Day., Disp: 200 each, Rfl: 3  •  amitriptyline (ELAVIL) 25 MG tablet, Take 25 mg by mouth 3 (three) times a day., Disp: , Rfl:   •  ASPIRIN LOW DOSE 81 MG EC tablet, TAKE 1 TABLET BY MOUTH EVERY DAY, Disp: 30 tablet, Rfl: 0  •  atorvastatin (LIPITOR) 20 MG tablet, Take 1 tablet by mouth Daily., Disp: 90 tablet, Rfl: 0  •  B-D UF III MINI PEN NEEDLES 31G X 5 MM misc, Injecting once daily up to 3 times daily, Disp: 150 each, Rfl: 11  •  budesonide-formoterol (SYMBICORT) 160-4.5 MCG/ACT inhaler, Inhale 2 puffs 2 (Two) Times a Day., Disp: 1 inhaler, Rfl: 3  •  busPIRone  (BUSPAR) 5 MG tablet, TK 1 T PO BID, Disp: , Rfl: 3  •  cyclobenzaprine (FLEXERIL) 5 MG tablet, Take 1 tablet by mouth 3 (Three) Times a Day As Needed for Muscle Spasms. for muscle spams, Disp: 90 tablet, Rfl: 0  •  fexofenadine (ALLEGRA ALLERGY) 180 MG tablet, Take 1 tablet by mouth Daily., Disp: 30 tablet, Rfl: 1  •  fluticasone (FLONASE) 50 MCG/ACT nasal spray, 2 sprays into each nostril Daily., Disp: 9.9 mL, Rfl: 3  •  glucose blood (ONE TOUCH ULTRA TEST) test strip, 1 each by Other route 3 (Three) Times a Day. Use as instructed, Disp: 200 each, Rfl: 3  •  insulin aspart (NOVOLOG FLEXPEN) 100 UNIT/ML solution pen-injector sc pen, Inject 8 Units under the skin 3 (Three) Times a Day With Meals., Disp: 2 pen, Rfl: 11  •  losartan (COZAAR) 50 MG tablet, TAKE 1 TABLET BY MOUTH DAILY, Disp: 90 tablet, Rfl: 0  •  MAGNESIUM-OXIDE 400 (241.3 MG) MG tablet tablet, TK 1 T PO BID, Disp: , Rfl: 12  •  meloxicam (MOBIC) 7.5 MG tablet, Take 1 tablet by mouth Daily., Disp: 30 tablet, Rfl: 1  •  metFORMIN ER (GLUCOPHAGE-XR) 750 MG 24 hr tablet, TAKE 1 TABLET BY MOUTH EVERY DAY., Disp: 90 tablet, Rfl: 1  •  metoprolol succinate XL (TOPROL-XL) 25 MG 24 hr tablet, TK 1 T PO BID, Disp: , Rfl: 3  •  OLANZapine (ZYPREXA) 10 MG tablet, Take 10 mg by mouth daily., Disp: , Rfl:   •  ondansetron (ZOFRAN) 4 MG tablet, TAKE 1 TABLET BY MOUTH EVERY 8 HOURS AS NEEDED FOR NAUSEA OR VOMITING, Disp: 30 tablet, Rfl: 0  •  pantoprazole (PROTONIX) 40 MG EC tablet, Take 1 tablet by mouth Daily., Disp: 30 tablet, Rfl: 2  •  promethazine-dextromethorphan (PROMETHAZINE-DM) 6.25-15 MG/5ML syrup, Take 1.3 mL by mouth 4 (Four) Times a Day As Needed for Cough., Disp: 180 mL, Rfl: 0  •  sertraline (ZOLOFT) 100 MG tablet, Take 2 tablets by mouth Daily., Disp: 60 tablet, Rfl: 3  •  tiotropium (SPIRIVA HANDIHALER) 18 MCG per inhalation capsule, Place 1 capsule into inhaler and inhale Daily., Disp: 30 capsule, Rfl: 2  •  TOUJEO SOLOSTAR 300 UNIT/ML solution  "pen-injector, INJECT 25 UNITS EVERY MORNING, Disp: 3 pen, Rfl: 5  •  tamsulosin (FLOMAX) 0.4 MG capsule 24 hr capsule, Take 1 capsule by mouth Every Night., Disp: 30 capsule, Rfl: 0    Allergies:  Patient has no known allergies.    Family Hx:  Family History   Problem Relation Age of Onset   • Cancer Mother    • Heart disease Sister         Social History:  Social History     Social History   • Marital status:      Spouse name: N/A   • Number of children: N/A   • Years of education: N/A     Occupational History   • Not on file.     Social History Main Topics   • Smoking status: Never Smoker   • Smokeless tobacco: Never Used   • Alcohol use Yes   • Drug use: No   • Sexual activity: Defer     Other Topics Concern   • Not on file     Social History Narrative   • No narrative on file       Review of Systems  Review of Systems   Constitutional: Negative for chills, fatigue and fever.   HENT: Negative for congestion, nosebleeds and sore throat.    Eyes: Negative for discharge and itching.   Respiratory: Negative for cough, shortness of breath and wheezing.    Cardiovascular: Negative for chest pain, palpitations and leg swelling.   Gastrointestinal: Negative for diarrhea, nausea and vomiting.   Genitourinary: Negative for dysuria and hematuria.   Musculoskeletal: Positive for back pain (chronic low back pain). Negative for neck pain and neck stiffness.   Skin: Negative for rash and wound.   Neurological: Negative for seizures and syncope.   Psychiatric/Behavioral: Negative for agitation and confusion.         Objective:     /82 (BP Location: Left arm, Patient Position: Sitting, Cuff Size: Large Adult)   Pulse 73   Ht 180.3 cm (71\")   Wt 108 kg (237 lb 1.6 oz)   SpO2 95%   BMI 33.07 kg/m²   Physical Exam   Constitutional: He is oriented to person, place, and time. He appears well-developed and well-nourished. No distress.   HENT:   Head: Normocephalic and atraumatic.   Right Ear: External ear normal. "   Left Ear: External ear normal.   Nose: Nose normal. No nose lacerations, sinus tenderness or nasal deformity. No epistaxis.   Eyes: Conjunctivae are normal. Right eye exhibits no discharge. Left eye exhibits no discharge. No scleral icterus.   Neck: Normal range of motion. Neck supple.   Cardiovascular: Normal rate, regular rhythm and normal heart sounds.    Pulmonary/Chest: Effort normal and breath sounds normal. No respiratory distress. He has no wheezes. He has no rales.   Abdominal: Soft. Bowel sounds are normal.   Musculoskeletal: Normal range of motion. He exhibits no edema, tenderness or deformity.   Neurological: He is alert and oriented to person, place, and time.   Skin: Skin is warm and dry. He is not diaphoretic.   Psychiatric: He has a normal mood and affect. His behavior is normal. Judgment and thought content normal.   Nursing note and vitals reviewed.                                                                     Assessment/Plan:     Diagnoses and all orders for this visit:    Right upper quadrant abdominal pain  -     US Abdomen Complete; Future    Chronic obstructive pulmonary disease, unspecified COPD type (CMS/HCC)  -     tiotropium (SPIRIVA HANDIHALER) 18 MCG per inhalation capsule; Place 1 capsule into inhaler and inhale Daily.  -     budesonide-formoterol (SYMBICORT) 160-4.5 MCG/ACT inhaler; Inhale 2 puffs 2 (Two) Times a Day.  -     albuterol (VENTOLIN HFA) 108 (90 Base) MCG/ACT inhaler; Inhale 2 puffs Every 4 (Four) Hours As Needed for Wheezing.    Benign prostatic hyperplasia with weak urinary stream  -     tamsulosin (FLOMAX) 0.4 MG capsule 24 hr capsule; Take 1 capsule by mouth Every Night.  -     PSA DIAGNOSTIC; Future    Other orders  -     fexofenadine (ALLEGRA ALLERGY) 180 MG tablet; Take 1 tablet by mouth Daily.         Follow-up:     Return in about 4 weeks (around 10/31/2018) for Next scheduled follow up.        GOALS:  Maintain medication    Preventative:  Male  Preventative: Exercises regularly  Vaccines:   Tetanus vaccine: not up to date - declined  Annual influenza vaccine: not up to date - declined   Pneumococcal vaccine: not up to date - declined    never smoker  occasional/rare  eat more fruits and vegetables, decrease soda or juice intake, increase water intake and increase physical activity    RISK SCORE: 3    Jeremy Mahmood MD PGY2  [place] [address]  Office: [phone]      This document has been electronically signed by Jeremy Mahmood MD on October 3, 2018 10:55 AM

## 2018-10-03 NOTE — PROGRESS NOTES
I have reviewed the notes, assessments, and/or procedures performed by Jeremy Mahmood MD , I concur with her/his documentation of Nirav Lind.

## 2018-10-04 ENCOUNTER — EPISODE CHANGES (OUTPATIENT)
Dept: CASE MANAGEMENT | Facility: OTHER | Age: 53
End: 2018-10-04

## 2018-10-10 ENCOUNTER — HOSPITAL ENCOUNTER (OUTPATIENT)
Dept: ULTRASOUND IMAGING | Facility: HOSPITAL | Age: 53
Discharge: HOME | End: 2018-10-10
Attending: FAMILY MEDICINE | Admitting: FAMILY MEDICINE

## 2018-10-10 DIAGNOSIS — R10.11 RIGHT UPPER QUADRANT ABDOMINAL PAIN: ICD-10-CM

## 2018-10-10 PROCEDURE — 76700 US EXAM ABDOM COMPLETE: CPT

## 2018-10-22 ENCOUNTER — TELEPHONE (OUTPATIENT)
Dept: FAMILY MEDICINE CLINIC | Facility: CLINIC | Age: 53
End: 2018-10-22

## 2018-10-22 ENCOUNTER — EPISODE CHANGES (OUTPATIENT)
Dept: CASE MANAGEMENT | Facility: OTHER | Age: 53
End: 2018-10-22

## 2018-10-22 NOTE — TELEPHONE ENCOUNTER
CALLED PT TO [facility] NOV FOLLOW UP APPT WITH DR. PEREZ    LEFT MESSAGE    MADE APPT

## 2018-10-29 ENCOUNTER — OFFICE VISIT (OUTPATIENT)
Dept: FAMILY MEDICINE CLINIC | Facility: CLINIC | Age: 53
End: 2018-10-29

## 2018-10-29 VITALS
WEIGHT: 239.25 LBS | BODY MASS INDEX: 33.49 KG/M2 | HEART RATE: 82 BPM | OXYGEN SATURATION: 97 % | HEIGHT: 71 IN | SYSTOLIC BLOOD PRESSURE: 126 MMHG | DIASTOLIC BLOOD PRESSURE: 82 MMHG

## 2018-10-29 DIAGNOSIS — R10.11 RIGHT UPPER QUADRANT PAIN: Primary | ICD-10-CM

## 2018-10-29 PROCEDURE — 90674 CCIIV4 VAC NO PRSV 0.5 ML IM: CPT | Performed by: FAMILY MEDICINE

## 2018-10-29 PROCEDURE — 99213 OFFICE O/P EST LOW 20 MIN: CPT | Performed by: FAMILY MEDICINE

## 2018-10-29 PROCEDURE — G0008 ADMIN INFLUENZA VIRUS VAC: HCPCS | Performed by: FAMILY MEDICINE

## 2018-10-29 RX ORDER — METOPROLOL SUCCINATE 25 MG/1
25 TABLET, EXTENDED RELEASE ORAL DAILY
Qty: 30 TABLET | Refills: 3 | Status: SHIPPED | OUTPATIENT
Start: 2018-10-29 | End: 2019-01-22 | Stop reason: DRUGHIGH

## 2018-10-29 RX ORDER — CYCLOBENZAPRINE HCL 10 MG
10 TABLET ORAL 3 TIMES DAILY PRN
Qty: 90 TABLET | Refills: 0 | Status: SHIPPED | OUTPATIENT
Start: 2018-10-29 | End: 2018-12-14

## 2018-10-29 RX ORDER — MELOXICAM 15 MG/1
15 TABLET ORAL DAILY
Qty: 30 TABLET | Refills: 3 | Status: SHIPPED | OUTPATIENT
Start: 2018-10-29 | End: 2018-12-14

## 2018-10-29 RX ORDER — LOSARTAN POTASSIUM 50 MG/1
50 TABLET ORAL DAILY
Qty: 90 TABLET | Refills: 0 | Status: SHIPPED | OUTPATIENT
Start: 2018-10-29 | End: 2018-11-12

## 2018-10-29 RX ORDER — ATORVASTATIN CALCIUM 20 MG/1
20 TABLET, FILM COATED ORAL DAILY
Qty: 90 TABLET | Refills: 0 | Status: SHIPPED | OUTPATIENT
Start: 2018-10-29 | End: 2018-10-29 | Stop reason: SDUPTHER

## 2018-10-29 RX ORDER — ATORVASTATIN CALCIUM 20 MG/1
20 TABLET, FILM COATED ORAL DAILY
Qty: 90 TABLET | Refills: 0 | Status: SHIPPED | OUTPATIENT
Start: 2018-10-29 | End: 2018-12-14 | Stop reason: SDUPTHER

## 2018-10-29 NOTE — PROGRESS NOTES
Subjective:     Nirav Lind is a 53 y.o. male who presents for follow up for RUQ abdominal pain. At last visit pt went for a RUQ abdominal US which was unremarkable. At last visit pt reported associated pain with eating. At this visit pt reports some improvement in his RUQ pain. Pt reports no association with eating. Pt reports worsening of pain with twisting of trunk or heavy lifting. Pt reports pain is worse after a day of heavy work. Pt is currently taking flexeril 5mg TID and mobic 7.5 which he states helps his pain. Pt is agreeable to increasing flexeril to 10mg TID and mobic to 15mg daily. Pt is currently refusing physical therapy. Pt requesting refill of losartan and atorvastatin.    Preventative:  Over the past 2 weeks, have you felt down, depressed, or hopeless?No   Over the past 2 weeks, have you felt little interest or pleasure in doing things?No  Clinical depression screening refused by patient.No     Past Medical Hx:  Past Medical History:   Diagnosis Date   • Abnormal computed tomography scan    • Adenomatous polyp of colon    • Allergic rhinitis    • Anemia    • Chronic back pain    • Coronary arteriosclerosis    • Diabetes mellitus (CMS/HCC)    • Dizziness    • Dyspnea     unspecified   • Epigastric pain    • Essential hypertension    • Ex-smoker    • Hemangioma of liver    • Hyperlipidemia    • Infected hydrocele     with orchitis and epididymis   • Nausea    • Nausea with vomiting    • Obesity with body mass index of 30.0 - 39.9    • Pain in right knee    • Right upper quadrant pain    • Type II diabetes mellitus, uncontrolled (CMS/HCC)    • Upper respiratory infection    • Urgency of urination    • Villous adenoma of colon        Past Surgical Hx:  Past Surgical History:   Procedure Laterality Date   • CARDIAC CATHETERIZATION  11/30/2015    No evidence of any obstructive disease in the circumflex, the RCA , or LAD. 1st diagonal branch in the midportion with 20-30% stenosis. Preserved LV  systolic function with EF 55%.   • COLONOSCOPY  01/21/2013    Normal   • COLONOSCOPY W/ POLYPECTOMY  10/13/2014    A single polyp was found in the colon; removed by snare cautery polypectomy.   • ENDOSCOPY  06/15/2016    Mildly severe esophagitis. Several biopsies obtained in the upper third of the esophagus.   • INCISION AND DRAINAGE ABSCESS  10/29/2014    Incision and drainage of scrotal abscess. Hydrocelectomy, bottle procedure.   • INJECTION OF MEDICATION  01/26/2016    Kenalog       Health Maintenance:  Health Maintenance   Topic Date Due   • ZOSTER VACCINE (1 of 2) 05/01/2013   • INFLUENZA VACCINE  08/01/2018   • DIABETIC FOOT EXAM  10/19/2018   • DIABETIC EYE EXAM  11/16/2018   • HEMOGLOBIN A1C  01/12/2019   • LIPID PANEL  03/14/2019   • MEDICARE ANNUAL WELLNESS  04/30/2019   • URINE MICROALBUMIN  07/12/2019   • COLONOSCOPY  04/05/2026   • TDAP/TD VACCINES (2 - Td) 09/09/2026   • PNEUMOCOCCAL VACCINE (19-64 MEDIUM RISK)  Addressed   • HEPATITIS C SCREENING  Completed       Current Meds:    Current Outpatient Prescriptions:   •  albuterol (VENTOLIN HFA) 108 (90 Base) MCG/ACT inhaler, Inhale 2 puffs Every 4 (Four) Hours As Needed for Wheezing., Disp: 1 inhaler, Rfl: 3  •  Alcohol Swabs ( STERILE ALCOHOL PREP) pads, 1 each 4 (Four) Times a Day., Disp: 200 each, Rfl: 3  •  amitriptyline (ELAVIL) 25 MG tablet, Take 25 mg by mouth 3 (three) times a day., Disp: , Rfl:   •  ASPIRIN LOW DOSE 81 MG EC tablet, TAKE 1 TABLET BY MOUTH EVERY DAY, Disp: 30 tablet, Rfl: 0  •  atorvastatin (LIPITOR) 20 MG tablet, Take 1 tablet by mouth Daily., Disp: 90 tablet, Rfl: 0  •  B-D UF III MINI PEN NEEDLES 31G X 5 MM misc, Injecting once daily up to 3 times daily, Disp: 150 each, Rfl: 11  •  budesonide-formoterol (SYMBICORT) 160-4.5 MCG/ACT inhaler, Inhale 2 puffs 2 (Two) Times a Day., Disp: 1 inhaler, Rfl: 3  •  busPIRone (BUSPAR) 5 MG tablet, TK 1 T PO BID, Disp: , Rfl: 3  •  fexofenadine (ALLEGRA ALLERGY) 180 MG tablet, Take 1  tablet by mouth Daily., Disp: 30 tablet, Rfl: 1  •  fluticasone (FLONASE) 50 MCG/ACT nasal spray, 2 sprays into each nostril Daily., Disp: 9.9 mL, Rfl: 3  •  glucose blood (ONE TOUCH ULTRA TEST) test strip, 1 each by Other route 3 (Three) Times a Day. Use as instructed, Disp: 200 each, Rfl: 3  •  insulin aspart (NOVOLOG FLEXPEN) 100 UNIT/ML solution pen-injector sc pen, Inject 8 Units under the skin 3 (Three) Times a Day With Meals., Disp: 2 pen, Rfl: 11  •  losartan (COZAAR) 50 MG tablet, Take 1 tablet by mouth Daily., Disp: 90 tablet, Rfl: 0  •  MAGNESIUM-OXIDE 400 (241.3 MG) MG tablet tablet, TK 1 T PO BID, Disp: , Rfl: 12  •  metFORMIN ER (GLUCOPHAGE-XR) 750 MG 24 hr tablet, TAKE 1 TABLET BY MOUTH EVERY DAY., Disp: 90 tablet, Rfl: 1  •  metoprolol succinate XL (TOPROL-XL) 25 MG 24 hr tablet, Take 1 tablet by mouth Daily., Disp: 30 tablet, Rfl: 3  •  OLANZapine (ZYPREXA) 10 MG tablet, Take 10 mg by mouth daily., Disp: , Rfl:   •  ondansetron (ZOFRAN) 4 MG tablet, TAKE 1 TABLET BY MOUTH EVERY 8 HOURS AS NEEDED FOR NAUSEA OR VOMITING, Disp: 30 tablet, Rfl: 0  •  pantoprazole (PROTONIX) 40 MG EC tablet, Take 1 tablet by mouth Daily., Disp: 30 tablet, Rfl: 2  •  promethazine-dextromethorphan (PROMETHAZINE-DM) 6.25-15 MG/5ML syrup, Take 1.3 mL by mouth 4 (Four) Times a Day As Needed for Cough., Disp: 180 mL, Rfl: 0  •  sertraline (ZOLOFT) 100 MG tablet, Take 2 tablets by mouth Daily., Disp: 60 tablet, Rfl: 3  •  tamsulosin (FLOMAX) 0.4 MG capsule 24 hr capsule, Take 1 capsule by mouth Every Night., Disp: 30 capsule, Rfl: 0  •  tiotropium (SPIRIVA HANDIHALER) 18 MCG per inhalation capsule, Place 1 capsule into inhaler and inhale Daily., Disp: 30 capsule, Rfl: 2  •  TOUJEO SOLOSTAR 300 UNIT/ML solution pen-injector, INJECT 25 UNITS EVERY MORNING, Disp: 3 pen, Rfl: 5  •  cyclobenzaprine (FLEXERIL) 10 MG tablet, Take 1 tablet by mouth 3 (Three) Times a Day As Needed for Muscle Spasms., Disp: 90 tablet, Rfl: 0  •   "meloxicam (MOBIC) 15 MG tablet, Take 1 tablet by mouth Daily., Disp: 30 tablet, Rfl: 3    Allergies:  Patient has no known allergies.    Family Hx:  Family History   Problem Relation Age of Onset   • Cancer Mother    • Heart disease Sister         Social History:  Social History     Social History   • Marital status:      Spouse name: N/A   • Number of children: N/A   • Years of education: N/A     Occupational History   • Not on file.     Social History Main Topics   • Smoking status: Never Smoker   • Smokeless tobacco: Never Used   • Alcohol use Yes   • Drug use: No   • Sexual activity: Defer     Other Topics Concern   • Not on file     Social History Narrative   • No narrative on file       Review of Systems  Review of Systems   Constitutional: Negative for chills, fatigue and fever.   HENT: Negative for congestion, nosebleeds and sore throat.    Eyes: Negative for discharge and itching.   Respiratory: Negative for cough, shortness of breath and wheezing.    Cardiovascular: Negative for chest pain, palpitations and leg swelling.   Gastrointestinal: Positive for abdominal pain. Negative for diarrhea, nausea and vomiting.   Genitourinary: Negative for dysuria and hematuria.   Musculoskeletal: Positive for back pain (chronic low back pain). Negative for neck pain and neck stiffness.   Skin: Negative for rash and wound.   Neurological: Negative for seizures and syncope.   Psychiatric/Behavioral: Negative for agitation and confusion.         Objective:     /82   Pulse 82   Ht 180.3 cm (71\")   Wt 109 kg (239 lb 4 oz)   SpO2 97%   BMI 33.37 kg/m²   Physical Exam   Constitutional: He is oriented to person, place, and time. He appears well-developed and well-nourished. No distress.   HENT:   Head: Normocephalic and atraumatic.   Right Ear: External ear normal.   Left Ear: External ear normal.   Nose: Nose normal. No nose lacerations, sinus tenderness or nasal deformity. No epistaxis.   Eyes: Conjunctivae " are normal. Right eye exhibits no discharge. Left eye exhibits no discharge. No scleral icterus.   Neck: Normal range of motion. Neck supple.   Cardiovascular: Normal rate, regular rhythm and normal heart sounds.    Pulmonary/Chest: Effort normal and breath sounds normal. No respiratory distress. He has no wheezes. He has no rales.   Abdominal: Soft. Bowel sounds are normal.   Musculoskeletal: Normal range of motion. He exhibits no edema, tenderness or deformity.        Arms:  Neurological: He is alert and oriented to person, place, and time.   Skin: Skin is warm and dry. He is not diaphoretic.   Psychiatric: He has a normal mood and affect. His behavior is normal. Judgment and thought content normal.   Nursing note and vitals reviewed.                                                                     Assessment/Plan:     Diagnoses and all orders for this visit:    Right upper quadrant pain  -     cyclobenzaprine (FLEXERIL) 10 MG tablet; Take 1 tablet by mouth 3 (Three) Times a Day As Needed for Muscle Spasms.  -     meloxicam (MOBIC) 15 MG tablet; Take 1 tablet by mouth Daily.    Other orders  -     Flucelvax Quad=>4Years ([id number])  -     metoprolol succinate XL (TOPROL-XL) 25 MG 24 hr tablet; Take 1 tablet by mouth Daily.  -     atorvastatin (LIPITOR) 20 MG tablet; Take 1 tablet by mouth Daily.  -     losartan (COZAAR) 50 MG tablet; Take 1 tablet by mouth Daily.         Follow-up:     Return in about 6 weeks (around 12/10/2018) for Next scheduled follow up.        GOALS:  Maintain medication compliance    Preventative:  Male Preventative: Exercises regularly  Vaccines:   Tetanus vaccine: not up to date - declined  Annual influenza vaccine: not up to date - declined   Pneumococcal vaccine: not up to date - declined    never smoker  regular (moderate)  eat more fruits and vegetables, decrease soda or juice intake, increase water intake and increase physical activity    RISK SCORE: 3    Jeremy Mahmood MD  PGY2  [place] [address]  Office: [phone]      This document has been electronically signed by Jeremy Mahmood MD on October 29, 2018 4:17 PM

## 2018-10-30 NOTE — PROGRESS NOTES
I have reviewed the notes, assessments, and/or procedures performed by Dr. Delvin Mahmood, I concur with his  documentation and assessment and plan for Nirav Lind        This document has been electronically signed by Tray Acevedo MD on October 30, 2018 10:19 AM

## 2018-11-08 ENCOUNTER — APPOINTMENT (OUTPATIENT)
Dept: LAB | Facility: HOSPITAL | Age: 53
End: 2018-11-08

## 2018-11-08 LAB
25(OH)D3 SERPL-MCNC: 22.9 NG/ML (ref 30–100)
ALBUMIN SERPL-MCNC: 4.3 G/DL (ref 3.4–4.8)
ALBUMIN/GLOB SERPL: 1.1 G/DL (ref 1.1–1.8)
ALP SERPL-CCNC: 130 U/L (ref 38–126)
ALT SERPL W P-5'-P-CCNC: 64 U/L (ref 21–72)
ANION GAP SERPL CALCULATED.3IONS-SCNC: 5 MMOL/L (ref 5–15)
ARTICHOKE IGE QN: 69 MG/DL (ref 1–129)
AST SERPL-CCNC: 65 U/L (ref 17–59)
BASOPHILS # BLD AUTO: 0.03 10*3/MM3 (ref 0–0.2)
BASOPHILS NFR BLD AUTO: 0.3 % (ref 0–2)
BILIRUB SERPL-MCNC: 0.5 MG/DL (ref 0.2–1.3)
BUN BLD-MCNC: 10 MG/DL (ref 7–21)
BUN/CREAT SERPL: 9.6 (ref 7–25)
CALCIUM SPEC-SCNC: 9 MG/DL (ref 8.4–10.2)
CHLORIDE SERPL-SCNC: 105 MMOL/L (ref 95–110)
CHOLEST SERPL-MCNC: 119 MG/DL (ref 0–199)
CO2 SERPL-SCNC: 29 MMOL/L (ref 22–31)
CREAT BLD-MCNC: 1.04 MG/DL (ref 0.7–1.3)
DEPRECATED RDW RBC AUTO: 39.9 FL (ref 35.1–43.9)
EOSINOPHIL # BLD AUTO: 0.34 10*3/MM3 (ref 0–0.7)
EOSINOPHIL NFR BLD AUTO: 3.6 % (ref 0–7)
ERYTHROCYTE [DISTWIDTH] IN BLOOD BY AUTOMATED COUNT: 13.2 % (ref 11.5–14.5)
GFR SERPL CREATININE-BSD FRML MDRD: 90 ML/MIN/1.73 (ref 56–130)
GLOBULIN UR ELPH-MCNC: 3.8 GM/DL (ref 2.3–3.5)
GLUCOSE BLD-MCNC: 98 MG/DL (ref 60–100)
HBA1C MFR BLD: 6.5 % (ref 4–5.6)
HCT VFR BLD AUTO: 43.3 % (ref 39–49)
HDLC SERPL-MCNC: 30 MG/DL (ref 60–200)
HGB BLD-MCNC: 14.2 G/DL (ref 13.7–17.3)
IMM GRANULOCYTES # BLD: 0.03 10*3/MM3 (ref 0–0.02)
IMM GRANULOCYTES NFR BLD: 0.3 % (ref 0–0.5)
LDLC/HDLC SERPL: 2.23 {RATIO} (ref 0–3.55)
LYMPHOCYTES # BLD AUTO: 2.11 10*3/MM3 (ref 0.6–4.2)
LYMPHOCYTES NFR BLD AUTO: 22.6 % (ref 10–50)
MCH RBC QN AUTO: 27.6 PG (ref 26.5–34)
MCHC RBC AUTO-ENTMCNC: 32.8 G/DL (ref 31.5–36.3)
MCV RBC AUTO: 84.2 FL (ref 80–98)
MONOCYTES # BLD AUTO: 0.72 10*3/MM3 (ref 0–0.9)
MONOCYTES NFR BLD AUTO: 7.7 % (ref 0–12)
NEUTROPHILS # BLD AUTO: 6.11 10*3/MM3 (ref 2–8.6)
NEUTROPHILS NFR BLD AUTO: 65.5 % (ref 37–80)
PLATELET # BLD AUTO: 288 10*3/MM3 (ref 150–450)
PMV BLD AUTO: 10.1 FL (ref 8–12)
POTASSIUM BLD-SCNC: 4.3 MMOL/L (ref 3.5–5.1)
PROT SERPL-MCNC: 8.1 G/DL (ref 6.3–8.6)
RBC # BLD AUTO: 5.14 10*6/MM3 (ref 4.37–5.74)
SODIUM BLD-SCNC: 139 MMOL/L (ref 137–145)
TRIGL SERPL-MCNC: 111 MG/DL (ref 20–199)
WBC NRBC COR # BLD: 9.34 10*3/MM3 (ref 3.2–9.8)

## 2018-11-08 PROCEDURE — 82306 VITAMIN D 25 HYDROXY: CPT | Performed by: NURSE PRACTITIONER

## 2018-11-08 PROCEDURE — 80061 LIPID PANEL: CPT | Performed by: NURSE PRACTITIONER

## 2018-11-08 PROCEDURE — 36415 COLL VENOUS BLD VENIPUNCTURE: CPT | Performed by: NURSE PRACTITIONER

## 2018-11-08 PROCEDURE — 85025 COMPLETE CBC W/AUTO DIFF WBC: CPT | Performed by: NURSE PRACTITIONER

## 2018-11-08 PROCEDURE — 80053 COMPREHEN METABOLIC PANEL: CPT | Performed by: NURSE PRACTITIONER

## 2018-11-08 PROCEDURE — 83036 HEMOGLOBIN GLYCOSYLATED A1C: CPT | Performed by: NURSE PRACTITIONER

## 2018-11-12 RX ORDER — LOSARTAN POTASSIUM 50 MG/1
50 TABLET ORAL DAILY
Qty: 90 TABLET | Refills: 0 | Status: SHIPPED | OUTPATIENT
Start: 2018-11-12 | End: 2018-12-14 | Stop reason: SDUPTHER

## 2018-11-16 ENCOUNTER — OFFICE VISIT (OUTPATIENT)
Dept: ENDOCRINOLOGY | Facility: CLINIC | Age: 53
End: 2018-11-16

## 2018-11-16 VITALS
HEIGHT: 71 IN | HEART RATE: 74 BPM | WEIGHT: 240 LBS | DIASTOLIC BLOOD PRESSURE: 80 MMHG | BODY MASS INDEX: 33.6 KG/M2 | SYSTOLIC BLOOD PRESSURE: 142 MMHG

## 2018-11-16 DIAGNOSIS — E11.49 DM (DIABETES MELLITUS), TYPE 2 WITH NEUROLOGICAL COMPLICATIONS (HCC): Primary | ICD-10-CM

## 2018-11-16 DIAGNOSIS — E55.9 VITAMIN D DEFICIENCY: ICD-10-CM

## 2018-11-16 DIAGNOSIS — E78.2 MIXED HYPERLIPIDEMIA: ICD-10-CM

## 2018-11-16 DIAGNOSIS — I10 ESSENTIAL HYPERTENSION: ICD-10-CM

## 2018-11-16 PROCEDURE — 99214 OFFICE O/P EST MOD 30 MIN: CPT | Performed by: NURSE PRACTITIONER

## 2018-11-16 RX ORDER — ERGOCALCIFEROL 1.25 MG/1
50000 CAPSULE ORAL
Qty: 4 CAPSULE | Refills: 11 | Status: SHIPPED | OUTPATIENT
Start: 2018-11-16 | End: 2019-07-30 | Stop reason: SDUPTHER

## 2018-11-16 NOTE — PROGRESS NOTES
Subjective    Nirav Lind is a 53 y.o. male. he is here today for follow-up.    History of Present Illness     Duration/Timing: Diabetes mellitus type 2, onset of symptoms gradual   dm dx at age 51 y      constant     not controlled      severity high      Patient was admitted to the hospital for chest pain but cardiac was ruled out      Severity (Complications/Hospitalizations)  Secondary Macrovascular Complications: No CAD, No CVA, No PAD  Secondary Microvascular Complications: No Microalbuminuria, No Diabetic Retinopathy, No Diabetic Neuropathy     Context  Diabetes Regimen: Insulin, Oral Medications,      Lab Results   Component Value Date    HGBA1C 6.5 (H) 11/08/2018                   Blood Glucose Readings     Blood glucose log ranging from 88 up to 144      Diet  adhering to 1800 calorie diet   Exercise: Does not exercise     Associated Signs/Symptoms  Hyperglycemic Symptoms: No polyuria, No polydipsia, No polyphagia  Hypoglycemic Episodes: No documented hypoglycemia since last visit                          The following portions of the patient's history were reviewed and updated as appropriate:   Past Medical History:   Diagnosis Date   • Abnormal computed tomography scan    • Adenomatous polyp of colon    • Allergic rhinitis    • Anemia    • Chronic back pain    • Coronary arteriosclerosis    • Diabetes mellitus (CMS/HCC)    • Dizziness    • Dyspnea     unspecified   • Epigastric pain    • Essential hypertension    • Ex-smoker    • Hemangioma of liver    • Hyperlipidemia    • Infected hydrocele     with orchitis and epididymis   • Nausea    • Nausea with vomiting    • Obesity with body mass index of 30.0 - 39.9    • Pain in right knee    • Right upper quadrant pain    • Type II diabetes mellitus, uncontrolled (CMS/HCC)    • Upper respiratory infection    • Urgency of urination    • Villous adenoma of colon      Past Surgical History:   Procedure Laterality Date   • CARDIAC CATHETERIZATION   11/30/2015    No evidence of any obstructive disease in the circumflex, the RCA , or LAD. 1st diagonal branch in the midportion with 20-30% stenosis. Preserved LV systolic function with EF 55%.   • COLONOSCOPY  01/21/2013    Normal   • COLONOSCOPY W/ POLYPECTOMY  10/13/2014    A single polyp was found in the colon; removed by snare cautery polypectomy.   • ENDOSCOPY  06/15/2016    Mildly severe esophagitis. Several biopsies obtained in the upper third of the esophagus.   • INCISION AND DRAINAGE ABSCESS  10/29/2014    Incision and drainage of scrotal abscess. Hydrocelectomy, bottle procedure.   • INJECTION OF MEDICATION  01/26/2016    Kenalog     Family History   Problem Relation Age of Onset   • Cancer Mother    • Heart disease Sister        Current Outpatient Medications   Medication Sig Dispense Refill   • albuterol (VENTOLIN HFA) 108 (90 Base) MCG/ACT inhaler Inhale 2 puffs Every 4 (Four) Hours As Needed for Wheezing. 1 inhaler 3   • Alcohol Swabs ( STERILE ALCOHOL PREP) pads 1 each 4 (Four) Times a Day. 200 each 3   • amitriptyline (ELAVIL) 25 MG tablet Take 25 mg by mouth 3 (three) times a day.     • ASPIRIN LOW DOSE 81 MG EC tablet TAKE 1 TABLET BY MOUTH EVERY DAY 30 tablet 0   • atorvastatin (LIPITOR) 20 MG tablet Take 1 tablet by mouth Daily. 90 tablet 0   • B-D UF III MINI PEN NEEDLES 31G X 5 MM misc Injecting once daily up to 3 times daily 150 each 11   • budesonide-formoterol (SYMBICORT) 160-4.5 MCG/ACT inhaler Inhale 2 puffs 2 (Two) Times a Day. 1 inhaler 3   • busPIRone (BUSPAR) 5 MG tablet TK 1 T PO BID  3   • cyclobenzaprine (FLEXERIL) 10 MG tablet Take 1 tablet by mouth 3 (Three) Times a Day As Needed for Muscle Spasms. 90 tablet 0   • fexofenadine (ALLEGRA ALLERGY) 180 MG tablet Take 1 tablet by mouth Daily. 30 tablet 1   • fluticasone (FLONASE) 50 MCG/ACT nasal spray 2 sprays into each nostril Daily. 9.9 mL 3   • glucose blood (ONE TOUCH ULTRA TEST) test strip 1 each by Other route 3 (Three)  Times a Day. Use as instructed 200 each 3   • insulin aspart (NOVOLOG FLEXPEN) 100 UNIT/ML solution pen-injector sc pen Inject 8 Units under the skin 3 (Three) Times a Day With Meals. 2 pen 11   • insulin aspart (NOVOLOG FLEXPEN) 100 UNIT/ML solution pen-injector sc pen Use sliding scale 2 up to 8 units before meals 3 pen 11   • Insulin Glargine (TOUJEO SOLOSTAR) 300 UNIT/ML solution pen-injector Inject 15 Units under the skin into the appropriate area as directed Daily. 3 pen 5   • losartan (COZAAR) 50 MG tablet TAKE 1 TABLET BY MOUTH DAILY 90 tablet 0   • MAGNESIUM-OXIDE 400 (241.3 MG) MG tablet tablet TK 1 T PO BID  12   • meloxicam (MOBIC) 15 MG tablet Take 1 tablet by mouth Daily. 30 tablet 3   • metFORMIN ER (GLUCOPHAGE-XR) 750 MG 24 hr tablet TAKE 1 TABLET BY MOUTH EVERY DAY. 90 tablet 1   • metoprolol succinate XL (TOPROL-XL) 25 MG 24 hr tablet Take 1 tablet by mouth Daily. 30 tablet 3   • OLANZapine (ZYPREXA) 10 MG tablet Take 10 mg by mouth daily.     • ondansetron (ZOFRAN) 4 MG tablet TAKE 1 TABLET BY MOUTH EVERY 8 HOURS AS NEEDED FOR NAUSEA OR VOMITING 30 tablet 0   • pantoprazole (PROTONIX) 40 MG EC tablet Take 1 tablet by mouth Daily. 30 tablet 2   • promethazine-dextromethorphan (PROMETHAZINE-DM) 6.25-15 MG/5ML syrup Take 1.3 mL by mouth 4 (Four) Times a Day As Needed for Cough. 180 mL 0   • sertraline (ZOLOFT) 100 MG tablet Take 2 tablets by mouth Daily. 60 tablet 3   • tamsulosin (FLOMAX) 0.4 MG capsule 24 hr capsule Take 1 capsule by mouth Every Night. 30 capsule 0   • tiotropium (SPIRIVA HANDIHALER) 18 MCG per inhalation capsule Place 1 capsule into inhaler and inhale Daily. 30 capsule 2   • vitamin D (ERGOCALCIFEROL) 73391 units capsule capsule Take 1 capsule by mouth Every 14 (Fourteen) Days. 4 capsule 11     No current facility-administered medications for this visit.      No Known Allergies  Social History     Socioeconomic History   • Marital status:      Spouse name: Not on file   •  "Number of children: Not on file   • Years of education: Not on file   • Highest education level: Not on file   Tobacco Use   • Smoking status: Never Smoker   • Smokeless tobacco: Never Used   Substance and Sexual Activity   • Alcohol use: Yes   • Drug use: No   • Sexual activity: Defer       Review of Systems  Review of Systems   Constitutional: Negative for activity change, appetite change, diaphoresis and fatigue.   HENT: Negative for facial swelling, sneezing, sore throat, tinnitus, trouble swallowing and voice change.    Eyes: Negative for photophobia, pain, discharge, redness, itching and visual disturbance.   Respiratory: Negative for apnea, cough, choking, chest tightness and shortness of breath.    Cardiovascular: Negative for chest pain, palpitations and leg swelling.   Gastrointestinal: Negative for abdominal distention, abdominal pain, constipation, diarrhea, nausea and vomiting.   Endocrine: Negative for cold intolerance, heat intolerance, polydipsia, polyphagia and polyuria.   Genitourinary: Negative for difficulty urinating, dysuria, frequency, hematuria and urgency.   Musculoskeletal: Negative for arthralgias, back pain, gait problem, joint swelling, myalgias, neck pain and neck stiffness.   Skin: Negative for color change, pallor, rash and wound.   Neurological: Negative for dizziness, tremors, weakness, light-headedness, numbness and headaches.   Hematological: Negative for adenopathy. Does not bruise/bleed easily.   Psychiatric/Behavioral: Negative for behavioral problems, confusion and sleep disturbance.        Objective    /80 (BP Location: Left arm, Patient Position: Sitting, Cuff Size: Adult)   Pulse 74   Ht 180.3 cm (71\")   Wt 109 kg (240 lb)   BMI 33.47 kg/m²   Physical Exam   Constitutional: He is oriented to person, place, and time. He appears well-developed and well-nourished. No distress.   HENT:   Head: Normocephalic and atraumatic.   Right Ear: External ear normal.   Left Ear: " External ear normal.   Nose: Nose normal.   Eyes: Conjunctivae and EOM are normal. Pupils are equal, round, and reactive to light.   Neck: Normal range of motion. Neck supple. No tracheal deviation present. No thyromegaly present.   Cardiovascular: Normal rate, regular rhythm and normal heart sounds.   No murmur heard.  Pulmonary/Chest: Effort normal and breath sounds normal. No respiratory distress. He has no wheezes.   Abdominal: Soft. Bowel sounds are normal. There is no tenderness. There is no rebound and no guarding.   Musculoskeletal: Normal range of motion. He exhibits no edema, tenderness or deformity.   Neurological: He is alert and oriented to person, place, and time. No cranial nerve deficit.   Skin: Skin is warm and dry. No rash noted.   Psychiatric: He has a normal mood and affect. His behavior is normal. Judgment and thought content normal.       Lab Review  Glucose (mg/dL)   Date Value   11/08/2018 98   07/20/2018 111 (H)   07/12/2018 91     Sodium (mmol/L)   Date Value   11/08/2018 139   07/20/2018 139   07/12/2018 138     Potassium (mmol/L)   Date Value   11/08/2018 4.3   07/20/2018 4.0   07/12/2018 4.4     Chloride (mmol/L)   Date Value   11/08/2018 105   07/20/2018 103   07/12/2018 104     CO2 (mmol/L)   Date Value   11/08/2018 29.0   07/20/2018 26.0   07/12/2018 25.0     BUN (mg/dL)   Date Value   11/08/2018 10   07/20/2018 14   07/12/2018 13     Creatinine (mg/dL)   Date Value   11/08/2018 1.04   07/20/2018 1.00   07/12/2018 1.03     Hemoglobin A1C (%)   Date Value   11/08/2018 6.5 (H)   07/12/2018 6.2 (H)   03/14/2018 6.1 (H)     Triglycerides (mg/dL)   Date Value   11/08/2018 111   03/14/2018 89   06/29/2017 63     LDL Cholesterol  (mg/dL)   Date Value   11/08/2018 69   03/14/2018 55   06/29/2017 41       Assessment/Plan      1. DM (diabetes mellitus), type 2 with neurological complications (CMS/HCC)    2. Mixed hyperlipidemia    3. Essential hypertension    4. Vitamin D deficiency     .    Medications prescribed:  Outpatient Encounter Medications as of 11/16/2018   Medication Sig Dispense Refill   • albuterol (VENTOLIN HFA) 108 (90 Base) MCG/ACT inhaler Inhale 2 puffs Every 4 (Four) Hours As Needed for Wheezing. 1 inhaler 3   • Alcohol Swabs (HM STERILE ALCOHOL PREP) pads 1 each 4 (Four) Times a Day. 200 each 3   • amitriptyline (ELAVIL) 25 MG tablet Take 25 mg by mouth 3 (three) times a day.     • ASPIRIN LOW DOSE 81 MG EC tablet TAKE 1 TABLET BY MOUTH EVERY DAY 30 tablet 0   • atorvastatin (LIPITOR) 20 MG tablet Take 1 tablet by mouth Daily. 90 tablet 0   • B-D UF III MINI PEN NEEDLES 31G X 5 MM misc Injecting once daily up to 3 times daily 150 each 11   • budesonide-formoterol (SYMBICORT) 160-4.5 MCG/ACT inhaler Inhale 2 puffs 2 (Two) Times a Day. 1 inhaler 3   • busPIRone (BUSPAR) 5 MG tablet TK 1 T PO BID  3   • cyclobenzaprine (FLEXERIL) 10 MG tablet Take 1 tablet by mouth 3 (Three) Times a Day As Needed for Muscle Spasms. 90 tablet 0   • fexofenadine (ALLEGRA ALLERGY) 180 MG tablet Take 1 tablet by mouth Daily. 30 tablet 1   • fluticasone (FLONASE) 50 MCG/ACT nasal spray 2 sprays into each nostril Daily. 9.9 mL 3   • glucose blood (ONE TOUCH ULTRA TEST) test strip 1 each by Other route 3 (Three) Times a Day. Use as instructed 200 each 3   • insulin aspart (NOVOLOG FLEXPEN) 100 UNIT/ML solution pen-injector sc pen Inject 8 Units under the skin 3 (Three) Times a Day With Meals. 2 pen 11   • insulin aspart (NOVOLOG FLEXPEN) 100 UNIT/ML solution pen-injector sc pen Use sliding scale 2 up to 8 units before meals 3 pen 11   • Insulin Glargine (TOUJEO SOLOSTAR) 300 UNIT/ML solution pen-injector Inject 15 Units under the skin into the appropriate area as directed Daily. 3 pen 5   • losartan (COZAAR) 50 MG tablet TAKE 1 TABLET BY MOUTH DAILY 90 tablet 0   • MAGNESIUM-OXIDE 400 (241.3 MG) MG tablet tablet TK 1 T PO BID  12   • meloxicam (MOBIC) 15 MG tablet Take 1 tablet by mouth Daily. 30 tablet  3   • metFORMIN ER (GLUCOPHAGE-XR) 750 MG 24 hr tablet TAKE 1 TABLET BY MOUTH EVERY DAY. 90 tablet 1   • metoprolol succinate XL (TOPROL-XL) 25 MG 24 hr tablet Take 1 tablet by mouth Daily. 30 tablet 3   • OLANZapine (ZYPREXA) 10 MG tablet Take 10 mg by mouth daily.     • ondansetron (ZOFRAN) 4 MG tablet TAKE 1 TABLET BY MOUTH EVERY 8 HOURS AS NEEDED FOR NAUSEA OR VOMITING 30 tablet 0   • pantoprazole (PROTONIX) 40 MG EC tablet Take 1 tablet by mouth Daily. 30 tablet 2   • promethazine-dextromethorphan (PROMETHAZINE-DM) 6.25-15 MG/5ML syrup Take 1.3 mL by mouth 4 (Four) Times a Day As Needed for Cough. 180 mL 0   • sertraline (ZOLOFT) 100 MG tablet Take 2 tablets by mouth Daily. 60 tablet 3   • tamsulosin (FLOMAX) 0.4 MG capsule 24 hr capsule Take 1 capsule by mouth Every Night. 30 capsule 0   • tiotropium (SPIRIVA HANDIHALER) 18 MCG per inhalation capsule Place 1 capsule into inhaler and inhale Daily. 30 capsule 2   • vitamin D (ERGOCALCIFEROL) 84812 units capsule capsule Take 1 capsule by mouth Every 14 (Fourteen) Days. 4 capsule 11   • [DISCONTINUED] TOUJEO SOLOSTAR 300 UNIT/ML solution pen-injector INJECT 25 UNITS EVERY MORNING 3 pen 5     No facility-administered encounter medications on file as of 11/16/2018.        Orders placed during this encounter include:  No orders of the defined types were placed in this encounter.    Glycemic Management     Lab Results   Component Value Date    HGBA1C 6.5 (H) 11/08/2018             Toujeo 25 units in the morning---taking 20 units ---taking 13 units      if you ever wake up with a sugar less than 100 - back off by 3 units     ---------------------     Invokana 100 mg before breakfast --not taking            Metformin 500 mg tablets---take one with supper ----taking 750 mg                     invokamet 150/1000 mg twice daily ( it combines metformin and invokana )---stopped the invokamet due to low blood sugars      --------------------        once you run out of januvia  , you will use in its place trulicity 0.75 mg weekly ---stopped due to low Blood sugars   if nausea try to eat less      ------------------     novolog --uses occasional      Use for sliding scale            2 units for every 15 grams of carbohydrate     once glucose readings are staying in the low 100s we can try 1 unit for every 15 grams and if it stays in the low 100s we will only use sliding scale         Lipid Management      on pravachol 20 mg qhs      Nov. 2016     Lipids at goal          Component      Latest Ref Rng & Units 11/8/2018   Total Cholesterol      0 - 199 mg/dL 119   Triglycerides      20 - 199 mg/dL 111   HDL Cholesterol      60 - 200 mg/dL 30 (L)   LDL Cholesterol      1 - 129 mg/dL 69   LDL/HDL Ratio      0.00 - 3.55 2.23        Blood Pressure Management     on amlodipine 5 mg daily      On lisinopril 2.5 mg daily --stopped due to allergic reaction     On metoprolol         Microvascular Complication Monitoring: No Microalbuminuria, No Diabetic Retinopathy, positive  Diabetic Neuropathy        Neuropathy     Taking Neurontin         Component      Latest Ref Rng & Units 7/12/2018 7/12/2018           9:34 AM  9:34 AM   Protein/Creatinine Ratio, Urine      0.0 - 200.0 mg/G Crea 59.1     Creatinine, Urine      mg/dL 211.6 211.6   Total Protein, Urine      mg/dL 12.5     Microalbumin/Creatinine Ratio      0.0 - 30.0 mg/g   37.3 (H)   Microalbumin, Urine      mg/L   7.9                  Preventive Care: Patient is not smoking        Weight Related: Obesity, Counseled on nutrition, Counseled on physical activity     Patient's Body mass index is 37.07 kg/m². BMI is above normal parameters. Recommendations include: educational material.        Dietary changes     Handout given diet and diabetes      Bone Health     Vitamin d def     Nov. 2016      Vit d - normal      Vitamin d Oct 2017     29     Start otc 1000 units daily         Component      Latest Ref Rng & Units 3/14/2018   25 Hydroxy, Vitamin  D      30.0 - 100.0 ng/ml 22.0 (L)          not taking states cannot remember         Component      Latest Ref Rng & Units 7/12/2018   25 Hydroxy, Vitamin D      30.0 - 100.0 ng/ml 33.9          Component      Latest Ref Rng & Units 11/8/2018   25 Hydroxy, Vitamin D      30.0 - 100.0 ng/ml 22.9 (L)       Not taking      Lab Results   Component Value Date    [id number] 07/12/2018           4. Follow-up: Return in about 4 months (around 3/16/2019) for Recheck.

## 2018-11-26 RX ORDER — DEXTROMETHORPHAN HYDROBROMIDE AND PROMETHAZINE HYDROCHLORIDE 15; 6.25 MG/5ML; MG/5ML
SYRUP ORAL
Qty: 180 ML | Refills: 0 | OUTPATIENT
Start: 2018-11-26

## 2018-12-14 ENCOUNTER — OFFICE VISIT (OUTPATIENT)
Dept: FAMILY MEDICINE CLINIC | Facility: CLINIC | Age: 53
End: 2018-12-14

## 2018-12-14 VITALS
WEIGHT: 238 LBS | SYSTOLIC BLOOD PRESSURE: 142 MMHG | HEIGHT: 71 IN | DIASTOLIC BLOOD PRESSURE: 90 MMHG | BODY MASS INDEX: 33.32 KG/M2 | HEART RATE: 75 BPM | OXYGEN SATURATION: 96 %

## 2018-12-14 DIAGNOSIS — I10 ESSENTIAL HYPERTENSION: ICD-10-CM

## 2018-12-14 DIAGNOSIS — R10.11 RIGHT UPPER QUADRANT PAIN: Primary | ICD-10-CM

## 2018-12-14 DIAGNOSIS — E78.2 MIXED HYPERLIPIDEMIA: ICD-10-CM

## 2018-12-14 PROCEDURE — 99213 OFFICE O/P EST LOW 20 MIN: CPT | Performed by: FAMILY MEDICINE

## 2018-12-14 RX ORDER — DEXTROMETHORPHAN HYDROBROMIDE AND PROMETHAZINE HYDROCHLORIDE 15; 6.25 MG/5ML; MG/5ML
1.25 SYRUP ORAL 4 TIMES DAILY PRN
Qty: 180 ML | Refills: 0 | Status: ON HOLD | OUTPATIENT
Start: 2018-12-14 | End: 2019-01-14

## 2018-12-14 RX ORDER — ATORVASTATIN CALCIUM 20 MG/1
20 TABLET, FILM COATED ORAL DAILY
Qty: 90 TABLET | Refills: 0 | Status: SHIPPED | OUTPATIENT
Start: 2018-12-14 | End: 2019-06-12 | Stop reason: SDUPTHER

## 2018-12-14 RX ORDER — LOSARTAN POTASSIUM 50 MG/1
50 TABLET ORAL DAILY
Qty: 90 TABLET | Refills: 0 | Status: SHIPPED | OUTPATIENT
Start: 2018-12-14 | End: 2019-03-04

## 2018-12-14 RX ORDER — CYCLOBENZAPRINE HCL 10 MG
10 TABLET ORAL 3 TIMES DAILY PRN
Qty: 90 TABLET | Refills: 0 | Status: CANCELLED | OUTPATIENT
Start: 2018-12-14

## 2018-12-14 NOTE — PROGRESS NOTES
Subjective:     Nirav Lind is a 53 y.o. male who presents for follow up for right sided musculoskeletal pain. Pt was placed on flexeril, mobic and had a physical therapy referral. Pt did not follow up with physical therapy. Pt reports the flexeril and mobic worked well and he is no longer having the pain. Pt is agreeable to discontinuing flexeril and mobic at this time. Pt plans to go for his DM eye exam at [facility] in the next week. Pt declines vaccination.    Preventative:  Over the past 2 weeks, have you felt down, depressed, or hopeless?No   Over the past 2 weeks, have you felt little interest or pleasure in doing things?No  Clinical depression screening refused by patient.No     Past Medical Hx:  Past Medical History:   Diagnosis Date   • Abnormal computed tomography scan    • Adenomatous polyp of colon    • Allergic rhinitis    • Anemia    • Chronic back pain    • Coronary arteriosclerosis    • Diabetes mellitus (CMS/HCC)    • Dizziness    • Dyspnea     unspecified   • Epigastric pain    • Essential hypertension    • Ex-smoker    • Hemangioma of liver    • Hyperlipidemia    • Infected hydrocele     with orchitis and epididymis   • Nausea    • Nausea with vomiting    • Obesity with body mass index of 30.0 - 39.9    • Pain in right knee    • Right upper quadrant pain    • Type II diabetes mellitus, uncontrolled (CMS/HCC)    • Upper respiratory infection    • Urgency of urination    • Villous adenoma of colon        Past Surgical Hx:  Past Surgical History:   Procedure Laterality Date   • CARDIAC CATHETERIZATION  11/30/2015    No evidence of any obstructive disease in the circumflex, the RCA , or LAD. 1st diagonal branch in the midportion with 20-30% stenosis. Preserved LV systolic function with EF 55%.   • COLONOSCOPY  01/21/2013    Normal   • COLONOSCOPY W/ POLYPECTOMY  10/13/2014    A single polyp was found in the colon; removed by snare cautery polypectomy.   • ENDOSCOPY  06/15/2016    Mildly severe  esophagitis. Several biopsies obtained in the upper third of the esophagus.   • INCISION AND DRAINAGE ABSCESS  10/29/2014    Incision and drainage of scrotal abscess. Hydrocelectomy, bottle procedure.   • INJECTION OF MEDICATION  01/26/2016    Kenalog       Health Maintenance:  Health Maintenance   Topic Date Due   • DIABETIC EYE EXAM  11/16/2018   • HEPATITIS A VACCINE ADULT (1 of 2) 07/01/2019 (Originally 5/1/1983)   • ZOSTER VACCINE (1 of 2) 07/01/2019 (Originally 5/1/2013)   • MEDICARE ANNUAL WELLNESS  04/30/2019   • HEMOGLOBIN A1C  05/08/2019   • URINE MICROALBUMIN  07/12/2019   • LIPID PANEL  11/08/2019   • DIABETIC FOOT EXAM  11/16/2019   • COLONOSCOPY  04/05/2026   • TDAP/TD VACCINES (2 - Td) 09/09/2026   • HEPATITIS C SCREENING  Completed   • INFLUENZA VACCINE  Completed   • PNEUMOCOCCAL VACCINE (19-64 MEDIUM RISK)  Addressed       Current Meds:    Current Outpatient Medications:   •  albuterol (VENTOLIN HFA) 108 (90 Base) MCG/ACT inhaler, Inhale 2 puffs Every 4 (Four) Hours As Needed for Wheezing., Disp: 1 inhaler, Rfl: 3  •  Alcohol Swabs ( STERILE ALCOHOL PREP) pads, 1 each 4 (Four) Times a Day., Disp: 200 each, Rfl: 3  •  amitriptyline (ELAVIL) 25 MG tablet, Take 25 mg by mouth 3 (three) times a day., Disp: , Rfl:   •  ASPIRIN LOW DOSE 81 MG EC tablet, TAKE 1 TABLET BY MOUTH EVERY DAY, Disp: 30 tablet, Rfl: 0  •  atorvastatin (LIPITOR) 20 MG tablet, Take 1 tablet by mouth Daily., Disp: 90 tablet, Rfl: 0  •  B-D UF III MINI PEN NEEDLES 31G X 5 MM misc, Injecting once daily up to 3 times daily, Disp: 150 each, Rfl: 11  •  budesonide-formoterol (SYMBICORT) 160-4.5 MCG/ACT inhaler, Inhale 2 puffs 2 (Two) Times a Day., Disp: 1 inhaler, Rfl: 3  •  busPIRone (BUSPAR) 5 MG tablet, TK 1 T PO BID, Disp: , Rfl: 3  •  fexofenadine (ALLEGRA ALLERGY) 180 MG tablet, Take 1 tablet by mouth Daily., Disp: 30 tablet, Rfl: 1  •  fluticasone (FLONASE) 50 MCG/ACT nasal spray, 2 sprays into each nostril Daily., Disp: 9.9  mL, Rfl: 3  •  glucose blood (ONE TOUCH ULTRA TEST) test strip, 1 each by Other route 3 (Three) Times a Day. Use as instructed, Disp: 200 each, Rfl: 3  •  Insulin Glargine (TOUJEO SOLOSTAR) 300 UNIT/ML solution pen-injector, Inject 15 Units under the skin into the appropriate area as directed Daily., Disp: 3 pen, Rfl: 5  •  Insulin Lispro (HUMALOG KWIKPEN) 100 UNIT/ML solution pen-injector, Inject up to 8 units before meals and 2 on sliding scale, Disp: 9 pen, Rfl: 5  •  losartan (COZAAR) 50 MG tablet, Take 1 tablet by mouth Daily., Disp: 90 tablet, Rfl: 0  •  MAGNESIUM-OXIDE 400 (241.3 MG) MG tablet tablet, TK 1 T PO BID, Disp: , Rfl: 12  •  metFORMIN ER (GLUCOPHAGE-XR) 750 MG 24 hr tablet, TAKE 1 TABLET BY MOUTH EVERY DAY., Disp: 90 tablet, Rfl: 1  •  metoprolol succinate XL (TOPROL-XL) 25 MG 24 hr tablet, Take 1 tablet by mouth Daily., Disp: 30 tablet, Rfl: 3  •  OLANZapine (ZYPREXA) 10 MG tablet, Take 10 mg by mouth daily., Disp: , Rfl:   •  ondansetron (ZOFRAN) 4 MG tablet, TAKE 1 TABLET BY MOUTH EVERY 8 HOURS AS NEEDED FOR NAUSEA OR VOMITING, Disp: 30 tablet, Rfl: 0  •  pantoprazole (PROTONIX) 40 MG EC tablet, Take 1 tablet by mouth Daily., Disp: 30 tablet, Rfl: 2  •  promethazine-dextromethorphan (PROMETHAZINE-DM) 6.25-15 MG/5ML syrup, Take 1.3 mL by mouth 4 (Four) Times a Day As Needed for Cough., Disp: 180 mL, Rfl: 0  •  sertraline (ZOLOFT) 100 MG tablet, Take 2 tablets by mouth Daily., Disp: 60 tablet, Rfl: 3  •  tamsulosin (FLOMAX) 0.4 MG capsule 24 hr capsule, Take 1 capsule by mouth Every Night., Disp: 30 capsule, Rfl: 0  •  tiotropium (SPIRIVA HANDIHALER) 18 MCG per inhalation capsule, Place 1 capsule into inhaler and inhale Daily., Disp: 30 capsule, Rfl: 2  •  vitamin D (ERGOCALCIFEROL) 29221 units capsule capsule, Take 1 capsule by mouth Every 14 (Fourteen) Days., Disp: 4 capsule, Rfl: 11    Allergies:  Patient has no known allergies.    Family Hx:  Family History   Problem Relation Age of Onset   •  "Cancer Mother    • Heart disease Sister         Social History:  Social History     Socioeconomic History   • Marital status:      Spouse name: Not on file   • Number of children: Not on file   • Years of education: Not on file   • Highest education level: Not on file   Social Needs   • Financial resource strain: Not on file   • Food insecurity - worry: Not on file   • Food insecurity - inability: Not on file   • Transportation needs - medical: Not on file   • Transportation needs - non-medical: Not on file   Occupational History   • Not on file   Tobacco Use   • Smoking status: Never Smoker   • Smokeless tobacco: Never Used   Substance and Sexual Activity   • Alcohol use: Yes   • Drug use: No   • Sexual activity: Defer   Other Topics Concern   • Not on file   Social History Narrative   • Not on file       Review of Systems  Review of Systems   Constitutional: Negative for chills, fatigue and fever.   HENT: Negative for congestion, nosebleeds and sore throat.    Eyes: Negative for discharge and itching.   Respiratory: Negative for cough, shortness of breath and wheezing.    Cardiovascular: Negative for chest pain, palpitations and leg swelling.   Gastrointestinal: Negative for abdominal pain, diarrhea, nausea and vomiting.   Genitourinary: Negative for dysuria and hematuria.   Musculoskeletal: Positive for back pain (chronic low back pain). Negative for neck pain and neck stiffness.   Skin: Negative for rash and wound.   Neurological: Negative for seizures and syncope.   Psychiatric/Behavioral: Negative for agitation and confusion.         Objective:     /90 (BP Location: Right arm, Patient Position: Sitting, Cuff Size: Adult)   Pulse 75   Ht 180.3 cm (71\")   Wt 108 kg (238 lb)   SpO2 96%   BMI 33.19 kg/m²   Physical Exam   Constitutional: He is oriented to person, place, and time. He appears well-developed and well-nourished. No distress.   HENT:   Head: Normocephalic and atraumatic.   Right Ear: " External ear normal.   Left Ear: External ear normal.   Nose: Nose normal. No nose lacerations, sinus tenderness or nasal deformity. No epistaxis.   Eyes: Conjunctivae are normal. Right eye exhibits no discharge. Left eye exhibits no discharge. No scleral icterus.   Neck: Normal range of motion. Neck supple.   Cardiovascular: Normal rate, regular rhythm and normal heart sounds.   Pulmonary/Chest: Effort normal and breath sounds normal. No respiratory distress. He has no wheezes. He has no rales.   Abdominal: Soft. Bowel sounds are normal.   Musculoskeletal: Normal range of motion. He exhibits no edema, tenderness or deformity.   Neurological: He is alert and oriented to person, place, and time.   Skin: Skin is warm and dry. He is not diaphoretic.   Psychiatric: He has a normal mood and affect. His behavior is normal. Judgment and thought content normal.   Nursing note and vitals reviewed.                                                                     Assessment/Plan:     Diagnoses and all orders for this visit:    Right upper quadrant pain       -      Resolved - discontinue flexeril and mobic  Essential hypertension  -     losartan (COZAAR) 50 MG tablet; Take 1 tablet by mouth Daily.    Mixed hyperlipidemia  -     atorvastatin (LIPITOR) 20 MG tablet; Take 1 tablet by mouth Daily.    Other orders  -     promethazine-dextromethorphan (PROMETHAZINE-DM) 6.25-15 MG/5ML syrup; Take 1.3 mL by mouth 4 (Four) Times a Day As Needed for Cough.         Follow-up:     Return in about 6 months (around 6/14/2019) for Next scheduled follow up.        GOALS:  Maintain medication compliance    Preventative:  Male Preventative: Cholesterol screening up to date  Vaccines:   Annual influenza vaccine: up to date     never smoker  occasional/rare  eat more fruits and vegetables, decrease soda or juice intake, increase water intake and increase physical activity    RISK SCORE: 3    Jeremy Mahmood MD PGY2  [place] [address]  Office: [phone]      This document has been electronically signed by Jeremy Mahmood MD on January 6, 2019 12:01 PM

## 2019-01-14 ENCOUNTER — HOSPITAL ENCOUNTER (OUTPATIENT)
Facility: HOSPITAL | Age: 54
Setting detail: OBSERVATION
Discharge: HOME | End: 2019-01-16
Attending: FAMILY MEDICINE | Admitting: FAMILY MEDICINE

## 2019-01-14 ENCOUNTER — APPOINTMENT (OUTPATIENT)
Dept: GENERAL RADIOLOGY | Facility: HOSPITAL | Age: 54
End: 2019-01-14

## 2019-01-14 DIAGNOSIS — J44.9 CHRONIC OBSTRUCTIVE PULMONARY DISEASE, UNSPECIFIED COPD TYPE (HCC): Chronic | ICD-10-CM

## 2019-01-14 DIAGNOSIS — R06.00 DYSPNEA, UNSPECIFIED TYPE: ICD-10-CM

## 2019-01-14 DIAGNOSIS — I10 HTN (HYPERTENSION), BENIGN: ICD-10-CM

## 2019-01-14 DIAGNOSIS — I11.9 BENIGN HYPERTENSIVE HEART DISEASE WITHOUT HEART FAILURE: ICD-10-CM

## 2019-01-14 DIAGNOSIS — I25.10 ATHEROSCLEROSIS OF NATIVE CORONARY ARTERY OF NATIVE HEART, ANGINA PRESENCE UNSPECIFIED: ICD-10-CM

## 2019-01-14 DIAGNOSIS — R07.9 CHEST PAIN, UNSPECIFIED TYPE: Primary | ICD-10-CM

## 2019-01-14 PROBLEM — E11.65 TYPE 2 DIABETES MELLITUS WITH HYPERGLYCEMIA, WITH LONG-TERM CURRENT USE OF INSULIN (HCC): Status: ACTIVE | Noted: 2017-07-17

## 2019-01-14 PROBLEM — Z79.4 TYPE 2 DIABETES MELLITUS WITH HYPERGLYCEMIA, WITH LONG-TERM CURRENT USE OF INSULIN: Status: ACTIVE | Noted: 2017-07-17

## 2019-01-14 PROBLEM — R30.0 DYSURIA: Status: ACTIVE | Noted: 2019-01-14

## 2019-01-14 PROBLEM — E11.42 DIABETIC POLYNEUROPATHY ASSOCIATED WITH TYPE 2 DIABETES MELLITUS (HCC): Status: RESOLVED | Noted: 2017-07-17 | Resolved: 2019-01-14

## 2019-01-14 PROBLEM — E11.628 TYPE 2 DIABETES MELLITUS WITH SKIN COMPLICATION, WITH LONG-TERM CURRENT USE OF INSULIN (HCC): Status: ACTIVE | Noted: 2017-07-17

## 2019-01-14 PROBLEM — R07.2 PRECORDIAL PAIN: Status: ACTIVE | Noted: 2019-01-14

## 2019-01-14 LAB
ALBUMIN SERPL-MCNC: 4.1 G/DL (ref 3.4–4.8)
ALBUMIN/GLOB SERPL: 1.4 G/DL (ref 1.1–1.8)
ALP SERPL-CCNC: 104 U/L (ref 38–126)
ALT SERPL W P-5'-P-CCNC: 38 U/L (ref 21–72)
ANION GAP SERPL CALCULATED.3IONS-SCNC: 4 MMOL/L (ref 5–15)
AST SERPL-CCNC: 26 U/L (ref 17–59)
BASOPHILS # BLD AUTO: 0.03 10*3/MM3 (ref 0–0.2)
BASOPHILS NFR BLD AUTO: 0.3 % (ref 0–2)
BILIRUB SERPL-MCNC: 0.2 MG/DL (ref 0.2–1.3)
BILIRUB UR QL STRIP: NEGATIVE
BUN BLD-MCNC: 12 MG/DL (ref 7–21)
BUN/CREAT SERPL: 11.8 (ref 7–25)
CALCIUM SPEC-SCNC: 9.2 MG/DL (ref 8.4–10.2)
CHLORIDE SERPL-SCNC: 106 MMOL/L (ref 95–110)
CLARITY UR: ABNORMAL
CO2 SERPL-SCNC: 28 MMOL/L (ref 22–31)
COLOR UR: YELLOW
CREAT BLD-MCNC: 1.02 MG/DL (ref 0.7–1.3)
DEPRECATED RDW RBC AUTO: 39.7 FL (ref 35.1–43.9)
EOSINOPHIL # BLD AUTO: 0.21 10*3/MM3 (ref 0–0.7)
EOSINOPHIL NFR BLD AUTO: 2.2 % (ref 0–7)
ERYTHROCYTE [DISTWIDTH] IN BLOOD BY AUTOMATED COUNT: 13.1 % (ref 11.5–14.5)
GFR SERPL CREATININE-BSD FRML MDRD: 92 ML/MIN/1.73 (ref 56–130)
GLOBULIN UR ELPH-MCNC: 3 GM/DL (ref 2.3–3.5)
GLUCOSE BLD-MCNC: 106 MG/DL (ref 60–100)
GLUCOSE BLDC GLUCOMTR-MCNC: 135 MG/DL (ref 70–130)
GLUCOSE UR STRIP-MCNC: ABNORMAL MG/DL
HCT VFR BLD AUTO: 39.8 % (ref 39–49)
HGB BLD-MCNC: 13.5 G/DL (ref 13.7–17.3)
HGB UR QL STRIP.AUTO: NEGATIVE
HOLD SPECIMEN: NORMAL
HOLD SPECIMEN: NORMAL
IMM GRANULOCYTES # BLD AUTO: 0.06 10*3/MM3 (ref 0–0.02)
IMM GRANULOCYTES NFR BLD AUTO: 0.6 % (ref 0–0.5)
KETONES UR QL STRIP: NEGATIVE
LEUKOCYTE ESTERASE UR QL STRIP.AUTO: NEGATIVE
LYMPHOCYTES # BLD AUTO: 1.22 10*3/MM3 (ref 0.6–4.2)
LYMPHOCYTES NFR BLD AUTO: 12.6 % (ref 10–50)
MAGNESIUM SERPL-MCNC: 2.1 MG/DL (ref 1.6–2.3)
MCH RBC QN AUTO: 28.4 PG (ref 26.5–34)
MCHC RBC AUTO-ENTMCNC: 33.9 G/DL (ref 31.5–36.3)
MCV RBC AUTO: 83.8 FL (ref 80–98)
MONOCYTES # BLD AUTO: 0.59 10*3/MM3 (ref 0–0.9)
MONOCYTES NFR BLD AUTO: 6.1 % (ref 0–12)
NEUTROPHILS # BLD AUTO: 7.61 10*3/MM3 (ref 2–8.6)
NEUTROPHILS NFR BLD AUTO: 78.2 % (ref 37–80)
NITRITE UR QL STRIP: NEGATIVE
NT-PROBNP SERPL-MCNC: 59.3 PG/ML (ref 0–900)
PH UR STRIP.AUTO: 5.5 [PH] (ref 5–9)
PLATELET # BLD AUTO: 239 10*3/MM3 (ref 150–450)
PMV BLD AUTO: 10 FL (ref 8–12)
POTASSIUM BLD-SCNC: 3.9 MMOL/L (ref 3.5–5.1)
PROT SERPL-MCNC: 7.1 G/DL (ref 6.3–8.6)
PROT UR QL STRIP: ABNORMAL
RBC # BLD AUTO: 4.75 10*6/MM3 (ref 4.37–5.74)
SODIUM BLD-SCNC: 138 MMOL/L (ref 137–145)
SP GR UR STRIP: 1.03 (ref 1–1.03)
TROPONIN I SERPL-MCNC: <0.012 NG/ML
UROBILINOGEN UR QL STRIP: ABNORMAL
WBC NRBC COR # BLD: 9.72 10*3/MM3 (ref 3.2–9.8)
WHOLE BLOOD HOLD SPECIMEN: NORMAL
WHOLE BLOOD HOLD SPECIMEN: NORMAL

## 2019-01-14 PROCEDURE — 83880 ASSAY OF NATRIURETIC PEPTIDE: CPT | Performed by: FAMILY MEDICINE

## 2019-01-14 PROCEDURE — 84484 ASSAY OF TROPONIN QUANT: CPT | Performed by: FAMILY MEDICINE

## 2019-01-14 PROCEDURE — 83735 ASSAY OF MAGNESIUM: CPT | Performed by: FAMILY MEDICINE

## 2019-01-14 PROCEDURE — 93010 ELECTROCARDIOGRAM REPORT: CPT | Performed by: INTERNAL MEDICINE

## 2019-01-14 PROCEDURE — 94760 N-INVAS EAR/PLS OXIMETRY 1: CPT

## 2019-01-14 PROCEDURE — 94640 AIRWAY INHALATION TREATMENT: CPT

## 2019-01-14 PROCEDURE — 94799 UNLISTED PULMONARY SVC/PX: CPT

## 2019-01-14 PROCEDURE — 80053 COMPREHEN METABOLIC PANEL: CPT | Performed by: FAMILY MEDICINE

## 2019-01-14 PROCEDURE — 81003 URINALYSIS AUTO W/O SCOPE: CPT | Performed by: FAMILY MEDICINE

## 2019-01-14 PROCEDURE — 82962 GLUCOSE BLOOD TEST: CPT

## 2019-01-14 PROCEDURE — 71045 X-RAY EXAM CHEST 1 VIEW: CPT

## 2019-01-14 PROCEDURE — 36415 COLL VENOUS BLD VENIPUNCTURE: CPT | Performed by: FAMILY MEDICINE

## 2019-01-14 PROCEDURE — 85025 COMPLETE CBC W/AUTO DIFF WBC: CPT | Performed by: FAMILY MEDICINE

## 2019-01-14 PROCEDURE — 93005 ELECTROCARDIOGRAM TRACING: CPT | Performed by: FAMILY MEDICINE

## 2019-01-14 PROCEDURE — G0378 HOSPITAL OBSERVATION PER HR: HCPCS

## 2019-01-14 PROCEDURE — 99285 EMERGENCY DEPT VISIT HI MDM: CPT

## 2019-01-14 PROCEDURE — 99219 PR INITIAL OBSERVATION CARE/DAY 50 MINUTES: CPT | Performed by: FAMILY MEDICINE

## 2019-01-14 RX ORDER — FLUTICASONE PROPIONATE 50 MCG
2 SPRAY, SUSPENSION (ML) NASAL DAILY
Status: DISCONTINUED | OUTPATIENT
Start: 2019-01-14 | End: 2019-01-16 | Stop reason: HOSPADM

## 2019-01-14 RX ORDER — SODIUM CHLORIDE 0.9 % (FLUSH) 0.9 %
3 SYRINGE (ML) INJECTION EVERY 12 HOURS SCHEDULED
Status: DISCONTINUED | OUTPATIENT
Start: 2019-01-14 | End: 2019-01-16 | Stop reason: HOSPADM

## 2019-01-14 RX ORDER — OLANZAPINE 10 MG/1
10 TABLET ORAL DAILY
Status: DISCONTINUED | OUTPATIENT
Start: 2019-01-15 | End: 2019-01-16 | Stop reason: HOSPADM

## 2019-01-14 RX ORDER — ASPIRIN 81 MG/1
324 TABLET, CHEWABLE ORAL ONCE
Status: DISCONTINUED | OUTPATIENT
Start: 2019-01-14 | End: 2019-01-14

## 2019-01-14 RX ORDER — ONDANSETRON 4 MG/1
4 TABLET, FILM COATED ORAL EVERY 6 HOURS PRN
Status: DISCONTINUED | OUTPATIENT
Start: 2019-01-14 | End: 2019-01-16 | Stop reason: HOSPADM

## 2019-01-14 RX ORDER — METOPROLOL SUCCINATE 25 MG/1
25 TABLET, EXTENDED RELEASE ORAL DAILY
Status: DISCONTINUED | OUTPATIENT
Start: 2019-01-15 | End: 2019-01-16

## 2019-01-14 RX ORDER — NICOTINE POLACRILEX 4 MG
15 LOZENGE BUCCAL
Status: DISCONTINUED | OUTPATIENT
Start: 2019-01-14 | End: 2019-01-16 | Stop reason: HOSPADM

## 2019-01-14 RX ORDER — SODIUM CHLORIDE 0.9 % (FLUSH) 0.9 %
3-10 SYRINGE (ML) INJECTION AS NEEDED
Status: DISCONTINUED | OUTPATIENT
Start: 2019-01-14 | End: 2019-01-16 | Stop reason: HOSPADM

## 2019-01-14 RX ORDER — BUDESONIDE AND FORMOTEROL FUMARATE DIHYDRATE 160; 4.5 UG/1; UG/1
2 AEROSOL RESPIRATORY (INHALATION)
Status: DISCONTINUED | OUTPATIENT
Start: 2019-01-14 | End: 2019-01-16 | Stop reason: HOSPADM

## 2019-01-14 RX ORDER — ACETAMINOPHEN 325 MG/1
650 TABLET ORAL EVERY 4 HOURS PRN
Status: DISCONTINUED | OUTPATIENT
Start: 2019-01-14 | End: 2019-01-16 | Stop reason: HOSPADM

## 2019-01-14 RX ORDER — DEXTROSE MONOHYDRATE 25 G/50ML
25 INJECTION, SOLUTION INTRAVENOUS
Status: DISCONTINUED | OUTPATIENT
Start: 2019-01-14 | End: 2019-01-16 | Stop reason: HOSPADM

## 2019-01-14 RX ORDER — ALBUTEROL SULFATE 2.5 MG/3ML
2.5 SOLUTION RESPIRATORY (INHALATION) EVERY 6 HOURS PRN
Status: DISCONTINUED | OUTPATIENT
Start: 2019-01-14 | End: 2019-01-16 | Stop reason: HOSPADM

## 2019-01-14 RX ORDER — ATORVASTATIN CALCIUM 20 MG/1
20 TABLET, FILM COATED ORAL DAILY
Status: DISCONTINUED | OUTPATIENT
Start: 2019-01-15 | End: 2019-01-16 | Stop reason: HOSPADM

## 2019-01-14 RX ORDER — TAMSULOSIN HYDROCHLORIDE 0.4 MG/1
0.4 CAPSULE ORAL NIGHTLY
Status: DISCONTINUED | OUTPATIENT
Start: 2019-01-14 | End: 2019-01-16 | Stop reason: HOSPADM

## 2019-01-14 RX ORDER — PANTOPRAZOLE SODIUM 40 MG/1
40 TABLET, DELAYED RELEASE ORAL DAILY
Status: DISCONTINUED | OUTPATIENT
Start: 2019-01-14 | End: 2019-01-16 | Stop reason: HOSPADM

## 2019-01-14 RX ORDER — LOSARTAN POTASSIUM 50 MG/1
50 TABLET ORAL DAILY
Status: DISCONTINUED | OUTPATIENT
Start: 2019-01-14 | End: 2019-01-16 | Stop reason: HOSPADM

## 2019-01-14 RX ORDER — BUSPIRONE HYDROCHLORIDE 5 MG/1
5 TABLET ORAL EVERY 12 HOURS SCHEDULED
Status: DISCONTINUED | OUTPATIENT
Start: 2019-01-14 | End: 2019-01-16 | Stop reason: HOSPADM

## 2019-01-14 RX ORDER — ONDANSETRON 2 MG/ML
4 INJECTION INTRAMUSCULAR; INTRAVENOUS EVERY 6 HOURS PRN
Status: DISCONTINUED | OUTPATIENT
Start: 2019-01-14 | End: 2019-01-16 | Stop reason: HOSPADM

## 2019-01-14 RX ORDER — ASPIRIN 81 MG/1
81 TABLET ORAL DAILY
Status: DISCONTINUED | OUTPATIENT
Start: 2019-01-15 | End: 2019-01-16 | Stop reason: HOSPADM

## 2019-01-14 RX ORDER — SENNA AND DOCUSATE SODIUM 50; 8.6 MG/1; MG/1
2 TABLET, FILM COATED ORAL NIGHTLY PRN
Status: DISCONTINUED | OUTPATIENT
Start: 2019-01-14 | End: 2019-01-16 | Stop reason: HOSPADM

## 2019-01-14 RX ORDER — ONDANSETRON 4 MG/1
4 TABLET, ORALLY DISINTEGRATING ORAL EVERY 6 HOURS PRN
Status: DISCONTINUED | OUTPATIENT
Start: 2019-01-14 | End: 2019-01-16 | Stop reason: HOSPADM

## 2019-01-14 RX ADMIN — PANTOPRAZOLE SODIUM 40 MG: 40 TABLET, DELAYED RELEASE ORAL at 15:54

## 2019-01-14 RX ADMIN — NITROGLYCERIN 0.5 INCH: 20 OINTMENT TOPICAL at 17:29

## 2019-01-14 RX ADMIN — Medication 400 MG: at 21:22

## 2019-01-14 RX ADMIN — BUSPIRONE HYDROCHLORIDE 5 MG: 5 TABLET ORAL at 21:22

## 2019-01-14 RX ADMIN — BUDESONIDE AND FORMOTEROL FUMARATE DIHYDRATE 2 PUFF: 160; 4.5 AEROSOL RESPIRATORY (INHALATION) at 19:41

## 2019-01-14 RX ADMIN — LOSARTAN POTASSIUM 50 MG: 50 TABLET, FILM COATED ORAL at 15:54

## 2019-01-14 RX ADMIN — TAMSULOSIN HYDROCHLORIDE 0.4 MG: 0.4 CAPSULE ORAL at 21:22

## 2019-01-14 NOTE — PLAN OF CARE
Problem: Patient Care Overview  Goal: Plan of Care Review  Outcome: Ongoing (interventions implemented as appropriate)   01/14/19 9160   Coping/Psychosocial   Plan of Care Reviewed With patient   Plan of Care Review   Progress no change   OTHER   Outcome Summary new admission to [facility]; initial assessment completed

## 2019-01-14 NOTE — ED PROVIDER NOTES
Subjective   Patient presents to emergency department for substernal chest pain described as pressure starting when he woke up this morning at 630AM.  Cardiologist is DR Holt.  Hx of hypertension, hyperlipidemia, CAD, DM2.           History provided by:  Patient   used: No    Chest Pain   Pain location:  Substernal area  Pain quality: pressure    Pain radiates to:  Does not radiate  Pain severity:  Moderate  Onset quality:  Sudden  Duration:  6 hours  Timing:  Intermittent  Progression:  Improving  Chronicity:  New  Context: at rest    Associated symptoms: nausea and shortness of breath    Associated symptoms: no abdominal pain, no altered mental status, no anxiety, no back pain, no cough, no diaphoresis, no dizziness, no dysphagia, no fever, no headache, no lower extremity edema, no near-syncope, no numbness, no palpitations, no syncope, no vomiting and no weakness    Risk factors: coronary artery disease, diabetes mellitus, high cholesterol, hypertension, male sex and obesity    Risk factors: no prior DVT/PE and no smoking    Shortness of Breath   Associated symptoms: chest pain    Associated symptoms: no abdominal pain, no cough, no diaphoresis, no fever, no headaches, no sore throat, no syncope and no vomiting        Review of Systems   Constitutional: Negative for diaphoresis and fever.   HENT: Negative for sore throat and trouble swallowing.    Eyes: Negative for visual disturbance.   Respiratory: Positive for shortness of breath. Negative for cough.    Cardiovascular: Positive for chest pain. Negative for palpitations, syncope and near-syncope.   Gastrointestinal: Positive for nausea. Negative for abdominal pain and vomiting.   Endocrine: Negative for polyuria.   Genitourinary: Negative for dysuria and flank pain.   Musculoskeletal: Negative for back pain.   Skin: Negative for color change.   Allergic/Immunologic: Negative for immunocompromised state.   Neurological: Negative for  dizziness, weakness, numbness and headaches.   Hematological: Does not bruise/bleed easily.   Psychiatric/Behavioral: Negative for confusion.       Past Medical History:   Diagnosis Date   • Abnormal computed tomography scan    • Adenomatous polyp of colon    • Allergic rhinitis    • Anemia    • Chronic back pain    • Coronary arteriosclerosis    • Depression    • Diabetes mellitus (CMS/HCC)    • Diabetic polyneuropathy associated with type 2 diabetes mellitus (CMS/HCC) 7/17/2017   • Dizziness    • Dyspnea     unspecified   • Epigastric pain    • Essential hypertension    • Ex-smoker    • Hemangioma of liver    • Hyperlipidemia    • Infected hydrocele     with orchitis and epididymis   • Nausea    • Nausea with vomiting    • Obesity with body mass index of 30.0 - 39.9    • Pain in right knee    • Right upper quadrant pain    • Type II diabetes mellitus, uncontrolled (CMS/HCC)    • Upper respiratory infection    • Urgency of urination    • Villous adenoma of colon        No Known Allergies    Past Surgical History:   Procedure Laterality Date   • CARDIAC CATHETERIZATION  11/30/2015    No evidence of any obstructive disease in the circumflex, the RCA , or LAD. 1st diagonal branch in the midportion with 20-30% stenosis. Preserved LV systolic function with EF 55%.   • COLONOSCOPY  01/21/2013    Normal   • COLONOSCOPY W/ POLYPECTOMY  10/13/2014    A single polyp was found in the colon; removed by snare cautery polypectomy.   • ENDOSCOPY  06/15/2016    Mildly severe esophagitis. Several biopsies obtained in the upper third of the esophagus.   • INCISION AND DRAINAGE ABSCESS  10/29/2014    Incision and drainage of scrotal abscess. Hydrocelectomy, bottle procedure.   • INJECTION OF MEDICATION  01/26/2016    Kenalog       Family History   Problem Relation Age of Onset   • Cancer Mother         leukemia   • Heart disease Mother    • Heart disease Sister    • Lung disease Father    • Heart disease Maternal Grandmother    •  "Heart disease Maternal Grandfather        Social History     Socioeconomic History   • Marital status:      Spouse name: Not on file   • Number of children: Not on file   • Years of education: Not on file   • Highest education level: Not on file   Tobacco Use   • Smoking status: Former Smoker     Packs/day: 0.25     Years: 20.00     Pack years: 5.00     Last attempt to quit: 2000     Years since quittin.0   • Smokeless tobacco: Never Used   Substance and Sexual Activity   • Alcohol use: No     Frequency: Never   • Drug use: No   • Sexual activity: Not Currently     Comment: shortness of breath           Objective      /88 (BP Location: Right arm, Patient Position: Lying)   Pulse 53   Temp 97.6 °F (36.4 °C) (Temporal)   Resp 20   Ht 175.3 cm (69\")   Wt 107 kg (235 lb 3.2 oz)   SpO2 98%   BMI 34.73 kg/m²     Physical Exam   Constitutional: He is oriented to person, place, and time. He appears well-developed and well-nourished. No distress.   HENT:   Head: Normocephalic and atraumatic.   Eyes: Conjunctivae are normal.   Cardiovascular: Normal rate, regular rhythm, normal heart sounds and intact distal pulses.   Pulmonary/Chest: Effort normal and breath sounds normal. No respiratory distress. He has no wheezes.   Abdominal: Soft. He exhibits no distension and no mass. There is no tenderness. There is no rebound and no guarding.   Musculoskeletal: He exhibits no edema.   Neurological: He is alert and oriented to person, place, and time.   Skin: Skin is warm. Capillary refill takes less than 2 seconds.   Psychiatric: He has a normal mood and affect. His behavior is normal. Thought content normal.   Nursing note and vitals reviewed.      ECG 12 Lead    Date/Time: 2019 1:36 PM  Performed by: Adama Scott PA-C  Authorized by: Timo Olvera MD   Comparison: not compared with previous ECG   Rhythm: sinus rhythm  Rate: normal  BPM: 61  ST Segments: ST segments normal  Clinical " impression: normal ECG                 ED Course      Results for orders placed or performed during the hospital encounter of 01/14/19   Comprehensive Metabolic Panel   Result Value Ref Range    Glucose 106 (H) 60 - 100 mg/dL    BUN 12 7 - 21 mg/dL    Creatinine 1.02 0.70 - 1.30 mg/dL    Sodium 138 137 - 145 mmol/L    Potassium 3.9 3.5 - 5.1 mmol/L    Chloride 106 95 - 110 mmol/L    CO2 28.0 22.0 - 31.0 mmol/L    Calcium 9.2 8.4 - 10.2 mg/dL    Total Protein 7.1 6.3 - 8.6 g/dL    Albumin 4.10 3.40 - 4.80 g/dL    ALT (SGPT) 38 21 - 72 U/L    AST (SGOT) 26 17 - 59 U/L    Alkaline Phosphatase 104 38 - 126 U/L    Total Bilirubin 0.2 0.2 - 1.3 mg/dL    eGFR  African Amer 92 56 - 130 mL/min/1.73    Globulin 3.0 2.3 - 3.5 gm/dL    A/G Ratio 1.4 1.1 - 1.8 g/dL    BUN/Creatinine Ratio 11.8 7.0 - 25.0    Anion Gap 4.0 (L) 5.0 - 15.0 mmol/L   Troponin   Result Value Ref Range    Troponin I <0.012 <=0.034 ng/mL   CBC Auto Differential   Result Value Ref Range    WBC 9.72 3.20 - 9.80 10*3/mm3    RBC 4.75 4.37 - 5.74 10*6/mm3    Hemoglobin 13.5 (L) 13.7 - 17.3 g/dL    Hematocrit 39.8 39.0 - 49.0 %    MCV 83.8 80.0 - 98.0 fL    MCH 28.4 26.5 - 34.0 pg    MCHC 33.9 31.5 - 36.3 g/dL    RDW 13.1 11.5 - 14.5 %    RDW-SD 39.7 35.1 - 43.9 fl    MPV 10.0 8.0 - 12.0 fL    Platelets 239 150 - 450 10*3/mm3    Neutrophil % 78.2 37.0 - 80.0 %    Lymphocyte % 12.6 10.0 - 50.0 %    Monocyte % 6.1 0.0 - 12.0 %    Eosinophil % 2.2 0.0 - 7.0 %    Basophil % 0.3 0.0 - 2.0 %    Immature Grans % 0.6 (H) 0.0 - 0.5 %    Neutrophils, Absolute 7.61 2.00 - 8.60 10*3/mm3    Lymphocytes, Absolute 1.22 0.60 - 4.20 10*3/mm3    Monocytes, Absolute 0.59 0.00 - 0.90 10*3/mm3    Eosinophils, Absolute 0.21 0.00 - 0.70 10*3/mm3    Basophils, Absolute 0.03 0.00 - 0.20 10*3/mm3    Immature Grans, Absolute 0.06 (H) 0.00 - 0.02 10*3/mm3   Magnesium   Result Value Ref Range    Magnesium 2.1 1.6 - 2.3 mg/dL   Light Blue Top   Result Value Ref Range    Extra Tube hold for  add-on    Green Top (Gel)   Result Value Ref Range    Extra Tube Hold for add-ons.    Lavender Top   Result Value Ref Range    Extra Tube hold for add-on    Gold Top - SST   Result Value Ref Range    Extra Tube Hold for add-ons.      Xr Chest 1 View    Result Date: 1/14/2019  Narrative: EXAM:         Radiograph(s), Chest VIEWS:   Frontal  ; 1     DATE/TIME:  1/14/2019 2:10 PM CST            INDICATION:   chest pain, R07.9 Chest pain, unspecified  COMPARISON:  CXR: 7/20/18         FINDINGS:         - lines/tubes:    none   - cardiac:         size within normal limits       - mediastinum: contour within normal limits       - lungs:         no focal air space process, pulmonary interstitial edema, nodule(s)/mass           - pleura:         no evidence of  fluid                - osseous:         unremarkable for age                - misc.:        Impression: CONCLUSION:    1. No evidence of an active cardiopulmonary process.                                                  Electronically signed by:  ALTAF Wade MD  1/14/2019 2:11 PM CST Workstation: [id number]            HEART Score (for prediction of 6-week risk of major adverse cardiac event) reviewed and/or performed as part of the patient evaluation and treatment planning process.  The result associated with this review/performance is: 4       MDM      Final diagnoses:   Chest pain, unspecified type            Adama Scott PA-C  01/14/19 1535

## 2019-01-14 NOTE — ED NOTES
Pt presents to ED with c/o chest pain and SOA that began at 0700 today. Pt states he used his inhaler, but didn't have relief. Pt does not wear O2 at home, EMS had pt on NC 2L at 99%.     Cordelia Hays, JEAN-PIERRE  01/14/19 8928

## 2019-01-14 NOTE — H&P
HISTORY AND PHYSICAL  NAME: Nirav Lind  : 1965  MRN: [id number]    DATE OF ADMISSION: 19    DATE & TIME SEEN: 19 2:22 PM    PCP: Jeremy Mahmood MD    CODE STATUS:   Code Status and Medical Interventions:   Ordered at: 19 1442     Level Of Support Discussed With:    Patient     Code Status:    CPR     Medical Interventions (Level of Support Prior to Arrest):    Full       CHIEF COMPLAINT   Chief Complaint   Patient presents with   • Chest Pain   • Shortness of Breath       HPI:  Nirav Lind is a 53 y.o. AA male with a concurrent medical history of essential hypertension, type II diabetes mellitus, and obesity who presents to the ED with new onset, bilateral chest pain and pressure that began at rest today. Chest pain and pressure did not radiate beyond the chest. Associated symptoms include dyspnea, dizziness, and nausea. Denies any visual disturbance or vomiting. Dyspnea improved some with albuterol inhaler.     In the ED patient's EKG revealed NSR with no ST segment deviations. Initial troponin and BNP were within normal limits. CXR revealed no radiographic evidence of acute cardiopulmonary disease. Given patient's risk factors (obesity, hypertension, diabetes, and family history), decision was made to admit patient for observation.     CONCURRENT MEDICAL HISTORY:  Past Medical History:   Diagnosis Date   • Abnormal computed tomography scan    • Adenomatous polyp of colon    • Allergic rhinitis    • Anemia    • Chronic back pain    • Coronary arteriosclerosis    • Depression    • Diabetes mellitus (CMS/HCC)    • Diabetic polyneuropathy associated with type 2 diabetes mellitus (CMS/HCC) 2017   • Dizziness    • Dyspnea     unspecified   • Epigastric pain    • Essential hypertension    • Ex-smoker    • Hemangioma of liver    • Hyperlipidemia    • Infected hydrocele     with orchitis and epididymis   • Nausea    • Nausea with vomiting    • Obesity with body mass index  of 30.0 - 39.9    • Pain in right knee    • Right upper quadrant pain    • Type II diabetes mellitus, uncontrolled (CMS/HCC)    • Upper respiratory infection    • Urgency of urination    • Villous adenoma of colon        PAST SURGICAL HISTORY:  Past Surgical History:   Procedure Laterality Date   • CARDIAC CATHETERIZATION  2015    No evidence of any obstructive disease in the circumflex, the RCA , or LAD. 1st diagonal branch in the midportion with 20-30% stenosis. Preserved LV systolic function with EF 55%.   • COLONOSCOPY  2013    Normal   • COLONOSCOPY W/ POLYPECTOMY  10/13/2014    A single polyp was found in the colon; removed by snare cautery polypectomy.   • ENDOSCOPY  06/15/2016    Mildly severe esophagitis. Several biopsies obtained in the upper third of the esophagus.   • INCISION AND DRAINAGE ABSCESS  10/29/2014    Incision and drainage of scrotal abscess. Hydrocelectomy, bottle procedure.   • INJECTION OF MEDICATION  2016    Kenalog       FAMILY HISTORY:  Family History   Problem Relation Age of Onset   • Cancer Mother         leukemia   • Heart disease Mother    • Heart disease Sister    • Lung disease Father    • Heart disease Maternal Grandmother    • Heart disease Maternal Grandfather         SOCIAL HISTORY:  Social History     Socioeconomic History   • Marital status:      Spouse name: Not on file   • Number of children: Not on file   • Years of education: Not on file   • Highest education level: Not on file   Social Needs   • Financial resource strain: Not on file   • Food insecurity - worry: Not on file   • Food insecurity - inability: Not on file   • Transportation needs - medical: Not on file   • Transportation needs - non-medical: Not on file   Occupational History   • Not on file   Tobacco Use   • Smoking status: Former Smoker     Packs/day: 0.25     Years: 20.00     Pack years: 5.00     Last attempt to quit: 2000     Years since quittin.0   • Smokeless tobacco:  Never Used   Substance and Sexual Activity   • Alcohol use: No     Frequency: Never   • Drug use: No   • Sexual activity: Not Currently     Comment: shortness of breath   Other Topics Concern   • Not on file   Social History Narrative   • Not on file       HOME MEDICATIONS:  No current facility-administered medications on file prior to encounter.      Current Outpatient Medications on File Prior to Encounter   Medication Sig Dispense Refill   • albuterol (VENTOLIN HFA) 108 (90 Base) MCG/ACT inhaler Inhale 2 puffs Every 4 (Four) Hours As Needed for Wheezing. 1 inhaler 3   • Alcohol Swabs ( STERILE ALCOHOL PREP) pads 1 each 4 (Four) Times a Day. 200 each 3   • amitriptyline (ELAVIL) 25 MG tablet Take 25 mg by mouth 3 (three) times a day.     • ASPIRIN LOW DOSE 81 MG EC tablet TAKE 1 TABLET BY MOUTH EVERY DAY 30 tablet 0   • atorvastatin (LIPITOR) 20 MG tablet Take 1 tablet by mouth Daily. 90 tablet 0   • B-D UF III MINI PEN NEEDLES 31G X 5 MM misc Injecting once daily up to 3 times daily 150 each 11   • budesonide-formoterol (SYMBICORT) 160-4.5 MCG/ACT inhaler Inhale 2 puffs 2 (Two) Times a Day. 1 inhaler 3   • busPIRone (BUSPAR) 5 MG tablet TK 1 T PO BID  3   • fexofenadine (ALLEGRA ALLERGY) 180 MG tablet Take 1 tablet by mouth Daily. 30 tablet 1   • fluticasone (FLONASE) 50 MCG/ACT nasal spray 2 sprays into each nostril Daily. 9.9 mL 3   • glucose blood (ONE TOUCH ULTRA TEST) test strip 1 each by Other route 3 (Three) Times a Day. Use as instructed 200 each 3   • Insulin Glargine (TOUJEO SOLOSTAR) 300 UNIT/ML solution pen-injector Inject 15 Units under the skin into the appropriate area as directed Daily. 3 pen 5   • Insulin Lispro (HUMALOG KWIKPEN) 100 UNIT/ML solution pen-injector Inject up to 8 units before meals and 2 on sliding scale 9 pen 5   • losartan (COZAAR) 50 MG tablet Take 1 tablet by mouth Daily. 90 tablet 0   • MAGNESIUM-OXIDE 400 (241.3 MG) MG tablet tablet TK 1 T PO BID  12   • metFORMIN ER  (GLUCOPHAGE-XR) 750 MG 24 hr tablet TAKE 1 TABLET BY MOUTH EVERY DAY. 90 tablet 1   • metoprolol succinate XL (TOPROL-XL) 25 MG 24 hr tablet Take 1 tablet by mouth Daily. 30 tablet 3   • OLANZapine (ZYPREXA) 10 MG tablet Take 10 mg by mouth daily.     • ondansetron (ZOFRAN) 4 MG tablet TAKE 1 TABLET BY MOUTH EVERY 8 HOURS AS NEEDED FOR NAUSEA OR VOMITING 30 tablet 0   • pantoprazole (PROTONIX) 40 MG EC tablet Take 1 tablet by mouth Daily. 30 tablet 2   • sertraline (ZOLOFT) 100 MG tablet Take 2 tablets by mouth Daily. 60 tablet 3   • tamsulosin (FLOMAX) 0.4 MG capsule 24 hr capsule Take 1 capsule by mouth Every Night. 30 capsule 0   • tiotropium (SPIRIVA HANDIHALER) 18 MCG per inhalation capsule Place 1 capsule into inhaler and inhale Daily. 30 capsule 2   • vitamin D (ERGOCALCIFEROL) 20313 units capsule capsule Take 1 capsule by mouth Every 14 (Fourteen) Days. 4 capsule 11   • [DISCONTINUED] promethazine-dextromethorphan (PROMETHAZINE-DM) 6.25-15 MG/5ML syrup Take 1.3 mL by mouth 4 (Four) Times a Day As Needed for Cough. 180 mL 0       ALLERGIES:  Patient has no known allergies.    REVIEW OF SYSTEMS  Review of Systems   Constitutional: Negative for chills, diaphoresis, fatigue and fever.   HENT: Negative for congestion, rhinorrhea, sneezing and sore throat.    Respiratory: Positive for shortness of breath. Negative for cough.    Cardiovascular: Positive for chest pain (pressure). Negative for leg swelling.   Gastrointestinal: Positive for nausea. Negative for abdominal pain, constipation, diarrhea and vomiting.   Genitourinary: Positive for difficulty urinating (improved with Flomax) and dysuria. Negative for hematuria.   Musculoskeletal: Negative for gait problem and joint swelling.   Skin: Negative for rash and wound.   Neurological: Positive for dizziness and light-headedness. Negative for seizures, syncope and headaches.   Psychiatric/Behavioral: Negative for confusion and sleep disturbance.       PHYSICAL  EXAM:  Temp:  [97.6 °F (36.4 °C)-98 °F (36.7 °C)] 97.6 °F (36.4 °C)  Heart Rate:  [52-62] 53  Resp:  [20] 20  BP: (126-150)/(74-90) 140/88  Body mass index is 34.73 kg/m².  Physical Exam   Constitutional: He is oriented to person, place, and time. He appears well-developed and well-nourished. No distress.   Obese   HENT:   Head: Normocephalic and atraumatic.   Right Ear: Tympanic membrane and ear canal normal.   Left Ear: Tympanic membrane and ear canal normal.   Mouth/Throat: Oropharynx is clear and moist. Abnormal dentition. Dental caries present.   Eyes: Conjunctivae and EOM are normal. Pupils are equal, round, and reactive to light. No scleral icterus.   Neck: Normal range of motion. Neck supple. No tracheal deviation present. No thyromegaly present.   Cardiovascular: Normal rate, regular rhythm, normal heart sounds and intact distal pulses.   Pulmonary/Chest: Effort normal. He has decreased breath sounds. He has no wheezes. He has no rales.   Abdominal: Soft. Bowel sounds are normal. There is tenderness in the left upper quadrant and left lower quadrant.   Musculoskeletal: Normal range of motion. He exhibits no edema or tenderness.   Pain with palpation of left costochondral junction     Skin Integrity  -  His right foot skin is not intact (dry, cracking skin).  His left foot skin is not intact (dry, cracking skin)..  Neurological: He is alert and oriented to person, place, and time. No cranial nerve deficit.   Skin: Skin is warm and dry. No rash noted. He is not diaphoretic.   Psychiatric: He has a normal mood and affect. His behavior is normal. Judgment and thought content normal.   Nursing note and vitals reviewed.      DIAGNOSTIC DATA:   Lab Results (last 24 hours)     Procedure Component Value Units Date/Time    BNP [[id number]]  (Normal) Collected:  01/14/19 1506    Specimen:  Blood Updated:  01/14/19 1542     proBNP 59.3 pg/mL     Troponin [[id number]]  (Normal) Collected:  01/14/19 1506    Specimen:   Blood Updated:  01/14/19 1542     Troponin I <0.012 ng/mL     Magnesium [[id number]]  (Normal) Collected:  01/14/19 1506    Specimen:  Blood Updated:  01/14/19 1531     Magnesium 2.1 mg/dL     [place] [facility] [[id number]] Collected:  01/14/19 1236    Specimen:  Blood Updated:  01/14/19 1346    Narrative:       The following orders were created for panel order [place] [facility].  Procedure                               Abnormality         Status                     ---------                               -----------         ------                     Light Blue Top[[id number]]                                   Final result               Green Top (Gel)[[id number]]                                  Final result               Lavender Top[[id number]]                                     Final result               Gold Top - SST[[id number]]                                   Final result                 Please view results for these tests on the individual orders.    Light Blue Top [[id number]] Collected:  01/14/19 1236    Specimen:  Blood Updated:  01/14/19 1346     Extra Tube hold for add-on     Comment: Auto resulted       Gold Top - SST [[id number]] Collected:  01/14/19 1236    Specimen:  Blood Updated:  01/14/19 1346     Extra Tube Hold for add-ons.     Comment: Auto resulted.       Green Top (Gel) [[id number]] Collected:  01/14/19 1236    Specimen:  Blood Updated:  01/14/19 1346     Extra Tube Hold for add-ons.     Comment: Auto resulted.       Lavender Top [[id number]] Collected:  01/14/19 1236    Specimen:  Blood Updated:  01/14/19 1346     Extra Tube hold for add-on     Comment: Auto resulted       Troponin [[id number]]  (Normal) Collected:  01/14/19 1236    Specimen:  Blood Updated:  01/14/19 1326     Troponin I <0.012 ng/mL     Comprehensive Metabolic Panel [[id number]]  (Abnormal) Collected:  01/14/19 1236    Specimen:  Blood Updated:  01/14/19 1315     Glucose 106 mg/dL      BUN 12 mg/dL      Creatinine 1.02 mg/dL      Sodium 138  mmol/L      Potassium 3.9 mmol/L      Chloride 106 mmol/L      CO2 28.0 mmol/L      Calcium 9.2 mg/dL      Total Protein 7.1 g/dL      Albumin 4.10 g/dL      ALT (SGPT) 38 U/L      AST (SGOT) 26 U/L      Alkaline Phosphatase 104 U/L      Total Bilirubin 0.2 mg/dL      eGFR  African Amer 92 mL/min/1.73      Globulin 3.0 gm/dL      A/G Ratio 1.4 g/dL      BUN/Creatinine Ratio 11.8     Anion Gap 4.0 mmol/L     CBC & Differential [[id number]] Collected:  01/14/19 1236    Specimen:  Blood Updated:  01/14/19 1250    Narrative:       The following orders were created for panel order CBC & Differential.  Procedure                               Abnormality         Status                     ---------                               -----------         ------                     CBC Auto Differential[[id number]]        Abnormal            Final result                 Please view results for these tests on the individual orders.    CBC Auto Differential [[id number]]  (Abnormal) Collected:  01/14/19 1236    Specimen:  Blood Updated:  01/14/19 1250     WBC 9.72 10*3/mm3      RBC 4.75 10*6/mm3      Hemoglobin 13.5 g/dL      Hematocrit 39.8 %      MCV 83.8 fL      MCH 28.4 pg      MCHC 33.9 g/dL      RDW 13.1 %      RDW-SD 39.7 fl      MPV 10.0 fL      Platelets 239 10*3/mm3      Neutrophil % 78.2 %      Lymphocyte % 12.6 %      Monocyte % 6.1 %      Eosinophil % 2.2 %      Basophil % 0.3 %      Immature Grans % 0.6 %      Neutrophils, Absolute 7.61 10*3/mm3      Lymphocytes, Absolute 1.22 10*3/mm3      Monocytes, Absolute 0.59 10*3/mm3      Eosinophils, Absolute 0.21 10*3/mm3      Basophils, Absolute 0.03 10*3/mm3      Immature Grans, Absolute 0.06 10*3/mm3            Imaging Results (last 24 hours)     Procedure Component Value Units Date/Time    XR Chest 1 View [[id number]] Collected:  01/14/19 1351     Updated:  01/14/19 1412    Narrative:         EXAM:         Radiograph(s), Chest   VIEWS:   Frontal  ; 1       DATE/TIME:  1/14/2019  2:10 PM CST                INDICATION:   chest pain, R07.9 Chest pain, unspecified    COMPARISON:  CXR: 7/20/18             FINDINGS:             - lines/tubes:    none     - cardiac:         size within normal limits         - mediastinum: contour within normal limits         - lungs:         no focal air space process, pulmonary  interstitial edema, nodule(s)/mass             - pleura:         no evidence of  fluid                  - osseous:         unremarkable for age                  - misc.:         Impression:       CONCLUSION:        1. No evidence of an active cardiopulmonary process.                                                              Electronically signed by:  ALTAF Wade MD  1/14/2019 2:11  PM CST Workstation: [id number]          I reviewed the patient's new clinical results.  I reviewed the patient's new imaging results and agree with the interpretation.    ASSESSMENT AND PLAN: This is a 53 y.o. male with:    Active Hospital Problems    Diagnosis Date Noted   • **Precordial pain [R07.2] 01/14/2019     Priority: High     -Regadenoson Stress Test With Myocardial Perfusion SPECT in June 2018 was consistent with low risk study  -HEART score: 4 points - moderate score  -CARLEY score: 63 points - 1.2% probability of death from admission to 6 months  -Trend troponin  -Monitoring cardiac activity with telemetry  -Will provide PRN nitroglycernin  -Encourage risk factor modification: ASA, statin, weight loss, tighter blood pressure and glycemic control       • Type 2 diabetes mellitus with skin complication, with long-term current use of insulin (CMS/[facility]) [E11.628, Z79.4] 07/17/2017     Priority: High     -Hemoglobin A1c: 6.5% in November 2018  -Will continue metformin  -Will continue insulin regimen: 13 units basal insulin nightly and 8 units short acting insulin with meals  -Will provide ADA diet  -Monitoring glucose with regular fingersticks and SSI     • Essential hypertension [I10] 08/19/2016      Priority: High     -Continue home antihypertensive medication: Losartan and Toprol  -Monitoring BP per hospital protocol     • COPD (chronic obstructive pulmonary disease) (CMS/HCC) [J44.9] 03/08/2017     Priority: Medium     -Patient is using maintenance inhalers (Spriva and Symbicort) inconsistently and intermittently. Will provide patient with Symbicort during IP course.   -Providing PRN albuterol.  -Will have patient follow up with pulmonology for PFT.      • Class 2 severe obesity due to excess calories with serious comorbidity and body mass index (BMI) of 35.0 to 35.9 in adult (CMS/[facility]) [E66.01, Z68.35]      Priority: Medium     -Body mass index is 35.93 kg/m².  -Nutrition consult - appreciate recommendations  -Recommend lifestyle modifications - 30 minutes of moderate intensity activity at least 5 days a week     • Dysuria [R30.0] 01/14/2019     Priority: Low     -Urinalysis pending         We will continue to monitor the patient's course and adjust our care accordingly.     DVT prophylaxis: SCD - PADUA score <4    Code status is   Code Status and Medical Interventions:   Ordered at: 01/14/19 1442     Level Of Support Discussed With:    Patient     Code Status:    CPR     Medical Interventions (Level of Support Prior to Arrest):    Full      [facility] #[id number], reviewed and consistent with patient reported medications.    Anticipated length of stay: 1-2 nights    I discussed the patients findings and my recommendations with patient, family and primary care team.     Dr. Emma Koo is the attending on record at time of admission, he is aware of the patient's status and agrees with the above history and physical.    Signature  Leslie Urban MD  [facility] Family Medicine Resident, PGYIII        This document has been electronically signed by Leslie Urban MD on January 14, 2019 4:22 PM

## 2019-01-15 ENCOUNTER — APPOINTMENT (OUTPATIENT)
Dept: CARDIOLOGY | Facility: HOSPITAL | Age: 54
End: 2019-01-15
Attending: FAMILY MEDICINE

## 2019-01-15 LAB
ANION GAP SERPL CALCULATED.3IONS-SCNC: 3 MMOL/L (ref 5–15)
BASOPHILS # BLD AUTO: 0.04 10*3/MM3 (ref 0–0.2)
BASOPHILS NFR BLD AUTO: 0.4 % (ref 0–2)
BH CV ECHO MEAS - ACS: 1.9 CM
BH CV ECHO MEAS - AO MAX PG (FULL): 2.9 MMHG
BH CV ECHO MEAS - AO MAX PG: 11.2 MMHG
BH CV ECHO MEAS - AO MEAN PG (FULL): 2.4 MMHG
BH CV ECHO MEAS - AO MEAN PG: 6 MMHG
BH CV ECHO MEAS - AO ROOT AREA (BSA CORRECTED): 1.4
BH CV ECHO MEAS - AO ROOT AREA: 7.9 CM^2
BH CV ECHO MEAS - AO ROOT DIAM: 3.2 CM
BH CV ECHO MEAS - AO V2 MAX: 167 CM/SEC
BH CV ECHO MEAS - AO V2 MEAN: 117.6 CM/SEC
BH CV ECHO MEAS - AO V2 VTI: 33.3 CM
BH CV ECHO MEAS - AVA(I,A): 2.7 CM^2
BH CV ECHO MEAS - AVA(I,D): 2.7 CM^2
BH CV ECHO MEAS - AVA(V,A): 3.1 CM^2
BH CV ECHO MEAS - AVA(V,D): 3.1 CM^2
BH CV ECHO MEAS - BSA(HAYCOCK): 2.3 M^2
BH CV ECHO MEAS - BSA: 2.2 M^2
BH CV ECHO MEAS - BZI_BMI: 34.3 KILOGRAMS/M^2
BH CV ECHO MEAS - BZI_METRIC_HEIGHT: 175 CM
BH CV ECHO MEAS - BZI_METRIC_WEIGHT: 105 KG
BH CV ECHO MEAS - EDV(CUBED): 124.7 ML
BH CV ECHO MEAS - EDV(TEICH): 118 ML
BH CV ECHO MEAS - IVS/LVPW: 1.3
BH CV ECHO MEAS - IVSD: 1.4 CM
BH CV ECHO MEAS - LV MASS(C)D: 238.5 GRAMS
BH CV ECHO MEAS - LV MASS(C)DI: 108.6 GRAMS/M^2
BH CV ECHO MEAS - LV MAX PG: 8.3 MMHG
BH CV ECHO MEAS - LV MEAN PG: 3.6 MMHG
BH CV ECHO MEAS - LV V1 MAX: 144 CM/SEC
BH CV ECHO MEAS - LV V1 MEAN: 84.4 CM/SEC
BH CV ECHO MEAS - LV V1 VTI: 25.4 CM
BH CV ECHO MEAS - LVIDD: 5 CM
BH CV ECHO MEAS - LVOT AREA: 3.5 CM^2
BH CV ECHO MEAS - LVOT DIAM: 2.1 CM
BH CV ECHO MEAS - LVPWD: 1.1 CM
BH CV ECHO MEAS - MV A MAX VEL: 97.2 CM/SEC
BH CV ECHO MEAS - MV E MAX VEL: 67.8 CM/SEC
BH CV ECHO MEAS - MV E/A: 0.7
BH CV ECHO MEAS - MV MAX PG: 8 MMHG
BH CV ECHO MEAS - MV MEAN PG: 1.4 MMHG
BH CV ECHO MEAS - MV P1/2T MAX VEL: 73.8 CM/SEC
BH CV ECHO MEAS - MV V2 MAX: 141 CM/SEC
BH CV ECHO MEAS - MV V2 MEAN: 46 CM/SEC
BH CV ECHO MEAS - MV V2 VTI: 31.5 CM
BH CV ECHO MEAS - MVA P1/2T LCG: 3 CM^2
BH CV ECHO MEAS - MVA(VTI): 2.9 CM^2
BH CV ECHO MEAS - PA MAX PG: 11.9 MMHG
BH CV ECHO MEAS - PA V2 MAX: 172.7 CM/SEC
BH CV ECHO MEAS - RAP SYSTOLE: 5 MMHG
BH CV ECHO MEAS - RVDD: 2.4 CM
BH CV ECHO MEAS - RVSP: 23.7 MMHG
BH CV ECHO MEAS - SI(AO): 120 ML/M^2
BH CV ECHO MEAS - SI(LVOT): 41 ML/M^2
BH CV ECHO MEAS - SV(AO): 263.6 ML
BH CV ECHO MEAS - SV(LVOT): 90.1 ML
BH CV ECHO MEAS - TR MAX VEL: 216.2 CM/SEC
BUN BLD-MCNC: 15 MG/DL (ref 7–21)
BUN/CREAT SERPL: 12.5 (ref 7–25)
CALCIUM SPEC-SCNC: 8.7 MG/DL (ref 8.4–10.2)
CHLORIDE SERPL-SCNC: 106 MMOL/L (ref 95–110)
CO2 SERPL-SCNC: 28 MMOL/L (ref 22–31)
CREAT BLD-MCNC: 1.2 MG/DL (ref 0.7–1.3)
DEPRECATED RDW RBC AUTO: 40.9 FL (ref 35.1–43.9)
EOSINOPHIL # BLD AUTO: 0.39 10*3/MM3 (ref 0–0.7)
EOSINOPHIL NFR BLD AUTO: 4.3 % (ref 0–7)
ERYTHROCYTE [DISTWIDTH] IN BLOOD BY AUTOMATED COUNT: 13.3 % (ref 11.5–14.5)
GFR SERPL CREATININE-BSD FRML MDRD: 76 ML/MIN/1.73 (ref 56–130)
GLUCOSE BLD-MCNC: 110 MG/DL (ref 60–100)
GLUCOSE BLDC GLUCOMTR-MCNC: 103 MG/DL (ref 70–130)
GLUCOSE BLDC GLUCOMTR-MCNC: 123 MG/DL (ref 70–130)
GLUCOSE BLDC GLUCOMTR-MCNC: 156 MG/DL (ref 70–130)
GLUCOSE BLDC GLUCOMTR-MCNC: 87 MG/DL (ref 70–130)
GLUCOSE BLDC GLUCOMTR-MCNC: 93 MG/DL (ref 70–130)
HCT VFR BLD AUTO: 38.5 % (ref 39–49)
HGB BLD-MCNC: 12.7 G/DL (ref 13.7–17.3)
IMM GRANULOCYTES # BLD AUTO: 0.03 10*3/MM3 (ref 0–0.02)
IMM GRANULOCYTES NFR BLD AUTO: 0.3 % (ref 0–0.5)
LYMPHOCYTES # BLD AUTO: 2.13 10*3/MM3 (ref 0.6–4.2)
LYMPHOCYTES NFR BLD AUTO: 23.5 % (ref 10–50)
MAXIMAL PREDICTED HEART RATE: 165 BPM
MCH RBC QN AUTO: 28 PG (ref 26.5–34)
MCHC RBC AUTO-ENTMCNC: 33 G/DL (ref 31.5–36.3)
MCV RBC AUTO: 85 FL (ref 80–98)
MONOCYTES # BLD AUTO: 0.79 10*3/MM3 (ref 0–0.9)
MONOCYTES NFR BLD AUTO: 8.7 % (ref 0–12)
NEUTROPHILS # BLD AUTO: 5.7 10*3/MM3 (ref 2–8.6)
NEUTROPHILS NFR BLD AUTO: 62.8 % (ref 37–80)
PLATELET # BLD AUTO: 231 10*3/MM3 (ref 150–450)
PMV BLD AUTO: 9.6 FL (ref 8–12)
POTASSIUM BLD-SCNC: 4.1 MMOL/L (ref 3.5–5.1)
RBC # BLD AUTO: 4.53 10*6/MM3 (ref 4.37–5.74)
SODIUM BLD-SCNC: 137 MMOL/L (ref 137–145)
STRESS TARGET HR: 140 BPM
TROPONIN I SERPL-MCNC: <0.012 NG/ML
WBC NRBC COR # BLD: 9.08 10*3/MM3 (ref 3.2–9.8)

## 2019-01-15 PROCEDURE — G0378 HOSPITAL OBSERVATION PER HR: HCPCS

## 2019-01-15 PROCEDURE — 85025 COMPLETE CBC W/AUTO DIFF WBC: CPT | Performed by: FAMILY MEDICINE

## 2019-01-15 PROCEDURE — 84484 ASSAY OF TROPONIN QUANT: CPT | Performed by: FAMILY MEDICINE

## 2019-01-15 PROCEDURE — 99225 PR SBSQ OBSERVATION CARE/DAY 25 MINUTES: CPT | Performed by: STUDENT IN AN ORGANIZED HEALTH CARE EDUCATION/TRAINING PROGRAM

## 2019-01-15 PROCEDURE — 63710000001 INSULIN DETEMIR PER 5 UNITS: Performed by: FAMILY MEDICINE

## 2019-01-15 PROCEDURE — 94760 N-INVAS EAR/PLS OXIMETRY 1: CPT

## 2019-01-15 PROCEDURE — 80048 BASIC METABOLIC PNL TOTAL CA: CPT | Performed by: FAMILY MEDICINE

## 2019-01-15 PROCEDURE — 82962 GLUCOSE BLOOD TEST: CPT

## 2019-01-15 PROCEDURE — 94799 UNLISTED PULMONARY SVC/PX: CPT

## 2019-01-15 PROCEDURE — 63710000001 INSULIN ASPART PER 5 UNITS: Performed by: FAMILY MEDICINE

## 2019-01-15 PROCEDURE — 93306 TTE W/DOPPLER COMPLETE: CPT

## 2019-01-15 RX ADMIN — INSULIN ASPART 3 UNITS: 100 INJECTION, SOLUTION INTRAVENOUS; SUBCUTANEOUS at 21:29

## 2019-01-15 RX ADMIN — TAMSULOSIN HYDROCHLORIDE 0.4 MG: 0.4 CAPSULE ORAL at 21:28

## 2019-01-15 RX ADMIN — BUDESONIDE AND FORMOTEROL FUMARATE DIHYDRATE 2 PUFF: 160; 4.5 AEROSOL RESPIRATORY (INHALATION) at 20:07

## 2019-01-15 RX ADMIN — BUSPIRONE HYDROCHLORIDE 5 MG: 5 TABLET ORAL at 08:55

## 2019-01-15 RX ADMIN — Medication 400 MG: at 08:55

## 2019-01-15 RX ADMIN — METOPROLOL SUCCINATE 25 MG: 25 TABLET, EXTENDED RELEASE ORAL at 08:55

## 2019-01-15 RX ADMIN — OLANZAPINE 10 MG: 10 TABLET, FILM COATED ORAL at 08:55

## 2019-01-15 RX ADMIN — ASPIRIN 81 MG: 81 TABLET, COATED ORAL at 08:55

## 2019-01-15 RX ADMIN — NITROGLYCERIN 0.5 INCH: 20 OINTMENT TOPICAL at 11:34

## 2019-01-15 RX ADMIN — Medication 400 MG: at 21:28

## 2019-01-15 RX ADMIN — PANTOPRAZOLE SODIUM 40 MG: 40 TABLET, DELAYED RELEASE ORAL at 08:55

## 2019-01-15 RX ADMIN — SODIUM CHLORIDE, PRESERVATIVE FREE 3 ML: 5 INJECTION INTRAVENOUS at 21:28

## 2019-01-15 RX ADMIN — BUDESONIDE AND FORMOTEROL FUMARATE DIHYDRATE 2 PUFF: 160; 4.5 AEROSOL RESPIRATORY (INHALATION) at 07:12

## 2019-01-15 RX ADMIN — ATORVASTATIN CALCIUM 20 MG: 20 TABLET, FILM COATED ORAL at 08:55

## 2019-01-15 RX ADMIN — INSULIN DETEMIR 13 UNITS: 100 INJECTION, SOLUTION SUBCUTANEOUS at 21:28

## 2019-01-15 RX ADMIN — BUSPIRONE HYDROCHLORIDE 5 MG: 5 TABLET ORAL at 21:28

## 2019-01-15 RX ADMIN — SODIUM CHLORIDE, PRESERVATIVE FREE 3 ML: 5 INJECTION INTRAVENOUS at 08:57

## 2019-01-15 RX ADMIN — LOSARTAN POTASSIUM 50 MG: 50 TABLET, FILM COATED ORAL at 08:55

## 2019-01-15 RX ADMIN — SERTRALINE HYDROCHLORIDE 200 MG: 50 TABLET ORAL at 08:55

## 2019-01-15 RX ADMIN — FLUTICASONE PROPIONATE 2 SPRAY: 50 SPRAY, METERED NASAL at 08:57

## 2019-01-15 RX ADMIN — INSULIN ASPART 8 UNITS: 100 INJECTION, SOLUTION INTRAVENOUS; SUBCUTANEOUS at 08:55

## 2019-01-15 NOTE — PLAN OF CARE
Problem: Patient Care Overview  Goal: Plan of Care Review  Outcome: Ongoing (interventions implemented as appropriate)   01/15/19 0134   Coping/Psychosocial   Plan of Care Reviewed With patient   Plan of Care Review   Progress no change   OTHER   Outcome Summary no c/o cp since arrival to [facility]

## 2019-01-15 NOTE — PLAN OF CARE
Problem: Patient Care Overview  Goal: Plan of Care Review  Outcome: Ongoing (interventions implemented as appropriate)    Goal: Individualization and Mutuality  Outcome: Ongoing (interventions implemented as appropriate)    Goal: Discharge Needs Assessment  Outcome: Ongoing (interventions implemented as appropriate)    Goal: Interprofessional Rounds/Family Conf  Outcome: Ongoing (interventions implemented as appropriate)      Problem: Cardiac: ACS (Acute Coronary Syndrome) (Adult)  Goal: Signs and Symptoms of Listed Potential Problems Will be Absent, Minimized or Managed (Cardiac: ACS)  Outcome: Ongoing (interventions implemented as appropriate)      Problem: Pain, Chronic (Adult)  Goal: Identify Related Risk Factors and Signs and Symptoms  Outcome: Outcome(s) achieved Date Met: 01/15/19    Goal: Acceptable Pain/Comfort Level and Functional Ability  Outcome: Ongoing (interventions implemented as appropriate)

## 2019-01-15 NOTE — PROGRESS NOTES
"FAMILY MEDICINE DAILY PROGRESS NOTE  NAME: Nirav Lind  : 1965  MRN: [id number]     LOS: 0 days     PROVIDER OF SERVICE: Conner Mills MD    Chief Complaint: Precordial pain    Subjective:     Interval History:  History taken from: patient  Pt is a 54 y/o  male who is admitted for record of chest pain.  Patient stated \"I feel good, but chest hasn't hurt\"    Patient denied any chest pain, chest pressure, fever, chills, nausea vomiting, diarrhea.  Overnight nursing reported no incidents.  Telemetry: No incidents, maintained sinus rhythm    Review of Systems:   Review of Systems   Constitutional: Negative for activity change, appetite change and fatigue.   HENT: Negative for sinus pain, sneezing and sore throat.    Respiratory: Negative for apnea, cough, choking, chest tightness and shortness of breath.    Cardiovascular: Negative for chest pain and leg swelling.   Gastrointestinal: Negative for abdominal distention, abdominal pain and anal bleeding.   Genitourinary: Negative for difficulty urinating, dysuria and enuresis.   Musculoskeletal: Negative for arthralgias and back pain.   Skin: Negative for color change.   Neurological: Negative for dizziness, facial asymmetry and headaches.   Psychiatric/Behavioral: Negative for agitation and behavioral problems.       Objective:     Vital Signs  Temp:  [97 °F (36.1 °C)-98 °F (36.7 °C)] 97.9 °F (36.6 °C)  Heart Rate:  [51-76] 51  Resp:  [18-20] 18  BP: (126-150)/(69-90) 130/72    Physical Exam  Physical Exam   Constitutional: He is oriented to person, place, and time. He appears well-developed and well-nourished. No distress.   HENT:   Head: Normocephalic and atraumatic.   Right Ear: External ear normal.   Left Ear: External ear normal.   Eyes: EOM are normal. Right eye exhibits no discharge. Left eye exhibits no discharge.   Neck: Normal range of motion. Neck supple. No tracheal deviation present. No thyromegaly present.   Cardiovascular: Normal " rate, regular rhythm and normal heart sounds.   Pulmonary/Chest: Effort normal and breath sounds normal.   Abdominal: Soft. Bowel sounds are normal.   Musculoskeletal: Normal range of motion. He exhibits no edema or deformity.   Neurological: He is alert and oriented to person, place, and time.   Skin: Skin is warm.   Psychiatric: He has a normal mood and affect.       Medication Review    Current Facility-Administered Medications:   •  acetaminophen (TYLENOL) tablet 650 mg, 650 mg, Oral, Q4H PRN, Leslie Urban MD  •  albuterol (PROVENTIL) nebulizer solution 0.083% 2.5 mg/3mL, 2.5 mg, Nebulization, Q6H PRN, Leslie Urban MD  •  aspirin EC tablet 81 mg, 81 mg, Oral, Daily, Leslie Urban MD  •  atorvastatin (LIPITOR) tablet 20 mg, 20 mg, Oral, Daily, Leslie Urban MD  •  budesonide-formoterol (SYMBICORT) 160-4.5 MCG/ACT inhaler 2 puff, 2 puff, Inhalation, BID - RT, Leslie Urban MD, 2 puff at 01/14/19 1941  •  busPIRone (BUSPAR) tablet 5 mg, 5 mg, Oral, Q12H, Leslie Urban MD, 5 mg at 01/14/19 2122  •  dextrose (D50W) 25 g/ 50mL Intravenous Solution 25 g, 25 g, Intravenous, Q15 Min PRN, Leslie Urban MD  •  dextrose (GLUTOSE) oral gel 15 g, 15 g, Oral, Q15 Min PRN, Leslie Urban MD  •  fluticasone (FLONASE) 50 MCG/ACT nasal spray 2 spray, 2 spray, Each Nare, Daily, Leslie Urban MD  •  glucagon (human recombinant) (GLUCAGEN DIAGNOSTIC) injection 1 mg, 1 mg, Subcutaneous, PRN, Leslie Urban MD  •  insulin aspart (novoLOG) injection 0-14 Units, 0-14 Units, Subcutaneous, 4x Daily AC & at Bedtime, Leslie Urban MD  •  insulin aspart (novoLOG) injection 8 Units, 8 Units, Subcutaneous, TID With Meals, Leslie Urban MD  •  insulin detemir (LEVEMIR) injection 13 Units, 13 Units, Subcutaneous, Nightly, Leslie Urban MD  •  losartan (COZAAR) tablet 50 mg, 50 mg, Oral, Daily, Leslie Urban MD, 50 mg at 01/14/19 6545  •   magnesium oxide (MAGOX) tablet 400 mg, 400 mg, Oral, BID, Leslie Urban MD, 400 mg at 01/14/19 2122  •  metoprolol succinate XL (TOPROL-XL) 24 hr tablet 25 mg, 25 mg, Oral, Daily, Leslie Urban MD  •  nitroglycerin (NITROSTAT) ointment 0.5 inch, 0.5 inch, Topical, Q6H, Leslie Urban MD, 0.5 inch at 01/14/19 1729  •  OLANZapine (zyPREXA) tablet 10 mg, 10 mg, Oral, Daily, Leslie Urban MD  •  ondansetron (ZOFRAN) tablet 4 mg, 4 mg, Oral, Q6H PRN **OR** ondansetron ODT (ZOFRAN-ODT) disintegrating tablet 4 mg, 4 mg, Oral, Q6H PRN **OR** ondansetron (ZOFRAN) injection 4 mg, 4 mg, Intravenous, Q6H PRN, Leslie Urban MD  •  pantoprazole (PROTONIX) EC tablet 40 mg, 40 mg, Oral, Daily, Leslie Urban MD, 40 mg at 01/14/19 1554  •  sennosides-docusate sodium (SENOKOT-S) 8.6-50 MG tablet 2 tablet, 2 tablet, Oral, Nightly PRN, Leslie Urban MD  •  sertraline (ZOLOFT) tablet 200 mg, 200 mg, Oral, Daily, Leslie Urban MD  •  sodium chloride 0.9 % flush 3 mL, 3 mL, Intravenous, Q12H, Leslie Urban MD  •  sodium chloride 0.9 % flush 3-10 mL, 3-10 mL, Intravenous, PRN, Leslie Urban MD  •  tamsulosin (FLOMAX) 24 hr capsule 0.4 mg, 0.4 mg, Oral, Nightly, Leslie Urban MD, 0.4 mg at 01/14/19 2122     Diagnostic Data    Lab Results (last 24 hours)     Procedure Component Value Units Date/Time    POC Glucose Once [[id number]]  (Normal) Collected:  01/14/19 2059    Specimen:  Blood Updated:  01/15/19 0551     Glucose 103 mg/dL      Comment: : [id number] GIULIA HANKSWNAMeter ID: [id number]       Troponin [[id number]]  (Normal) Collected:  01/15/19 0228    Specimen:  Blood Updated:  01/15/19 0300     Troponin I <0.012 ng/mL     CBC & Differential [[id number]] Collected:  01/15/19 0228    Specimen:  Blood Updated:  01/15/19 0256    Narrative:       The following orders were created for panel order CBC & Differential.  Procedure                                Abnormality         Status                     ---------                               -----------         ------                     CBC Auto Differential[[id number]]        Abnormal            Final result                 Please view results for these tests on the individual orders.    CBC Auto Differential [[id number]]  (Abnormal) Collected:  01/15/19 0228    Specimen:  Blood Updated:  01/15/19 0256     WBC 9.08 10*3/mm3      RBC 4.53 10*6/mm3      Hemoglobin 12.7 g/dL      Hematocrit 38.5 %      MCV 85.0 fL      MCH 28.0 pg      MCHC 33.0 g/dL      RDW 13.3 %      RDW-SD 40.9 fl      MPV 9.6 fL      Platelets 231 10*3/mm3      Neutrophil % 62.8 %      Lymphocyte % 23.5 %      Monocyte % 8.7 %      Eosinophil % 4.3 %      Basophil % 0.4 %      Immature Grans % 0.3 %      Neutrophils, Absolute 5.70 10*3/mm3      Lymphocytes, Absolute 2.13 10*3/mm3      Monocytes, Absolute 0.79 10*3/mm3      Eosinophils, Absolute 0.39 10*3/mm3      Basophils, Absolute 0.04 10*3/mm3      Immature Grans, Absolute 0.03 10*3/mm3     Basic Metabolic Panel [[id number]]  (Abnormal) Collected:  01/15/19 0228    Specimen:  Blood Updated:  01/15/19 0250     Glucose 110 mg/dL      BUN 15 mg/dL      Creatinine 1.20 mg/dL      Sodium 137 mmol/L      Potassium 4.1 mmol/L      Chloride 106 mmol/L      CO2 28.0 mmol/L      Calcium 8.7 mg/dL      eGFR  African Amer 76 mL/min/1.73      BUN/Creatinine Ratio 12.5     Anion Gap 3.0 mmol/L     Troponin [[id number]]  (Normal) Collected:  01/14/19 2052    Specimen:  Blood Updated:  01/14/19 2123     Troponin I <0.012 ng/mL     Urinalysis With Culture If Indicated - Urine, Clean Catch [[id number]]  (Abnormal) Collected:  01/14/19 2009    Specimen:  Urine, Clean Catch Updated:  01/14/19 2023     Color, UA Yellow     Appearance, UA Cloudy     pH, UA 5.5     Specific Gravity, UA 1.029     Glucose,  mg/dL (Trace)     Ketones, UA Negative     Bilirubin, UA Negative     Blood, UA Negative     Protein,  UA Trace     Leuk Esterase, UA Negative     Nitrite, UA Negative     Urobilinogen, UA 0.2 E.U./dL    Narrative:       Urine microscopic not indicated.    POC Glucose Once [[id number]]  (Abnormal) Collected:  01/14/19 1646    Specimen:  Blood Updated:  01/14/19 1713     Glucose 135 mg/dL      Comment: RN NotifiedOperator: [id number] GWEN FISCHERYMeter ID: [id number]       BNP [[id number]]  (Normal) Collected:  01/14/19 1506    Specimen:  Blood Updated:  01/14/19 1542     proBNP 59.3 pg/mL     Troponin [[id number]]  (Normal) Collected:  01/14/19 1506    Specimen:  Blood Updated:  01/14/19 1542     Troponin I <0.012 ng/mL     Magnesium [[id number]]  (Normal) Collected:  01/14/19 1506    Specimen:  Blood Updated:  01/14/19 1531     Magnesium 2.1 mg/dL     [place] Draw [[id number]] Collected:  01/14/19 1236    Specimen:  Blood Updated:  01/14/19 1346    Narrative:       The following orders were created for panel order [place] Draw.  Procedure                               Abnormality         Status                     ---------                               -----------         ------                     Light Blue Top[[id number]]                                   Final result               Green Top (Gel)[[id number]]                                  Final result               Lavender Top[[id number]]                                     Final result               Gold Top - SST[[id number]]                                   Final result                 Please view results for these tests on the individual orders.    Light Blue Top [[id number]] Collected:  01/14/19 1236    Specimen:  Blood Updated:  01/14/19 1346     Extra Tube hold for add-on     Comment: Auto resulted       Gold Top - SST [[id number]] Collected:  01/14/19 1236    Specimen:  Blood Updated:  01/14/19 1346     Extra Tube Hold for add-ons.     Comment: Auto resulted.       Green Top (Gel) [[id number]] Collected:  01/14/19 1236    Specimen:  Blood Updated:  01/14/19 1346      Extra Tube Hold for add-ons.     Comment: Auto resulted.       Lavender Top [[id number]] Collected:  01/14/19 1236    Specimen:  Blood Updated:  01/14/19 1346     Extra Tube hold for add-on     Comment: Auto resulted       Troponin [[id number]]  (Normal) Collected:  01/14/19 1236    Specimen:  Blood Updated:  01/14/19 1326     Troponin I <0.012 ng/mL     Comprehensive Metabolic Panel [[id number]]  (Abnormal) Collected:  01/14/19 1236    Specimen:  Blood Updated:  01/14/19 1315     Glucose 106 mg/dL      BUN 12 mg/dL      Creatinine 1.02 mg/dL      Sodium 138 mmol/L      Potassium 3.9 mmol/L      Chloride 106 mmol/L      CO2 28.0 mmol/L      Calcium 9.2 mg/dL      Total Protein 7.1 g/dL      Albumin 4.10 g/dL      ALT (SGPT) 38 U/L      AST (SGOT) 26 U/L      Alkaline Phosphatase 104 U/L      Total Bilirubin 0.2 mg/dL      eGFR  African Amer 92 mL/min/1.73      Globulin 3.0 gm/dL      A/G Ratio 1.4 g/dL      BUN/Creatinine Ratio 11.8     Anion Gap 4.0 mmol/L     CBC & Differential [[id number]] Collected:  01/14/19 1236    Specimen:  Blood Updated:  01/14/19 1250    Narrative:       The following orders were created for panel order CBC & Differential.  Procedure                               Abnormality         Status                     ---------                               -----------         ------                     CBC Auto Differential[[id number]]        Abnormal            Final result                 Please view results for these tests on the individual orders.    CBC Auto Differential [[id number]]  (Abnormal) Collected:  01/14/19 1236    Specimen:  Blood Updated:  01/14/19 1250     WBC 9.72 10*3/mm3      RBC 4.75 10*6/mm3      Hemoglobin 13.5 g/dL      Hematocrit 39.8 %      MCV 83.8 fL      MCH 28.4 pg      MCHC 33.9 g/dL      RDW 13.1 %      RDW-SD 39.7 fl      MPV 10.0 fL      Platelets 239 10*3/mm3      Neutrophil % 78.2 %      Lymphocyte % 12.6 %      Monocyte % 6.1 %      Eosinophil % 2.2 %       Basophil % 0.3 %      Immature Grans % 0.6 %      Neutrophils, Absolute 7.61 10*3/mm3      Lymphocytes, Absolute 1.22 10*3/mm3      Monocytes, Absolute 0.59 10*3/mm3      Eosinophils, Absolute 0.21 10*3/mm3      Basophils, Absolute 0.03 10*3/mm3      Immature Grans, Absolute 0.06 10*3/mm3            Imaging Results (last 24 hours)     Procedure Component Value Units Date/Time    XR Chest 1 View [[id number]] Collected:  01/14/19 1351     Updated:  01/14/19 1412    Narrative:         EXAM:         Radiograph(s), Chest   VIEWS:   Frontal  ; 1       DATE/TIME:  1/14/2019 2:10 PM CST                INDICATION:   chest pain, R07.9 Chest pain, unspecified    COMPARISON:  CXR: 7/20/18             FINDINGS:             - lines/tubes:    none     - cardiac:         size within normal limits         - mediastinum: contour within normal limits         - lungs:         no focal air space process, pulmonary  interstitial edema, nodule(s)/mass             - pleura:         no evidence of  fluid                  - osseous:         unremarkable for age                  - misc.:         Impression:       CONCLUSION:        1. No evidence of an active cardiopulmonary process.                                                              Electronically signed by:  ALTAF Wade MD  1/14/2019 2:11  PM CST Workstation: [id number]          I reviewed the patient's new clinical results.    Assessment/Plan:     Active Hospital Problems    Diagnosis   • **Precordial pain     -Regadenoson Stress Test With Myocardial Perfusion SPECT in June 2018 was consistent with low risk study  -HEART score: 4 points - moderate score  -CARLEY score: 63 points - 1.2% probability of death from admission to 6 months  -Troponin negative x3; BNP WNL  -Monitoring cardiac activity with telemetry  -Will provide PRN nitroglycernin  -Encourage risk factor modification: ASA, statin, weight loss, tighter blood pressure and glycemic control       • Dysuria      -Urinalysis negative for infection     • Type 2 diabetes mellitus with skin complication, with long-term current use of insulin (CMS/HCC)     -Hemoglobin A1c: 6.5% in November 2018   continue metformin (home dose)  continue insulin regimen: 13 units basal insulin nightly and 8 units short acting insulin with meals  -Will provide ADA diet  -Monitoring glucose with regular fingersticks and SSI     • COPD (chronic obstructive pulmonary disease) (CMS/HCC)     -Patient is using maintenance inhalers (Spriva and Symbicort) inconsistently and intermittently. Will provide patient with Symbicort during IP course.   -Providing PRN albuterol.   patient follow up with pulmonology for PFT.      • Class 2 severe obesity due to excess calories with serious comorbidity and body mass index (BMI) of 35.0 to 35.9 in adult (CMS/HCC)     -Body mass index is 35.93 kg/m².  -Nutrition consult - appreciate recommendations  -Recommend lifestyle modifications - 30 minutes of moderate intensity activity at least 5 days a week     • Essential hypertension     -Continue home antihypertensive medication: Losartan and Toprol  -Monitoring BP per hospital protocol       Will continue to evaluate and monitor pt's course and will adjust treatment and care accordingly.     DVT prophylaxis: SCDs/TEDs PADUA SCORE 1  Code Status and Medical Interventions:   Ordered at: 01/14/19 1442     Level Of Support Discussed With:    Patient     Code Status:    CPR     Medical Interventions (Level of Support Prior to Arrest):    Full       Plan for disposition:Home today      Time: 20 min     Conner Mills MD  Family Medicine Resident PGY1  [facility]        This document has been electronically signed by Conner Mills MD on January 15, 2019 6:38 AM

## 2019-01-16 VITALS
DIASTOLIC BLOOD PRESSURE: 73 MMHG | HEIGHT: 69 IN | OXYGEN SATURATION: 94 % | TEMPERATURE: 97.8 F | SYSTOLIC BLOOD PRESSURE: 136 MMHG | HEART RATE: 62 BPM | BODY MASS INDEX: 35.07 KG/M2 | RESPIRATION RATE: 16 BRPM | WEIGHT: 236.8 LBS

## 2019-01-16 PROBLEM — R07.2 PRECORDIAL PAIN: Status: RESOLVED | Noted: 2019-01-14 | Resolved: 2019-01-16

## 2019-01-16 PROBLEM — R30.0 DYSURIA: Status: RESOLVED | Noted: 2019-01-14 | Resolved: 2019-01-16

## 2019-01-16 PROBLEM — I51.9 LEFT VENTRICULAR DIASTOLIC DYSFUNCTION: Status: ACTIVE | Noted: 2019-01-16

## 2019-01-16 LAB
ANION GAP SERPL CALCULATED.3IONS-SCNC: 9 MMOL/L (ref 5–15)
BASOPHILS # BLD AUTO: 0.02 10*3/MM3 (ref 0–0.2)
BASOPHILS NFR BLD AUTO: 0.2 % (ref 0–2)
BUN BLD-MCNC: 17 MG/DL (ref 7–21)
BUN/CREAT SERPL: 16.5 (ref 7–25)
CALCIUM SPEC-SCNC: 8.5 MG/DL (ref 8.4–10.2)
CHLORIDE SERPL-SCNC: 106 MMOL/L (ref 95–110)
CO2 SERPL-SCNC: 25 MMOL/L (ref 22–31)
CREAT BLD-MCNC: 1.03 MG/DL (ref 0.7–1.3)
DEPRECATED RDW RBC AUTO: 41.1 FL (ref 35.1–43.9)
EOSINOPHIL # BLD AUTO: 0.38 10*3/MM3 (ref 0–0.7)
EOSINOPHIL NFR BLD AUTO: 4 % (ref 0–7)
ERYTHROCYTE [DISTWIDTH] IN BLOOD BY AUTOMATED COUNT: 13.4 % (ref 11.5–14.5)
GFR SERPL CREATININE-BSD FRML MDRD: 91 ML/MIN/1.73 (ref 56–130)
GLUCOSE BLD-MCNC: 114 MG/DL (ref 60–100)
GLUCOSE BLDC GLUCOMTR-MCNC: 123 MG/DL (ref 70–130)
GLUCOSE BLDC GLUCOMTR-MCNC: 144 MG/DL (ref 70–130)
HCT VFR BLD AUTO: 39.8 % (ref 39–49)
HGB BLD-MCNC: 13.3 G/DL (ref 13.7–17.3)
IMM GRANULOCYTES # BLD AUTO: 0.03 10*3/MM3 (ref 0–0.02)
IMM GRANULOCYTES NFR BLD AUTO: 0.3 % (ref 0–0.5)
LYMPHOCYTES # BLD AUTO: 2.05 10*3/MM3 (ref 0.6–4.2)
LYMPHOCYTES NFR BLD AUTO: 21.7 % (ref 10–50)
MCH RBC QN AUTO: 28.4 PG (ref 26.5–34)
MCHC RBC AUTO-ENTMCNC: 33.4 G/DL (ref 31.5–36.3)
MCV RBC AUTO: 85 FL (ref 80–98)
MONOCYTES # BLD AUTO: 0.71 10*3/MM3 (ref 0–0.9)
MONOCYTES NFR BLD AUTO: 7.5 % (ref 0–12)
NEUTROPHILS # BLD AUTO: 6.26 10*3/MM3 (ref 2–8.6)
NEUTROPHILS NFR BLD AUTO: 66.3 % (ref 37–80)
PLATELET # BLD AUTO: 241 10*3/MM3 (ref 150–450)
PMV BLD AUTO: 9.9 FL (ref 8–12)
POTASSIUM BLD-SCNC: 4.2 MMOL/L (ref 3.5–5.1)
RBC # BLD AUTO: 4.68 10*6/MM3 (ref 4.37–5.74)
SODIUM BLD-SCNC: 140 MMOL/L (ref 137–145)
WBC NRBC COR # BLD: 9.45 10*3/MM3 (ref 3.2–9.8)

## 2019-01-16 PROCEDURE — 80048 BASIC METABOLIC PNL TOTAL CA: CPT | Performed by: FAMILY MEDICINE

## 2019-01-16 PROCEDURE — 99217 PR OBSERVATION CARE DISCHARGE MANAGEMENT: CPT | Performed by: STUDENT IN AN ORGANIZED HEALTH CARE EDUCATION/TRAINING PROGRAM

## 2019-01-16 PROCEDURE — 94799 UNLISTED PULMONARY SVC/PX: CPT

## 2019-01-16 PROCEDURE — 94760 N-INVAS EAR/PLS OXIMETRY 1: CPT

## 2019-01-16 PROCEDURE — G0378 HOSPITAL OBSERVATION PER HR: HCPCS

## 2019-01-16 PROCEDURE — 82962 GLUCOSE BLOOD TEST: CPT

## 2019-01-16 PROCEDURE — 85025 COMPLETE CBC W/AUTO DIFF WBC: CPT | Performed by: FAMILY MEDICINE

## 2019-01-16 PROCEDURE — 63710000001 INSULIN ASPART PER 5 UNITS: Performed by: FAMILY MEDICINE

## 2019-01-16 RX ORDER — METOPROLOL SUCCINATE 50 MG/1
50 TABLET, EXTENDED RELEASE ORAL DAILY
Qty: 30 TABLET | Refills: 0 | Status: SHIPPED | OUTPATIENT
Start: 2019-01-17 | End: 2019-04-04 | Stop reason: SDUPTHER

## 2019-01-16 RX ORDER — METOPROLOL SUCCINATE 50 MG/1
50 TABLET, EXTENDED RELEASE ORAL DAILY
Status: DISCONTINUED | OUTPATIENT
Start: 2019-01-16 | End: 2019-01-16 | Stop reason: HOSPADM

## 2019-01-16 RX ADMIN — LOSARTAN POTASSIUM 50 MG: 50 TABLET, FILM COATED ORAL at 09:35

## 2019-01-16 RX ADMIN — NITROGLYCERIN 0.5 INCH: 20 OINTMENT TOPICAL at 06:20

## 2019-01-16 RX ADMIN — BUSPIRONE HYDROCHLORIDE 5 MG: 5 TABLET ORAL at 09:34

## 2019-01-16 RX ADMIN — BUDESONIDE AND FORMOTEROL FUMARATE DIHYDRATE 2 PUFF: 160; 4.5 AEROSOL RESPIRATORY (INHALATION) at 08:25

## 2019-01-16 RX ADMIN — FLUTICASONE PROPIONATE 2 SPRAY: 50 SPRAY, METERED NASAL at 09:37

## 2019-01-16 RX ADMIN — PANTOPRAZOLE SODIUM 40 MG: 40 TABLET, DELAYED RELEASE ORAL at 09:34

## 2019-01-16 RX ADMIN — ASPIRIN 81 MG: 81 TABLET, COATED ORAL at 09:34

## 2019-01-16 RX ADMIN — ATORVASTATIN CALCIUM 20 MG: 20 TABLET, FILM COATED ORAL at 09:34

## 2019-01-16 RX ADMIN — OLANZAPINE 10 MG: 10 TABLET, FILM COATED ORAL at 09:34

## 2019-01-16 RX ADMIN — SERTRALINE HYDROCHLORIDE 200 MG: 50 TABLET ORAL at 09:34

## 2019-01-16 RX ADMIN — Medication 400 MG: at 09:34

## 2019-01-16 RX ADMIN — METOPROLOL SUCCINATE 50 MG: 50 TABLET, EXTENDED RELEASE ORAL at 09:39

## 2019-01-16 RX ADMIN — INSULIN ASPART 8 UNITS: 100 INJECTION, SOLUTION INTRAVENOUS; SUBCUTANEOUS at 09:40

## 2019-01-16 RX ADMIN — SODIUM CHLORIDE, PRESERVATIVE FREE 3 ML: 5 INJECTION INTRAVENOUS at 09:40

## 2019-01-16 NOTE — PLAN OF CARE
Problem: Patient Care Overview  Goal: Plan of Care Review  Outcome: Ongoing (interventions implemented as appropriate)   01/15/19 1340 01/15/19 2100   Coping/Psychosocial   Plan of Care Reviewed With --  patient   Plan of Care Review   Progress no change --      Goal: Individualization and Mutuality  Outcome: Ongoing (interventions implemented as appropriate)    Goal: Discharge Needs Assessment  Outcome: Ongoing (interventions implemented as appropriate)   01/14/19 1449   Disability   Equipment Currently Used at Home glucometer;cane, straight   Discharge Needs Assessment   Patient/Family Anticipates Transition to home with family   Patient/Family Anticipated Services at Transition none   Transportation Concerns car, none   Transportation Anticipated family or friend will provide     Goal: Interprofessional Rounds/Family Conf  Outcome: Ongoing (interventions implemented as appropriate)      Problem: Cardiac: ACS (Acute Coronary Syndrome) (Adult)  Goal: Signs and Symptoms of Listed Potential Problems Will be Absent, Minimized or Managed (Cardiac: ACS)  Outcome: Ongoing (interventions implemented as appropriate)   01/15/19 1340   Goal/Outcome Evaluation   Problems Assessed (Acute Coronary Syndrome) all   Problems Present (Acute Coronary Syn) none       Problem: Pain, Chronic (Adult)  Goal: Acceptable Pain/Comfort Level and Functional Ability  Outcome: Ongoing (interventions implemented as appropriate)   01/16/19 0414   Pain, Chronic (Adult)   Acceptable Pain/Comfort Level and Functional Ability making progress toward outcome

## 2019-01-16 NOTE — DISCHARGE SUMMARY
FAMILY MEDICINE SERVICE   HOSPITAL DISCHARGE SUMMARY    PATIENT NAME: Nirav Lind   PHYSICIAN: Conner Mills MD   : 1965  MRN: [id number]    ADMITTED: 2019  DISCHARGED: 19     ADMISSION DIAGNOSES:  Active Hospital Problems    Diagnosis Date Noted   • Left ventricular diastolic dysfunction [I51.9] 2019   • Type 2 diabetes mellitus with skin complication, with long-term current use of insulin (CMS/HCC) [E11.628, Z79.4] 2017   • COPD (chronic obstructive pulmonary disease) (CMS/[facility]) [J44.9] 2017   • Class 2 severe obesity due to excess calories with serious comorbidity and body mass index (BMI) of 35.0 to 35.9 in adult (CMS/[facility]) [E66.01, Z68.35]    • Essential hypertension [I10] 2016      Resolved Hospital Problems    Diagnosis Date Noted Date Resolved   • **Precordial pain [R07.2] 2019   • Dysuria [R30.0] 2019     DISCHARGE DIAGNOSES:   Active Hospital Problems    Diagnosis Date Noted   • Left ventricular diastolic dysfunction [I51.9] 2019   • Type 2 diabetes mellitus with skin complication, with long-term current use of insulin (CMS/[facility]) [E11.628, Z79.4] 2017   • COPD (chronic obstructive pulmonary disease) (CMS/[facility]) [J44.9] 2017   • Class 2 severe obesity due to excess calories with serious comorbidity and body mass index (BMI) of 35.0 to 35.9 in adult (CMS/[facility]) [E66.01, Z68.35]    • Essential hypertension [I10] 2016      Resolved Hospital Problems    Diagnosis Date Noted Date Resolved   • **Precordial pain [R07.2] 2019   • Dysuria [R30.0] 2019       SERVICE: Family Medicine. Attending Dr Koo, Resident Conner Mills MD    CONSULTS:   Consult Orders (all) (From admission, onward)    Start     Ordered    19 1344  Family Practice - Resident (on-call MD unless specified)  Once     Specialty:  Family Medicine  Provider:  Leslie Urban MD    19 5331           PROCEDURES:   · Echocardiogram -1/15/19- Left ventricular wall thickness is consistent with mild concentric hypertrophy.  Left ventricular diastolic dysfunction (grade I) consistent with impaired relaxation.      HISTORY OF PRESENT ILLNESS (from admission H&P):   Patient is a 53 y.o. male presented with a concurrent medical history of essential hypertension, type II diabetes mellitus, and obesity who presents to the ED with new onset, bilateral chest pain and pressure that began at rest today. Chest pain and pressure did not radiate beyond the chest. Associated symptoms include dyspnea, dizziness, and nausea. Denies any visual disturbance or vomiting. Dyspnea improved some with albuterol inhaler.      In the ED patient's EKG revealed NSR with no ST segment deviations. Initial troponin and BNP were within normal limits. CXR revealed no radiographic evidence of acute cardiopulmonary disease. Given patient's risk factors (obesity, hypertension, diabetes, and family history), decision was made to admit patient for observation.     Copied from Dr Urban's Admission H&P.     DIAGNOSTIC DATA:   Lab Results   Component Value Date    WBC 9.45 01/16/2019    HGB 13.3 (L) 01/16/2019    HCT 39.8 01/16/2019    MCV 85.0 01/16/2019     01/16/2019     Lab Results   Component Value Date    GLUCOSE 114 (H) 01/16/2019    BUN 17 01/16/2019    CREATININE 1.03 01/16/2019    EGFRIFAFRI 91 01/16/2019    BCR 16.5 01/16/2019    K 4.2 01/16/2019    CO2 25.0 01/16/2019    CALCIUM 8.5 01/16/2019    ALBUMIN 4.10 01/14/2019    AST 26 01/14/2019    ALT 38 01/14/2019     Imaging Results (last 24 hours)     ** No results found for the last 24 hours. **             HOSPITAL COURSE:  Patient was admitted to the floors for further treatment and evaluation of new onset substernal chest pain.  In the ED patient's EKG revealed NSR with no ST segment deviations.  Troponins were trended negative x3, chest x-ray was obtained and showed no  evidence of acute cardiopulmonary disease.    Patient was diagnosed with precordial chest pain.  Troponins were trended.  Cardiac activity was monitored with telemetry.  Due to concerns of shortness of air on exertion, echocardiogram was obtained which read grade 1 left ventricular diastolic dysfunction with preserved EF.  EF was 61-65%.  Due to evidence showed by echocardiogram, patient's metoprolol was increased to 50 mg once a day.  Patient was walked and oxygen saturation monitored.  Patient's oxygen saturation did not drop below 94% on room air.     Patient improved during course of hospital stay.  Patient was stable upon discharge.  Patient was advised to follow-up with PCP. Jeremy Mahmood MD within one week.     DISCHARGE CONDITION:   Stable    DISPOSITION:  Home or Self Care [1]  home    DISCHARGE MEDICATIONS     Discharge Medications      Changes to Medications      Instructions Start Date   Insulin Glargine 300 UNIT/ML solution pen-injector  Commonly known as:  TOUJEO SOLOSTAR  What changed:  how much to take   13 Units, Subcutaneous, Daily      metoprolol succinate XL 25 MG 24 hr tablet  Commonly known as:  TOPROL-XL  What changed:  Another medication with the same name was added. Make sure you understand how and when to take each.   25 mg, Oral, Daily      metoprolol succinate XL 50 MG 24 hr tablet  Commonly known as:  TOPROL-XL  What changed:  You were already taking a medication with the same name, and this prescription was added. Make sure you understand how and when to take each.   50 mg, Oral, Daily         Continue These Medications      Instructions Start Date   albuterol sulfate  (90 Base) MCG/ACT inhaler  Commonly known as:  VENTOLIN HFA   2 puffs, Inhalation, Every 4 Hours PRN      ASPIRIN LOW DOSE 81 MG EC tablet  Generic drug:  aspirin   TAKE 1 TABLET BY MOUTH EVERY DAY      atorvastatin 20 MG tablet  Commonly known as:  LIPITOR   20 mg, Oral, Daily      B-D UF III MINI PEN  NEEDLES 31G X 5 MM misc  Generic drug:  Insulin Pen Needle   Injecting once daily up to 3 times daily      budesonide-formoterol 160-4.5 MCG/ACT inhaler  Commonly known as:  SYMBICORT   2 puffs, Inhalation, 2 Times Daily - RT      busPIRone 5 MG tablet  Commonly known as:  BUSPAR   TK 1 T PO BID      fexofenadine 180 MG tablet  Commonly known as:  ALLEGRA ALLERGY   180 mg, Oral, Daily      fluticasone 50 MCG/ACT nasal spray  Commonly known as:  FLONASE   2 sprays, Nasal, Daily      glucose blood test strip  Commonly known as:  ONE TOUCH ULTRA TEST   1 each, Other, 3 Times Daily, Use as instructed      HM STERILE ALCOHOL PREP pads   1 each, Does not apply, 4 Times Daily      Insulin Lispro 100 UNIT/ML solution pen-injector  Commonly known as:  HUMALOG KWIKPEN   Inject up to 8 units before meals and 2 on sliding scale      losartan 50 MG tablet  Commonly known as:  COZAAR   50 mg, Oral, Daily      MAGNESIUM-OXIDE 400 (241.3 Mg) MG tablet tablet  Generic drug:  magnesium oxide   TK 1 T PO BID      metFORMIN  MG 24 hr tablet  Commonly known as:  GLUCOPHAGE-XR   TAKE 1 TABLET BY MOUTH EVERY DAY.      OLANZapine 10 MG tablet  Commonly known as:  zyPREXA   10 mg, Oral, Daily      ondansetron 4 MG tablet  Commonly known as:  ZOFRAN   TAKE 1 TABLET BY MOUTH EVERY 8 HOURS AS NEEDED FOR NAUSEA OR VOMITING      pantoprazole 40 MG EC tablet  Commonly known as:  PROTONIX   40 mg, Oral, Daily      sertraline 100 MG tablet  Commonly known as:  ZOLOFT   200 mg, Oral, Daily      tamsulosin 0.4 MG capsule 24 hr capsule  Commonly known as:  FLOMAX   1 capsule, Oral, Nightly      tiotropium 18 MCG per inhalation capsule  Commonly known as:  SPIRIVA HANDIHALER   1 capsule, Inhalation, Daily - RT      vitamin D 52743 units capsule capsule  Commonly known as:  ERGOCALCIFEROL   50,000 Units, Oral, Every 14 Days         Stop These Medications    amitriptyline 25 MG tablet  Commonly known as:  ELAVIL            INSTRUCTIONS:  Activity:    Activity Instructions     Activity as Tolerated      Activity as Tolerated          Diet:   Diet Instructions     Diet: Consistent Carbohydrate, Cardiac      Discharge Diet:   Consistent Carbohydrate  Cardiac       Diet: Regular, Cardiac      Discharge Diet:   Regular  Cardiac           Special instructions: Patient instructed to call MD or return to ED with worsening shortness of breath, chest pain, fever greater than 100.4 degrees F or any other medical concerns..    FOLLOW UP:   Additional Instructions for the Follow-ups that You Need to Schedule     Discharge Follow-up with PCP   As directed       Currently Documented PCP:    Jreemy Mahmood MD    PCP Phone Number:    [phone]     Follow Up Details:  Follow up for chest pain         Discharge Follow-up with PCP   As directed       Currently Documented PCP:    Jeremy Mahmood MD    PCP Phone Number:    [phone]     Follow Up Details:  follow up in one week           Follow-up Information     Jeremy Mahmood MD. Schedule an appointment as soon as possible for a visit in 1 week(s).    Specialty:  Family Medicine  Why:  Follow up for chest pain....Will see Dr. Urban Tuesday January 22, 2019 at 1:30 pm   Contact information:  [address] [place] [address] [phone]             Jeremy Mahmood MD .    Specialty:  Family Medicine  Why:  follow up in one week  Contact information:  [address] [place] [address] [phone]                   PENDING TEST RESULTS AT DISCHARGE  none     Time: 20 min    Dr Koo is the attending at time of discharge, is aware of the patient's status and agrees with the above discharge summary.    Signed      Conner Mills MD  Family Medicine Resident PGY1  [facility]      This document has been electronically signed by Conner Mills MD on 01/16/19 2:21 PM

## 2019-01-16 NOTE — PROGRESS NOTES
Discharge Planning Assessment  [facility]     Patient Name: Nirav Lind  MRN: [id number]  Today's Date: 1/16/2019    Admit Date: 1/14/2019    Discharge Needs Assessment     Row Name 01/16/19 1007       Living Environment    Lives With  spouse;child(raven), dependent Caregiver for 16 yr old niece.     Current Living Arrangements  home/apartment/condo Information on face sheet confirmed with patient.     Primary Care Provided by  self    Provides Primary Care For  no one    Family Caregiver if Needed  spouse    Quality of Family Relationships  helpful;involved;supportive    Able to Return to Prior Arrangements  yes       Resource/Environmental Concerns    Transportation Concerns  car, none Patient voiced support/assistance from spouse and family.        Transition Planning    Patient/Family Anticipates Transition to  home with family    Patient/Family Anticipated Services at Transition  none    Transportation Anticipated  family or friend will provide       Discharge Needs Assessment    Readmission Within the Last 30 Days  no previous admission in last 30 days    Equipment Currently Used at Home  cane, straight;glucometer    Anticipated Changes Related to Illness  none    Equipment Needed After Discharge  none    Current Discharge Risk  chronically ill DM. Patient reports compliance with DM management.        Discharge Plan     Row Name 01/16/19 1010       Plan    Plan  home with no services    Plan Comments  LACE complete. Patient denies need for home health: transition visit. Plans to return home @ d/c....[facility]        Destination      No service coordination in this encounter.      Durable Medical Equipment      No service coordination in this encounter.      Dialysis/Infusion      No service coordination in this encounter.      Home Medical Care      No service coordination in this encounter.      Community Resources      No service coordination in this encounter.        Expected Discharge Date  and Time     Expected Discharge Date Expected Discharge Time    Jan 16, 2019         Demographic Summary     Row Name 01/16/19 1005       General Information    Admission Type  observation    Arrived From  home    Referral Source  high risk screening    Preferred Language  English     Used During This Interaction  no        Functional Status     Row Name 01/16/19 1005       Functional Status    Usual Activity Tolerance  good    Functional Status Comments  Patient is independent with ADL's. Patient uses a straight cane to assist @ times with ambulation.        Functional Status, IADL    Medications  independent Pharmacy, [facility], and coverage confirmed with patient.     Meal Preparation  independent    Housekeeping  independent    Laundry  independent    Shopping  independent       Mental Status Summary    Recent Changes in Mental Status/Cognitive Functioning  no changes       Employment/    Employment Status  disabled        Psychosocial    No documentation.       Abuse/Neglect    No documentation.       Legal    No documentation.       Substance Abuse    No documentation.       Patient Forms    No documentation.           JAREK Abdul

## 2019-01-16 NOTE — PROGRESS NOTES
"FAMILY MEDICINE DAILY PROGRESS NOTE  NAME: Nirav Lind  : 1965  MRN: [id number]     LOS: 0 days     PROVIDER OF SERVICE: Conner Mills MD    Chief Complaint: Precordial pain    Subjective:     Interval History:  History taken from: patient  Pt is a 52 y/o  male who is admitted for care of chest pain.pt stated \" I feel good, I want to go home\"  Patient denied any chest pain, chest pressure, fever, chills, nausea vomiting, diarrhea.  Overnight nursing reported no incidents.    Pt is being walked and monitored for desaturations.     Review of Systems:   Review of Systems   Constitutional: Negative for activity change, appetite change and fatigue.   HENT: Negative for sinus pain, sneezing and sore throat.    Respiratory: Negative for apnea, cough, choking, chest tightness and shortness of breath.    Cardiovascular: Negative for chest pain and leg swelling.   Gastrointestinal: Negative for abdominal distention, abdominal pain and anal bleeding.   Genitourinary: Negative for difficulty urinating, dysuria and enuresis.   Musculoskeletal: Negative for arthralgias and back pain.   Skin: Negative for color change.   Neurological: Negative for dizziness, facial asymmetry and headaches.   Psychiatric/Behavioral: Negative for agitation and behavioral problems.       Objective:     Vital Signs  Temp:  [97.1 °F (36.2 °C)-98.8 °F (37.1 °C)] 97.5 °F (36.4 °C)  Heart Rate:  [56-91] 91  Resp:  [16-18] 18  BP: (115-144)/(69-82) 125/76    Physical Exam  Physical Exam   Constitutional: He is oriented to person, place, and time. He appears well-developed and well-nourished. No distress.   HENT:   Head: Normocephalic and atraumatic.   Right Ear: External ear normal.   Left Ear: External ear normal.   Eyes: EOM are normal. Right eye exhibits no discharge. Left eye exhibits no discharge.   Neck: Normal range of motion. Neck supple. No tracheal deviation present. No thyromegaly present.   Cardiovascular: Normal " rate, regular rhythm and normal heart sounds.   Pulmonary/Chest: Effort normal and breath sounds normal.   Abdominal: Soft. Bowel sounds are normal.   Musculoskeletal: Normal range of motion. He exhibits no edema or deformity.   Neurological: He is alert and oriented to person, place, and time.   Skin: Skin is warm.   Psychiatric: He has a normal mood and affect.       Medication Review    Current Facility-Administered Medications:   •  acetaminophen (TYLENOL) tablet 650 mg, 650 mg, Oral, Q4H PRN, Leslie Urban MD  •  albuterol (PROVENTIL) nebulizer solution 0.083% 2.5 mg/3mL, 2.5 mg, Nebulization, Q6H PRN, Leslie Urban MD  •  aspirin EC tablet 81 mg, 81 mg, Oral, Daily, Leslie Urban MD, 81 mg at 01/15/19 0855  •  atorvastatin (LIPITOR) tablet 20 mg, 20 mg, Oral, Daily, Leslie Urban MD, 20 mg at 01/15/19 0855  •  budesonide-formoterol (SYMBICORT) 160-4.5 MCG/ACT inhaler 2 puff, 2 puff, Inhalation, BID - RT, Leslie Urban MD, 2 puff at 01/15/19 2007  •  busPIRone (BUSPAR) tablet 5 mg, 5 mg, Oral, Q12H, Leslie Urban MD, 5 mg at 01/15/19 2128  •  dextrose (D50W) 25 g/ 50mL Intravenous Solution 25 g, 25 g, Intravenous, Q15 Min PRN, Leslie Urban MD  •  dextrose (GLUTOSE) oral gel 15 g, 15 g, Oral, Q15 Min PRN, Leslie Urban MD  •  fluticasone (FLONASE) 50 MCG/ACT nasal spray 2 spray, 2 spray, Each Nare, Daily, Leslie Urban MD, 2 spray at 01/15/19 0857  •  glucagon (human recombinant) (GLUCAGEN DIAGNOSTIC) injection 1 mg, 1 mg, Subcutaneous, PRN, Leslie Urban MD  •  insulin aspart (novoLOG) injection 0-14 Units, 0-14 Units, Subcutaneous, 4x Daily AC & at Bedtime, Leslie Urban MD, 3 Units at 01/15/19 2129  •  insulin aspart (novoLOG) injection 8 Units, 8 Units, Subcutaneous, TID With Meals, Leslie Urban MD, 8 Units at 01/15/19 0855  •  insulin detemir (LEVEMIR) injection 13 Units, 13 Units, Subcutaneous, Nightly,  Leslie Urban MD, 13 Units at 01/15/19 2128  •  losartan (COZAAR) tablet 50 mg, 50 mg, Oral, Daily, Leslie Urban MD, 50 mg at 01/15/19 0855  •  magnesium oxide (MAGOX) tablet 400 mg, 400 mg, Oral, BID, Leslie Urban MD, 400 mg at 01/15/19 2128  •  metoprolol succinate XL (TOPROL-XL) 24 hr tablet 25 mg, 25 mg, Oral, Daily, Leslie Urban MD, 25 mg at 01/15/19 0855  •  nitroglycerin (NITROSTAT) ointment 0.5 inch, 0.5 inch, Topical, Q6H, Leslie Urban MD, 0.5 inch at 01/16/19 0620  •  OLANZapine (zyPREXA) tablet 10 mg, 10 mg, Oral, Daily, Leslie Urban MD, 10 mg at 01/15/19 0855  •  ondansetron (ZOFRAN) tablet 4 mg, 4 mg, Oral, Q6H PRN **OR** ondansetron ODT (ZOFRAN-ODT) disintegrating tablet 4 mg, 4 mg, Oral, Q6H PRN **OR** ondansetron (ZOFRAN) injection 4 mg, 4 mg, Intravenous, Q6H PRN, Leslie Urban MD  •  pantoprazole (PROTONIX) EC tablet 40 mg, 40 mg, Oral, Daily, Leslie Urban MD, 40 mg at 01/15/19 0855  •  sennosides-docusate sodium (SENOKOT-S) 8.6-50 MG tablet 2 tablet, 2 tablet, Oral, Nightly PRN, Leslie Urban MD  •  sertraline (ZOLOFT) tablet 200 mg, 200 mg, Oral, Daily, Leslie Urban MD, 200 mg at 01/15/19 0855  •  sodium chloride 0.9 % flush 3 mL, 3 mL, Intravenous, Q12H, Leslie Urban MD, 3 mL at 01/15/19 2128  •  sodium chloride 0.9 % flush 3-10 mL, 3-10 mL, Intravenous, PRN, Leslie Urban MD  •  tamsulosin (FLOMAX) 24 hr capsule 0.4 mg, 0.4 mg, Oral, Nightly, Leslie Urban MD, 0.4 mg at 01/15/19 5143     Diagnostic Data    Lab Results (last 24 hours)     Procedure Component Value Units Date/Time    Basic Metabolic Panel [[id number]]  (Abnormal) Collected:  01/16/19 0503    Specimen:  Blood Updated:  01/16/19 0601     Glucose 114 mg/dL      BUN 17 mg/dL      Creatinine 1.03 mg/dL      Sodium 140 mmol/L      Potassium 4.2 mmol/L      Chloride 106 mmol/L      CO2 25.0 mmol/L      Calcium 8.5 mg/dL       eGFR   Amer 91 mL/min/1.73      BUN/Creatinine Ratio 16.5     Anion Gap 9.0 mmol/L     CBC & Differential [[id number]] Collected:  01/16/19 0503    Specimen:  Blood Updated:  01/16/19 0541    Narrative:       The following orders were created for panel order CBC & Differential.  Procedure                               Abnormality         Status                     ---------                               -----------         ------                     CBC Auto Differential[[id number]]        Abnormal            Final result                 Please view results for these tests on the individual orders.    CBC Auto Differential [[id number]]  (Abnormal) Collected:  01/16/19 0503    Specimen:  Blood Updated:  01/16/19 0541     WBC 9.45 10*3/mm3      RBC 4.68 10*6/mm3      Hemoglobin 13.3 g/dL      Hematocrit 39.8 %      MCV 85.0 fL      MCH 28.4 pg      MCHC 33.4 g/dL      RDW 13.4 %      RDW-SD 41.1 fl      MPV 9.9 fL      Platelets 241 10*3/mm3      Neutrophil % 66.3 %      Lymphocyte % 21.7 %      Monocyte % 7.5 %      Eosinophil % 4.0 %      Basophil % 0.2 %      Immature Grans % 0.3 %      Neutrophils, Absolute 6.26 10*3/mm3      Lymphocytes, Absolute 2.05 10*3/mm3      Monocytes, Absolute 0.71 10*3/mm3      Eosinophils, Absolute 0.38 10*3/mm3      Basophils, Absolute 0.02 10*3/mm3      Immature Grans, Absolute 0.03 10*3/mm3     POC Glucose Once [[id number]]  (Abnormal) Collected:  01/15/19 1930    Specimen:  Blood Updated:  01/15/19 2035     Glucose 156 mg/dL      Comment: RN NotifiedOperator: [id number] [facility]: [id number]       POC Glucose Once [[id number]]  (Normal) Collected:  01/15/19 1514    Specimen:  Blood Updated:  01/15/19 1628     Glucose 123 mg/dL      Comment: RN NotifiedOperator: [id number] [facility] ID: [id number]       POC Glucose Once [[id number]]  (Normal) Collected:  01/15/19 1124    Specimen:  Blood Updated:  01/15/19 1140     Glucose 87 mg/dL      Comment: :  [id number] GWEN FISCHERYMeter ID: [id number]       POC Glucose Once [[id number]]  (Normal) Collected:  01/15/19 0630    Specimen:  Blood Updated:  01/15/19 0754     Glucose 93 mg/dL      Comment: : [id number] GIULIA COKEReter ID: [id number]              Imaging Results (last 24 hours)     ** No results found for the last 24 hours. **          I reviewed the patient's new clinical results.    Assessment/Plan:     Active Hospital Problems    Diagnosis   • **Precordial pain     -Regadenoson Stress Test With Myocardial Perfusion SPECT in June 2018 was consistent with low risk study  -HEART score: 4 points - moderate score  -CARLEY score: 63 points - 1.2% probability of death from admission to 6 months  -Troponin negative x3; BNP WNL  -Monitoring cardiac activity with telemetry  -Will provide PRN nitroglycernin  -Encourage risk factor modification:   ASA, statin, weight loss, tighter blood pressure and glycemic control       • Dysuria     -Urinalysis negative for infection     • Type 2 diabetes mellitus with skin complication, with long-term current use of insulin (CMS/HCC)     -Hemoglobin A1c: 6.5% in November 2018   continue metformin (home dose)  continue insulin regimen: 13 units basal insulin nightly and 8 units short acting insulin with meals  -Will provide ADA diet  -Monitoring glucose with regular fingersticks and SSI     • COPD (chronic obstructive pulmonary disease) (CMS/HCC)     -Patient is using maintenance inhalers (Spriva and Symbicort) inconsistently and intermittently. Will provide patient with Symbicort during IP course.   -Providing PRN albuterol.   patient follow up with pulmonology for PFT.      • Class 2 severe obesity due to excess calories with serious comorbidity and body mass index (BMI) of 35.0 to 35.9 in adult (CMS/HCC)     -Body mass index is 35.93 kg/m².  -Nutrition consult - appreciate recommendations  -Recommend lifestyle modifications - 30 minutes of moderate intensity activity  at least 5 days a week     • Essential hypertension     -Continue home antihypertensive medication: Losartan and Toprol  -Monitoring BP per hospital protocol       Will continue to evaluate and monitor pt's course and will adjust treatment and care accordingly.     DVT prophylaxis: SCDs/TEDs PADUA SCORE 1  Code Status and Medical Interventions:   Ordered at: 01/14/19 1442     Level Of Support Discussed With:    Patient     Code Status:    CPR     Medical Interventions (Level of Support Prior to Arrest):    Full       Plan for disposition:Home today      Time: 20 min     Conner Mills MD  Family Medicine Resident PGY1  [facility]        This document has been electronically signed by Conner Mills MD on January 16, 2019 6:34 AM

## 2019-01-17 ENCOUNTER — READMISSION MANAGEMENT (OUTPATIENT)
Dept: CALL CENTER | Facility: HOSPITAL | Age: 54
End: 2019-01-17

## 2019-01-17 NOTE — OUTREACH NOTE
Prep Survey      Responses   Facility patient discharged from?  [facility]   Is patient eligible?  Yes   Discharge diagnosis  Left ventricular diastolic dysfunction, IDDM II with skin complication, COPD, Class 2 severe obistiy, essential HTN   Does the patient have one of the following disease processes/diagnoses(primary or secondary)?  Other   Does the patient have Home health ordered?  No   Is there a DME ordered?  No   Comments regarding appointments  See AVS   Prep survey completed?  Yes          Eboni Shen RN

## 2019-01-18 ENCOUNTER — READMISSION MANAGEMENT (OUTPATIENT)
Dept: CALL CENTER | Facility: HOSPITAL | Age: 54
End: 2019-01-18

## 2019-01-18 NOTE — OUTREACH NOTE
Medical Week 1 Survey      Responses   Facility patient discharged from?  [facility]   Does the patient have one of the following disease processes/diagnoses(primary or secondary)?  Other   Is there a successful TCM telephone encounter documented?  No   Week 1 attempt successful?  No   Unsuccessful attempts  Attempt 1          Eboni López RN

## 2019-01-20 ENCOUNTER — READMISSION MANAGEMENT (OUTPATIENT)
Dept: CALL CENTER | Facility: HOSPITAL | Age: 54
End: 2019-01-20

## 2019-01-20 NOTE — OUTREACH NOTE
Medical Week 1 Survey      Responses   Facility patient discharged from?  [facility]   Does the patient have one of the following disease processes/diagnoses(primary or secondary)?  Other   Is there a successful TCM telephone encounter documented?  No   Week 1 attempt successful?  No   Unsuccessful attempts  Attempt 2          Rupal Harden RN

## 2019-01-22 ENCOUNTER — OFFICE VISIT (OUTPATIENT)
Dept: FAMILY MEDICINE CLINIC | Facility: CLINIC | Age: 54
End: 2019-01-22

## 2019-01-22 VITALS
HEIGHT: 69 IN | HEART RATE: 65 BPM | WEIGHT: 234.56 LBS | SYSTOLIC BLOOD PRESSURE: 118 MMHG | OXYGEN SATURATION: 98 % | BODY MASS INDEX: 34.74 KG/M2 | DIASTOLIC BLOOD PRESSURE: 74 MMHG

## 2019-01-22 DIAGNOSIS — M94.0 COSTOCHONDRITIS: ICD-10-CM

## 2019-01-22 DIAGNOSIS — Z09 HOSPITAL DISCHARGE FOLLOW-UP: Primary | ICD-10-CM

## 2019-01-22 PROCEDURE — 99213 OFFICE O/P EST LOW 20 MIN: CPT | Performed by: FAMILY MEDICINE

## 2019-01-22 RX ORDER — MELOXICAM 7.5 MG/1
TABLET ORAL
Qty: 30 TABLET | Refills: 0 | Status: SHIPPED | OUTPATIENT
Start: 2019-01-22 | End: 2019-01-22 | Stop reason: SDUPTHER

## 2019-01-22 RX ORDER — MELOXICAM 7.5 MG/1
TABLET ORAL
Qty: 90 TABLET | Refills: 0 | Status: SHIPPED | OUTPATIENT
Start: 2019-01-22 | End: 2019-08-14

## 2019-01-24 ENCOUNTER — READMISSION MANAGEMENT (OUTPATIENT)
Dept: CALL CENTER | Facility: HOSPITAL | Age: 54
End: 2019-01-24

## 2019-01-24 NOTE — OUTREACH NOTE
Medical Week 2 Survey      Responses   Facility patient discharged from?  [facility]   Does the patient have one of the following disease processes/diagnoses(primary or secondary)?  Other   Week 2 attempt successful?  No   Unsuccessful attempts  Attempt 1          Charito Lipscomb RN

## 2019-01-25 ENCOUNTER — READMISSION MANAGEMENT (OUTPATIENT)
Dept: CALL CENTER | Facility: HOSPITAL | Age: 54
End: 2019-01-25

## 2019-01-25 NOTE — OUTREACH NOTE
Medical Week 2 Survey      Responses   Facility patient discharged from?  [facility]   Does the patient have one of the following disease processes/diagnoses(primary or secondary)?  Other   Week 2 attempt successful?  No   Unsuccessful attempts  Attempt 2          Genevieve Bar RN

## 2019-01-30 DIAGNOSIS — R10.11 RIGHT UPPER QUADRANT PAIN: ICD-10-CM

## 2019-01-30 RX ORDER — MELOXICAM 15 MG/1
15 TABLET ORAL DAILY
Qty: 90 TABLET | Refills: 3 | OUTPATIENT
Start: 2019-01-30

## 2019-01-30 RX ORDER — ATORVASTATIN CALCIUM 20 MG/1
20 TABLET, FILM COATED ORAL DAILY
Qty: 90 TABLET | Refills: 0 | OUTPATIENT
Start: 2019-01-30

## 2019-02-12 DIAGNOSIS — E11.42 DIABETIC POLYNEUROPATHY ASSOCIATED WITH TYPE 2 DIABETES MELLITUS (HCC): ICD-10-CM

## 2019-02-12 RX ORDER — METFORMIN HYDROCHLORIDE 750 MG/1
TABLET, EXTENDED RELEASE ORAL
Qty: 90 TABLET | Refills: 0 | Status: SHIPPED | OUTPATIENT
Start: 2019-02-12 | End: 2019-05-12 | Stop reason: SDUPTHER

## 2019-03-03 RX ORDER — PANTOPRAZOLE SODIUM 40 MG/1
40 TABLET, DELAYED RELEASE ORAL DAILY
Qty: 30 TABLET | Refills: 0 | OUTPATIENT
Start: 2019-03-03

## 2019-03-03 RX ORDER — FEXOFENADINE HCL 180 MG/1
180 TABLET ORAL DAILY
Qty: 30 TABLET | Refills: 0 | OUTPATIENT
Start: 2019-03-03

## 2019-03-04 RX ORDER — LOSARTAN POTASSIUM 50 MG/1
50 TABLET ORAL DAILY
Qty: 90 TABLET | Refills: 0 | Status: SHIPPED | OUTPATIENT
Start: 2019-03-04 | End: 2020-03-11

## 2019-03-04 RX ORDER — PANTOPRAZOLE SODIUM 40 MG/1
40 TABLET, DELAYED RELEASE ORAL DAILY
Qty: 90 TABLET | Refills: 0 | Status: SHIPPED | OUTPATIENT
Start: 2019-03-04 | End: 2019-03-26 | Stop reason: SDUPTHER

## 2019-03-04 NOTE — TELEPHONE ENCOUNTER
Requested Medications      Name from pharmacy: PANTOPRAZOLE 40MG TABLETS         Will file in chart as: pantoprazole (PROTONIX) 40 MG EC tablet    Sig: Take 1 tablet by mouth Daily.    Original sig: TAKE 1 TABLET BY MOUTH DAILY    Disp:  90 tablet    Refills:  0    Start: 3/4/2019    Class: Normal    To pharmacy: **Patient requests 90 days supply**    Requested on: 3/4/2019    Last ordered: 9 months ago by Jeremy Mahmood MD Last refill: 9/15/2018    Rx #: [id number]        Name from pharmacy: LOSARTAN 50MG TABLETS         Will file in chart as: losartan (COZAAR) 50 MG tablet    The source prescription was reordered on 12/14/2018 by Jeremy Mahmood MD.    Sig: TAKE 1 TABLET BY MOUTH DAILY    Disp:  90 tablet    Refills:  0    Start: 3/4/2019    Class: Normal    Requested on: 3/4/2019    Last ordered: 3 months ago by Jeremy Mahmood MD Last refill: 2/14/2019    Rx #: [id number]       To be filled at: [facility] [address] [place] [address] [phone] [facility] [phone]

## 2019-03-05 ENCOUNTER — TELEPHONE (OUTPATIENT)
Dept: FAMILY MEDICINE CLINIC | Facility: CLINIC | Age: 54
End: 2019-03-05

## 2019-03-05 ENCOUNTER — APPOINTMENT (OUTPATIENT)
Dept: LAB | Facility: HOSPITAL | Age: 54
End: 2019-03-05

## 2019-03-05 LAB
25(OH)D3 SERPL-MCNC: 31.5 NG/ML (ref 30–100)
ALBUMIN SERPL-MCNC: 4.6 G/DL (ref 3.4–4.8)
ALBUMIN UR-MCNC: 7.3 MG/L
ALBUMIN/GLOB SERPL: 1 G/DL (ref 1.1–1.8)
ALP SERPL-CCNC: 133 U/L (ref 38–126)
ALT SERPL W P-5'-P-CCNC: 38 U/L (ref 21–72)
ANION GAP SERPL CALCULATED.3IONS-SCNC: 7 MMOL/L (ref 5–15)
ARTICHOKE IGE QN: 121 MG/DL (ref 1–129)
AST SERPL-CCNC: 93 U/L (ref 17–59)
BASOPHILS # BLD AUTO: 0.07 10*3/MM3 (ref 0–0.2)
BASOPHILS NFR BLD AUTO: 0.8 % (ref 0–1.5)
BILIRUB SERPL-MCNC: 0.5 MG/DL (ref 0.2–1.3)
BUN BLD-MCNC: 13 MG/DL (ref 7–21)
BUN/CREAT SERPL: 11.8 (ref 7–25)
CALCIUM SPEC-SCNC: 9.6 MG/DL (ref 8.4–10.2)
CHLORIDE SERPL-SCNC: 102 MMOL/L (ref 95–110)
CHOLEST SERPL-MCNC: 202 MG/DL (ref 0–199)
CO2 SERPL-SCNC: 27 MMOL/L (ref 22–31)
CREAT BLD-MCNC: 1.1 MG/DL (ref 0.7–1.3)
CREAT UR-MCNC: 412.1 MG/DL
CREAT UR-MCNC: 412.1 MG/DL
DEPRECATED RDW RBC AUTO: 40.3 FL (ref 37–54)
EOSINOPHIL # BLD AUTO: 0.24 10*3/MM3 (ref 0–0.4)
EOSINOPHIL NFR BLD AUTO: 2.6 % (ref 0.3–6.2)
ERYTHROCYTE [DISTWIDTH] IN BLOOD BY AUTOMATED COUNT: 13 % (ref 12.3–15.4)
GFR SERPL CREATININE-BSD FRML MDRD: 84 ML/MIN/1.73 (ref 56–130)
GLOBULIN UR ELPH-MCNC: 4.4 GM/DL (ref 2.3–3.5)
GLUCOSE BLD-MCNC: 108 MG/DL (ref 60–100)
HBA1C MFR BLD: 6.4 % (ref 4–5.6)
HCT VFR BLD AUTO: 47.6 % (ref 37.5–51)
HDLC SERPL-MCNC: 32 MG/DL (ref 60–200)
HGB BLD-MCNC: 14.8 G/DL (ref 13–17.7)
IMM GRANULOCYTES # BLD AUTO: 0.02 10*3/MM3 (ref 0–0.05)
IMM GRANULOCYTES NFR BLD AUTO: 0.2 % (ref 0–0.5)
LDLC/HDLC SERPL: 4.39 {RATIO} (ref 0–3.55)
LYMPHOCYTES # BLD AUTO: 1.77 10*3/MM3 (ref 0.7–3.1)
LYMPHOCYTES NFR BLD AUTO: 19 % (ref 19.6–45.3)
MCH RBC QN AUTO: 26.5 PG (ref 26.6–33)
MCHC RBC AUTO-ENTMCNC: 31.1 G/DL (ref 31.5–35.7)
MCV RBC AUTO: 85.3 FL (ref 79–97)
MICROALBUMIN/CREAT UR: 17.7 MG/G (ref 0–30)
MONOCYTES # BLD AUTO: 0.78 10*3/MM3 (ref 0.1–0.9)
MONOCYTES NFR BLD AUTO: 8.4 % (ref 5–12)
NEUTROPHILS # BLD AUTO: 6.45 10*3/MM3 (ref 1.4–7)
NEUTROPHILS NFR BLD AUTO: 69 % (ref 42.7–76)
NRBC BLD AUTO-RTO: 0 /100 WBC (ref 0–0)
PLATELET # BLD AUTO: 317 10*3/MM3 (ref 140–450)
PMV BLD AUTO: 9.8 FL (ref 6–12)
POTASSIUM BLD-SCNC: 4.1 MMOL/L (ref 3.5–5.1)
PROT SERPL-MCNC: 9 G/DL (ref 6.3–8.6)
PROT UR-MCNC: 9.3 MG/DL
PROT/CREAT UR: 22.6 MG/G CREA (ref 0–200)
RBC # BLD AUTO: 5.58 10*6/MM3 (ref 4.14–5.8)
SODIUM BLD-SCNC: 136 MMOL/L (ref 137–145)
TRIGL SERPL-MCNC: 148 MG/DL (ref 20–199)
TSH SERPL DL<=0.05 MIU/L-ACNC: 2.67 MIU/ML (ref 0.46–4.68)
VIT B12 BLD-MCNC: 413 PG/ML (ref 239–931)
WBC NRBC COR # BLD: 9.33 10*3/MM3 (ref 3.4–10.8)

## 2019-03-05 PROCEDURE — 80053 COMPREHEN METABOLIC PANEL: CPT | Performed by: NURSE PRACTITIONER

## 2019-03-05 PROCEDURE — 83036 HEMOGLOBIN GLYCOSYLATED A1C: CPT | Performed by: NURSE PRACTITIONER

## 2019-03-05 PROCEDURE — 82043 UR ALBUMIN QUANTITATIVE: CPT | Performed by: NURSE PRACTITIONER

## 2019-03-05 PROCEDURE — 85025 COMPLETE CBC W/AUTO DIFF WBC: CPT | Performed by: NURSE PRACTITIONER

## 2019-03-05 PROCEDURE — 82570 ASSAY OF URINE CREATININE: CPT | Performed by: NURSE PRACTITIONER

## 2019-03-05 PROCEDURE — 82306 VITAMIN D 25 HYDROXY: CPT | Performed by: NURSE PRACTITIONER

## 2019-03-05 PROCEDURE — 82607 VITAMIN B-12: CPT | Performed by: NURSE PRACTITIONER

## 2019-03-05 PROCEDURE — 80061 LIPID PANEL: CPT | Performed by: NURSE PRACTITIONER

## 2019-03-05 PROCEDURE — 36415 COLL VENOUS BLD VENIPUNCTURE: CPT | Performed by: NURSE PRACTITIONER

## 2019-03-05 PROCEDURE — 84156 ASSAY OF PROTEIN URINE: CPT | Performed by: NURSE PRACTITIONER

## 2019-03-05 PROCEDURE — 84443 ASSAY THYROID STIM HORMONE: CPT | Performed by: NURSE PRACTITIONER

## 2019-03-05 RX ORDER — METOPROLOL SUCCINATE 50 MG/1
50 TABLET, EXTENDED RELEASE ORAL DAILY
Qty: 30 TABLET | Refills: 0 | Status: CANCELLED | OUTPATIENT
Start: 2019-03-05

## 2019-03-05 RX ORDER — PANTOPRAZOLE SODIUM 40 MG/1
40 TABLET, DELAYED RELEASE ORAL DAILY
Qty: 90 TABLET | Refills: 0 | Status: CANCELLED | OUTPATIENT
Start: 2019-03-05

## 2019-03-05 NOTE — TELEPHONE ENCOUNTER
Patient called about getting a refill on his medications.    fexofenadine (ALLEGRA ALLERGY) 180 MG tablet    metoprolol succinate XL (TOPROL-XL) 50 MG 24 hr tablet    pantoprazole (PROTONIX) 40 MG EC tablet

## 2019-03-07 RX ORDER — FEXOFENADINE HCL 180 MG/1
180 TABLET ORAL DAILY
Qty: 30 TABLET | Refills: 1 | Status: SHIPPED | OUTPATIENT
Start: 2019-03-07 | End: 2019-06-12 | Stop reason: SDUPTHER

## 2019-03-11 RX ORDER — PANTOPRAZOLE SODIUM 40 MG/1
40 TABLET, DELAYED RELEASE ORAL DAILY
Qty: 90 TABLET | Refills: 0 | OUTPATIENT
Start: 2019-03-11

## 2019-03-13 DIAGNOSIS — R10.11 RIGHT UPPER QUADRANT PAIN: ICD-10-CM

## 2019-03-13 RX ORDER — MELOXICAM 15 MG/1
15 TABLET ORAL DAILY
Qty: 30 TABLET | Refills: 0 | OUTPATIENT
Start: 2019-03-13

## 2019-03-13 NOTE — TELEPHONE ENCOUNTER
Requested Medications      Name from pharmacy: MELOXICAM 15MG TABLETS         Will file in chart as: meloxicam (MOBIC) 15 MG tablet    The source prescription was discontinued on 12/14/2018 by Jeremy Mahmood MD.    Sig: Take 1 tablet by mouth Daily.    Original sig: TAKE 1 TABLET BY MOUTH DAILY    Disp:  30 tablet    Refills:  0    Start: 3/13/2019    Class: Normal    For: Right upper quadrant pain    Requested on: 3/13/2019    Last ordered: 4 months ago by Jeremy Mahmood MD Last refill: 2/11/2019    Rx #: [id number]       To be filled at: [facility] [id number] [place] [address] AT [facility] [phone] [facility] [phone]

## 2019-03-15 ENCOUNTER — OFFICE VISIT (OUTPATIENT)
Dept: ENDOCRINOLOGY | Facility: CLINIC | Age: 54
End: 2019-03-15

## 2019-03-15 VITALS
SYSTOLIC BLOOD PRESSURE: 130 MMHG | HEART RATE: 73 BPM | BODY MASS INDEX: 34.21 KG/M2 | WEIGHT: 231 LBS | DIASTOLIC BLOOD PRESSURE: 82 MMHG | HEIGHT: 69 IN

## 2019-03-15 DIAGNOSIS — E55.9 VITAMIN D DEFICIENCY: ICD-10-CM

## 2019-03-15 DIAGNOSIS — R74.02 NONSPECIFIC ELEVATION OF LEVELS OF TRANSAMINASE AND LACTIC ACID DEHYDROGENASE (LDH): ICD-10-CM

## 2019-03-15 DIAGNOSIS — E78.2 MIXED HYPERLIPIDEMIA: ICD-10-CM

## 2019-03-15 DIAGNOSIS — I10 ESSENTIAL HYPERTENSION: ICD-10-CM

## 2019-03-15 DIAGNOSIS — R74.01 NONSPECIFIC ELEVATION OF LEVELS OF TRANSAMINASE AND LACTIC ACID DEHYDROGENASE (LDH): ICD-10-CM

## 2019-03-15 DIAGNOSIS — E11.49 DM (DIABETES MELLITUS), TYPE 2 WITH NEUROLOGICAL COMPLICATIONS (HCC): Primary | ICD-10-CM

## 2019-03-15 DIAGNOSIS — R74.8 ELEVATED LIVER ENZYMES: ICD-10-CM

## 2019-03-15 PROCEDURE — 99214 OFFICE O/P EST MOD 30 MIN: CPT | Performed by: NURSE PRACTITIONER

## 2019-03-15 NOTE — PROGRESS NOTES
Subjective    Nirav Lind is a 53 y.o. male. he is here today for follow-up.    History of Present Illness      Duration/Timing: Diabetes mellitus type 2, onset of symptoms gradual   dm dx at age 51 y      constant     not controlled      severity high      Patient was admitted to the hospital for chest pain but cardiac was ruled out      Severity (Complications/Hospitalizations)  Secondary Macrovascular Complications: No CAD, No CVA, No PAD  Secondary Microvascular Complications: No Microalbuminuria, No Diabetic Retinopathy, No Diabetic Neuropathy     Context  Diabetes Regimen: Insulin, Oral Medications,       Lab Results   Component Value Date    HGBA1C 6.4 (H) 03/05/2019                         Blood Glucose Readings     Blood glucose log ranging from 88 up to 144      Diet  adhering to 1800 calorie diet   Exercise: Does not exercise     Associated Signs/Symptoms  Hyperglycemic Symptoms: No polyuria, No polydipsia, No polyphagia  Hypoglycemic Episodes: No documented hypoglycemia since last visit                 The following portions of the patient's history were reviewed and updated as appropriate:   Past Medical History:   Diagnosis Date   • Abnormal computed tomography scan    • Adenomatous polyp of colon    • Allergic rhinitis    • Anemia    • Chronic back pain    • Coronary arteriosclerosis    • Depression    • Diabetes mellitus (CMS/HCC)    • Diabetic polyneuropathy associated with type 2 diabetes mellitus (CMS/HCC) 7/17/2017   • Dizziness    • Dyspnea     unspecified   • Epigastric pain    • Essential hypertension    • Ex-smoker    • Hemangioma of liver    • Hyperlipidemia    • Infected hydrocele     with orchitis and epididymis   • Nausea    • Nausea with vomiting    • Obesity with body mass index of 30.0 - 39.9    • Pain in right knee    • Right upper quadrant pain    • Type II diabetes mellitus, uncontrolled (CMS/HCC)    • Upper respiratory infection    • Urgency of urination    • Villous adenoma  of colon      Past Surgical History:   Procedure Laterality Date   • CARDIAC CATHETERIZATION  11/30/2015    No evidence of any obstructive disease in the circumflex, the RCA , or LAD. 1st diagonal branch in the midportion with 20-30% stenosis. Preserved LV systolic function with EF 55%.   • COLONOSCOPY  01/21/2013    Normal   • COLONOSCOPY W/ POLYPECTOMY  10/13/2014    A single polyp was found in the colon; removed by snare cautery polypectomy.   • ENDOSCOPY  06/15/2016    Mildly severe esophagitis. Several biopsies obtained in the upper third of the esophagus.   • INCISION AND DRAINAGE ABSCESS  10/29/2014    Incision and drainage of scrotal abscess. Hydrocelectomy, bottle procedure.   • INJECTION OF MEDICATION  01/26/2016    Kenalog     Family History   Problem Relation Age of Onset   • Cancer Mother         leukemia   • Heart disease Mother    • Heart disease Sister    • Lung disease Father    • Heart disease Maternal Grandmother    • Heart disease Maternal Grandfather        Current Outpatient Medications   Medication Sig Dispense Refill   • albuterol (VENTOLIN HFA) 108 (90 Base) MCG/ACT inhaler Inhale 2 puffs Every 4 (Four) Hours As Needed for Wheezing. 1 inhaler 3   • Alcohol Swabs ( STERILE ALCOHOL PREP) pads 1 each 4 (Four) Times a Day. 200 each 3   • ASPIRIN LOW DOSE 81 MG EC tablet TAKE 1 TABLET BY MOUTH EVERY DAY 30 tablet 0   • atorvastatin (LIPITOR) 20 MG tablet Take 1 tablet by mouth Daily. 90 tablet 0   • B-D UF III MINI PEN NEEDLES 31G X 5 MM misc Injecting once daily up to 3 times daily 150 each 11   • budesonide-formoterol (SYMBICORT) 160-4.5 MCG/ACT inhaler Inhale 2 puffs 2 (Two) Times a Day. 1 inhaler 3   • busPIRone (BUSPAR) 5 MG tablet TK 1 T PO BID  3   • fexofenadine (ALLEGRA ALLERGY) 180 MG tablet Take 1 tablet by mouth Daily. 30 tablet 1   • fluticasone (FLONASE) 50 MCG/ACT nasal spray 2 sprays into each nostril Daily. 9.9 mL 3   • glucose blood (ONE TOUCH ULTRA TEST) test strip 1 each by  Other route 3 (Three) Times a Day. Use as instructed 200 each 3   • Insulin Glargine (TOUJEO SOLOSTAR) 300 UNIT/ML solution pen-injector Inject 13 Units under the skin into the appropriate area as directed Daily. 3 pen 5   • Insulin Lispro (HUMALOG KWIKPEN) 100 UNIT/ML solution pen-injector Inject up to 8 units before meals and 2 on sliding scale 9 pen 5   • losartan (COZAAR) 50 MG tablet TAKE 1 TABLET BY MOUTH DAILY 90 tablet 0   • MAGNESIUM-OXIDE 400 (241.3 MG) MG tablet tablet TK 1 T PO BID  12   • meloxicam (MOBIC) 7.5 MG tablet TAKE 1 TABLET BY MOUTH ONCE DAILY WITH FOOD FOR 7 DAYS, THEN TAKE AS NEEDED 90 tablet 0   • metFORMIN ER (GLUCOPHAGE-XR) 750 MG 24 hr tablet TAKE 1 TABLET BY MOUTH EVERY DAY. 90 tablet 0   • metoprolol succinate XL (TOPROL-XL) 50 MG 24 hr tablet Take 1 tablet by mouth Daily. 30 tablet 0   • OLANZapine (ZYPREXA) 10 MG tablet Take 10 mg by mouth daily.     • ondansetron (ZOFRAN) 4 MG tablet TAKE 1 TABLET BY MOUTH EVERY 8 HOURS AS NEEDED FOR NAUSEA OR VOMITING 30 tablet 0   • pantoprazole (PROTONIX) 40 MG EC tablet TAKE 1 TABLET BY MOUTH DAILY 90 tablet 0   • sertraline (ZOLOFT) 100 MG tablet Take 2 tablets by mouth Daily. 60 tablet 3   • tamsulosin (FLOMAX) 0.4 MG capsule 24 hr capsule Take 1 capsule by mouth Every Night. 30 capsule 0   • tiotropium (SPIRIVA HANDIHALER) 18 MCG per inhalation capsule Place 1 capsule into inhaler and inhale Daily. 30 capsule 2   • vitamin D (ERGOCALCIFEROL) 57268 units capsule capsule Take 1 capsule by mouth Every 14 (Fourteen) Days. 4 capsule 11     No current facility-administered medications for this visit.      No Known Allergies  Social History     Socioeconomic History   • Marital status:      Spouse name: Not on file   • Number of children: Not on file   • Years of education: Not on file   • Highest education level: Not on file   Tobacco Use   • Smoking status: Former Smoker     Packs/day: 0.25     Years: 15.00     Pack years: 3.75     Last  "attempt to quit: 2000     Years since quittin.2   • Smokeless tobacco: Never Used   Substance and Sexual Activity   • Alcohol use: No     Frequency: Never   • Drug use: No   • Sexual activity: Not Currently     Comment: shortness of breath       Review of Systems  Review of Systems   Constitutional: Negative for activity change, appetite change, diaphoresis and fatigue.   HENT: Negative for facial swelling, sneezing, sore throat, tinnitus, trouble swallowing and voice change.    Eyes: Negative for photophobia, pain, discharge, redness, itching and visual disturbance.   Respiratory: Negative for apnea, cough, choking, chest tightness and shortness of breath.    Cardiovascular: Negative for chest pain, palpitations and leg swelling.   Gastrointestinal: Negative for abdominal distention, abdominal pain, constipation, diarrhea, nausea and vomiting.   Endocrine: Negative for cold intolerance, heat intolerance, polydipsia, polyphagia and polyuria.   Genitourinary: Negative for difficulty urinating, dysuria, frequency, hematuria and urgency.   Musculoskeletal: Negative for arthralgias, back pain, gait problem, joint swelling, myalgias, neck pain and neck stiffness.   Skin: Negative for color change, pallor, rash and wound.   Neurological: Negative for dizziness, tremors, weakness, light-headedness, numbness and headaches.   Hematological: Negative for adenopathy. Does not bruise/bleed easily.   Psychiatric/Behavioral: Negative for behavioral problems, confusion and sleep disturbance.        Objective    /82 (BP Location: Right arm, Patient Position: Sitting, Cuff Size: Adult)   Pulse 73   Ht 175.3 cm (69\")   Wt 105 kg (231 lb)   BMI 34.11 kg/m²   Physical Exam   Constitutional: He is oriented to person, place, and time. He appears well-developed and well-nourished. No distress.   HENT:   Head: Normocephalic and atraumatic.   Right Ear: External ear normal.   Left Ear: External ear normal.   Nose: Nose " normal.   Eyes: Conjunctivae and EOM are normal. Pupils are equal, round, and reactive to light.   Neck: Normal range of motion. Neck supple. No tracheal deviation present. No thyromegaly present.   Cardiovascular: Normal rate, regular rhythm and normal heart sounds.   No murmur heard.  Pulmonary/Chest: Effort normal and breath sounds normal. No respiratory distress. He has no wheezes.   Abdominal: Soft. Bowel sounds are normal. There is no tenderness. There is no rebound and no guarding.   Musculoskeletal: Normal range of motion. He exhibits no edema, tenderness or deformity.   Neurological: He is alert and oriented to person, place, and time. No cranial nerve deficit.   Skin: Skin is warm and dry. No rash noted.   Psychiatric: He has a normal mood and affect. His behavior is normal. Judgment and thought content normal.       Lab Review  Glucose (mg/dL)   Date Value   03/05/2019 108 (H)   01/16/2019 114 (H)   01/15/2019 110 (H)     Sodium (mmol/L)   Date Value   03/05/2019 136 (L)   01/16/2019 140   01/15/2019 137     Potassium (mmol/L)   Date Value   03/05/2019 4.1   01/16/2019 4.2   01/15/2019 4.1     Chloride (mmol/L)   Date Value   03/05/2019 102   01/16/2019 106   01/15/2019 106     CO2 (mmol/L)   Date Value   03/05/2019 27.0   01/16/2019 25.0   01/15/2019 28.0     BUN (mg/dL)   Date Value   03/05/2019 13   01/16/2019 17   01/15/2019 15     Creatinine (mg/dL)   Date Value   03/05/2019 1.10   01/16/2019 1.03   01/15/2019 1.20     Hemoglobin A1C (%)   Date Value   03/05/2019 6.4 (H)   11/08/2018 6.5 (H)   07/12/2018 6.2 (H)     Triglycerides (mg/dL)   Date Value   03/05/2019 148   11/08/2018 111   03/14/2018 89     LDL Cholesterol  (mg/dL)   Date Value   03/05/2019 121   11/08/2018 69   03/14/2018 55       Assessment/Plan      1. DM (diabetes mellitus), type 2 with neurological complications (CMS/HCC)    2. Mixed hyperlipidemia    3. Essential hypertension    4. Vitamin D deficiency    5. Elevated liver enzymes     6. Nonspecific elevation of levels of transaminase and lactic acid dehydrogenase (LDH)     .    Medications prescribed:  Outpatient Encounter Medications as of 3/15/2019   Medication Sig Dispense Refill   • albuterol (VENTOLIN HFA) 108 (90 Base) MCG/ACT inhaler Inhale 2 puffs Every 4 (Four) Hours As Needed for Wheezing. 1 inhaler 3   • Alcohol Swabs (HM STERILE ALCOHOL PREP) pads 1 each 4 (Four) Times a Day. 200 each 3   • ASPIRIN LOW DOSE 81 MG EC tablet TAKE 1 TABLET BY MOUTH EVERY DAY 30 tablet 0   • atorvastatin (LIPITOR) 20 MG tablet Take 1 tablet by mouth Daily. 90 tablet 0   • B-D UF III MINI PEN NEEDLES 31G X 5 MM misc Injecting once daily up to 3 times daily 150 each 11   • budesonide-formoterol (SYMBICORT) 160-4.5 MCG/ACT inhaler Inhale 2 puffs 2 (Two) Times a Day. 1 inhaler 3   • busPIRone (BUSPAR) 5 MG tablet TK 1 T PO BID  3   • fexofenadine (ALLEGRA ALLERGY) 180 MG tablet Take 1 tablet by mouth Daily. 30 tablet 1   • fluticasone (FLONASE) 50 MCG/ACT nasal spray 2 sprays into each nostril Daily. 9.9 mL 3   • glucose blood (ONE TOUCH ULTRA TEST) test strip 1 each by Other route 3 (Three) Times a Day. Use as instructed 200 each 3   • Insulin Glargine (TOUJEO SOLOSTAR) 300 UNIT/ML solution pen-injector Inject 13 Units under the skin into the appropriate area as directed Daily. 3 pen 5   • Insulin Lispro (HUMALOG KWIKPEN) 100 UNIT/ML solution pen-injector Inject up to 8 units before meals and 2 on sliding scale 9 pen 5   • losartan (COZAAR) 50 MG tablet TAKE 1 TABLET BY MOUTH DAILY 90 tablet 0   • MAGNESIUM-OXIDE 400 (241.3 MG) MG tablet tablet TK 1 T PO BID  12   • meloxicam (MOBIC) 7.5 MG tablet TAKE 1 TABLET BY MOUTH ONCE DAILY WITH FOOD FOR 7 DAYS, THEN TAKE AS NEEDED 90 tablet 0   • metFORMIN ER (GLUCOPHAGE-XR) 750 MG 24 hr tablet TAKE 1 TABLET BY MOUTH EVERY DAY. 90 tablet 0   • metoprolol succinate XL (TOPROL-XL) 50 MG 24 hr tablet Take 1 tablet by mouth Daily. 30 tablet 0   • OLANZapine (ZYPREXA)  10 MG tablet Take 10 mg by mouth daily.     • ondansetron (ZOFRAN) 4 MG tablet TAKE 1 TABLET BY MOUTH EVERY 8 HOURS AS NEEDED FOR NAUSEA OR VOMITING 30 tablet 0   • pantoprazole (PROTONIX) 40 MG EC tablet TAKE 1 TABLET BY MOUTH DAILY 90 tablet 0   • sertraline (ZOLOFT) 100 MG tablet Take 2 tablets by mouth Daily. 60 tablet 3   • tamsulosin (FLOMAX) 0.4 MG capsule 24 hr capsule Take 1 capsule by mouth Every Night. 30 capsule 0   • tiotropium (SPIRIVA HANDIHALER) 18 MCG per inhalation capsule Place 1 capsule into inhaler and inhale Daily. 30 capsule 2   • vitamin D (ERGOCALCIFEROL) 65138 units capsule capsule Take 1 capsule by mouth Every 14 (Fourteen) Days. 4 capsule 11     No facility-administered encounter medications on file as of 3/15/2019.        Orders placed during this encounter include:  Orders Placed This Encounter   Procedures   • Comprehensive Metabolic Panel   • Hepatitis Panel, Acute     Glycemic Management      Lab Results   Component Value Date    HGBA1C 6.4 (H) 03/05/2019                   Toujeo 25 units in the morning---taking 20 units ---taking 13 units      if you ever wake up with a sugar less than 100 - back off by 3 units     ---------------------     Invokana 100 mg before breakfast --not taking            Metformin 500 mg tablets---take one with supper ----taking 750 mg                     invokamet 150/1000 mg twice daily ( it combines metformin and invokana )---stopped the invokamet due to low blood sugars      --------------------        once you run out of januvia , you will use in its place trulicity 0.75 mg weekly ---stopped due to low Blood sugars   if nausea try to eat less      ------------------     novolog --uses occasional      Use for sliding scale            2 units for every 15 grams of carbohydrate     once glucose readings are staying in the low 100s we can try 1 unit for every 15 grams and if it stays in the low 100s we will only use sliding scale         Lipid Management         on pravachol 20 mg qhs      Nov. 2016     Lipids at goal           Component      Latest Ref Rng & Units 3/5/2019   Total Cholesterol      0 - 199 mg/dL 202 (H)   Triglycerides      20 - 199 mg/dL 148   HDL Cholesterol      60 - 200 mg/dL 32 (L)   LDL Cholesterol      1 - 129 mg/dL 121   LDL/HDL Ratio      0.00 - 3.55 4.39 (H)       Non fasting      Blood Pressure Management     on amlodipine 5 mg daily      On lisinopril 2.5 mg daily --stopped due to allergic reaction     On metoprolol         Microvascular Complication Monitoring: No Microalbuminuria, No Diabetic Retinopathy, positive  Diabetic Neuropathy        Neuropathy     Taking Neurontin         Component      Latest Ref Rng & Units 7/12/2018 7/12/2018           9:34 AM  9:34 AM   Protein/Creatinine Ratio, Urine      0.0 - 200.0 mg/G Crea 59.1     Creatinine, Urine      mg/dL 211.6 211.6   Total Protein, Urine      mg/dL 12.5     Microalbumin/Creatinine Ratio      0.0 - 30.0 mg/g   37.3 (H)   Microalbumin, Urine      mg/L   7.9                  Preventive Care: Patient is not smoking        Weight Related: Obesity, Counseled on nutrition, Counseled on physical activity     Patient's Body mass index is 37.07 kg/m². BMI is above normal parameters. Recommendations include: educational material.        Dietary changes     Handout given diet and diabetes      Bone Health     Vitamin d def     Nov. 2016      Vit d - normal      Vitamin d Oct 2017     29     Start otc 1000 units daily              Component      Latest Ref Rng & Units 3/5/2019   25 Hydroxy, Vitamin D      30.0 - 100.0 ng/ml 31.5          Lab Results   Component Value Date    [id number] 03/05/2019             Elevated liver enzymes    Repeat with hepatitis panel         4. Follow-up: Return in about 4 months (around 7/15/2019) for Recheck.

## 2019-03-18 RX ORDER — PANTOPRAZOLE SODIUM 40 MG/1
TABLET, DELAYED RELEASE ORAL
Qty: 30 TABLET | Refills: 0 | OUTPATIENT
Start: 2019-03-18

## 2019-03-21 ENCOUNTER — TELEPHONE (OUTPATIENT)
Dept: FAMILY MEDICINE CLINIC | Facility: CLINIC | Age: 54
End: 2019-03-21

## 2019-03-21 NOTE — TELEPHONE ENCOUNTER
Pt called and is needing a refill on his pantoprazole (PROTONIX) 40 MG EC tablet and would like for it to be called into [facility] on [facility].      Thank you,      Ingrid

## 2019-03-26 ENCOUNTER — TELEPHONE (OUTPATIENT)
Dept: FAMILY MEDICINE CLINIC | Facility: CLINIC | Age: 54
End: 2019-03-26

## 2019-03-26 ENCOUNTER — TELEPHONE (OUTPATIENT)
Dept: ENDOCRINOLOGY | Facility: CLINIC | Age: 54
End: 2019-03-26

## 2019-03-26 RX ORDER — PANTOPRAZOLE SODIUM 40 MG/1
40 TABLET, DELAYED RELEASE ORAL DAILY
Qty: 90 TABLET | Refills: 0 | Status: CANCELLED | OUTPATIENT
Start: 2019-03-26

## 2019-03-26 RX ORDER — PANTOPRAZOLE SODIUM 40 MG/1
40 TABLET, DELAYED RELEASE ORAL DAILY
Qty: 90 TABLET | Refills: 2 | Status: SHIPPED | OUTPATIENT
Start: 2019-03-26 | End: 2019-07-05 | Stop reason: SDUPTHER

## 2019-03-26 NOTE — TELEPHONE ENCOUNTER
Patient called to get a refill on his medication. Patient said he has been calling to get this refilled for awhile.       pantoprazole (PROTONIX) 40 MG EC tablet    ThanksNannette

## 2019-04-04 RX ORDER — FEXOFENADINE HCL 180 MG/1
180 TABLET ORAL DAILY
Qty: 30 TABLET | Refills: 0 | OUTPATIENT
Start: 2019-04-04

## 2019-04-04 RX ORDER — FEXOFENADINE HCL 180 MG/1
180 TABLET ORAL DAILY
Qty: 30 TABLET | Refills: 1 | OUTPATIENT
Start: 2019-04-04

## 2019-04-04 RX ORDER — METOPROLOL SUCCINATE 50 MG/1
50 TABLET, EXTENDED RELEASE ORAL DAILY
Qty: 30 TABLET | Refills: 0 | Status: SHIPPED | OUTPATIENT
Start: 2019-04-04 | End: 2019-04-04 | Stop reason: SDUPTHER

## 2019-04-05 RX ORDER — METOPROLOL SUCCINATE 50 MG/1
50 TABLET, EXTENDED RELEASE ORAL DAILY
Qty: 90 TABLET | Refills: 0 | Status: SHIPPED | OUTPATIENT
Start: 2019-04-05 | End: 2019-07-05 | Stop reason: SDUPTHER

## 2019-04-11 ENCOUNTER — APPOINTMENT (OUTPATIENT)
Dept: LAB | Facility: HOSPITAL | Age: 54
End: 2019-04-11

## 2019-04-11 ENCOUNTER — TELEPHONE (OUTPATIENT)
Dept: ENDOCRINOLOGY | Facility: CLINIC | Age: 54
End: 2019-04-11

## 2019-04-11 LAB
ALBUMIN SERPL-MCNC: 4.2 G/DL (ref 3.5–5.2)
ALBUMIN/GLOB SERPL: 1.1 G/DL
ALP SERPL-CCNC: 93 U/L (ref 39–117)
ALT SERPL W P-5'-P-CCNC: 31 U/L (ref 1–41)
ANION GAP SERPL CALCULATED.3IONS-SCNC: 11 MMOL/L
AST SERPL-CCNC: 19 U/L (ref 1–40)
BILIRUB SERPL-MCNC: 0.4 MG/DL (ref 0.2–1.2)
BUN BLD-MCNC: 11 MG/DL (ref 6–20)
BUN/CREAT SERPL: 9.6 (ref 7–25)
CALCIUM SPEC-SCNC: 9.6 MG/DL (ref 8.6–10.5)
CHLORIDE SERPL-SCNC: 100 MMOL/L (ref 98–107)
CO2 SERPL-SCNC: 26 MMOL/L (ref 22–29)
CREAT BLD-MCNC: 1.15 MG/DL (ref 0.76–1.27)
GFR SERPL CREATININE-BSD FRML MDRD: 80 ML/MIN/1.73
GLOBULIN UR ELPH-MCNC: 3.7 GM/DL
GLUCOSE BLD-MCNC: 87 MG/DL (ref 65–99)
HAV IGM SERPL QL IA: NORMAL
HBV CORE IGM SERPL QL IA: NORMAL
HBV SURFACE AG SERPL QL IA: NORMAL
HCV AB SER DONR QL: NORMAL
POTASSIUM BLD-SCNC: 4.1 MMOL/L (ref 3.5–5.2)
PROT SERPL-MCNC: 7.9 G/DL (ref 6–8.5)
SODIUM BLD-SCNC: 137 MMOL/L (ref 136–145)

## 2019-04-11 PROCEDURE — 80053 COMPREHEN METABOLIC PANEL: CPT | Performed by: NURSE PRACTITIONER

## 2019-04-11 PROCEDURE — 36415 COLL VENOUS BLD VENIPUNCTURE: CPT | Performed by: NURSE PRACTITIONER

## 2019-04-11 PROCEDURE — 80074 ACUTE HEPATITIS PANEL: CPT | Performed by: NURSE PRACTITIONER

## 2019-04-11 NOTE — TELEPHONE ENCOUNTER
----- Message from FLYNN Lund sent at 4/11/2019  1:00 PM CDT -----  Hepatitis panel was hegative, liver enzymes are back to normal

## 2019-05-12 DIAGNOSIS — E11.42 DIABETIC POLYNEUROPATHY ASSOCIATED WITH TYPE 2 DIABETES MELLITUS (HCC): ICD-10-CM

## 2019-05-13 RX ORDER — METFORMIN HYDROCHLORIDE 750 MG/1
TABLET, EXTENDED RELEASE ORAL
Qty: 90 TABLET | Refills: 0 | Status: SHIPPED | OUTPATIENT
Start: 2019-05-13 | End: 2019-08-10 | Stop reason: SDUPTHER

## 2019-05-15 RX ORDER — LOSARTAN POTASSIUM 50 MG/1
50 TABLET ORAL DAILY
Qty: 90 TABLET | Refills: 0 | OUTPATIENT
Start: 2019-05-15

## 2019-06-12 ENCOUNTER — OFFICE VISIT (OUTPATIENT)
Dept: FAMILY MEDICINE CLINIC | Facility: CLINIC | Age: 54
End: 2019-06-12

## 2019-06-12 VITALS
SYSTOLIC BLOOD PRESSURE: 138 MMHG | HEIGHT: 69 IN | BODY MASS INDEX: 33.77 KG/M2 | WEIGHT: 228 LBS | OXYGEN SATURATION: 96 % | HEART RATE: 70 BPM | DIASTOLIC BLOOD PRESSURE: 82 MMHG

## 2019-06-12 DIAGNOSIS — I10 ESSENTIAL HYPERTENSION: Primary | ICD-10-CM

## 2019-06-12 DIAGNOSIS — N40.1 BENIGN PROSTATIC HYPERPLASIA WITH WEAK URINARY STREAM: ICD-10-CM

## 2019-06-12 DIAGNOSIS — E78.2 MIXED HYPERLIPIDEMIA: ICD-10-CM

## 2019-06-12 DIAGNOSIS — R39.12 BENIGN PROSTATIC HYPERPLASIA WITH WEAK URINARY STREAM: ICD-10-CM

## 2019-06-12 DIAGNOSIS — F32.5 MAJOR DEPRESSIVE DISORDER WITH SINGLE EPISODE, IN FULL REMISSION (HCC): Chronic | ICD-10-CM

## 2019-06-12 PROCEDURE — 99213 OFFICE O/P EST LOW 20 MIN: CPT | Performed by: FAMILY MEDICINE

## 2019-06-12 RX ORDER — TAMSULOSIN HYDROCHLORIDE 0.4 MG/1
1 CAPSULE ORAL NIGHTLY
Qty: 30 CAPSULE | Refills: 3 | Status: SHIPPED | OUTPATIENT
Start: 2019-06-12 | End: 2019-10-18 | Stop reason: SDUPTHER

## 2019-06-12 RX ORDER — FEXOFENADINE HCL 180 MG/1
180 TABLET ORAL DAILY
Qty: 30 TABLET | Refills: 3 | Status: SHIPPED | OUTPATIENT
Start: 2019-06-12 | End: 2019-10-18 | Stop reason: SDUPTHER

## 2019-06-12 RX ORDER — ISOPROPYL ALCOHOL 0.7 ML/1
SWAB TOPICAL
Refills: 3 | COMMUNITY
Start: 2019-04-23 | End: 2019-10-21 | Stop reason: SDUPTHER

## 2019-06-12 RX ORDER — LOSARTAN POTASSIUM AND HYDROCHLOROTHIAZIDE 12.5; 1 MG/1; MG/1
TABLET ORAL
Refills: 0 | COMMUNITY
Start: 2019-04-12 | End: 2019-08-30

## 2019-06-12 RX ORDER — ATORVASTATIN CALCIUM 20 MG/1
20 TABLET, FILM COATED ORAL DAILY
Qty: 90 TABLET | Refills: 3 | Status: SHIPPED | OUTPATIENT
Start: 2019-06-12 | End: 2019-12-09 | Stop reason: SDUPTHER

## 2019-06-12 NOTE — PROGRESS NOTES
Subjective:     Nirav Lind is a 54 y.o. male who presents for follow up for DM, HTN and depression. Pt has a history of well controlled DM, last HbA1c of 6.4, and is currently on toujeo 13 units qhs, Humalog sliding scale, metformin 750mg daily. Pt is seen by Bronwyn David and has his next appointment on 7/15/19. Pt has a history of well controlled HTN and is currently on losartan 50mg daily. Pt denies chest pain, dyspnea, blurry vision or headaches. Pt has a history of well controlled depression currently on zoloft 100mg daily, zyprexa 10mg daily, buspar 5mg bid. Pt reports no difficulty falling or staying asleep, good interest in hobbies, no feelings of guilt, low/normal energy, good concentration, good appetite, no psychomotor retardation, no homicidal/suicidal ideation. Pt has no other current complaints.    Preventative:  Over the past 2 weeks, have you felt down, depressed, or hopeless?No   Over the past 2 weeks, have you felt little interest or pleasure in doing things?No  Clinical depression screening refused by patient.No     Past Medical Hx:  Past Medical History:   Diagnosis Date   • Abnormal computed tomography scan    • Adenomatous polyp of colon    • Allergic rhinitis    • Anemia    • Chronic back pain    • Coronary arteriosclerosis    • Depression    • Diabetes mellitus (CMS/HCC)    • Diabetic polyneuropathy associated with type 2 diabetes mellitus (CMS/HCC) 7/17/2017   • Dizziness    • Dyspnea     unspecified   • Epigastric pain    • Essential hypertension    • Ex-smoker    • Hemangioma of liver    • Hyperlipidemia    • Infected hydrocele     with orchitis and epididymis   • Nausea    • Nausea with vomiting    • Obesity with body mass index of 30.0 - 39.9    • Pain in right knee    • Right upper quadrant pain    • Type II diabetes mellitus, uncontrolled (CMS/HCC)    • Upper respiratory infection    • Urgency of urination    • Villous adenoma of colon        Past Surgical Hx:  Past Surgical  History:   Procedure Laterality Date   • CARDIAC CATHETERIZATION  11/30/2015    No evidence of any obstructive disease in the circumflex, the RCA , or LAD. 1st diagonal branch in the midportion with 20-30% stenosis. Preserved LV systolic function with EF 55%.   • COLONOSCOPY  01/21/2013    Normal   • COLONOSCOPY W/ POLYPECTOMY  10/13/2014    A single polyp was found in the colon; removed by snare cautery polypectomy.   • ENDOSCOPY  06/15/2016    Mildly severe esophagitis. Several biopsies obtained in the upper third of the esophagus.   • INCISION AND DRAINAGE ABSCESS  10/29/2014    Incision and drainage of scrotal abscess. Hydrocelectomy, bottle procedure.   • INJECTION OF MEDICATION  01/26/2016    Kenalog       Health Maintenance:  Health Maintenance   Topic Date Due   • MEDICARE ANNUAL WELLNESS  04/30/2019   • ZOSTER VACCINE (1 of 2) 07/01/2019 (Originally 5/1/2013)   • INFLUENZA VACCINE  08/01/2019   • HEMOGLOBIN A1C  09/05/2019   • DIABETIC FOOT EXAM  11/16/2019   • LIPID PANEL  03/05/2020   • DIABETIC EYE EXAM  03/05/2020   • URINE MICROALBUMIN  03/05/2020   • COLONOSCOPY  04/05/2026   • TDAP/TD VACCINES (2 - Td) 09/09/2026   • HEPATITIS C SCREENING  Completed   • PNEUMOCOCCAL VACCINE (19-64 MEDIUM RISK)  Addressed       Current Meds:    Current Outpatient Medications:   •  albuterol (VENTOLIN HFA) 108 (90 Base) MCG/ACT inhaler, Inhale 2 puffs Every 4 (Four) Hours As Needed for Wheezing., Disp: 1 inhaler, Rfl: 3  •  Alcohol Swabs ( STERILE ALCOHOL PREP) pads, 1 each 4 (Four) Times a Day., Disp: 200 each, Rfl: 3  •  Alcohol Swabs (HM STERILE ALCOHOL PREP) pads, USE UTD QID, Disp: , Rfl: 3  •  ASPIRIN LOW DOSE 81 MG EC tablet, TAKE 1 TABLET BY MOUTH EVERY DAY, Disp: 30 tablet, Rfl: 0  •  B-D UF III MINI PEN NEEDLES 31G X 5 MM misc, Injecting once daily up to 3 times daily, Disp: 150 each, Rfl: 11  •  budesonide-formoterol (SYMBICORT) 160-4.5 MCG/ACT inhaler, Inhale 2 puffs 2 (Two) Times a Day., Disp: 1 inhaler,  Rfl: 3  •  busPIRone (BUSPAR) 5 MG tablet, TK 1 T PO BID, Disp: , Rfl: 3  •  fluticasone (FLONASE) 50 MCG/ACT nasal spray, 2 sprays into each nostril Daily., Disp: 9.9 mL, Rfl: 3  •  glucose blood (ONE TOUCH ULTRA TEST) test strip, 1 each by Other route 3 (Three) Times a Day. Use as instructed, Disp: 200 each, Rfl: 3  •  Insulin Glargine (TOUJEO SOLOSTAR) 300 UNIT/ML solution pen-injector, Inject 13 Units under the skin into the appropriate area as directed Daily., Disp: 3 pen, Rfl: 5  •  Insulin Lispro (HUMALOG KWIKPEN) 100 UNIT/ML solution pen-injector, Inject up to 8 units before meals and 2 on sliding scale, Disp: 9 pen, Rfl: 5  •  losartan (COZAAR) 50 MG tablet, TAKE 1 TABLET BY MOUTH DAILY, Disp: 90 tablet, Rfl: 0  •  losartan-hydrochlorothiazide (HYZAAR) 100-12.5 MG per tablet, TK 1 T PO QD WITH LUNCH, Disp: , Rfl: 0  •  MAGNESIUM-OXIDE 400 (241.3 MG) MG tablet tablet, TK 1 T PO BID, Disp: , Rfl: 12  •  meloxicam (MOBIC) 7.5 MG tablet, TAKE 1 TABLET BY MOUTH ONCE DAILY WITH FOOD FOR 7 DAYS, THEN TAKE AS NEEDED, Disp: 90 tablet, Rfl: 0  •  metFORMIN ER (GLUCOPHAGE-XR) 750 MG 24 hr tablet, TAKE 1 TABLET BY MOUTH EVERY DAY., Disp: 90 tablet, Rfl: 0  •  metoprolol succinate XL (TOPROL-XL) 50 MG 24 hr tablet, TAKE 1 TABLET BY MOUTH DAILY, Disp: 90 tablet, Rfl: 0  •  OLANZapine (ZYPREXA) 10 MG tablet, Take 10 mg by mouth daily., Disp: , Rfl:   •  ondansetron (ZOFRAN) 4 MG tablet, TAKE 1 TABLET BY MOUTH EVERY 8 HOURS AS NEEDED FOR NAUSEA OR VOMITING, Disp: 30 tablet, Rfl: 0  •  pantoprazole (PROTONIX) 40 MG EC tablet, Take 1 tablet by mouth Daily., Disp: 90 tablet, Rfl: 2  •  sertraline (ZOLOFT) 100 MG tablet, Take 2 tablets by mouth Daily., Disp: 60 tablet, Rfl: 3  •  tiotropium (SPIRIVA HANDIHALER) 18 MCG per inhalation capsule, Place 1 capsule into inhaler and inhale Daily., Disp: 30 capsule, Rfl: 2  •  vitamin D (ERGOCALCIFEROL) 09068 units capsule capsule, Take 1 capsule by mouth Every 14 (Fourteen) Days.,  Disp: 4 capsule, Rfl: 11  •  atorvastatin (LIPITOR) 20 MG tablet, Take 1 tablet by mouth Daily., Disp: 90 tablet, Rfl: 3  •  fexofenadine (ALLEGRA ALLERGY) 180 MG tablet, Take 1 tablet by mouth Daily., Disp: 30 tablet, Rfl: 3  •  tamsulosin (FLOMAX) 0.4 MG capsule 24 hr capsule, Take 1 capsule by mouth Every Night., Disp: 30 capsule, Rfl: 3    Allergies:  Patient has no known allergies.    Family Hx:  Family History   Problem Relation Age of Onset   • Cancer Mother         leukemia   • Heart disease Mother    • Heart disease Sister    • Lung disease Father    • Heart disease Maternal Grandmother    • Heart disease Maternal Grandfather         Social History:  Social History     Socioeconomic History   • Marital status:      Spouse name: Not on file   • Number of children: Not on file   • Years of education: Not on file   • Highest education level: Not on file   Tobacco Use   • Smoking status: Former Smoker     Packs/day: 0.25     Years: 15.00     Pack years: 3.75     Last attempt to quit: 2000     Years since quittin.4   • Smokeless tobacco: Never Used   Substance and Sexual Activity   • Alcohol use: No     Frequency: Never   • Drug use: No   • Sexual activity: Not Currently     Comment: shortness of breath       Review of Systems  Review of Systems   Constitutional: Negative for chills, fatigue and fever.   HENT: Negative for congestion, nosebleeds and sore throat.    Eyes: Negative for discharge and itching.   Respiratory: Negative for cough, shortness of breath and wheezing.    Cardiovascular: Negative for chest pain, palpitations and leg swelling.   Gastrointestinal: Negative for abdominal pain, diarrhea, nausea and vomiting.   Genitourinary: Negative for dysuria and hematuria.   Musculoskeletal: Positive for back pain (chronic low back pain). Negative for neck pain and neck stiffness.   Skin: Negative for rash and wound.   Neurological: Negative for seizures and syncope.   Psychiatric/Behavioral:  "Negative for agitation and confusion.         Objective:     /82 (BP Location: Left arm, Patient Position: Sitting, Cuff Size: Adult)   Pulse 70   Ht 175.3 cm (69\")   Wt 103 kg (228 lb)   SpO2 96%   BMI 33.67 kg/m²   Physical Exam   Constitutional: He is oriented to person, place, and time. He appears well-developed and well-nourished. No distress.   HENT:   Head: Normocephalic and atraumatic.   Right Ear: External ear normal.   Left Ear: External ear normal.   Nose: Nose normal. No nose lacerations, sinus tenderness or nasal deformity. No epistaxis.   Eyes: Conjunctivae are normal. Right eye exhibits no discharge. Left eye exhibits no discharge. No scleral icterus.   Neck: Normal range of motion. Neck supple.   Cardiovascular: Normal rate, regular rhythm and normal heart sounds.   Pulmonary/Chest: Effort normal and breath sounds normal. No respiratory distress. He has no wheezes. He has no rales.   Abdominal: Soft. Bowel sounds are normal.   Musculoskeletal: Normal range of motion. He exhibits no edema, tenderness or deformity.   Neurological: He is alert and oriented to person, place, and time.   Skin: Skin is warm and dry. He is not diaphoretic.   Psychiatric: He has a normal mood and affect. His behavior is normal. Judgment and thought content normal.   Nursing note and vitals reviewed.                                                                     Assessment/Plan:     Diagnoses and all orders for this visit:    Essential hypertension    Major depressive disorder with single episode, in full remission (CMS/[facility])    Mixed hyperlipidemia  -     atorvastatin (LIPITOR) 20 MG tablet; Take 1 tablet by mouth Daily.    Benign prostatic hyperplasia with weak urinary stream  -     tamsulosin (FLOMAX) 0.4 MG capsule 24 hr capsule; Take 1 capsule by mouth Every Night.    Other orders  -     Alcohol Swabs ( STERILE ALCOHOL PREP) pads; USE UTD QID  -     losartan-hydrochlorothiazide (HYZAAR) 100-12.5 MG per " tablet; TK 1 T PO QD WITH LUNCH  -     fexofenadine (ALLEGRA ALLERGY) 180 MG tablet; Take 1 tablet by mouth Daily.         Follow-up:     Return in about 6 months (around 12/12/2019) for Next scheduled follow up.        GOALS:  Maintain medication compliance    Preventative:  Male Preventative: Cholesterol screening up to date  Vaccines:   Tetanus vaccine: not up to date - declined  Annual influenza vaccine: up to date   Pneumococcal vaccine: not up to date - declined    former smoker  does not drink  eat more fruits and vegetables, decrease soda or juice intake, increase water intake and increase physical activity    RISK SCORE: 3    Jeremy Mahmood MD PGY3  Family [place] [address]  Office: [phone]      This document has been electronically signed by Jeremy Mahmood MD on June 14, 2019 1:04 PM

## 2019-06-19 NOTE — PROGRESS NOTES
I have reviewed the notes, assessments, and/or procedures performed by Jeremy Mahmood MD, I concur with her/his documentation and assessment and plan for Nirav Lind.                This document has been electronically signed by Donaldo Nolan MD on June 19, 2019 2:02 PM

## 2019-06-27 ENCOUNTER — TELEPHONE (OUTPATIENT)
Dept: FAMILY MEDICINE CLINIC | Facility: CLINIC | Age: 54
End: 2019-06-27

## 2019-06-27 DIAGNOSIS — J44.9 CHRONIC OBSTRUCTIVE PULMONARY DISEASE, UNSPECIFIED COPD TYPE (HCC): Chronic | ICD-10-CM

## 2019-06-27 RX ORDER — TIOTROPIUM BROMIDE 18 UG/1
CAPSULE ORAL; RESPIRATORY (INHALATION)
Qty: 30 CAPSULE | Refills: 0 | Status: CANCELLED | OUTPATIENT
Start: 2019-06-27

## 2019-06-27 NOTE — TELEPHONE ENCOUNTER
[facility] called and left VM they said received a C&M form on pt , Medicare is asking for documentation for test strips .   Medicare says that the frequency testing must match Script and that the script says test 3 X daily and the C&M form says 4 X daily and in order for it to process they must match so could you please send a new script matching the C&M form. Any questions please call    Phone number [phone]

## 2019-06-27 NOTE — TELEPHONE ENCOUNTER
NORMAN HADLEY WAS GIVEN A NEW GLUCOSE METER=ONE TOUCH VERIO FROM HERE IN OFFICE.he NEEDS THE TEST STRIPS BUT [facility] SAID THE PAPERWORK FOR HIS INSURANCE HAS NOT BEEN DONE..PLEASE CK ON THIS FOR HIM  HIS# [phone]

## 2019-06-28 DIAGNOSIS — J44.9 CHRONIC OBSTRUCTIVE PULMONARY DISEASE, UNSPECIFIED COPD TYPE (HCC): Chronic | ICD-10-CM

## 2019-06-28 DIAGNOSIS — E11.49 DM (DIABETES MELLITUS), TYPE 2 WITH NEUROLOGICAL COMPLICATIONS (HCC): ICD-10-CM

## 2019-07-05 ENCOUNTER — TELEPHONE (OUTPATIENT)
Dept: FAMILY MEDICINE CLINIC | Facility: CLINIC | Age: 54
End: 2019-07-05

## 2019-07-05 RX ORDER — PANTOPRAZOLE SODIUM 40 MG/1
40 TABLET, DELAYED RELEASE ORAL DAILY
Qty: 90 TABLET | Refills: 2 | Status: SHIPPED | OUTPATIENT
Start: 2019-07-05 | End: 2019-12-09 | Stop reason: SDUPTHER

## 2019-07-05 RX ORDER — METOPROLOL SUCCINATE 50 MG/1
50 TABLET, EXTENDED RELEASE ORAL DAILY
Qty: 90 TABLET | Refills: 0 | Status: SHIPPED | OUTPATIENT
Start: 2019-07-05 | End: 2019-08-30

## 2019-07-05 NOTE — TELEPHONE ENCOUNTER
Pt called and is needing refills on his pantoprazole (PROTONIX) 40 MG EC tablet and metoprolol succinate XL (TOPROL-XL) 50 MG 24 hr tablet and he is out of both of them. Would like for it to be called into [facility] on [address].      Thank you,      Ingrid

## 2019-07-08 RX ORDER — METOPROLOL SUCCINATE 50 MG/1
50 TABLET, EXTENDED RELEASE ORAL DAILY
Qty: 90 TABLET | Refills: 0 | Status: SHIPPED | OUTPATIENT
Start: 2019-07-08 | End: 2019-10-11 | Stop reason: SDUPTHER

## 2019-07-11 ENCOUNTER — OFFICE VISIT (OUTPATIENT)
Dept: ENDOCRINOLOGY | Facility: CLINIC | Age: 54
End: 2019-07-11

## 2019-07-11 ENCOUNTER — APPOINTMENT (OUTPATIENT)
Dept: LAB | Facility: HOSPITAL | Age: 54
End: 2019-07-11

## 2019-07-11 VITALS
BODY MASS INDEX: 33.33 KG/M2 | DIASTOLIC BLOOD PRESSURE: 80 MMHG | HEIGHT: 69 IN | WEIGHT: 225 LBS | HEART RATE: 92 BPM | SYSTOLIC BLOOD PRESSURE: 126 MMHG

## 2019-07-11 DIAGNOSIS — E78.2 MIXED HYPERLIPIDEMIA: ICD-10-CM

## 2019-07-11 DIAGNOSIS — E55.9 VITAMIN D DEFICIENCY: ICD-10-CM

## 2019-07-11 DIAGNOSIS — I10 ESSENTIAL HYPERTENSION: ICD-10-CM

## 2019-07-11 DIAGNOSIS — E11.49 DM (DIABETES MELLITUS), TYPE 2 WITH NEUROLOGICAL COMPLICATIONS (HCC): Primary | ICD-10-CM

## 2019-07-11 LAB
25(OH)D3 SERPL-MCNC: 26.8 NG/ML (ref 30–100)
ALBUMIN SERPL-MCNC: 4.4 G/DL (ref 3.5–5.2)
ALBUMIN UR-MCNC: 4.6 MG/L
ALBUMIN/GLOB SERPL: 1.1 G/DL
ALP SERPL-CCNC: 116 U/L (ref 39–117)
ALT SERPL W P-5'-P-CCNC: 31 U/L (ref 1–41)
ANION GAP SERPL CALCULATED.3IONS-SCNC: 13.6 MMOL/L (ref 5–15)
AST SERPL-CCNC: 24 U/L (ref 1–40)
BASOPHILS # BLD AUTO: 0.06 10*3/MM3 (ref 0–0.2)
BASOPHILS NFR BLD AUTO: 0.7 % (ref 0–1.5)
BILIRUB SERPL-MCNC: 0.3 MG/DL (ref 0.2–1.2)
BUN BLD-MCNC: 12 MG/DL (ref 6–20)
BUN/CREAT SERPL: 10.3 (ref 7–25)
CALCIUM SPEC-SCNC: 9.5 MG/DL (ref 8.6–10.5)
CHLORIDE SERPL-SCNC: 101 MMOL/L (ref 98–107)
CHOLEST SERPL-MCNC: 108 MG/DL (ref 0–200)
CO2 SERPL-SCNC: 24.4 MMOL/L (ref 22–29)
CREAT BLD-MCNC: 1.16 MG/DL (ref 0.76–1.27)
CREAT UR-MCNC: 526.4 MG/DL
CREAT UR-MCNC: 526.4 MG/DL
DEPRECATED RDW RBC AUTO: 40.4 FL (ref 37–54)
EOSINOPHIL # BLD AUTO: 0.29 10*3/MM3 (ref 0–0.4)
EOSINOPHIL NFR BLD AUTO: 3.6 % (ref 0.3–6.2)
ERYTHROCYTE [DISTWIDTH] IN BLOOD BY AUTOMATED COUNT: 13.1 % (ref 12.3–15.4)
GFR SERPL CREATININE-BSD FRML MDRD: 79 ML/MIN/1.73
GLOBULIN UR ELPH-MCNC: 4 GM/DL
GLUCOSE BLD-MCNC: 93 MG/DL (ref 65–99)
HBA1C MFR BLD: 5.95 % (ref 4.8–5.6)
HCT VFR BLD AUTO: 42.8 % (ref 37.5–51)
HDLC SERPL-MCNC: 32 MG/DL (ref 40–60)
HGB BLD-MCNC: 13.6 G/DL (ref 13–17.7)
IMM GRANULOCYTES # BLD AUTO: 0.03 10*3/MM3 (ref 0–0.05)
IMM GRANULOCYTES NFR BLD AUTO: 0.4 % (ref 0–0.5)
LDLC SERPL CALC-MCNC: 60 MG/DL (ref 0–100)
LDLC/HDLC SERPL: 1.87 {RATIO}
LYMPHOCYTES # BLD AUTO: 1.55 10*3/MM3 (ref 0.7–3.1)
LYMPHOCYTES NFR BLD AUTO: 19.1 % (ref 19.6–45.3)
MCH RBC QN AUTO: 27.3 PG (ref 26.6–33)
MCHC RBC AUTO-ENTMCNC: 31.8 G/DL (ref 31.5–35.7)
MCV RBC AUTO: 85.8 FL (ref 79–97)
MICROALBUMIN/CREAT UR: 8.7 MG/G
MONOCYTES # BLD AUTO: 0.62 10*3/MM3 (ref 0.1–0.9)
MONOCYTES NFR BLD AUTO: 7.6 % (ref 5–12)
NEUTROPHILS # BLD AUTO: 5.58 10*3/MM3 (ref 1.7–7)
NEUTROPHILS NFR BLD AUTO: 68.6 % (ref 42.7–76)
NRBC BLD AUTO-RTO: 0 /100 WBC (ref 0–0.2)
PLATELET # BLD AUTO: 335 10*3/MM3 (ref 140–450)
PMV BLD AUTO: 10.3 FL (ref 6–12)
POTASSIUM BLD-SCNC: 4 MMOL/L (ref 3.5–5.2)
PROT SERPL-MCNC: 8.4 G/DL (ref 6–8.5)
PROT UR-MCNC: 34 MG/DL
PROT/CREAT UR: 64.6 MG/G CREA (ref 0–200)
RBC # BLD AUTO: 4.99 10*6/MM3 (ref 4.14–5.8)
SODIUM BLD-SCNC: 139 MMOL/L (ref 136–145)
TRIGL SERPL-MCNC: 81 MG/DL (ref 0–150)
TSH SERPL DL<=0.05 MIU/L-ACNC: 2 MIU/ML (ref 0.27–4.2)
VIT B12 BLD-MCNC: 621 PG/ML (ref 211–946)
VLDLC SERPL-MCNC: 16.2 MG/DL (ref 5–40)
WBC NRBC COR # BLD: 8.13 10*3/MM3 (ref 3.4–10.8)

## 2019-07-11 PROCEDURE — 82306 VITAMIN D 25 HYDROXY: CPT | Performed by: NURSE PRACTITIONER

## 2019-07-11 PROCEDURE — 80053 COMPREHEN METABOLIC PANEL: CPT | Performed by: NURSE PRACTITIONER

## 2019-07-11 PROCEDURE — 84443 ASSAY THYROID STIM HORMONE: CPT | Performed by: NURSE PRACTITIONER

## 2019-07-11 PROCEDURE — 82570 ASSAY OF URINE CREATININE: CPT | Performed by: NURSE PRACTITIONER

## 2019-07-11 PROCEDURE — 36415 COLL VENOUS BLD VENIPUNCTURE: CPT | Performed by: NURSE PRACTITIONER

## 2019-07-11 PROCEDURE — 82607 VITAMIN B-12: CPT | Performed by: NURSE PRACTITIONER

## 2019-07-11 PROCEDURE — 80061 LIPID PANEL: CPT | Performed by: NURSE PRACTITIONER

## 2019-07-11 PROCEDURE — 84156 ASSAY OF PROTEIN URINE: CPT | Performed by: NURSE PRACTITIONER

## 2019-07-11 PROCEDURE — 99214 OFFICE O/P EST MOD 30 MIN: CPT | Performed by: NURSE PRACTITIONER

## 2019-07-11 PROCEDURE — 83036 HEMOGLOBIN GLYCOSYLATED A1C: CPT | Performed by: NURSE PRACTITIONER

## 2019-07-11 PROCEDURE — 82043 UR ALBUMIN QUANTITATIVE: CPT | Performed by: NURSE PRACTITIONER

## 2019-07-11 PROCEDURE — 85025 COMPLETE CBC W/AUTO DIFF WBC: CPT | Performed by: NURSE PRACTITIONER

## 2019-07-11 NOTE — PROGRESS NOTES
Subjective    Nirav Lind is a 54 y.o. male. he is here today for follow-up.    History of Present Illness       Reason - Diabetes Mellitus type 2     Duration/Timing: Diabetes mellitus type 2, onset of symptoms gradual     dm dx at age 51 y      constant     not controlled      severity high      Patient was admitted to the hospital for chest pain but cardiac was ruled out      Severity (Complications/Hospitalizations)  Secondary Macrovascular Complications: No CAD, No CVA, No PAD  Secondary Microvascular Complications: No Microalbuminuria, No Diabetic Retinopathy, No Diabetic Neuropathy     Context  Diabetes Regimen: Insulin, Oral Medications,               Lab Results   Component Value Date     HGBA1C 6.4 (H) 03/05/2019                 Blood Glucose Readings     States at goal          Diet  adhering to 1800 calorie diet     Exercise: Does not exercise     Associated Signs/Symptoms  Hyperglycemic Symptoms: No polyuria, No polydipsia, No polyphagia  Hypoglycemic Episodes: No documented hypoglycemia since last visit                  The following portions of the patient's history were reviewed and updated as appropriate:   Past Medical History:   Diagnosis Date   • Abnormal computed tomography scan    • Adenomatous polyp of colon    • Allergic rhinitis    • Anemia    • Chronic back pain    • Coronary arteriosclerosis    • Depression    • Diabetes mellitus (CMS/HCC)    • Diabetic polyneuropathy associated with type 2 diabetes mellitus (CMS/HCC) 7/17/2017   • Dizziness    • Dyspnea     unspecified   • Epigastric pain    • Essential hypertension    • Ex-smoker    • Hemangioma of liver    • Hyperlipidemia    • Infected hydrocele     with orchitis and epididymis   • Nausea    • Nausea with vomiting    • Obesity with body mass index of 30.0 - 39.9    • Pain in right knee    • Right upper quadrant pain    • Type II diabetes mellitus, uncontrolled (CMS/HCC)    • Upper respiratory infection    • Urgency of urination     • Villous adenoma of colon      Past Surgical History:   Procedure Laterality Date   • CARDIAC CATHETERIZATION  11/30/2015    No evidence of any obstructive disease in the circumflex, the RCA , or LAD. 1st diagonal branch in the midportion with 20-30% stenosis. Preserved LV systolic function with EF 55%.   • COLONOSCOPY  01/21/2013    Normal   • COLONOSCOPY W/ POLYPECTOMY  10/13/2014    A single polyp was found in the colon; removed by snare cautery polypectomy.   • ENDOSCOPY  06/15/2016    Mildly severe esophagitis. Several biopsies obtained in the upper third of the esophagus.   • INCISION AND DRAINAGE ABSCESS  10/29/2014    Incision and drainage of scrotal abscess. Hydrocelectomy, bottle procedure.   • INJECTION OF MEDICATION  01/26/2016    Kenalog     Family History   Problem Relation Age of Onset   • Cancer Mother         leukemia   • Heart disease Mother    • Heart disease Sister    • Lung disease Father    • Heart disease Maternal Grandmother    • Heart disease Maternal Grandfather        Current Outpatient Medications   Medication Sig Dispense Refill   • albuterol (VENTOLIN HFA) 108 (90 Base) MCG/ACT inhaler Inhale 2 puffs Every 4 (Four) Hours As Needed for Wheezing. 1 inhaler 3   • Alcohol Swabs ( STERILE ALCOHOL PREP) pads 1 each 4 (Four) Times a Day. 200 each 3   • Alcohol Swabs ( STERILE ALCOHOL PREP) pads USE UTD QID  3   • ASPIRIN LOW DOSE 81 MG EC tablet TAKE 1 TABLET BY MOUTH EVERY DAY 30 tablet 0   • atorvastatin (LIPITOR) 20 MG tablet Take 1 tablet by mouth Daily. 90 tablet 3   • B-D UF III MINI PEN NEEDLES 31G X 5 MM misc Injecting once daily up to 3 times daily 150 each 11   • budesonide-formoterol (SYMBICORT) 160-4.5 MCG/ACT inhaler Inhale 2 puffs 2 (Two) Times a Day. 1 inhaler 3   • busPIRone (BUSPAR) 5 MG tablet TK 1 T PO BID  3   • fexofenadine (ALLEGRA ALLERGY) 180 MG tablet Take 1 tablet by mouth Daily. 30 tablet 3   • fluticasone (FLONASE) 50 MCG/ACT nasal spray 2 sprays into each  nostril Daily. 9.9 mL 3   • glucose blood (ONE TOUCH ULTRA TEST) test strip Use to text 4 times a day.  Dx-e11.49 200 each 11   • Insulin Glargine (TOUJEO SOLOSTAR) 300 UNIT/ML solution pen-injector Inject 13 Units under the skin into the appropriate area as directed Daily. 3 pen 5   • Insulin Lispro (HUMALOG KWIKPEN) 100 UNIT/ML solution pen-injector Inject up to 8 units before meals and 2 on sliding scale 9 pen 5   • losartan (COZAAR) 50 MG tablet TAKE 1 TABLET BY MOUTH DAILY 90 tablet 0   • losartan-hydrochlorothiazide (HYZAAR) 100-12.5 MG per tablet TK 1 T PO QD WITH LUNCH  0   • MAGNESIUM-OXIDE 400 (241.3 MG) MG tablet tablet TK 1 T PO BID  12   • meloxicam (MOBIC) 7.5 MG tablet TAKE 1 TABLET BY MOUTH ONCE DAILY WITH FOOD FOR 7 DAYS, THEN TAKE AS NEEDED 90 tablet 0   • metFORMIN ER (GLUCOPHAGE-XR) 750 MG 24 hr tablet TAKE 1 TABLET BY MOUTH EVERY DAY. 90 tablet 0   • metoprolol succinate XL (TOPROL-XL) 50 MG 24 hr tablet Take 1 tablet by mouth Daily. 90 tablet 0   • metoprolol succinate XL (TOPROL-XL) 50 MG 24 hr tablet Take 1 tablet by mouth Daily. 90 tablet 0   • OLANZapine (ZYPREXA) 10 MG tablet Take 10 mg by mouth daily.     • ondansetron (ZOFRAN) 4 MG tablet TAKE 1 TABLET BY MOUTH EVERY 8 HOURS AS NEEDED FOR NAUSEA OR VOMITING 30 tablet 0   • pantoprazole (PROTONIX) 40 MG EC tablet Take 1 tablet by mouth Daily. 90 tablet 2   • sertraline (ZOLOFT) 100 MG tablet Take 2 tablets by mouth Daily. 60 tablet 3   • tamsulosin (FLOMAX) 0.4 MG capsule 24 hr capsule Take 1 capsule by mouth Every Night. 30 capsule 3   • tiotropium (SPIRIVA HANDIHALER) 18 MCG per inhalation capsule Place 1 capsule into inhaler and inhale Daily. 30 capsule 2   • vitamin D (ERGOCALCIFEROL) 25106 units capsule capsule Take 1 capsule by mouth Every 14 (Fourteen) Days. 4 capsule 11     No current facility-administered medications for this visit.      No Known Allergies  Social History     Socioeconomic History   • Marital status:       "Spouse name: Not on file   • Number of children: Not on file   • Years of education: Not on file   • Highest education level: Not on file   Tobacco Use   • Smoking status: Former Smoker     Packs/day: 0.25     Years: 15.00     Pack years: 3.75     Last attempt to quit: 2000     Years since quittin.5   • Smokeless tobacco: Never Used   Substance and Sexual Activity   • Alcohol use: No     Frequency: Never   • Drug use: No   • Sexual activity: Not Currently     Comment: shortness of breath       Review of Systems  Review of Systems   Constitutional: Negative for activity change, appetite change, diaphoresis and fatigue.   HENT: Negative for facial swelling, sneezing, sore throat, tinnitus, trouble swallowing and voice change.    Eyes: Negative for photophobia, pain, discharge, redness, itching and visual disturbance.   Respiratory: Negative for apnea, cough, choking, chest tightness and shortness of breath.    Cardiovascular: Negative for chest pain, palpitations and leg swelling.   Gastrointestinal: Negative for abdominal distention, abdominal pain, constipation, diarrhea, nausea and vomiting.   Endocrine: Negative for cold intolerance, heat intolerance, polydipsia, polyphagia and polyuria.   Genitourinary: Negative for difficulty urinating, dysuria, frequency, hematuria and urgency.   Musculoskeletal: Negative for arthralgias, back pain, gait problem, joint swelling, myalgias, neck pain and neck stiffness.   Skin: Negative for color change, pallor, rash and wound.   Neurological: Negative for dizziness, tremors, weakness, light-headedness, numbness and headaches.   Hematological: Negative for adenopathy. Does not bruise/bleed easily.   Psychiatric/Behavioral: Negative for behavioral problems, confusion and sleep disturbance.        Objective    /80 (BP Location: Left arm, Patient Position: Sitting, Cuff Size: Adult)   Pulse 92   Ht 175.3 cm (69\")   Wt 102 kg (225 lb)   BMI 33.23 kg/m²   Physical Exam "   Constitutional: He is oriented to person, place, and time. He appears well-developed and well-nourished. No distress.   HENT:   Head: Normocephalic and atraumatic.   Right Ear: External ear normal.   Left Ear: External ear normal.   Nose: Nose normal.   Eyes: Conjunctivae and EOM are normal. Pupils are equal, round, and reactive to light.   Neck: Normal range of motion. Neck supple. No tracheal deviation present. No thyromegaly present.   Cardiovascular: Normal rate, regular rhythm and normal heart sounds.   No murmur heard.  Pulmonary/Chest: Effort normal and breath sounds normal. No respiratory distress. He has no wheezes.   Abdominal: Soft. Bowel sounds are normal. There is no tenderness. There is no rebound and no guarding.   Musculoskeletal: Normal range of motion. He exhibits no edema, tenderness or deformity.   Neurological: He is alert and oriented to person, place, and time. No cranial nerve deficit.   Skin: Skin is warm and dry. No rash noted.   Psychiatric: He has a normal mood and affect. His behavior is normal. Judgment and thought content normal.       Lab Review  Glucose (mg/dL)   Date Value   04/11/2019 87   03/05/2019 108 (H)   01/16/2019 114 (H)     Sodium (mmol/L)   Date Value   04/11/2019 137   03/05/2019 136 (L)   01/16/2019 140     Potassium (mmol/L)   Date Value   04/11/2019 4.1   03/05/2019 4.1   01/16/2019 4.2     Chloride (mmol/L)   Date Value   04/11/2019 100   03/05/2019 102   01/16/2019 106     CO2 (mmol/L)   Date Value   04/11/2019 26.0   03/05/2019 27.0   01/16/2019 25.0     BUN (mg/dL)   Date Value   04/11/2019 11   03/05/2019 13   01/16/2019 17     Creatinine (mg/dL)   Date Value   04/11/2019 1.15   03/05/2019 1.10   01/16/2019 1.03     Hemoglobin A1C (%)   Date Value   03/05/2019 6.4 (H)   11/08/2018 6.5 (H)   07/12/2018 6.2 (H)     Triglycerides (mg/dL)   Date Value   03/05/2019 148   11/08/2018 111   03/14/2018 89     LDL Cholesterol  (mg/dL)   Date Value   03/05/2019 121    11/08/2018 69   03/14/2018 55       Assessment/Plan      1. DM (diabetes mellitus), type 2 with neurological complications (CMS/HCC)    2. Mixed hyperlipidemia    3. Essential hypertension    4. Vitamin D deficiency    .    Medications prescribed:  Outpatient Encounter Medications as of 7/11/2019   Medication Sig Dispense Refill   • albuterol (VENTOLIN HFA) 108 (90 Base) MCG/ACT inhaler Inhale 2 puffs Every 4 (Four) Hours As Needed for Wheezing. 1 inhaler 3   • Alcohol Swabs (HM STERILE ALCOHOL PREP) pads 1 each 4 (Four) Times a Day. 200 each 3   • Alcohol Swabs (HM STERILE ALCOHOL PREP) pads USE UTD QID  3   • ASPIRIN LOW DOSE 81 MG EC tablet TAKE 1 TABLET BY MOUTH EVERY DAY 30 tablet 0   • atorvastatin (LIPITOR) 20 MG tablet Take 1 tablet by mouth Daily. 90 tablet 3   • B-D UF III MINI PEN NEEDLES 31G X 5 MM misc Injecting once daily up to 3 times daily 150 each 11   • budesonide-formoterol (SYMBICORT) 160-4.5 MCG/ACT inhaler Inhale 2 puffs 2 (Two) Times a Day. 1 inhaler 3   • busPIRone (BUSPAR) 5 MG tablet TK 1 T PO BID  3   • fexofenadine (ALLEGRA ALLERGY) 180 MG tablet Take 1 tablet by mouth Daily. 30 tablet 3   • fluticasone (FLONASE) 50 MCG/ACT nasal spray 2 sprays into each nostril Daily. 9.9 mL 3   • glucose blood (ONE TOUCH ULTRA TEST) test strip Use to text 4 times a day.  Dx-e11.49 200 each 11   • Insulin Glargine (TOUJEO SOLOSTAR) 300 UNIT/ML solution pen-injector Inject 13 Units under the skin into the appropriate area as directed Daily. 3 pen 5   • Insulin Lispro (HUMALOG KWIKPEN) 100 UNIT/ML solution pen-injector Inject up to 8 units before meals and 2 on sliding scale 9 pen 5   • losartan (COZAAR) 50 MG tablet TAKE 1 TABLET BY MOUTH DAILY 90 tablet 0   • losartan-hydrochlorothiazide (HYZAAR) 100-12.5 MG per tablet TK 1 T PO QD WITH LUNCH  0   • MAGNESIUM-OXIDE 400 (241.3 MG) MG tablet tablet TK 1 T PO BID  12   • meloxicam (MOBIC) 7.5 MG tablet TAKE 1 TABLET BY MOUTH ONCE DAILY WITH FOOD FOR 7  DAYS, THEN TAKE AS NEEDED 90 tablet 0   • metFORMIN ER (GLUCOPHAGE-XR) 750 MG 24 hr tablet TAKE 1 TABLET BY MOUTH EVERY DAY. 90 tablet 0   • metoprolol succinate XL (TOPROL-XL) 50 MG 24 hr tablet Take 1 tablet by mouth Daily. 90 tablet 0   • metoprolol succinate XL (TOPROL-XL) 50 MG 24 hr tablet Take 1 tablet by mouth Daily. 90 tablet 0   • OLANZapine (ZYPREXA) 10 MG tablet Take 10 mg by mouth daily.     • ondansetron (ZOFRAN) 4 MG tablet TAKE 1 TABLET BY MOUTH EVERY 8 HOURS AS NEEDED FOR NAUSEA OR VOMITING 30 tablet 0   • pantoprazole (PROTONIX) 40 MG EC tablet Take 1 tablet by mouth Daily. 90 tablet 2   • sertraline (ZOLOFT) 100 MG tablet Take 2 tablets by mouth Daily. 60 tablet 3   • tamsulosin (FLOMAX) 0.4 MG capsule 24 hr capsule Take 1 capsule by mouth Every Night. 30 capsule 3   • tiotropium (SPIRIVA HANDIHALER) 18 MCG per inhalation capsule Place 1 capsule into inhaler and inhale Daily. 30 capsule 2   • vitamin D (ERGOCALCIFEROL) 81481 units capsule capsule Take 1 capsule by mouth Every 14 (Fourteen) Days. 4 capsule 11     No facility-administered encounter medications on file as of 7/11/2019.        Orders placed during this encounter include:  Orders Placed This Encounter   Procedures   • Comprehensive Metabolic Panel   • Hemoglobin A1c   • Vitamin D 25 Hydroxy   • TSH   • Lipid Panel   • Vitamin B12   • Protein / Creatinine Ratio, Urine - Urine, Clean Catch   • Microalbumin / Creatinine Urine Ratio - Urine, Clean Catch   • CBC & Differential     Order Specific Question:   Manual Differential     Answer:   No     Glycemic Management             Lab Results   Component Value Date    HGBA1C 6.4 (H) 03/05/2019                Toujeo 25 units in the morning---taking 20 units ---taking 13 units      if you ever wake up with a sugar less than 100 - back off by 3 units     ---------------------     Invokana 100 mg before breakfast --not taking            Metformin 500 mg tablets---take one with supper ----taking  750 mg                     invokamet 150/1000 mg twice daily ( it combines metformin and invokana )---stopped the invokamet due to low blood sugars      --------------------        once you run out of januvia , you will use in its place trulicity 0.75 mg weekly ---stopped due to low Blood sugars   if nausea try to eat less      ------------------     novolog --uses occasional      Use for sliding scale            2 units for every 15 grams of carbohydrate     once glucose readings are staying in the low 100s we can try 1 unit for every 15 grams and if it stays in the low 100s we will only use sliding scale         Lipid Management        on pravachol 20 mg qhs      Nov. 2016     Lipids at goal            Component      Latest Ref Rng & Units 3/5/2019   Total Cholesterol      0 - 199 mg/dL 202 (H)   Triglycerides      20 - 199 mg/dL 148   HDL Cholesterol      60 - 200 mg/dL 32 (L)   LDL Cholesterol      1 - 129 mg/dL 121   LDL/HDL Ratio      0.00 - 3.55 4.39 (H)         Non fasting      Blood Pressure Management     on amlodipine 5 mg daily      On lisinopril 2.5 mg daily --stopped due to allergic reaction     On metoprolol         Microvascular Complication Monitoring: No Microalbuminuria, No Diabetic Retinopathy, positive  Diabetic Neuropathy        Neuropathy     Taking Neurontin         Component      Latest Ref Rng & Units 7/12/2018 7/12/2018           9:34 AM  9:34 AM   Protein/Creatinine Ratio, Urine      0.0 - 200.0 mg/G Crea 59.1     Creatinine, Urine      mg/dL 211.6 211.6   Total Protein, Urine      mg/dL 12.5     Microalbumin/Creatinine Ratio      0.0 - 30.0 mg/g   37.3 (H)   Microalbumin, Urine      mg/L   7.9                  Preventive Care: Patient is not smoking        Weight Related: Obesity, Counseled on nutrition, Counseled on physical activity     Patient's Body mass index is 33.23 kg/m². BMI is above normal parameters. Recommendations include: educational material.          Dietary changes      Handout given diet and diabetes      Bone Health     Vitamin d def     Nov. 2016      Vit d - normal      Vitamin d Oct 2017     29     Start otc 1000 units daily               Component      Latest Ref Rng & Units 3/5/2019   25 Hydroxy, Vitamin D      30.0 - 100.0 ng/ml 31.5                  Lab Results   Component Value Date     [id number] 03/05/2019                Component      Latest Ref Rng & Units 4/11/2019   Hepatitis B Surface Ag      Non-Reactive Non-Reactive   Hep A IgM      Non-Reactive Non-Reactive   Hep B Core IgM      Non-Reactive Non-Reactive   Hepatitis C Ab      Non-Reactive Non-Reactive         4. Follow-up: Return in about 4 months (around 11/11/2019) for Recheck.

## 2019-07-12 ENCOUNTER — TELEPHONE (OUTPATIENT)
Dept: ENDOCRINOLOGY | Facility: CLINIC | Age: 54
End: 2019-07-12

## 2019-07-12 NOTE — TELEPHONE ENCOUNTER
----- Message from FLYNN Lund sent at 7/12/2019  9:22 AM CDT -----  Vitamin d low be sure taking , thyroid normal; urine test negative for protein ; cholesterol good; A1c was 5.95 looks good

## 2019-07-19 ENCOUNTER — TELEPHONE (OUTPATIENT)
Dept: ENDOCRINOLOGY | Facility: CLINIC | Age: 54
End: 2019-07-19

## 2019-07-30 ENCOUNTER — TELEPHONE (OUTPATIENT)
Dept: FAMILY MEDICINE CLINIC | Facility: CLINIC | Age: 54
End: 2019-07-30

## 2019-07-30 NOTE — TELEPHONE ENCOUNTER
Pt needs refills on MAGNESIUM-OXIDE 400 (241.3 MG) MG tablet tablet and    vitamin D (ERGOCALCIFEROL) 84461 units capsule capsule    Thanks  Marilynn

## 2019-07-31 RX ORDER — ERGOCALCIFEROL 1.25 MG/1
50000 CAPSULE ORAL
Qty: 4 CAPSULE | Refills: 11 | Status: SHIPPED | OUTPATIENT
Start: 2019-07-31 | End: 2020-08-06

## 2019-08-10 DIAGNOSIS — E11.42 DIABETIC POLYNEUROPATHY ASSOCIATED WITH TYPE 2 DIABETES MELLITUS (HCC): ICD-10-CM

## 2019-08-12 RX ORDER — METFORMIN HYDROCHLORIDE 750 MG/1
TABLET, EXTENDED RELEASE ORAL
Qty: 90 TABLET | Refills: 0 | Status: SHIPPED | OUTPATIENT
Start: 2019-08-12 | End: 2019-10-06 | Stop reason: SDUPTHER

## 2019-08-14 ENCOUNTER — OFFICE VISIT (OUTPATIENT)
Dept: FAMILY MEDICINE CLINIC | Facility: CLINIC | Age: 54
End: 2019-08-14

## 2019-08-14 VITALS
DIASTOLIC BLOOD PRESSURE: 82 MMHG | HEIGHT: 69 IN | SYSTOLIC BLOOD PRESSURE: 130 MMHG | HEART RATE: 72 BPM | WEIGHT: 223.56 LBS | OXYGEN SATURATION: 98 % | BODY MASS INDEX: 33.11 KG/M2 | TEMPERATURE: 97.1 F

## 2019-08-14 DIAGNOSIS — M25.561 ACUTE PAIN OF RIGHT KNEE: Primary | ICD-10-CM

## 2019-08-14 DIAGNOSIS — F07.81 POSTCONCUSSION SYNDROME: ICD-10-CM

## 2019-08-14 PROCEDURE — 99213 OFFICE O/P EST LOW 20 MIN: CPT | Performed by: STUDENT IN AN ORGANIZED HEALTH CARE EDUCATION/TRAINING PROGRAM

## 2019-08-14 RX ORDER — NAPROXEN 500 MG/1
500 TABLET ORAL 2 TIMES DAILY WITH MEALS
Qty: 60 TABLET | Refills: 0 | Status: SHIPPED | OUTPATIENT
Start: 2019-08-14 | End: 2019-09-10 | Stop reason: SDUPTHER
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
## 2019-08-23 RX ORDER — FLURBIPROFEN SODIUM 0.3 MG/ML
SOLUTION/ DROPS OPHTHALMIC
Qty: 100 EACH | Refills: 3 | Status: SHIPPED | OUTPATIENT
Start: 2019-08-23 | End: 2019-11-11 | Stop reason: SDUPTHER

## 2019-08-30 ENCOUNTER — OFFICE VISIT (OUTPATIENT)
Dept: FAMILY MEDICINE CLINIC | Facility: CLINIC | Age: 54
End: 2019-08-30

## 2019-08-30 VITALS
DIASTOLIC BLOOD PRESSURE: 80 MMHG | HEART RATE: 63 BPM | WEIGHT: 224.2 LBS | BODY MASS INDEX: 33.21 KG/M2 | SYSTOLIC BLOOD PRESSURE: 130 MMHG | OXYGEN SATURATION: 98 % | HEIGHT: 69 IN

## 2019-08-30 DIAGNOSIS — F07.81 POST CONCUSSION SYNDROME: Primary | ICD-10-CM

## 2019-08-30 DIAGNOSIS — M25.561 ACUTE PAIN OF RIGHT KNEE: ICD-10-CM

## 2019-08-30 DIAGNOSIS — M62.838 MUSCLE SPASM OF RIGHT LOWER EXTREMITY: ICD-10-CM

## 2019-08-30 PROCEDURE — 99213 OFFICE O/P EST LOW 20 MIN: CPT | Performed by: STUDENT IN AN ORGANIZED HEALTH CARE EDUCATION/TRAINING PROGRAM

## 2019-08-30 RX ORDER — CYCLOBENZAPRINE HYDROCHLORIDE 7.5 MG/1
7.5 TABLET, FILM COATED ORAL 3 TIMES DAILY PRN
Qty: 45 TABLET | Refills: 0 | Status: SHIPPED | OUTPATIENT
Start: 2019-08-30 | End: 2019-10-18 | Stop reason: SDUPTHER
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
## 2019-08-30 NOTE — PROGRESS NOTES
Pharmacy Medication Reconciliation  Patient: Nirav Lind   Sex: male  : 1965  Primary Care Physician: Charles Olivares MD         Reconciled Medication List  Prior to Admission medications    Medication Sig Start Date End Date Taking? Authorizing Provider   albuterol (VENTOLIN HFA) 108 (90 Base) MCG/ACT inhaler Inhale 2 puffs Every 4 (Four) Hours As Needed for Wheezing. 10/3/18  Yes Jeremy Mahmood MD   Alcohol Swabs ( STERILE ALCOHOL PREP) pads 1 each 4 (Four) Times a Day. 18  Yes Tato David APRN   Alcohol Swabs (HM STERILE ALCOHOL PREP) pads USE UTD QID 19  Yes ProviderAracelis MD   ASPIRIN LOW DOSE 81 MG EC tablet TAKE 1 TABLET BY MOUTH EVERY DAY 16  Yes Jacobo Burris MD   B-D UF III MINI PEN NEEDLES 31G X 5 MM misc USE AS DIRECTED UP TO THREE TIMES DAILY 19  Yes Tato David APRN   budesonide-formoterol (SYMBICORT) 160-4.5 MCG/ACT inhaler Inhale 2 puffs 2 (Two) Times a Day. 10/3/18  Yes Jeremy Mahmood MD   busPIRone (BUSPAR) 5 MG tablet 1 tablet PO once a day 17  Yes ProviderAracelis MD   fexofenadine (ALLEGRA ALLERGY) 180 MG tablet Take 1 tablet by mouth Daily. 19  Yes Jeremy Mahmood MD   fluticasone (FLONASE) 50 MCG/ACT nasal spray 2 sprays into each nostril Daily. 1/15/18  Yes Jeremy Mahmood MD   glucose blood (ONE TOUCH ULTRA TEST) test strip Use to text 4 times a day.  Dx-e11.49 19  Yes Tato David APRN   Insulin Glargine (TOUJEO SOLOSTAR) 300 UNIT/ML solution pen-injector Inject 13 Units under the skin into the appropriate area as directed Daily. 1/15/19  Yes Leslie Urban MD   Insulin Lispro (HUMALOG KWIKPEN) 100 UNIT/ML solution pen-injector Inject up to 8 units before meals and 2 on sliding scale 18  Yes Tato David APRN   losartan (COZAAR) 50 MG tablet TAKE 1 TABLET BY MOUTH DAILY 3/4/19  Yes Jeremy Mahmood MD   MAGNESIUM-OXIDE  400 (241.3 Mg) MG tablet tablet Take 1 tablet by mouth 2 (Two) Times a Day. 7/31/19  Yes Charles Olivares MD   metFORMIN ER (GLUCOPHAGE-XR) 750 MG 24 hr tablet TAKE 1 TABLET BY MOUTH EVERY DAY. 8/12/19  Yes Tato David APRN   naproxen (NAPROXEN DR) 500 MG EC tablet Take 1 tablet by mouth 2 (Two) Times a Day With Meals. 8/14/19  Yes Charles Olivares MD   OLANZapine (ZYPREXA) 10 MG tablet Take 10 mg by mouth daily.   Yes Aracelis Jordan MD   ondansetron (ZOFRAN) 4 MG tablet TAKE 1 TABLET BY MOUTH EVERY 8 HOURS AS NEEDED FOR NAUSEA OR VOMITING 8/13/18  Yes Jeremy Mahmood MD   pantoprazole (PROTONIX) 40 MG EC tablet Take 1 tablet by mouth Daily. 7/5/19  Yes Bill Jean MD   sertraline (ZOLOFT) 100 MG tablet Take 2 tablets by mouth Daily.  Patient taking differently: Take 100 mg by mouth Daily. 4/30/18  Yes Jeremy Mahmood MD   tamsulosin (FLOMAX) 0.4 MG capsule 24 hr capsule Take 1 capsule by mouth Every Night.  Patient taking differently: Take 1 capsule by mouth Daily. 6/12/19  Yes Jeremy Mahmood MD   tiotropium (SPIRIVA HANDIHALER) 18 MCG per inhalation capsule Place 1 capsule into inhaler and inhale Daily. 6/28/19  Yes Jeremy Mahmood MD   vitamin D (ERGOCALCIFEROL) 93990 units capsule capsule Take 1 capsule by mouth Every 14 (Fourteen) Days. 7/31/19  Yes Charles Olivares MD   DISCONTINUED        losartan-hydrochlorothiazide (HYZAAR) 100-12.5 MG per tablet TK 1 T PO QD WITH LUNCH 4/12/19 8/30/19 Yes ProviderAracelis MD   metoprolol succinate XL (TOPROL-XL) 50 MG 24 hr tablet Take 1 tablet by mouth Daily. 7/5/19 8/30/19 Yes Bill Jean MD   atorvastatin (LIPITOR) 20 MG tablet Take 1 tablet by mouth Daily. 6/12/19   Jeremy Mahmood MD   cyclobenzaprine (FEXMID) 7.5 MG tablet Take 1 tablet by mouth 3 (Three) Times a Day As Needed for Muscle Spasms. 8/30/19   Chalres Olivares MD   metoprolol succinate XL (TOPROL-XL) 50 MG 24 hr  tablet Take 1 tablet by mouth Daily. 7/8/19   Cory Kaur MD         Allergies  No Known Allergies      Number of medications added - 0  Number of medications removed - 3  Number of clarifications - 5  Patient reported compliance - Not compliant  Allergy changes - No      Summary/Recommendations  Patient reports not currently taking atorvastatin or toprol xl.  Patient is taking spiriva 1 puff daily, requested patient take 2 puffs daily.  Patient reports breathing is well controlled and rarely needs rescue inhaler.  Patient reports not taking hyzaar or toprol XL.  Patient reports compliance with all other medications.  Reported all findings to provider.      Lamine Ponce [facility]  08/30/19 10:53 AM

## 2019-09-04 ENCOUNTER — TELEPHONE (OUTPATIENT)
Dept: FAMILY MEDICINE CLINIC | Facility: CLINIC | Age: 54
End: 2019-09-04

## 2019-09-11 RX ORDER — NAPROXEN 500 MG/1
TABLET, DELAYED RELEASE ORAL
Qty: 60 TABLET | Refills: 0 | Status: SHIPPED | OUTPATIENT
Start: 2019-09-11 | End: 2019-10-08 | Stop reason: SDUPTHER

## 2019-10-06 DIAGNOSIS — E11.42 DIABETIC POLYNEUROPATHY ASSOCIATED WITH TYPE 2 DIABETES MELLITUS (HCC): ICD-10-CM

## 2019-10-06 RX ORDER — NAPROXEN 500 MG/1
TABLET, DELAYED RELEASE ORAL
Qty: 60 TABLET | Refills: 0 | Status: CANCELLED | OUTPATIENT
Start: 2019-10-06

## 2019-10-07 RX ORDER — METFORMIN HYDROCHLORIDE 750 MG/1
TABLET, EXTENDED RELEASE ORAL
Qty: 90 TABLET | Refills: 0 | Status: SHIPPED | OUTPATIENT
Start: 2019-10-07 | End: 2020-02-11

## 2019-10-08 RX ORDER — NAPROXEN 500 MG/1
500 TABLET ORAL 2 TIMES DAILY WITH MEALS
Qty: 60 TABLET | Refills: 2 | Status: SHIPPED | OUTPATIENT
Start: 2019-10-08 | End: 2020-05-26 | Stop reason: SDUPTHER

## 2019-10-11 RX ORDER — METOPROLOL SUCCINATE 50 MG/1
TABLET, EXTENDED RELEASE ORAL
Qty: 90 TABLET | Refills: 4 | Status: SHIPPED | OUTPATIENT
Start: 2019-10-11 | End: 2020-10-15 | Stop reason: SDUPTHER

## 2019-10-16 ENCOUNTER — APPOINTMENT (OUTPATIENT)
Dept: GENERAL RADIOLOGY | Facility: HOSPITAL | Age: 54
End: 2019-10-16

## 2019-10-16 ENCOUNTER — HOSPITAL ENCOUNTER (EMERGENCY)
Facility: HOSPITAL | Age: 54
Discharge: HOME | End: 2019-10-16
Attending: FAMILY MEDICINE | Admitting: FAMILY MEDICINE

## 2019-10-16 VITALS
SYSTOLIC BLOOD PRESSURE: 162 MMHG | HEIGHT: 70 IN | OXYGEN SATURATION: 98 % | DIASTOLIC BLOOD PRESSURE: 137 MMHG | RESPIRATION RATE: 18 BRPM | BODY MASS INDEX: 32.38 KG/M2 | TEMPERATURE: 98 F | HEART RATE: 61 BPM | WEIGHT: 226.2 LBS

## 2019-10-16 DIAGNOSIS — S61.412A SKIN TEAR OF LEFT HAND WITHOUT COMPLICATION, INITIAL ENCOUNTER: Primary | ICD-10-CM

## 2019-10-16 PROCEDURE — 73140 X-RAY EXAM OF FINGER(S): CPT

## 2019-10-16 PROCEDURE — 25010000002 TDAP 5-2.5-18.5 LF-MCG/0.5 SUSPENSION: Performed by: PHYSICIAN ASSISTANT

## 2019-10-16 PROCEDURE — 99284 EMERGENCY DEPT VISIT MOD MDM: CPT

## 2019-10-16 PROCEDURE — 90471 IMMUNIZATION ADMIN: CPT | Performed by: PHYSICIAN ASSISTANT

## 2019-10-16 PROCEDURE — 90715 TDAP VACCINE 7 YRS/> IM: CPT | Performed by: PHYSICIAN ASSISTANT

## 2019-10-16 RX ORDER — DIAPER,BRIEF,INFANT-TODD,DISP
EACH MISCELLANEOUS ONCE
Status: COMPLETED | OUTPATIENT
Start: 2019-10-16 | End: 2019-10-16

## 2019-10-16 RX ADMIN — TETANUS TOXOID, REDUCED DIPHTHERIA TOXOID AND ACELLULAR PERTUSSIS VACCINE, ADSORBED 0.5 ML: 5; 2.5; 8; 8; 2.5 SUSPENSION INTRAMUSCULAR at 19:59

## 2019-10-16 RX ADMIN — BACITRACIN ZINC: 500 OINTMENT TOPICAL at 20:52

## 2019-10-17 ENCOUNTER — EPISODE CHANGES (OUTPATIENT)
Dept: CASE MANAGEMENT | Facility: OTHER | Age: 54
End: 2019-10-17

## 2019-10-17 NOTE — DISCHARGE INSTRUCTIONS
Please leave steri strips on until they fall off. Follow up with Dr. Olivares or any other resident for follow up. Apply bacitracin over wounds 2x/day. Return to the ER with any concerning or worsening symptoms.

## 2019-10-17 NOTE — ED PROVIDER NOTES
Subjective   Pt reports he was taking the trash out PTA when pt fell and trash can landed on pt's L hand. Pt is c/o L 3rd and 4th digit pain. Unknown of last tetanus shot.        History provided by:  Patient   used: No        Review of Systems   Constitutional: Negative for fatigue.   HENT: Negative for congestion.    Respiratory: Negative for cough and shortness of breath.    Gastrointestinal: Negative for vomiting.   Endocrine: Negative for polyuria.   Skin: Negative for color change.   Neurological: Negative for syncope.   Psychiatric/Behavioral: Negative for agitation.       Past Medical History:   Diagnosis Date   • Abnormal computed tomography scan    • Adenomatous polyp of colon    • Allergic rhinitis    • Anemia    • Chronic back pain    • Coronary arteriosclerosis    • Depression    • Diabetes mellitus (CMS/HCC)    • Diabetic polyneuropathy associated with type 2 diabetes mellitus (CMS/HCC) 7/17/2017   • Dizziness    • Dyspnea     unspecified   • Epigastric pain    • Essential hypertension    • Ex-smoker    • Hemangioma of liver    • Hyperlipidemia    • Infected hydrocele     with orchitis and epididymis   • Nausea    • Nausea with vomiting    • Obesity with body mass index of 30.0 - 39.9    • Pain in right knee    • Right upper quadrant pain    • Type II diabetes mellitus, uncontrolled (CMS/HCC)    • Upper respiratory infection    • Urgency of urination    • Villous adenoma of colon        No Known Allergies    Past Surgical History:   Procedure Laterality Date   • CARDIAC CATHETERIZATION  11/30/2015    No evidence of any obstructive disease in the circumflex, the RCA , or LAD. 1st diagonal branch in the midportion with 20-30% stenosis. Preserved LV systolic function with EF 55%.   • COLONOSCOPY  01/21/2013    Normal   • COLONOSCOPY W/ POLYPECTOMY  10/13/2014    A single polyp was found in the colon; removed by snare cautery polypectomy.   • ENDOSCOPY  06/15/2016    Mildly severe  esophagitis. Several biopsies obtained in the upper third of the esophagus.   • INCISION AND DRAINAGE ABSCESS  10/29/2014    Incision and drainage of scrotal abscess. Hydrocelectomy, bottle procedure.   • INJECTION OF MEDICATION  2016    Kenalog       Family History   Problem Relation Age of Onset   • Cancer Mother         leukemia   • Heart disease Mother    • Heart disease Sister    • Lung disease Father    • Heart disease Maternal Grandmother    • Heart disease Maternal Grandfather        Social History     Socioeconomic History   • Marital status:      Spouse name: Not on file   • Number of children: Not on file   • Years of education: Not on file   • Highest education level: Not on file   Tobacco Use   • Smoking status: Former Smoker     Packs/day: 0.25     Years: 15.00     Pack years: 3.75     Types: Cigarettes     Last attempt to quit: 2000     Years since quittin.8   • Smokeless tobacco: Never Used   Substance and Sexual Activity   • Alcohol use: No     Frequency: Never   • Drug use: No   • Sexual activity: Not Currently     Comment: shortness of breath           Objective   Physical Exam   Constitutional: He is oriented to person, place, and time. He appears well-developed and well-nourished.   HENT:   Head: Normocephalic.   Right Ear: Hearing normal.   Left Ear: Hearing normal.   Nose: Nose normal.   Eyes: Conjunctivae, EOM and lids are normal.   Neck: Trachea normal and full passive range of motion without pain.   Cardiovascular: Regular rhythm, S1 normal, S2 normal, normal heart sounds and normal pulses.   Pulmonary/Chest: Effort normal and breath sounds normal.   Abdominal: Normal appearance and bowel sounds are normal.   Neurological: He is alert and oriented to person, place, and time. He is not disoriented.   Skin: Skin is warm and dry. He is not diaphoretic.   Superficial skin tear to the tip of the left 3rd digit- able to flex and extend L hand with no complications   Psychiatric:  He has a normal mood and affect. His speech is normal and behavior is normal. Thought content normal.   Nursing note and vitals reviewed.      Procedures           ED Course        Applied dermabond and steri strips on superficial skin tear of L 3rd digit.          MDM    Final diagnoses:   Skin tear of left hand without complication, initial encounter              Huma Chen PA-C  10/19/19 1718       Huma Chen PA-C  11/13/19 1949

## 2019-10-17 NOTE — ED NOTES
Multiple skin tears noted to left hand digits 3 and four noted. No bleeding noted at this time. Bandage in place. Sensation present bilat. No distress noted at this time.      Rinku Durham, RN  10/16/19 1919

## 2019-10-17 NOTE — ED NOTES
Bacitracin and dressing applied to wound dsg; finger splint applied to 3rd digit as instructed by Dr. Chen. Pt educated in care of wound and steri strips. Pt tolerated procedure without difficulty. Pt verbalizes understanding of information.      Rinku Durham, RN  10/16/19 [id number]

## 2019-10-17 NOTE — ED NOTES
Wound irrigated with normal saline. Pt tolerated without difficulty.     Rinku Durham, RN  10/16/19 2003

## 2019-10-17 NOTE — ED NOTES
Dr. Chen informed that hand wound has been irrigated and steri strips are at bedside. Physician verbalizes understanding of this information.     Rinku Durham RN  10/16/19 2014

## 2019-10-18 ENCOUNTER — OFFICE VISIT (OUTPATIENT)
Dept: FAMILY MEDICINE CLINIC | Facility: CLINIC | Age: 54
End: 2019-10-18

## 2019-10-18 ENCOUNTER — APPOINTMENT (OUTPATIENT)
Dept: LAB | Facility: HOSPITAL | Age: 54
End: 2019-10-18

## 2019-10-18 VITALS
DIASTOLIC BLOOD PRESSURE: 84 MMHG | WEIGHT: 222.6 LBS | HEART RATE: 67 BPM | BODY MASS INDEX: 32.97 KG/M2 | SYSTOLIC BLOOD PRESSURE: 152 MMHG | RESPIRATION RATE: 20 BRPM | TEMPERATURE: 96.6 F | HEIGHT: 69 IN | OXYGEN SATURATION: 96 %

## 2019-10-18 DIAGNOSIS — F07.81 POST CONCUSSION SYNDROME: ICD-10-CM

## 2019-10-18 DIAGNOSIS — R39.12 BENIGN PROSTATIC HYPERPLASIA WITH WEAK URINARY STREAM: ICD-10-CM

## 2019-10-18 DIAGNOSIS — G89.29 CHRONIC PAIN OF RIGHT KNEE: Primary | ICD-10-CM

## 2019-10-18 DIAGNOSIS — N40.1 BENIGN PROSTATIC HYPERPLASIA WITH WEAK URINARY STREAM: ICD-10-CM

## 2019-10-18 DIAGNOSIS — E11.49 DM (DIABETES MELLITUS), TYPE 2 WITH NEUROLOGICAL COMPLICATIONS (HCC): ICD-10-CM

## 2019-10-18 DIAGNOSIS — M25.361 KNEE INSTABILITY, RIGHT: ICD-10-CM

## 2019-10-18 DIAGNOSIS — M25.561 CHRONIC PAIN OF RIGHT KNEE: Primary | ICD-10-CM

## 2019-10-18 DIAGNOSIS — Z23 NEED FOR IMMUNIZATION AGAINST INFLUENZA: ICD-10-CM

## 2019-10-18 PROCEDURE — G0008 ADMIN INFLUENZA VIRUS VAC: HCPCS | Performed by: STUDENT IN AN ORGANIZED HEALTH CARE EDUCATION/TRAINING PROGRAM

## 2019-10-18 PROCEDURE — 90674 CCIIV4 VAC NO PRSV 0.5 ML IM: CPT | Performed by: STUDENT IN AN ORGANIZED HEALTH CARE EDUCATION/TRAINING PROGRAM

## 2019-10-18 PROCEDURE — 83036 HEMOGLOBIN GLYCOSYLATED A1C: CPT | Performed by: STUDENT IN AN ORGANIZED HEALTH CARE EDUCATION/TRAINING PROGRAM

## 2019-10-18 PROCEDURE — 36415 COLL VENOUS BLD VENIPUNCTURE: CPT | Performed by: STUDENT IN AN ORGANIZED HEALTH CARE EDUCATION/TRAINING PROGRAM

## 2019-10-18 PROCEDURE — 99214 OFFICE O/P EST MOD 30 MIN: CPT | Performed by: STUDENT IN AN ORGANIZED HEALTH CARE EDUCATION/TRAINING PROGRAM

## 2019-10-18 RX ORDER — FEXOFENADINE HCL 180 MG/1
180 TABLET ORAL DAILY
Qty: 30 TABLET | Refills: 3 | Status: SHIPPED | OUTPATIENT
Start: 2019-10-18 | End: 2020-06-12 | Stop reason: SDDI

## 2019-10-18 RX ORDER — LOSARTAN POTASSIUM AND HYDROCHLOROTHIAZIDE 12.5; 1 MG/1; MG/1
TABLET ORAL
Refills: 0 | COMMUNITY
Start: 2019-10-06 | End: 2020-06-12 | Stop reason: SDUPTHER

## 2019-10-18 RX ORDER — ONDANSETRON 4 MG/1
4 TABLET, FILM COATED ORAL EVERY 8 HOURS PRN
Qty: 30 TABLET | Refills: 0 | Status: CANCELLED | OUTPATIENT
Start: 2019-10-18

## 2019-10-18 RX ORDER — DOXAZOSIN 2 MG/1
1 TABLET ORAL NIGHTLY
Qty: 30 TABLET | Refills: 1 | Status: SHIPPED | OUTPATIENT
Start: 2019-10-18 | End: 2020-03-03

## 2019-10-18 RX ORDER — TAMSULOSIN HYDROCHLORIDE 0.4 MG/1
1 CAPSULE ORAL NIGHTLY
Qty: 30 CAPSULE | Refills: 3 | Status: CANCELLED | OUTPATIENT
Start: 2019-10-18

## 2019-10-18 RX ORDER — AMITRIPTYLINE HYDROCHLORIDE 25 MG/1
25 TABLET, FILM COATED ORAL NIGHTLY PRN
Refills: 1 | COMMUNITY
Start: 2019-09-17 | End: 2022-06-10

## 2019-10-18 RX ORDER — ONDANSETRON 4 MG/1
4 TABLET, ORALLY DISINTEGRATING ORAL EVERY 8 HOURS PRN
Qty: 30 TABLET | Refills: 0 | Status: SHIPPED | OUTPATIENT
Start: 2019-10-18 | End: 2020-06-12 | Stop reason: SDUPTHER

## 2019-10-18 RX ORDER — CYCLOBENZAPRINE HYDROCHLORIDE 7.5 MG/1
7.5 TABLET, FILM COATED ORAL 3 TIMES DAILY PRN
Qty: 45 TABLET | Refills: 0 | Status: SHIPPED | OUTPATIENT
Start: 2019-10-18 | End: 2020-12-10

## 2019-10-18 RX ORDER — DUTASTERIDE 0.5 MG/1
0.5 CAPSULE, LIQUID FILLED ORAL DAILY
Qty: 30 CAPSULE | Refills: 4 | Status: SHIPPED | OUTPATIENT
Start: 2019-10-18 | End: 2020-12-10

## 2019-10-18 RX ORDER — TAMSULOSIN HYDROCHLORIDE 0.4 MG/1
2 CAPSULE ORAL NIGHTLY
Qty: 60 CAPSULE | Refills: 3 | Status: SHIPPED | OUTPATIENT
Start: 2019-10-18 | End: 2019-10-18 | Stop reason: ALTCHOICE

## 2019-10-19 LAB — HBA1C MFR BLD: 6.22 % (ref 4.8–5.6)

## 2019-10-21 ENCOUNTER — TELEPHONE (OUTPATIENT)
Dept: FAMILY MEDICINE CLINIC | Facility: CLINIC | Age: 54
End: 2019-10-21

## 2019-10-21 DIAGNOSIS — M25.361 KNEE INSTABILITY, RIGHT: Primary | ICD-10-CM

## 2019-10-21 DIAGNOSIS — R39.12 BENIGN PROSTATIC HYPERPLASIA WITH WEAK URINARY STREAM: Primary | ICD-10-CM

## 2019-10-21 DIAGNOSIS — N40.1 BENIGN PROSTATIC HYPERPLASIA WITH WEAK URINARY STREAM: Primary | ICD-10-CM

## 2019-10-21 NOTE — TELEPHONE ENCOUNTER
Patient called stating that he is going to need a referral to see Dr Sheridan since he has not been seen over there in 3 years.    Call back number for patient is [phone].    Thanks,  Nannette

## 2019-10-21 NOTE — TELEPHONE ENCOUNTER
Patient called stating that he is going to need a referral to see Dr Cisneros since he has not been seen over there in 3 years.     Call back number for patient is [phone].     Thanks,  Nannette      Patient last seen by you on 10/18/19 for knee pain / BPH.      WHITNEY Gerber MA.    10/21/19
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
## 2019-10-25 ENCOUNTER — HOSPITAL ENCOUNTER (OUTPATIENT)
Dept: MRI IMAGING | Facility: HOSPITAL | Age: 54
Discharge: HOME | End: 2019-10-25
Admitting: RADIOLOGY

## 2019-10-25 DIAGNOSIS — M25.361 KNEE INSTABILITY, RIGHT: ICD-10-CM

## 2019-10-25 PROCEDURE — 73721 MRI JNT OF LWR EXTRE W/O DYE: CPT

## 2019-10-28 DIAGNOSIS — S83.281D TEAR OF LATERAL MENISCUS OF RIGHT KNEE, CURRENT, UNSPECIFIED TEAR TYPE, SUBSEQUENT ENCOUNTER: Primary | ICD-10-CM

## 2019-10-30 DIAGNOSIS — M23.91 KNEE DERANGEMENT, RIGHT: Primary | ICD-10-CM

## 2019-11-05 ENCOUNTER — TELEPHONE (OUTPATIENT)
Dept: ENDOCRINOLOGY | Facility: CLINIC | Age: 54
End: 2019-11-05

## 2019-11-05 DIAGNOSIS — I10 ESSENTIAL HYPERTENSION: Primary | ICD-10-CM

## 2019-11-05 DIAGNOSIS — E78.2 MIXED HYPERLIPIDEMIA: ICD-10-CM

## 2019-11-05 DIAGNOSIS — E11.49 DM (DIABETES MELLITUS), TYPE 2 WITH NEUROLOGICAL COMPLICATIONS (HCC): ICD-10-CM

## 2019-11-05 DIAGNOSIS — E55.9 VITAMIN D DEFICIENCY: ICD-10-CM

## 2019-11-05 NOTE — TELEPHONE ENCOUNTER
Can you put labs in for him please-          (Has an appointment on 11/11 and he wants to know if he needs to get labs done before his appointment. Please call him back and let him know.)

## 2019-11-05 NOTE — TELEPHONE ENCOUNTER
Has an appointment on 11/11 and he wants to know if he needs to get labs done before his appointment. Please call him back and let him know.

## 2019-11-11 ENCOUNTER — APPOINTMENT (OUTPATIENT)
Dept: LAB | Facility: HOSPITAL | Age: 54
End: 2019-11-11

## 2019-11-11 ENCOUNTER — OFFICE VISIT (OUTPATIENT)
Dept: ENDOCRINOLOGY | Facility: CLINIC | Age: 54
End: 2019-11-11

## 2019-11-11 ENCOUNTER — OFFICE VISIT (OUTPATIENT)
Dept: ORTHOPEDIC SURGERY | Facility: CLINIC | Age: 54
End: 2019-11-11

## 2019-11-11 VITALS — WEIGHT: 223.6 LBS | HEIGHT: 69 IN | BODY MASS INDEX: 33.12 KG/M2

## 2019-11-11 VITALS
SYSTOLIC BLOOD PRESSURE: 136 MMHG | DIASTOLIC BLOOD PRESSURE: 80 MMHG | WEIGHT: 224.2 LBS | HEIGHT: 69 IN | BODY MASS INDEX: 33.21 KG/M2

## 2019-11-11 DIAGNOSIS — E55.9 VITAMIN D DEFICIENCY: ICD-10-CM

## 2019-11-11 DIAGNOSIS — E78.2 MIXED HYPERLIPIDEMIA: Primary | ICD-10-CM

## 2019-11-11 DIAGNOSIS — M25.561 RIGHT KNEE PAIN, UNSPECIFIED CHRONICITY: Primary | ICD-10-CM

## 2019-11-11 DIAGNOSIS — I10 ESSENTIAL HYPERTENSION: ICD-10-CM

## 2019-11-11 DIAGNOSIS — E11.49 DM (DIABETES MELLITUS), TYPE 2 WITH NEUROLOGICAL COMPLICATIONS (HCC): ICD-10-CM

## 2019-11-11 DIAGNOSIS — S83.281A TEAR OF LATERAL MENISCUS OF RIGHT KNEE, CURRENT, UNSPECIFIED TEAR TYPE, INITIAL ENCOUNTER: ICD-10-CM

## 2019-11-11 LAB
25(OH)D3 SERPL-MCNC: 35.2 NG/ML (ref 30–100)
ALBUMIN SERPL-MCNC: 4 G/DL (ref 3.5–5.2)
ALBUMIN UR-MCNC: 2 MG/DL
ALBUMIN/GLOB SERPL: 1.1 G/DL
ALP SERPL-CCNC: 107 U/L (ref 39–117)
ALT SERPL W P-5'-P-CCNC: 21 U/L (ref 1–41)
ANION GAP SERPL CALCULATED.3IONS-SCNC: 11.9 MMOL/L (ref 5–15)
AST SERPL-CCNC: 18 U/L (ref 1–40)
BASOPHILS # BLD AUTO: 0.06 10*3/MM3 (ref 0–0.2)
BASOPHILS NFR BLD AUTO: 0.7 % (ref 0–1.5)
BILIRUB SERPL-MCNC: 0.3 MG/DL (ref 0.2–1.2)
BUN BLD-MCNC: 18 MG/DL (ref 6–20)
BUN/CREAT SERPL: 15.1 (ref 7–25)
CALCIUM SPEC-SCNC: 9.5 MG/DL (ref 8.6–10.5)
CHLORIDE SERPL-SCNC: 106 MMOL/L (ref 98–107)
CHOLEST SERPL-MCNC: 113 MG/DL (ref 0–200)
CO2 SERPL-SCNC: 26.1 MMOL/L (ref 22–29)
CREAT BLD-MCNC: 1.19 MG/DL (ref 0.76–1.27)
CREAT UR-MCNC: 167 MG/DL
CREAT UR-MCNC: 167.8 MG/DL
CREAT UR-MCNC: 167.8 MG/DL
DEPRECATED RDW RBC AUTO: 39.6 FL (ref 37–54)
EOSINOPHIL # BLD AUTO: 0.38 10*3/MM3 (ref 0–0.4)
EOSINOPHIL NFR BLD AUTO: 4.6 % (ref 0.3–6.2)
ERYTHROCYTE [DISTWIDTH] IN BLOOD BY AUTOMATED COUNT: 13 % (ref 12.3–15.4)
GFR SERPL CREATININE-BSD FRML MDRD: 77 ML/MIN/1.73
GLOBULIN UR ELPH-MCNC: 3.8 GM/DL
GLUCOSE BLD-MCNC: 81 MG/DL (ref 65–99)
HBA1C MFR BLD: 6.43 % (ref 4.8–5.6)
HCT VFR BLD AUTO: 39.7 % (ref 37.5–51)
HDLC SERPL-MCNC: 32 MG/DL (ref 40–60)
HGB BLD-MCNC: 13.2 G/DL (ref 13–17.7)
IMM GRANULOCYTES # BLD AUTO: 0.04 10*3/MM3 (ref 0–0.05)
IMM GRANULOCYTES NFR BLD AUTO: 0.5 % (ref 0–0.5)
LDLC SERPL CALC-MCNC: 64 MG/DL (ref 0–100)
LDLC/HDLC SERPL: 2.01 {RATIO}
LYMPHOCYTES # BLD AUTO: 1.62 10*3/MM3 (ref 0.7–3.1)
LYMPHOCYTES NFR BLD AUTO: 19.4 % (ref 19.6–45.3)
MCH RBC QN AUTO: 28.3 PG (ref 26.6–33)
MCHC RBC AUTO-ENTMCNC: 33.2 G/DL (ref 31.5–35.7)
MCV RBC AUTO: 85 FL (ref 79–97)
MICROALBUMIN/CREAT UR: 11.9 MG/G
MONOCYTES # BLD AUTO: 0.77 10*3/MM3 (ref 0.1–0.9)
MONOCYTES NFR BLD AUTO: 9.2 % (ref 5–12)
NEUTROPHILS # BLD AUTO: 5.48 10*3/MM3 (ref 1.7–7)
NEUTROPHILS NFR BLD AUTO: 65.6 % (ref 42.7–76)
NRBC BLD AUTO-RTO: 0 /100 WBC (ref 0–0.2)
PLATELET # BLD AUTO: 273 10*3/MM3 (ref 140–450)
PMV BLD AUTO: 10.9 FL (ref 6–12)
POTASSIUM BLD-SCNC: 4.2 MMOL/L (ref 3.5–5.2)
PROT SERPL-MCNC: 7.8 G/DL (ref 6–8.5)
PROT UR-MCNC: 13 MG/DL
PROT UR-MCNC: 13 MG/DL
PROT/CREAT UR: 77.5 MG/G CREA (ref 0–200)
PROT/CREAT UR: 77.8 MG/G CREA (ref 0–200)
RBC # BLD AUTO: 4.67 10*6/MM3 (ref 4.14–5.8)
SODIUM BLD-SCNC: 144 MMOL/L (ref 136–145)
TRIGL SERPL-MCNC: 84 MG/DL (ref 0–150)
TSH SERPL DL<=0.05 MIU/L-ACNC: 3.37 UIU/ML (ref 0.27–4.2)
VIT B12 BLD-MCNC: 611 PG/ML (ref 211–946)
VLDLC SERPL-MCNC: 16.8 MG/DL (ref 5–40)
WBC NRBC COR # BLD: 8.35 10*3/MM3 (ref 3.4–10.8)

## 2019-11-11 PROCEDURE — 99214 OFFICE O/P EST MOD 30 MIN: CPT | Performed by: NURSE PRACTITIONER

## 2019-11-11 PROCEDURE — 84443 ASSAY THYROID STIM HORMONE: CPT | Performed by: NURSE PRACTITIONER

## 2019-11-11 PROCEDURE — 82570 ASSAY OF URINE CREATININE: CPT | Performed by: NURSE PRACTITIONER

## 2019-11-11 PROCEDURE — 80061 LIPID PANEL: CPT | Performed by: NURSE PRACTITIONER

## 2019-11-11 PROCEDURE — 99203 OFFICE O/P NEW LOW 30 MIN: CPT | Performed by: ORTHOPAEDIC SURGERY

## 2019-11-11 PROCEDURE — 82607 VITAMIN B-12: CPT | Performed by: NURSE PRACTITIONER

## 2019-11-11 PROCEDURE — 84156 ASSAY OF PROTEIN URINE: CPT | Performed by: NURSE PRACTITIONER

## 2019-11-11 PROCEDURE — 85025 COMPLETE CBC W/AUTO DIFF WBC: CPT | Performed by: NURSE PRACTITIONER

## 2019-11-11 PROCEDURE — 20610 DRAIN/INJ JOINT/BURSA W/O US: CPT | Performed by: ORTHOPAEDIC SURGERY

## 2019-11-11 PROCEDURE — 83036 HEMOGLOBIN GLYCOSYLATED A1C: CPT | Performed by: NURSE PRACTITIONER

## 2019-11-11 PROCEDURE — 36415 COLL VENOUS BLD VENIPUNCTURE: CPT | Performed by: NURSE PRACTITIONER

## 2019-11-11 PROCEDURE — 82306 VITAMIN D 25 HYDROXY: CPT | Performed by: NURSE PRACTITIONER

## 2019-11-11 PROCEDURE — 82043 UR ALBUMIN QUANTITATIVE: CPT | Performed by: NURSE PRACTITIONER

## 2019-11-11 PROCEDURE — 80053 COMPREHEN METABOLIC PANEL: CPT | Performed by: NURSE PRACTITIONER

## 2019-11-11 RX ORDER — TAMSULOSIN HYDROCHLORIDE 0.4 MG/1
0.4 CAPSULE ORAL 2 TIMES DAILY
Refills: 3 | COMMUNITY
Start: 2019-10-18 | End: 2020-01-24 | Stop reason: SDUPTHER

## 2019-11-11 RX ORDER — FLURBIPROFEN SODIUM 0.3 MG/ML
SOLUTION/ DROPS OPHTHALMIC
Qty: 100 EACH | Refills: 3 | Status: SHIPPED | OUTPATIENT
Start: 2019-11-11 | End: 2020-03-11

## 2019-11-11 RX ORDER — INSULIN GLARGINE 300 U/ML
13 INJECTION, SOLUTION SUBCUTANEOUS DAILY
Refills: 5 | COMMUNITY
Start: 2019-08-27 | End: 2020-03-11 | Stop reason: SDUPTHER

## 2019-11-11 RX ADMIN — LIDOCAINE HYDROCHLORIDE 4 ML: 10 INJECTION, SOLUTION EPIDURAL; INFILTRATION; INTRACAUDAL; PERINEURAL at 15:13

## 2019-11-11 RX ADMIN — TRIAMCINOLONE ACETONIDE 80 MG: 40 INJECTION, SUSPENSION INTRA-ARTICULAR; INTRAMUSCULAR at 15:13

## 2019-11-11 NOTE — PROGRESS NOTES
Subjective    Nirav Lind is a 54 y.o. male. he is here today for follow-up.    History of Present Illness       Reason - Diabetes Mellitus type 2      Duration/Timing: Diabetes mellitus type 2, onset of symptoms gradual      dm dx at age 51 y      constant     not controlled      severity high      Patient was admitted to the hospital for chest pain but cardiac was ruled out      Severity (Complications/Hospitalizations)  Secondary Macrovascular Complications: No CAD, No CVA, No PAD  Secondary Microvascular Complications: No Microalbuminuria, No Diabetic Retinopathy, No Diabetic Neuropathy     Context  Diabetes Regimen: Insulin, Oral Medications,                   Lab Results   Component Value Date     HGBA1C 6.4 (H) 03/05/2019                  Blood Glucose Readings     States at goal            Diet  adhering to 1800 calorie diet      Exercise: Does not exercise     Associated Signs/Symptoms  Hyperglycemic Symptoms: No polyuria, No polydipsia, No polyphagia  Hypoglycemic Episodes: No documented hypoglycemia since last visit                  The following portions of the patient's history were reviewed and updated as appropriate:   Past Medical History:   Diagnosis Date   • Abnormal computed tomography scan    • Adenomatous polyp of colon    • Allergic rhinitis    • Anemia    • Chronic back pain    • Coronary arteriosclerosis    • Depression    • Diabetes mellitus (CMS/HCC)    • Diabetic polyneuropathy associated with type 2 diabetes mellitus (CMS/HCC) 7/17/2017   • Dizziness    • Dyspnea     unspecified   • Epigastric pain    • Essential hypertension    • Ex-smoker    • Hemangioma of liver    • Hyperlipidemia    • Infected hydrocele     with orchitis and epididymis   • Nausea    • Nausea with vomiting    • Obesity with body mass index of 30.0 - 39.9    • Pain in right knee    • Right upper quadrant pain    • Type II diabetes mellitus, uncontrolled (CMS/HCC)    • Upper respiratory infection    • Urgency of  urination    • Villous adenoma of colon      Past Surgical History:   Procedure Laterality Date   • CARDIAC CATHETERIZATION  11/30/2015    No evidence of any obstructive disease in the circumflex, the RCA , or LAD. 1st diagonal branch in the midportion with 20-30% stenosis. Preserved LV systolic function with EF 55%.   • COLONOSCOPY  01/21/2013    Normal   • COLONOSCOPY W/ POLYPECTOMY  10/13/2014    A single polyp was found in the colon; removed by snare cautery polypectomy.   • ENDOSCOPY  06/15/2016    Mildly severe esophagitis. Several biopsies obtained in the upper third of the esophagus.   • INCISION AND DRAINAGE ABSCESS  10/29/2014    Incision and drainage of scrotal abscess. Hydrocelectomy, bottle procedure.   • INJECTION OF MEDICATION  01/26/2016    Kenalog     Family History   Problem Relation Age of Onset   • Cancer Mother         leukemia   • Heart disease Mother    • Heart disease Sister    • Lung disease Father    • Heart disease Maternal Grandmother    • Heart disease Maternal Grandfather        Current Outpatient Medications   Medication Sig Dispense Refill   • albuterol (VENTOLIN HFA) 108 (90 Base) MCG/ACT inhaler Inhale 2 puffs Every 4 (Four) Hours As Needed for Wheezing. 1 inhaler 3   • Alcohol Swabs ( STERILE ALCOHOL PREP) pads 1 each 4 (Four) Times a Day. 200 each 3   • amitriptyline (ELAVIL) 25 MG tablet TK 1 T PO UP TO TID PRA OR SLP  1   • ASPIRIN LOW DOSE 81 MG EC tablet TAKE 1 TABLET BY MOUTH EVERY DAY 30 tablet 0   • atorvastatin (LIPITOR) 20 MG tablet Take 1 tablet by mouth Daily. 90 tablet 3   • B-D UF III MINI PEN NEEDLES 31G X 5 MM misc USE AS DIRECTED UP TO THREE TIMES DAILY 100 each 3   • budesonide-formoterol (SYMBICORT) 160-4.5 MCG/ACT inhaler Inhale 2 puffs 2 (Two) Times a Day. 1 inhaler 3   • busPIRone (BUSPAR) 5 MG tablet 1 tablet PO once a day  3   • cyclobenzaprine (FEXMID) 7.5 MG tablet Take 1 tablet by mouth 3 (Three) Times a Day As Needed for Muscle Spasms. 45 tablet 0   •  doxazosin (CARDURA) 2 MG tablet Take 0.5 tablets by mouth Every Night. 30 tablet 1   • dutasteride (AVODART) 0.5 MG capsule Take 1 capsule by mouth Daily. 30 capsule 4   • fexofenadine (ALLEGRA ALLERGY) 180 MG tablet Take 1 tablet by mouth Daily. 30 tablet 3   • fluticasone (FLONASE) 50 MCG/ACT nasal spray 2 sprays into each nostril Daily. 9.9 mL 3   • glucose blood (ONE TOUCH ULTRA TEST) test strip Use to text 4 times a day.  Dx-e11.49 200 each 11   • Insulin Glargine (TOUJEO SOLOSTAR) 300 UNIT/ML solution pen-injector Inject 13 Units under the skin into the appropriate area as directed Daily. 3 pen 5   • Insulin Lispro (HUMALOG KWIKPEN) 100 UNIT/ML solution pen-injector Inject up to 8 units before meals and 2 on sliding scale 9 pen 5   • losartan (COZAAR) 50 MG tablet TAKE 1 TABLET BY MOUTH DAILY 90 tablet 0   • losartan-hydrochlorothiazide (HYZAAR) 100-12.5 MG per tablet TK 1 T PO QD WITH LUNCH  0   • MAGNESIUM-OXIDE 400 (241.3 Mg) MG tablet tablet Take 1 tablet by mouth 2 (Two) Times a Day. 30 each 12   • metFORMIN ER (GLUCOPHAGE-XR) 750 MG 24 hr tablet TAKE 1 TABLET BY MOUTH EVERY DAY. 90 tablet 0   • metoprolol succinate XL (TOPROL-XL) 50 MG 24 hr tablet TAKE 1 TABLET BY MOUTH EVERY DAY 90 tablet 4   • naproxen (EC-NAPROXEN) 500 MG EC tablet Take 1 tablet by mouth 2 (Two) Times a Day With Meals. 60 tablet 2   • OLANZapine (ZYPREXA) 10 MG tablet Take 10 mg by mouth daily.     • ondansetron ODT (ZOFRAN ODT) 4 MG disintegrating tablet Take 1 tablet by mouth Every 8 (Eight) Hours As Needed for Nausea or Vomiting. 30 tablet 0   • pantoprazole (PROTONIX) 40 MG EC tablet Take 1 tablet by mouth Daily. 90 tablet 2   • sertraline (ZOLOFT) 100 MG tablet Take 2 tablets by mouth Daily. (Patient taking differently: Take 100 mg by mouth Daily.) 60 tablet 3   • tamsulosin (FLOMAX) 0.4 MG capsule 24 hr capsule Take 0.4 mg by mouth 2 (Two) Times a Day.  3   • tiotropium (SPIRIVA HANDIHALER) 18 MCG per inhalation capsule Place 1  capsule into inhaler and inhale Daily. 30 capsule 2   • TOUJEO SOLOSTAR 300 UNIT/ML solution pen-injector injection Take 13 Units by mouth Daily.  5   • vitamin D (ERGOCALCIFEROL) 01065 units capsule capsule Take 1 capsule by mouth Every 14 (Fourteen) Days. 4 capsule 11     No current facility-administered medications for this visit.      No Known Allergies  Social History     Socioeconomic History   • Marital status:      Spouse name: Not on file   • Number of children: Not on file   • Years of education: Not on file   • Highest education level: Not on file   Tobacco Use   • Smoking status: Former Smoker     Packs/day: 0.25     Years: 15.00     Pack years: 3.75     Types: Cigarettes     Last attempt to quit: 2000     Years since quittin.8   • Smokeless tobacco: Never Used   Substance and Sexual Activity   • Alcohol use: No     Frequency: Never   • Drug use: No   • Sexual activity: Not Currently     Comment: shortness of breath       Review of Systems  Review of Systems   Constitutional: Negative for activity change, appetite change, diaphoresis and fatigue.   HENT: Negative for facial swelling, sneezing, sore throat, tinnitus, trouble swallowing and voice change.    Eyes: Negative for photophobia, pain, discharge, redness, itching and visual disturbance.   Respiratory: Negative for apnea, cough, choking, chest tightness and shortness of breath.    Cardiovascular: Negative for chest pain, palpitations and leg swelling.   Gastrointestinal: Negative for abdominal distention, abdominal pain, constipation, diarrhea, nausea and vomiting.   Endocrine: Negative for cold intolerance, heat intolerance, polydipsia, polyphagia and polyuria.   Genitourinary: Negative for difficulty urinating, dysuria, frequency, hematuria and urgency.   Musculoskeletal: Negative for arthralgias, back pain, gait problem, joint swelling, myalgias, neck pain and neck stiffness.   Skin: Negative for color change, pallor, rash and wound.  "  Neurological: Negative for dizziness, tremors, weakness, light-headedness, numbness and headaches.   Hematological: Negative for adenopathy. Does not bruise/bleed easily.   Psychiatric/Behavioral: Negative for behavioral problems, confusion and sleep disturbance.        Objective    /80   Ht 175.3 cm (69\")   Wt 102 kg (224 lb 3.2 oz)   BMI 33.11 kg/m²   Physical Exam   Constitutional: He is oriented to person, place, and time. He appears well-developed and well-nourished. No distress.   HENT:   Head: Normocephalic and atraumatic.   Right Ear: External ear normal.   Left Ear: External ear normal.   Nose: Nose normal.   Eyes: Conjunctivae and EOM are normal. Pupils are equal, round, and reactive to light.   Neck: Normal range of motion. Neck supple. No tracheal deviation present. No thyromegaly present.   Cardiovascular: Normal rate, regular rhythm and normal heart sounds.   No murmur heard.  Pulmonary/Chest: Effort normal and breath sounds normal. No respiratory distress. He has no wheezes.   Abdominal: Soft. Bowel sounds are normal. There is no tenderness. There is no rebound and no guarding.   Musculoskeletal: Normal range of motion. He exhibits no edema, tenderness or deformity.   Neurological: He is alert and oriented to person, place, and time. No cranial nerve deficit.   Skin: Skin is warm and dry. No rash noted.   Psychiatric: He has a normal mood and affect. His behavior is normal. Judgment and thought content normal.       Lab Review  Glucose (mg/dL)   Date Value   11/11/2019 81   07/11/2019 93   04/11/2019 87     Sodium (mmol/L)   Date Value   11/11/2019 144   07/11/2019 139   04/11/2019 137     Potassium (mmol/L)   Date Value   11/11/2019 4.2   07/11/2019 4.0   04/11/2019 4.1     Chloride (mmol/L)   Date Value   11/11/2019 106   07/11/2019 101   04/11/2019 100     CO2 (mmol/L)   Date Value   11/11/2019 26.1   07/11/2019 24.4   04/11/2019 26.0     BUN (mg/dL)   Date Value   11/11/2019 18 "   07/11/2019 12   04/11/2019 11     Creatinine (mg/dL)   Date Value   11/11/2019 1.19   07/11/2019 1.16   04/11/2019 1.15     Hemoglobin A1C (%)   Date Value   11/11/2019 6.43 (H)   10/18/2019 6.22 (H)   07/11/2019 5.95 (H)     Triglycerides (mg/dL)   Date Value   11/11/2019 84   07/11/2019 81   03/05/2019 148     LDL Cholesterol  (mg/dL)   Date Value   11/11/2019 64   07/11/2019 60   03/05/2019 121   11/08/2018 69   03/14/2018 55       Assessment/Plan      1. Mixed hyperlipidemia    2. Essential hypertension    3. Vitamin D deficiency    4. DM (diabetes mellitus), type 2 with neurological complications (CMS/HCC)    .    Medications prescribed:  Outpatient Encounter Medications as of 11/11/2019   Medication Sig Dispense Refill   • albuterol (VENTOLIN HFA) 108 (90 Base) MCG/ACT inhaler Inhale 2 puffs Every 4 (Four) Hours As Needed for Wheezing. 1 inhaler 3   • Alcohol Swabs ( STERILE ALCOHOL PREP) pads 1 each 4 (Four) Times a Day. 200 each 3   • amitriptyline (ELAVIL) 25 MG tablet TK 1 T PO UP TO TID PRA OR SLP  1   • ASPIRIN LOW DOSE 81 MG EC tablet TAKE 1 TABLET BY MOUTH EVERY DAY 30 tablet 0   • atorvastatin (LIPITOR) 20 MG tablet Take 1 tablet by mouth Daily. 90 tablet 3   • B-D UF III MINI PEN NEEDLES 31G X 5 MM misc USE AS DIRECTED UP TO THREE TIMES DAILY 100 each 3   • budesonide-formoterol (SYMBICORT) 160-4.5 MCG/ACT inhaler Inhale 2 puffs 2 (Two) Times a Day. 1 inhaler 3   • busPIRone (BUSPAR) 5 MG tablet 1 tablet PO once a day  3   • cyclobenzaprine (FEXMID) 7.5 MG tablet Take 1 tablet by mouth 3 (Three) Times a Day As Needed for Muscle Spasms. 45 tablet 0   • doxazosin (CARDURA) 2 MG tablet Take 0.5 tablets by mouth Every Night. 30 tablet 1   • dutasteride (AVODART) 0.5 MG capsule Take 1 capsule by mouth Daily. 30 capsule 4   • fexofenadine (ALLEGRA ALLERGY) 180 MG tablet Take 1 tablet by mouth Daily. 30 tablet 3   • fluticasone (FLONASE) 50 MCG/ACT nasal spray 2 sprays into each nostril Daily. 9.9 mL 3    • glucose blood (ONE TOUCH ULTRA TEST) test strip Use to text 4 times a day.  Dx-e11.49 200 each 11   • Insulin Glargine (TOUJEO SOLOSTAR) 300 UNIT/ML solution pen-injector Inject 13 Units under the skin into the appropriate area as directed Daily. 3 pen 5   • Insulin Lispro (HUMALOG KWIKPEN) 100 UNIT/ML solution pen-injector Inject up to 8 units before meals and 2 on sliding scale 9 pen 5   • losartan (COZAAR) 50 MG tablet TAKE 1 TABLET BY MOUTH DAILY 90 tablet 0   • losartan-hydrochlorothiazide (HYZAAR) 100-12.5 MG per tablet TK 1 T PO QD WITH LUNCH  0   • MAGNESIUM-OXIDE 400 (241.3 Mg) MG tablet tablet Take 1 tablet by mouth 2 (Two) Times a Day. 30 each 12   • metFORMIN ER (GLUCOPHAGE-XR) 750 MG 24 hr tablet TAKE 1 TABLET BY MOUTH EVERY DAY. 90 tablet 0   • metoprolol succinate XL (TOPROL-XL) 50 MG 24 hr tablet TAKE 1 TABLET BY MOUTH EVERY DAY 90 tablet 4   • naproxen (EC-NAPROXEN) 500 MG EC tablet Take 1 tablet by mouth 2 (Two) Times a Day With Meals. 60 tablet 2   • OLANZapine (ZYPREXA) 10 MG tablet Take 10 mg by mouth daily.     • ondansetron ODT (ZOFRAN ODT) 4 MG disintegrating tablet Take 1 tablet by mouth Every 8 (Eight) Hours As Needed for Nausea or Vomiting. 30 tablet 0   • pantoprazole (PROTONIX) 40 MG EC tablet Take 1 tablet by mouth Daily. 90 tablet 2   • sertraline (ZOLOFT) 100 MG tablet Take 2 tablets by mouth Daily. (Patient taking differently: Take 100 mg by mouth Daily.) 60 tablet 3   • tamsulosin (FLOMAX) 0.4 MG capsule 24 hr capsule Take 0.4 mg by mouth 2 (Two) Times a Day.  3   • tiotropium (SPIRIVA HANDIHALER) 18 MCG per inhalation capsule Place 1 capsule into inhaler and inhale Daily. 30 capsule 2   • TOUJEO SOLOSTAR 300 UNIT/ML solution pen-injector injection Take 13 Units by mouth Daily.  5   • vitamin D (ERGOCALCIFEROL) 16949 units capsule capsule Take 1 capsule by mouth Every 14 (Fourteen) Days. 4 capsule 11   • [DISCONTINUED] B-D UF III MINI PEN NEEDLES 31G X 5 MM misc USE AS DIRECTED  UP TO THREE TIMES DAILY 100 each 3     No facility-administered encounter medications on file as of 11/11/2019.        Orders placed during this encounter include:  Orders Placed This Encounter   Procedures   • Protein / Creatinine Ratio, Urine - Urine, Clean Catch     Glycemic Management                    Lab Results   Component Value Date     HGBA1C 6.4 (H) 03/05/2019                  Toujeo 13 units in the morning--     if you ever wake up with a sugar less than 100 - back off by 3 units     ---------------------     Invokana 100 mg before breakfast --not taking            Metformin 500 mg tablets---take one with supper ----taking 750 mg                     invokamet 150/1000 mg twice daily ( it combines metformin and invokana )---stopped the invokamet due to low blood sugars      --------------------        once you run out of januvia , you will use in its place trulicity 0.75 mg weekly ---stopped due to low Blood sugars   if nausea try to eat less      ------------------     novolog --uses occasional      Use for sliding scale            2 units for every 15 grams of carbohydrate     once glucose readings are staying in the low 100s we can try 1 unit for every 15 grams and if it stays in the low 100s we will only use sliding scale         Lipid Management        on pravachol 20 mg qhs      Nov. 2016     Lipids at goal            Component      Latest Ref Rng & Units 3/5/2019   Total Cholesterol      0 - 199 mg/dL 202 (H)   Triglycerides      20 - 199 mg/dL 148   HDL Cholesterol      60 - 200 mg/dL 32 (L)   LDL Cholesterol      1 - 129 mg/dL 121   LDL/HDL Ratio      0.00 - 3.55 4.39 (H)         Non fasting      Blood Pressure Management     on amlodipine 5 mg daily      On lisinopril 2.5 mg daily --stopped due to allergic reaction     On metoprolol         Microvascular Complication Monitoring: No Microalbuminuria, No Diabetic Retinopathy, positive  Diabetic Neuropathy        Neuropathy     Taking Neurontin          Component      Latest Ref Rng & Units 7/12/2018 7/12/2018           9:34 AM  9:34 AM   Protein/Creatinine Ratio, Urine      0.0 - 200.0 mg/G Crea 59.1     Creatinine, Urine      mg/dL 211.6 211.6   Total Protein, Urine      mg/dL 12.5     Microalbumin/Creatinine Ratio      0.0 - 30.0 mg/g   37.3 (H)   Microalbumin, Urine      mg/L   7.9                  Preventive Care: Patient is not smoking        Weight Related: Obesity, Counseled on nutrition, Counseled on physical activity     Patient's Body mass index is 33.11 kg/m². BMI is above normal parameters. Recommendations include: educational material.       Decrease daily caloric intake by 500 calories per day        Dietary changes     Handout given diet and diabetes      Bone Health     Vitamin d def     Nov. 2016      Vit d - normal      Vitamin d Oct 2017     29     Start otc 1000 units daily               Component      Latest Ref Rng & Units 3/5/2019   25 Hydroxy, Vitamin D      30.0 - 100.0 ng/ml 31.5                      Lab Results   Component Value Date     [id number] 03/05/2019                  Component      Latest Ref Rng & Units 4/11/2019   Hepatitis B Surface Ag      Non-Reactive Non-Reactive   Hep A IgM      Non-Reactive Non-Reactive   Hep B Core IgM      Non-Reactive Non-Reactive   Hepatitis C Ab      Non-Reactive Non-Reactive               4. Follow-up: Return in about 4 months (around 3/11/2020) for Recheck.

## 2019-11-11 NOTE — PROGRESS NOTES
Nirav Lind is a 54 y.o. male   Primary provider:  Charles Olivares MD       Chief Complaint   Patient presents with   • Right Knee - Pain       HISTORY OF PRESENT ILLNESS: Patient is here today for right knee pain. Patient had xray and MRI prior to visit today. He states that his pain is 6/10.  He fell directly on his knee has had knee pain ever since this happened about 2 or 3 months ago.  He tells me he did not have any problem prior to that.  He scraped up the left knee but is not really bothering him the knee.  It hurts with activity better with rest does have a sensation of catching and buckling at times with the right knee.  It is unpredictable.  History of Present Illness     CONCURRENT MEDICAL HISTORY:    Past Medical History:   Diagnosis Date   • Abnormal computed tomography scan    • Adenomatous polyp of colon    • Allergic rhinitis    • Anemia    • Chronic back pain    • Coronary arteriosclerosis    • Depression    • Diabetes mellitus (CMS/HCC)    • Diabetic polyneuropathy associated with type 2 diabetes mellitus (CMS/HCC) 7/17/2017   • Dizziness    • Dyspnea     unspecified   • Epigastric pain    • Essential hypertension    • Ex-smoker    • Hemangioma of liver    • Hyperlipidemia    • Infected hydrocele     with orchitis and epididymis   • Nausea    • Nausea with vomiting    • Obesity with body mass index of 30.0 - 39.9    • Pain in right knee    • Right upper quadrant pain    • Type II diabetes mellitus, uncontrolled (CMS/HCC)    • Upper respiratory infection    • Urgency of urination    • Villous adenoma of colon        No Known Allergies      Current Outpatient Medications:   •  albuterol (VENTOLIN HFA) 108 (90 Base) MCG/ACT inhaler, Inhale 2 puffs Every 4 (Four) Hours As Needed for Wheezing., Disp: 1 inhaler, Rfl: 3  •  Alcohol Swabs ( STERILE ALCOHOL PREP) pads, 1 each 4 (Four) Times a Day., Disp: 200 each, Rfl: 3  •  amitriptyline (ELAVIL) 25 MG tablet, TK 1 T PO UP TO TID PRA OR SLP,  Disp: , Rfl: 1  •  ASPIRIN LOW DOSE 81 MG EC tablet, TAKE 1 TABLET BY MOUTH EVERY DAY, Disp: 30 tablet, Rfl: 0  •  atorvastatin (LIPITOR) 20 MG tablet, Take 1 tablet by mouth Daily., Disp: 90 tablet, Rfl: 3  •  B-D UF III MINI PEN NEEDLES 31G X 5 MM misc, USE AS DIRECTED UP TO THREE TIMES DAILY, Disp: 100 each, Rfl: 3  •  budesonide-formoterol (SYMBICORT) 160-4.5 MCG/ACT inhaler, Inhale 2 puffs 2 (Two) Times a Day., Disp: 1 inhaler, Rfl: 3  •  busPIRone (BUSPAR) 5 MG tablet, 1 tablet PO once a day, Disp: , Rfl: 3  •  cyclobenzaprine (FEXMID) 7.5 MG tablet, Take 1 tablet by mouth 3 (Three) Times a Day As Needed for Muscle Spasms., Disp: 45 tablet, Rfl: 0  •  doxazosin (CARDURA) 2 MG tablet, Take 0.5 tablets by mouth Every Night., Disp: 30 tablet, Rfl: 1  •  dutasteride (AVODART) 0.5 MG capsule, Take 1 capsule by mouth Daily., Disp: 30 capsule, Rfl: 4  •  fexofenadine (ALLEGRA ALLERGY) 180 MG tablet, Take 1 tablet by mouth Daily., Disp: 30 tablet, Rfl: 3  •  fluticasone (FLONASE) 50 MCG/ACT nasal spray, 2 sprays into each nostril Daily., Disp: 9.9 mL, Rfl: 3  •  glucose blood (ONE TOUCH ULTRA TEST) test strip, Use to text 4 times a day.  [id number], Disp: 200 each, Rfl: 11  •  Insulin Glargine (TOUJEO SOLOSTAR) 300 UNIT/ML solution pen-injector, Inject 13 Units under the skin into the appropriate area as directed Daily., Disp: 3 pen, Rfl: 5  •  Insulin Lispro (HUMALOG KWIKPEN) 100 UNIT/ML solution pen-injector, Inject up to 8 units before meals and 2 on sliding scale, Disp: 9 pen, Rfl: 5  •  losartan (COZAAR) 50 MG tablet, TAKE 1 TABLET BY MOUTH DAILY, Disp: 90 tablet, Rfl: 0  •  losartan-hydrochlorothiazide (HYZAAR) 100-12.5 MG per tablet, TK 1 T PO QD WITH LUNCH, Disp: , Rfl: 0  •  MAGNESIUM-OXIDE 400 (241.3 Mg) MG tablet tablet, Take 1 tablet by mouth 2 (Two) Times a Day., Disp: 30 each, Rfl: 12  •  metFORMIN ER (GLUCOPHAGE-XR) 750 MG 24 hr tablet, TAKE 1 TABLET BY MOUTH EVERY DAY., Disp: 90 tablet, Rfl: 0  •   metoprolol succinate XL (TOPROL-XL) 50 MG 24 hr tablet, TAKE 1 TABLET BY MOUTH EVERY DAY, Disp: 90 tablet, Rfl: 4  •  naproxen (EC-NAPROXEN) 500 MG EC tablet, Take 1 tablet by mouth 2 (Two) Times a Day With Meals., Disp: 60 tablet, Rfl: 2  •  OLANZapine (ZYPREXA) 10 MG tablet, Take 10 mg by mouth daily., Disp: , Rfl:   •  ondansetron ODT (ZOFRAN ODT) 4 MG disintegrating tablet, Take 1 tablet by mouth Every 8 (Eight) Hours As Needed for Nausea or Vomiting., Disp: 30 tablet, Rfl: 0  •  pantoprazole (PROTONIX) 40 MG EC tablet, Take 1 tablet by mouth Daily., Disp: 90 tablet, Rfl: 2  •  sertraline (ZOLOFT) 100 MG tablet, Take 2 tablets by mouth Daily. (Patient taking differently: Take 100 mg by mouth Daily.), Disp: 60 tablet, Rfl: 3  •  tamsulosin (FLOMAX) 0.4 MG capsule 24 hr capsule, Take 0.4 mg by mouth 2 (Two) Times a Day., Disp: , Rfl: 3  •  tiotropium (SPIRIVA HANDIHALER) 18 MCG per inhalation capsule, Place 1 capsule into inhaler and inhale Daily., Disp: 30 capsule, Rfl: 2  •  TOUJEO SOLOSTAR 300 UNIT/ML solution pen-injector injection, Take 13 Units by mouth Daily., Disp: , Rfl: 5  •  vitamin D (ERGOCALCIFEROL) 06080 units capsule capsule, Take 1 capsule by mouth Every 14 (Fourteen) Days., Disp: 4 capsule, Rfl: 11    Past Surgical History:   Procedure Laterality Date   • CARDIAC CATHETERIZATION  11/30/2015    No evidence of any obstructive disease in the circumflex, the RCA , or LAD. 1st diagonal branch in the midportion with 20-30% stenosis. Preserved LV systolic function with EF 55%.   • COLONOSCOPY  01/21/2013    Normal   • COLONOSCOPY W/ POLYPECTOMY  10/13/2014    A single polyp was found in the colon; removed by snare cautery polypectomy.   • ENDOSCOPY  06/15/2016    Mildly severe esophagitis. Several biopsies obtained in the upper third of the esophagus.   • INCISION AND DRAINAGE ABSCESS  10/29/2014    Incision and drainage of scrotal abscess. Hydrocelectomy, bottle procedure.   • INJECTION OF MEDICATION   "2016    Kenalog       Family History   Problem Relation Age of Onset   • Cancer Mother         leukemia   • Heart disease Mother    • Heart disease Sister    • Lung disease Father    • Heart disease Maternal Grandmother    • Heart disease Maternal Grandfather        Social History     Socioeconomic History   • Marital status:      Spouse name: Not on file   • Number of children: Not on file   • Years of education: Not on file   • Highest education level: Not on file   Tobacco Use   • Smoking status: Former Smoker     Packs/day: 0.25     Years: 15.00     Pack years: 3.75     Types: Cigarettes     Last attempt to quit: 2000     Years since quittin.8   • Smokeless tobacco: Never Used   Substance and Sexual Activity   • Alcohol use: No     Frequency: Never   • Drug use: No   • Sexual activity: Not Currently     Comment: shortness of breath        Review of Systems   Constitutional: Positive for activity change. Negative for chills and fever.   HENT: Negative.  Negative for facial swelling.    Eyes: Negative.  Negative for photophobia.   Respiratory: Negative.  Negative for apnea and shortness of breath.    Cardiovascular: Negative.  Negative for chest pain and leg swelling.   Gastrointestinal: Negative.  Negative for abdominal pain, nausea and vomiting.   Endocrine: Negative.    Genitourinary: Negative.  Negative for dysuria.   Musculoskeletal: Positive for arthralgias and joint swelling.   Skin: Negative.  Negative for color change and rash.   Allergic/Immunologic: Negative.    Neurological: Negative.  Negative for seizures and syncope.   Hematological: Negative.    Psychiatric/Behavioral: Positive for sleep disturbance. Negative for behavioral problems and dysphoric mood.         PHYSICAL EXAMINATION:       Ht 175.3 cm (69\")   Wt 101 kg (223 lb 9.6 oz)   BMI 33.02 kg/m²     Physical Exam   Constitutional: He is oriented to person, place, and time. He appears well-developed.   HENT:   Head: " Normocephalic and atraumatic.   Eyes: EOM are normal. Pupils are equal, round, and reactive to light.   Neck: Neck supple. No tracheal deviation present.   Pulmonary/Chest: Effort normal.   Musculoskeletal: He exhibits tenderness. He exhibits no edema or deformity.   Neurological: He is alert and oriented to person, place, and time.   Skin: Skin is warm and dry. No erythema.   Psychiatric: He has a normal mood and affect.       GAIT:     []  Normal  []  Antalgic    Assistive device: [x]  None  []  Walker     []  Crutches  []  Cane     []  Wheelchair  []  Stretcher    Ortho Exam  Tender medial joint line.  No effusion ligaments are grossly stable Fariba's is mildly positive.  No real patellofemoral crepitus healed abrasion anteriorly over the knee bilaterally.  Neurovascular intact distally.      Xr Knee Bilateral Ap Standing    Result Date: 10/18/2019  Narrative: [facility] [place] Patient Name: NORMAN HADLEY ATTENDING: MARGARITA HUGHES REFERRING: MARGARITA HUGHES ORDERING: MARGARITA HUGHES ----------------------- PROCEDURE: Bilateral knees DATE OF EXAM: 10/18/2019 HISTORY: Trauma to right knee, instability Single standing PA projection of the knees was obtained. No fractures are seen on this limited single view. Joint spaces are well preserved and appear symmetric. No spurring is evident. No radiopaque foreign bodies are identified.     Impression: Unremarkable single views of the knees Electronically signed by:  Abiel Kim MD  10/18/2019 5:19 PM CDT Workstation: [id number]    Xr Finger 2+ View Left    Result Date: 10/16/2019  Narrative: Three view left fingers HISTORY: Trauma AP, lateral and oblique views obtained. COMPARISON: None FINDINGS: Soft tissue injury ventral to the distal phalangeal joint of the middle finger. 1 mm radiopaque foreign body in or on the soft tissues. No fracture or dislocation. No other osseous or articular abnormality.     Impression: CONCLUSION: Soft tissue  injury ventral to the distal phalangeal joint of the middle finger. 1 mm radiopaque foreign body in or on the soft tissues. No fracture or dislocation. [id number] Electronically signed by:  Seamus David MD  10/16/2019 7:55 PM CDT Workstation: [id number]    Mri Knee Right Without Contrast    Result Date: 10/25/2019  Narrative: MRI right knee. HISTORY: Deep pain, instability. Prior exam bilateral knees October 18, 2019. TECHNIQUE: Multiplanar multisequence noncontrast images right knee. FINDINGS: Normal-appearing patellar articular hyaline cartilage. Normal medial collateral ligament and lateral collateral ligament complex. Normal anterior and posterior cruciate ligaments. Very subtle tear free edge anterior horn of the lateral meniscus. Medial and lateral menisci are otherwise unremarkable.     Impression: CONCLUSION: Subtle tear free edge anterior horn lateral meniscus. MRI right knee is otherwise unremarkable. Electronically signed by:  oDc Schuster MD  10/25/2019 5:36 PM CDT Workstation: [id number]    Questionable free edge tear lateral meniscus agree with this finding.    Body mass index is 33.02 kg/m².  ASSESSMENT:    Diagnoses and all orders for this visit:    Right knee pain, unspecified chronicity  -     Large Joint Arthrocentesis: R knee    Tear of lateral meniscus of right knee, current, unspecified tear type, initial encounter        Large Joint Arthrocentesis: R knee  Date/Time: 11/11/2019 3:13 PM  Consent given by: patient  Site marked: site marked  Timeout: Immediately prior to procedure a time out was called to verify the correct patient, procedure, equipment, support staff and site/side marked as required   Supporting Documentation  Indications: pain   Procedure Details  Location: knee - R knee  Preparation: Patient was prepped and draped in the usual sterile fashion  Needle size: 22 G  Medications administered: 80 mg triamcinolone acetonide 40 MG/ML; 4 mL lidocaine PF 1% 1 %            PLAN gone over the  findings with him.  I will inject the knee today with mixture of Xylocaine and Kenalog as above he will ice tonight put him on some quad exercises he does have a little bit of atrophy noted of his quad on the right side.  We will follow-up in 3 weeks to see is getting along and discuss further options at that point    No Follow-up on file.      This document has been electronically signed by Jerry Oakes MD on November 12, 2019 10:28 AM

## 2019-11-12 PROBLEM — S83.281A TEAR OF LATERAL MENISCUS OF RIGHT KNEE, CURRENT: Status: ACTIVE | Noted: 2019-11-12

## 2019-11-12 RX ORDER — TRIAMCINOLONE ACETONIDE 40 MG/ML
80 INJECTION, SUSPENSION INTRA-ARTICULAR; INTRAMUSCULAR
Status: COMPLETED | OUTPATIENT
Start: 2019-11-11 | End: 2019-11-11

## 2019-11-12 RX ORDER — LIDOCAINE HYDROCHLORIDE 10 MG/ML
4 INJECTION, SOLUTION EPIDURAL; INFILTRATION; INTRACAUDAL; PERINEURAL
Status: COMPLETED | OUTPATIENT
Start: 2019-11-11 | End: 2019-11-11

## 2019-12-04 ENCOUNTER — TELEPHONE (OUTPATIENT)
Dept: FAMILY MEDICINE CLINIC | Facility: CLINIC | Age: 54
End: 2019-12-04

## 2019-12-05 ENCOUNTER — TELEPHONE (OUTPATIENT)
Dept: FAMILY MEDICINE CLINIC | Facility: CLINIC | Age: 54
End: 2019-12-05

## 2019-12-05 NOTE — TELEPHONE ENCOUNTER
attempted to call pt. Received return mail in reference to referral appt & time. number unreachable

## 2019-12-09 ENCOUNTER — OFFICE VISIT (OUTPATIENT)
Dept: FAMILY MEDICINE CLINIC | Facility: CLINIC | Age: 54
End: 2019-12-09

## 2019-12-09 VITALS
SYSTOLIC BLOOD PRESSURE: 124 MMHG | HEIGHT: 69 IN | WEIGHT: 219.38 LBS | DIASTOLIC BLOOD PRESSURE: 78 MMHG | TEMPERATURE: 96.4 F | BODY MASS INDEX: 32.49 KG/M2 | HEART RATE: 74 BPM | OXYGEN SATURATION: 96 %

## 2019-12-09 DIAGNOSIS — M25.561 RIGHT ANTERIOR KNEE PAIN: ICD-10-CM

## 2019-12-09 DIAGNOSIS — F07.81 POST CONCUSSION SYNDROME: Primary | ICD-10-CM

## 2019-12-09 DIAGNOSIS — E78.2 MIXED HYPERLIPIDEMIA: ICD-10-CM

## 2019-12-09 PROCEDURE — 99213 OFFICE O/P EST LOW 20 MIN: CPT | Performed by: STUDENT IN AN ORGANIZED HEALTH CARE EDUCATION/TRAINING PROGRAM

## 2019-12-09 RX ORDER — PANTOPRAZOLE SODIUM 40 MG/1
40 TABLET, DELAYED RELEASE ORAL DAILY
Qty: 90 TABLET | Refills: 2 | Status: SHIPPED | OUTPATIENT
Start: 2019-12-09 | End: 2020-06-12 | Stop reason: SDUPTHER

## 2019-12-09 RX ORDER — ATORVASTATIN CALCIUM 20 MG/1
20 TABLET, FILM COATED ORAL DAILY
Qty: 90 TABLET | Refills: 3 | Status: SHIPPED | OUTPATIENT
Start: 2019-12-09 | End: 2020-06-12 | Stop reason: SDUPTHER

## 2019-12-09 RX ORDER — BACLOFEN 10 MG/1
10 TABLET ORAL 3 TIMES DAILY
Qty: 30 TABLET | Refills: 0 | Status: SHIPPED | OUTPATIENT
Start: 2019-12-09 | End: 2020-12-10
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
## 2019-12-11 ENCOUNTER — EPISODE CHANGES (OUTPATIENT)
Dept: CASE MANAGEMENT | Facility: OTHER | Age: 54
End: 2019-12-11

## 2019-12-13 NOTE — PROGRESS NOTES
I have reviewed the notes, assessments, and/or procedures performed by Dr. Olivares, I concur with her/his documentation and assessment and plan for Nirav Lind.       This document has been electronically signed by Cory Kaur MD on December 13, 2019 1:39 PM

## 2019-12-18 ENCOUNTER — OFFICE VISIT (OUTPATIENT)
Dept: ORTHOPEDIC SURGERY | Facility: CLINIC | Age: 54
End: 2019-12-18

## 2019-12-18 VITALS — HEIGHT: 69 IN | WEIGHT: 226.4 LBS | BODY MASS INDEX: 33.53 KG/M2

## 2019-12-18 DIAGNOSIS — M25.561 RIGHT KNEE PAIN, UNSPECIFIED CHRONICITY: Primary | ICD-10-CM

## 2019-12-18 DIAGNOSIS — S83.281A TEAR OF LATERAL MENISCUS OF RIGHT KNEE, CURRENT, UNSPECIFIED TEAR TYPE, INITIAL ENCOUNTER: ICD-10-CM

## 2019-12-18 PROCEDURE — 99213 OFFICE O/P EST LOW 20 MIN: CPT | Performed by: ORTHOPAEDIC SURGERY

## 2019-12-18 NOTE — PROGRESS NOTES
"Nirav Lind is a 54 y.o. male returns for     Chief Complaint   Patient presents with   • Right Knee - Follow-up       HISTORY OF PRESENT ILLNESS:  F/u right knee pain, patient states pain score is at a 6 today, got about a weeks relief after the injection the pain is come back at this point occasional sensation the leg still wants to buckle and give way.  Is now been going on for about 4 months.       CONCURRENT MEDICAL HISTORY:    The following portions of the patient's history were reviewed and updated as appropriate: allergies, current medications, past family history, past medical history, past social history, past surgical history and problem list.     ROS  No fevers or chills.  No chest pain or shortness of air.  No GI or  disturbances.    PHYSICAL EXAMINATION:       Ht 175.3 cm (69\")   Wt 103 kg (226 lb 6.4 oz)   BMI 33.43 kg/m²     Physical Exam   Constitutional: He is oriented to person, place, and time. He appears well-developed.   HENT:   Head: Normocephalic and atraumatic.   Eyes: Pupils are equal, round, and reactive to light. EOM are normal.   Neck: Neck supple. No tracheal deviation present.   Pulmonary/Chest: Effort normal.   Musculoskeletal: He exhibits tenderness. He exhibits no edema or deformity.   Neurological: He is alert and oriented to person, place, and time. A sensory deficit is present.   Skin: Skin is warm and dry. No erythema.   Psychiatric: He has a normal mood and affect.       GAIT:     []  Normal  []  Antalgic    Assistive device: [x]  None  []  Walker     []  Crutches  []  Cane     []  Wheelchair  []  Stretcher    Ortho Exam  Tender over the medial joint line as before motion is well-maintained 1+ crepitus.  No effusion.  Ligaments are grossly stable.  Calf is negative.  No results found.          ASSESSMENT:    Diagnoses and all orders for this visit:    Right knee pain, unspecified chronicity  -     Ambulatory Referral to Physical Therapy Evaluate and treat; ROM (2 x " week. ), Strengthening    Tear of lateral meniscus of right knee, current, unspecified tear type, initial encounter  -     Ambulatory Referral to Physical Therapy Evaluate and treat; ROM (2 x week. ), Strengthening          PLAN at this point we will see him in a month and consider physical therapy to give this some time to see if this thing does not settle down to get better.  We discussed arthroscopy if not better possible meniscectomy we will try to put this off another month see how is getting along for planning this.  Return in about 1 month (around 1/18/2020).    Jerry Oakes MD

## 2020-01-03 ENCOUNTER — HOSPITAL ENCOUNTER (OUTPATIENT)
Dept: PHYSICAL THERAPY | Facility: HOSPITAL | Age: 55
Setting detail: THERAPIES SERIES
Discharge: HOME | End: 2020-01-03

## 2020-01-03 DIAGNOSIS — M25.561 RIGHT KNEE PAIN, UNSPECIFIED CHRONICITY: Primary | ICD-10-CM

## 2020-01-03 DIAGNOSIS — S83.281A TEAR OF LATERAL MENISCUS OF RIGHT KNEE, CURRENT, UNSPECIFIED TEAR TYPE, INITIAL ENCOUNTER: ICD-10-CM

## 2020-01-03 PROCEDURE — 97161 PT EVAL LOW COMPLEX 20 MIN: CPT

## 2020-01-03 PROCEDURE — G0283 ELEC STIM OTHER THAN WOUND: HCPCS

## 2020-01-03 PROCEDURE — 97535 SELF CARE MNGMENT TRAINING: CPT
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
## 2020-01-17 ENCOUNTER — OFFICE VISIT (OUTPATIENT)
Dept: ORTHOPEDIC SURGERY | Facility: CLINIC | Age: 55
End: 2020-01-17

## 2020-01-17 ENCOUNTER — APPOINTMENT (OUTPATIENT)
Dept: PHYSICAL THERAPY | Facility: HOSPITAL | Age: 55
End: 2020-01-17

## 2020-01-17 VITALS — WEIGHT: 223 LBS | HEIGHT: 69 IN | BODY MASS INDEX: 33.03 KG/M2

## 2020-01-17 DIAGNOSIS — S83.281D TEAR OF LATERAL MENISCUS OF RIGHT KNEE, CURRENT, UNSPECIFIED TEAR TYPE, SUBSEQUENT ENCOUNTER: ICD-10-CM

## 2020-01-17 DIAGNOSIS — M25.561 RIGHT KNEE PAIN, UNSPECIFIED CHRONICITY: Primary | ICD-10-CM

## 2020-01-17 PROCEDURE — 99213 OFFICE O/P EST LOW 20 MIN: CPT | Performed by: ORTHOPAEDIC SURGERY

## 2020-01-17 NOTE — PROGRESS NOTES
Nirav Lind is a 54 y.o. male returns for     Chief Complaint   Patient presents with   • Right Knee - Follow-up       HISTORY OF PRESENT ILLNESS: f/u right knee pain. Physical therapy at [facility] medicine.  Therapy has not helped him much.  He is in home program at this point     CONCURRENT MEDICAL HISTORY:    Past Medical History:   Diagnosis Date   • Abnormal computed tomography scan    • Adenomatous polyp of colon    • Allergic rhinitis    • Anemia    • Chronic back pain    • Coronary arteriosclerosis    • Depression    • Diabetes mellitus (CMS/HCC)    • Diabetic polyneuropathy associated with type 2 diabetes mellitus (CMS/HCC) 7/17/2017   • Dizziness    • Dyspnea     unspecified   • Epigastric pain    • Essential hypertension    • Ex-smoker    • Hemangioma of liver    • Hyperlipidemia    • Infected hydrocele     with orchitis and epididymis   • Nausea    • Nausea with vomiting    • Obesity with body mass index of 30.0 - 39.9    • Pain in right knee    • Right upper quadrant pain    • Type II diabetes mellitus, uncontrolled (CMS/HCC)    • Upper respiratory infection    • Urgency of urination    • Villous adenoma of colon        No Known Allergies      Current Outpatient Medications:   •  albuterol (VENTOLIN HFA) 108 (90 Base) MCG/ACT inhaler, Inhale 2 puffs Every 4 (Four) Hours As Needed for Wheezing., Disp: 1 inhaler, Rfl: 3  •  Alcohol Swabs ( STERILE ALCOHOL PREP) pads, 1 each 4 (Four) Times a Day., Disp: 200 each, Rfl: 3  •  amitriptyline (ELAVIL) 25 MG tablet, TK 1 T PO UP TO TID PRA OR SLP, Disp: , Rfl: 1  •  ASPIRIN LOW DOSE 81 MG EC tablet, TAKE 1 TABLET BY MOUTH EVERY DAY, Disp: 30 tablet, Rfl: 0  •  atorvastatin (LIPITOR) 20 MG tablet, Take 1 tablet by mouth Daily., Disp: 90 tablet, Rfl: 3  •  B-D UF III MINI PEN NEEDLES 31G X 5 MM misc, USE AS DIRECTED UP TO THREE TIMES DAILY, Disp: 100 each, Rfl: 3  •  baclofen (LIORESAL) 10 MG tablet, Take 1 tablet by mouth 3 (Three) Times a Day., Disp: 30  tablet, Rfl: 0  •  budesonide-formoterol (SYMBICORT) 160-4.5 MCG/ACT inhaler, Inhale 2 puffs 2 (Two) Times a Day., Disp: 1 inhaler, Rfl: 3  •  busPIRone (BUSPAR) 5 MG tablet, 1 tablet PO once a day, Disp: , Rfl: 3  •  cyclobenzaprine (FEXMID) 7.5 MG tablet, Take 1 tablet by mouth 3 (Three) Times a Day As Needed for Muscle Spasms., Disp: 45 tablet, Rfl: 0  •  doxazosin (CARDURA) 2 MG tablet, Take 0.5 tablets by mouth Every Night., Disp: 30 tablet, Rfl: 1  •  dutasteride (AVODART) 0.5 MG capsule, Take 1 capsule by mouth Daily., Disp: 30 capsule, Rfl: 4  •  fexofenadine (ALLEGRA ALLERGY) 180 MG tablet, Take 1 tablet by mouth Daily., Disp: 30 tablet, Rfl: 3  •  fluticasone (FLONASE) 50 MCG/ACT nasal spray, 2 sprays into each nostril Daily., Disp: 9.9 mL, Rfl: 3  •  glucose blood (ONE TOUCH ULTRA TEST) test strip, Use to text 4 times a day.  Dx-e11.49, Disp: 200 each, Rfl: 11  •  Insulin Glargine (TOUJEO SOLOSTAR) 300 UNIT/ML solution pen-injector, Inject 13 Units under the skin into the appropriate area as directed Daily., Disp: 3 pen, Rfl: 5  •  Insulin Lispro (HUMALOG KWIKPEN) 100 UNIT/ML solution pen-injector, Inject up to 8 units before meals and 2 on sliding scale, Disp: 9 pen, Rfl: 5  •  losartan (COZAAR) 50 MG tablet, TAKE 1 TABLET BY MOUTH DAILY, Disp: 90 tablet, Rfl: 0  •  losartan-hydrochlorothiazide (HYZAAR) 100-12.5 MG per tablet, TK 1 T PO QD WITH LUNCH, Disp: , Rfl: 0  •  MAGNESIUM-OXIDE 400 (241.3 Mg) MG tablet tablet, Take 1 tablet by mouth 2 (Two) Times a Day., Disp: 30 each, Rfl: 12  •  metFORMIN ER (GLUCOPHAGE-XR) 750 MG 24 hr tablet, TAKE 1 TABLET BY MOUTH EVERY DAY., Disp: 90 tablet, Rfl: 0  •  metoprolol succinate XL (TOPROL-XL) 50 MG 24 hr tablet, TAKE 1 TABLET BY MOUTH EVERY DAY, Disp: 90 tablet, Rfl: 4  •  naproxen (EC-NAPROXEN) 500 MG EC tablet, Take 1 tablet by mouth 2 (Two) Times a Day With Meals., Disp: 60 tablet, Rfl: 2  •  OLANZapine (ZYPREXA) 10 MG tablet, Take 10 mg by mouth daily.,  "Disp: , Rfl:   •  ondansetron ODT (ZOFRAN ODT) 4 MG disintegrating tablet, Take 1 tablet by mouth Every 8 (Eight) Hours As Needed for Nausea or Vomiting., Disp: 30 tablet, Rfl: 0  •  pantoprazole (PROTONIX) 40 MG EC tablet, Take 1 tablet by mouth Daily., Disp: 90 tablet, Rfl: 2  •  sertraline (ZOLOFT) 100 MG tablet, Take 2 tablets by mouth Daily. (Patient taking differently: Take 100 mg by mouth Daily.), Disp: 60 tablet, Rfl: 3  •  tamsulosin (FLOMAX) 0.4 MG capsule 24 hr capsule, Take 0.4 mg by mouth 2 (Two) Times a Day., Disp: , Rfl: 3  •  tiotropium (SPIRIVA HANDIHALER) 18 MCG per inhalation capsule, Place 1 capsule into inhaler and inhale Daily., Disp: 30 capsule, Rfl: 2  •  TOUJEO SOLOSTAR 300 UNIT/ML solution pen-injector injection, Take 13 Units by mouth Daily., Disp: , Rfl: 5  •  vitamin D (ERGOCALCIFEROL) 91074 units capsule capsule, Take 1 capsule by mouth Every 14 (Fourteen) Days., Disp: 4 capsule, Rfl: 11    Past Surgical History:   Procedure Laterality Date   • CARDIAC CATHETERIZATION  11/30/2015    No evidence of any obstructive disease in the circumflex, the RCA , or LAD. 1st diagonal branch in the midportion with 20-30% stenosis. Preserved LV systolic function with EF 55%.   • COLONOSCOPY  01/21/2013    Normal   • COLONOSCOPY W/ POLYPECTOMY  10/13/2014    A single polyp was found in the colon; removed by snare cautery polypectomy.   • ENDOSCOPY  06/15/2016    Mildly severe esophagitis. Several biopsies obtained in the upper third of the esophagus.   • INCISION AND DRAINAGE ABSCESS  10/29/2014    Incision and drainage of scrotal abscess. Hydrocelectomy, bottle procedure.   • INJECTION OF MEDICATION  01/26/2016    Kenalog           ROS: Review of systems has been updated as of today's date.  All other systems are negative except as noted previously.    PHYSICAL EXAMINATION:       Ht 175.3 cm (69\")   Wt 101 kg (223 lb)   BMI 32.93 kg/m²     Physical Exam   Constitutional: He is oriented to person, " place, and time. He appears well-developed.   HENT:   Head: Normocephalic and atraumatic.   Eyes: Pupils are equal, round, and reactive to light. EOM are normal.   Neck: Neck supple. No tracheal deviation present.   Pulmonary/Chest: Effort normal.   Musculoskeletal: Normal range of motion. He exhibits tenderness. He exhibits no edema or deformity.   Neurological: He is alert and oriented to person, place, and time.   Skin: Skin is warm and dry. No erythema.   Psychiatric: He has a normal mood and affect.       GAIT:     []  Normal  []  Antalgic    Assistive device: [x]  None  []  Walker     []  Crutches  []  Cane     []  Wheelchair  []  Stretcher    Ortho Exam  Tender medial joint line.  No real effusion today.  Ligaments are stable.  Calf is negative he is neurovascular intact.    No results found.          ASSESSMENT:    Diagnoses and all orders for this visit:    Right knee pain, unspecified chronicity    Tear of lateral meniscus of right knee, current, unspecified tear type, subsequent encounter          PLAN at this point we have talked about arthroscopy.  He wants to hold off on that at this point.  Certainly consider his options.  I talked to him about it briefly.  He is to call me if he like to have brought this further.  He is not responded to injections, physical therapy, medications and activity modifications at this point.  Patient's Body mass index is 32.93 kg/m². BMI is above normal parameters. Recommendations include: exercise counseling and nutrition counseling.    No follow-ups on file.      This document has been electronically signed by Jerry Oakes MD on January 17, 2020 8:56 AM

## 2020-01-21 ENCOUNTER — APPOINTMENT (OUTPATIENT)
Dept: PHYSICAL THERAPY | Facility: HOSPITAL | Age: 55
End: 2020-01-21

## 2020-01-23 ENCOUNTER — TELEPHONE (OUTPATIENT)
Dept: FAMILY MEDICINE CLINIC | Facility: CLINIC | Age: 55
End: 2020-01-23

## 2020-01-23 NOTE — TELEPHONE ENCOUNTER
Pt called and is needing a refill on his tamsulosin (FLOMAX) 0.4 MG capsule 24 hr capsule and would like for this to be sent to [facility]. His number to call is [phone].      Thank you,    Ingrid

## 2020-01-24 RX ORDER — TAMSULOSIN HYDROCHLORIDE 0.4 MG/1
0.8 CAPSULE ORAL NIGHTLY
Qty: 30 CAPSULE | Refills: 3 | Status: SHIPPED | OUTPATIENT
Start: 2020-01-24 | End: 2020-01-24 | Stop reason: SDUPTHER

## 2020-01-24 RX ORDER — TAMSULOSIN HYDROCHLORIDE 0.4 MG/1
0.8 CAPSULE ORAL NIGHTLY
Qty: 180 CAPSULE | Refills: 5 | Status: SHIPPED | OUTPATIENT
Start: 2020-01-24 | End: 2020-06-12 | Stop reason: SDUPTHER

## 2020-02-04 ENCOUNTER — TELEPHONE (OUTPATIENT)
Dept: FAMILY MEDICINE CLINIC | Facility: CLINIC | Age: 55
End: 2020-02-04

## 2020-02-04 NOTE — TELEPHONE ENCOUNTER
PATIENT CALLED AND DR NILDA KITCHEN WAS SUPPOSE TO FILL HIS MAGNESIUM-OXIDE 400 (241.3 Mg) MG tablet tablet TO 90 DAY SUPPLY. THE MEDICATION IS STILL PENDING TO SIGN BY DR NILDA KITCHEN. HE IS NEEDING THIS DONE PLEASE. HIS NUMBER TO CALL BACK [phone].        THANK YOU,        REN        Please let me know when this is done and I will call the patient and let him know.    Thank you    WHITNEY Sin MA.    2/4/20

## 2020-02-04 NOTE — TELEPHONE ENCOUNTER
PATIENT CALLED AND DR NILDA KITCHEN WAS SUPPOSE TO FILL HIS MAGNESIUM-OXIDE 400 (241.3 Mg) MG tablet tablet TO 90 DAY SUPPLY. THE MEDICATION IS STILL PENDING TO SIGN BY DR NILDA KITCHEN. HE IS NEEDING THIS DONE PLEASE. HIS NUMBER TO CALL BACK [phone].      THANK YOU,      REN

## 2020-02-05 NOTE — TELEPHONE ENCOUNTER
PATIENT CALLED AND DR NILDA JEAN WAS SUPPOSE TO FILL HIS MAGNESIUM-OXIDE 400 (241.3 Mg) MG tablet tablet TO 90 DAY SUPPLY. THE MEDICATION IS STILL PENDING TO SIGN BY DR NILDA JEAN. HE IS NEEDING THIS DONE PLEASE. HIS NUMBER TO CALL BACK [phone].        THANK YOU,        REN        I sent this to Nilda Jean but he said he never saw this patient. My apologies. Please let me know if you need anything from me.    Thank you    WHITNEY Sin MA.    2/5/20

## 2020-02-11 DIAGNOSIS — E11.42 DIABETIC POLYNEUROPATHY ASSOCIATED WITH TYPE 2 DIABETES MELLITUS (HCC): ICD-10-CM

## 2020-02-11 RX ORDER — METFORMIN HYDROCHLORIDE 750 MG/1
TABLET, EXTENDED RELEASE ORAL
Qty: 90 TABLET | Refills: 0 | Status: SHIPPED | OUTPATIENT
Start: 2020-02-11 | End: 2020-03-11 | Stop reason: SDUPTHER

## 2020-03-03 ENCOUNTER — TELEPHONE (OUTPATIENT)
Dept: ENDOCRINOLOGY | Facility: CLINIC | Age: 55
End: 2020-03-03

## 2020-03-03 DIAGNOSIS — E55.9 VITAMIN D DEFICIENCY: ICD-10-CM

## 2020-03-03 DIAGNOSIS — E11.65 TYPE 2 DIABETES MELLITUS WITH HYPERGLYCEMIA, WITH LONG-TERM CURRENT USE OF INSULIN (HCC): Primary | ICD-10-CM

## 2020-03-03 DIAGNOSIS — R39.12 BENIGN PROSTATIC HYPERPLASIA WITH WEAK URINARY STREAM: ICD-10-CM

## 2020-03-03 DIAGNOSIS — Z79.4 TYPE 2 DIABETES MELLITUS WITH HYPERGLYCEMIA, WITH LONG-TERM CURRENT USE OF INSULIN (HCC): Primary | ICD-10-CM

## 2020-03-03 DIAGNOSIS — N40.1 BENIGN PROSTATIC HYPERPLASIA WITH WEAK URINARY STREAM: ICD-10-CM

## 2020-03-03 RX ORDER — DOXAZOSIN 2 MG/1
TABLET ORAL
Qty: 30 TABLET | Refills: 1 | Status: SHIPPED | OUTPATIENT
Start: 2020-03-03 | End: 2020-06-12 | Stop reason: ALTCHOICE

## 2020-03-03 NOTE — TELEPHONE ENCOUNTER
PATIENT IS NEEDING LABS ORDERS SENT TO LAB BEFORE HIS APPT. NEXT WEEK PLEASE CALL [phone]      Need new orders please

## 2020-03-06 ENCOUNTER — APPOINTMENT (OUTPATIENT)
Dept: LAB | Facility: HOSPITAL | Age: 55
End: 2020-03-06

## 2020-03-06 LAB
25(OH)D3 SERPL-MCNC: 31.8 NG/ML (ref 30–100)
ALBUMIN SERPL-MCNC: 4.3 G/DL (ref 3.5–5.2)
ALBUMIN UR-MCNC: 5.4 MG/DL
ALBUMIN/GLOB SERPL: 1.2 G/DL
ALP SERPL-CCNC: 109 U/L (ref 39–117)
ALT SERPL W P-5'-P-CCNC: 34 U/L (ref 1–41)
ANION GAP SERPL CALCULATED.3IONS-SCNC: 13.2 MMOL/L (ref 5–15)
AST SERPL-CCNC: 24 U/L (ref 1–40)
BASOPHILS # BLD AUTO: 0.07 10*3/MM3 (ref 0–0.2)
BASOPHILS NFR BLD AUTO: 0.8 % (ref 0–1.5)
BILIRUB SERPL-MCNC: 0.3 MG/DL (ref 0.2–1.2)
BUN BLD-MCNC: 17 MG/DL (ref 6–20)
BUN/CREAT SERPL: 12.7 (ref 7–25)
CALCIUM SPEC-SCNC: 9.3 MG/DL (ref 8.6–10.5)
CHLORIDE SERPL-SCNC: 104 MMOL/L (ref 98–107)
CHOLEST SERPL-MCNC: 123 MG/DL (ref 0–200)
CO2 SERPL-SCNC: 24.8 MMOL/L (ref 22–29)
CREAT BLD-MCNC: 1.34 MG/DL (ref 0.76–1.27)
CREAT UR-MCNC: 306.9 MG/DL
DEPRECATED RDW RBC AUTO: 39.2 FL (ref 37–54)
EOSINOPHIL # BLD AUTO: 0.44 10*3/MM3 (ref 0–0.4)
EOSINOPHIL NFR BLD AUTO: 4.8 % (ref 0.3–6.2)
ERYTHROCYTE [DISTWIDTH] IN BLOOD BY AUTOMATED COUNT: 13 % (ref 12.3–15.4)
GFR SERPL CREATININE-BSD FRML MDRD: 67 ML/MIN/1.73
GLOBULIN UR ELPH-MCNC: 3.6 GM/DL
GLUCOSE BLD-MCNC: 108 MG/DL (ref 65–99)
HBA1C MFR BLD: 5.2 % (ref 4.8–5.6)
HCT VFR BLD AUTO: 42 % (ref 37.5–51)
HDLC SERPL-MCNC: 34 MG/DL (ref 40–60)
HGB BLD-MCNC: 13.8 G/DL (ref 13–17.7)
IMM GRANULOCYTES # BLD AUTO: 0.06 10*3/MM3 (ref 0–0.05)
IMM GRANULOCYTES NFR BLD AUTO: 0.7 % (ref 0–0.5)
LDLC SERPL CALC-MCNC: 72 MG/DL (ref 0–100)
LDLC/HDLC SERPL: 2.13 {RATIO}
LYMPHOCYTES # BLD AUTO: 1.7 10*3/MM3 (ref 0.7–3.1)
LYMPHOCYTES NFR BLD AUTO: 18.6 % (ref 19.6–45.3)
MCH RBC QN AUTO: 27.7 PG (ref 26.6–33)
MCHC RBC AUTO-ENTMCNC: 32.9 G/DL (ref 31.5–35.7)
MCV RBC AUTO: 84.2 FL (ref 79–97)
MICROALBUMIN/CREAT UR: 17.6 MG/G
MONOCYTES # BLD AUTO: 0.73 10*3/MM3 (ref 0.1–0.9)
MONOCYTES NFR BLD AUTO: 8 % (ref 5–12)
NEUTROPHILS # BLD AUTO: 6.15 10*3/MM3 (ref 1.7–7)
NEUTROPHILS NFR BLD AUTO: 67.1 % (ref 42.7–76)
NRBC BLD AUTO-RTO: 0 /100 WBC (ref 0–0.2)
PLATELET # BLD AUTO: 257 10*3/MM3 (ref 140–450)
PMV BLD AUTO: 10.5 FL (ref 6–12)
POTASSIUM BLD-SCNC: 4.4 MMOL/L (ref 3.5–5.2)
PROT SERPL-MCNC: 7.9 G/DL (ref 6–8.5)
RBC # BLD AUTO: 4.99 10*6/MM3 (ref 4.14–5.8)
SODIUM BLD-SCNC: 142 MMOL/L (ref 136–145)
TRIGL SERPL-MCNC: 83 MG/DL (ref 0–150)
TSH SERPL DL<=0.05 MIU/L-ACNC: 1.97 UIU/ML (ref 0.27–4.2)
VIT B12 BLD-MCNC: 632 PG/ML (ref 211–946)
VLDLC SERPL-MCNC: 16.6 MG/DL (ref 5–40)
WBC NRBC COR # BLD: 9.15 10*3/MM3 (ref 3.4–10.8)

## 2020-03-06 PROCEDURE — 80061 LIPID PANEL: CPT | Performed by: INTERNAL MEDICINE

## 2020-03-06 PROCEDURE — 85025 COMPLETE CBC W/AUTO DIFF WBC: CPT | Performed by: INTERNAL MEDICINE

## 2020-03-06 PROCEDURE — 80053 COMPREHEN METABOLIC PANEL: CPT | Performed by: INTERNAL MEDICINE

## 2020-03-06 PROCEDURE — 83036 HEMOGLOBIN GLYCOSYLATED A1C: CPT | Performed by: INTERNAL MEDICINE

## 2020-03-06 PROCEDURE — 82570 ASSAY OF URINE CREATININE: CPT | Performed by: INTERNAL MEDICINE

## 2020-03-06 PROCEDURE — 82607 VITAMIN B-12: CPT | Performed by: INTERNAL MEDICINE

## 2020-03-06 PROCEDURE — 84443 ASSAY THYROID STIM HORMONE: CPT | Performed by: INTERNAL MEDICINE

## 2020-03-06 PROCEDURE — 36415 COLL VENOUS BLD VENIPUNCTURE: CPT | Performed by: INTERNAL MEDICINE

## 2020-03-06 PROCEDURE — 82043 UR ALBUMIN QUANTITATIVE: CPT | Performed by: INTERNAL MEDICINE

## 2020-03-06 PROCEDURE — 82306 VITAMIN D 25 HYDROXY: CPT | Performed by: INTERNAL MEDICINE

## 2020-03-11 ENCOUNTER — OFFICE VISIT (OUTPATIENT)
Dept: ENDOCRINOLOGY | Facility: CLINIC | Age: 55
End: 2020-03-11

## 2020-03-11 VITALS
SYSTOLIC BLOOD PRESSURE: 118 MMHG | OXYGEN SATURATION: 98 % | HEART RATE: 76 BPM | BODY MASS INDEX: 33.93 KG/M2 | HEIGHT: 69 IN | WEIGHT: 229.1 LBS | DIASTOLIC BLOOD PRESSURE: 70 MMHG

## 2020-03-11 DIAGNOSIS — E11.65 TYPE 2 DIABETES MELLITUS WITH HYPERGLYCEMIA, WITH LONG-TERM CURRENT USE OF INSULIN (HCC): Primary | ICD-10-CM

## 2020-03-11 DIAGNOSIS — E55.9 VITAMIN D DEFICIENCY: ICD-10-CM

## 2020-03-11 DIAGNOSIS — Z79.4 TYPE 2 DIABETES MELLITUS WITH HYPERGLYCEMIA, WITH LONG-TERM CURRENT USE OF INSULIN (HCC): Primary | ICD-10-CM

## 2020-03-11 DIAGNOSIS — I10 ESSENTIAL HYPERTENSION: ICD-10-CM

## 2020-03-11 DIAGNOSIS — E11.42 DIABETIC POLYNEUROPATHY ASSOCIATED WITH TYPE 2 DIABETES MELLITUS (HCC): ICD-10-CM

## 2020-03-11 DIAGNOSIS — E78.2 MIXED HYPERLIPIDEMIA: ICD-10-CM

## 2020-03-11 PROBLEM — M79.645 FINGER PAIN, LEFT: Status: RESOLVED | Noted: 2017-04-27 | Resolved: 2020-03-11

## 2020-03-11 PROBLEM — R07.2 PRECORDIAL PAIN: Status: RESOLVED | Noted: 2017-07-05 | Resolved: 2020-03-11

## 2020-03-11 PROBLEM — I20.0 UNSTABLE ANGINA: Status: RESOLVED | Noted: 2017-03-08 | Resolved: 2020-03-11

## 2020-03-11 PROCEDURE — 99214 OFFICE O/P EST MOD 30 MIN: CPT | Performed by: INTERNAL MEDICINE

## 2020-03-11 RX ORDER — METFORMIN HYDROCHLORIDE 750 MG/1
750 TABLET, EXTENDED RELEASE ORAL DAILY
Qty: 90 TABLET | Refills: 0 | Status: SHIPPED | OUTPATIENT
Start: 2020-03-11 | End: 2020-08-06

## 2020-03-11 RX ORDER — INSULIN LISPRO 100 [IU]/ML
INJECTION, SOLUTION INTRAVENOUS; SUBCUTANEOUS
Qty: 3 PEN | Refills: 11 | Status: SHIPPED | OUTPATIENT
Start: 2020-03-11 | End: 2020-03-19 | Stop reason: CLARIF

## 2020-03-11 RX ORDER — PEN NEEDLE, DIABETIC 30 GX3/16"
1 NEEDLE, DISPOSABLE MISCELLANEOUS 4 TIMES DAILY
Qty: 120 EACH | Refills: 11 | Status: SHIPPED | OUTPATIENT
Start: 2020-03-11 | End: 2022-01-10

## 2020-03-11 NOTE — PROGRESS NOTES
Subjective    Nirav Lind is a 54 y.o. male. he is here today for follow-up.    Diabetes   Pertinent negatives for hypoglycemia include no confusion, dizziness, headaches, pallor or tremors. Pertinent negatives for diabetes include no chest pain, no fatigue, no polydipsia, no polyphagia, no polyuria and no weakness.          Reason - Diabetes Mellitus type 2      Duration/Timing: Diabetes mellitus type 2, onset of symptoms gradual      dm dx at age 51 y      constant     Now controlled      severity high      Patient was admitted to the hospital for chest pain but cardiac was ruled out      Severity (Complications/Hospitalizations)  Secondary Macrovascular Complications: No CAD, No CVA, No PAD  Secondary Microvascular Complications: No Microalbuminuria, No Diabetic Retinopathy, No Diabetic Neuropathy     Context  Diabetes Regimen: Insulin, Oral Medications,                   Lab Results   Component Value Date     HGBA1C 6.4 (H) 03/05/2019                  Blood Glucose Readings     States at goal            Diet  adhering to 1800 calorie diet      Exercise: Does not exercise     Associated Signs/Symptoms  Hyperglycemic Symptoms: No polyuria, No polydipsia, No polyphagia  Hypoglycemic Episodes: No documented hypoglycemia since last visit                  The following portions of the patient's history were reviewed and updated as appropriate:   Past Medical History:   Diagnosis Date   • Abnormal computed tomography scan    • Adenomatous polyp of colon    • Allergic rhinitis    • Anemia    • Chronic back pain    • Coronary arteriosclerosis    • Depression    • Diabetes mellitus (CMS/HCC)    • Diabetic polyneuropathy associated with type 2 diabetes mellitus (CMS/HCC) 7/17/2017   • Dizziness    • Dyspnea     unspecified   • Epigastric pain    • Essential hypertension    • Ex-smoker    • Hemangioma of liver    • Hyperlipidemia    • Infected hydrocele     with orchitis and epididymis   • Nausea    • Nausea with  "              After Visit Summary   5/4/2018    Berenice Chaudhari    MRN: [id number]           Patient Information     Date Of Birth          1944        Visit Information        Provider Department      5/4/2018 11:40 AM [facility]        Today's Diagnoses     Lightheadedness    -  1      Care Instructions    With next episodes - try taking pulse ox and blood pressure at home.    Recheck labs.    Keep track of episodes - cary related to activity, food and sleep.    For urination - try small frequent urination.  In the future we can consider seeing urology.          Follow-ups after your visit        Your next 10 appointments already scheduled     May 18, 2018 11:15 AM CDT   Anticoagulation Visit with  ANTICOAGULATION CLINIC   [facility] ([facility])    [address]  [address]  [place] [address] [phone]              Who to contact     If you have questions or need follow up information about today's clinic visit or your schedule please contact [facility] directly at [phone].  Normal or non-critical lab and imaging results will be communicated to you by [employer]hart, letter or phone within 4 business days after the clinic has received the results. If you do not hear from us within 7 days, please contact the clinic through [employer] or phone. If you have a critical or abnormal lab result, we will notify you by phone as soon as possible.  Submit refill requests through [employer] or call your pharmacy and they will forward the refill request to us. Please allow 3 business days for your refill to be completed.          Additional Information About Your Visit        [employer] Mailhart Information     [employer] lets you send messages to your doctor, view your test results, renew your prescriptions, schedule appointments and more. To sign up, go to [email or web address] . Click on \"Log in\" on the left side of the screen, which will take " "you to the Welcome page. Then click on \"Sign up Now\" on the right side of the page.     You will be asked to enter the access code listed below, as well as some personal information. Please follow the directions to create your username and password.     Your access code is: [email or web address]  Expires: 6/10/2018 12:33 PM     Your access code will  in 90 days. If you need help or a new code, please call your [facility] clinic or [phone].        Care EveryWhere ID     This is your Care EveryWhere ID. This could be used by other organizations to access your [facility] medical records  [id number]        Your Vitals Were     Pulse Temperature Height Pulse Oximetry BMI (Body Mass Index)       66 98  F (36.7  C) 5' 4\" (1.626 m) 97% 59.56 kg/m2        Blood Pressure from Last 3 Encounters:   18 134/72   18 136/66   18 161/79    Weight from Last 3 Encounters:   18 (!) 347 lb (157.4 kg)   18 (!) 347 lb 9.6 oz (157.7 kg)   18 (!) 347 lb 9.6 oz (157.7 kg)              We Performed the Following     Basic metabolic panel     CBC with platelets          Today's Medication Changes          These changes are accurate as of 18 12:26 PM.  If you have any questions, ask your nurse or doctor.               Start taking these medicines.        Dose/Directions    warfarin 2.5 MG tablet   Commonly known as:  COUMADIN   Used for:  Long-term (current) use of anticoagulants, Atrial flutter with rapid ventricular response (H)        Take by mouth: 5 mg Mon and Fri; 2.5 mg all other days or as directed by INR Clinic   Refills:  0            Where to get your medicines      Some of these will need a paper prescription and others can be bought over the counter.  Ask your nurse if you have questions.     You don't need a prescription for these medications     warfarin 2.5 MG tablet                Primary Care Provider Office Phone # Fax #    Nelly Pearl [phone] " vomiting    • Obesity with body mass index of 30.0 - 39.9    • Pain in right knee    • Right upper quadrant pain    • Type II diabetes mellitus, uncontrolled (CMS/HCC)    • Upper respiratory infection    • Urgency of urination    • Villous adenoma of colon      Past Surgical History:   Procedure Laterality Date   • CARDIAC CATHETERIZATION  11/30/2015    No evidence of any obstructive disease in the circumflex, the RCA , or LAD. 1st diagonal branch in the midportion with 20-30% stenosis. Preserved LV systolic function with EF 55%.   • COLONOSCOPY  01/21/2013    Normal   • COLONOSCOPY W/ POLYPECTOMY  10/13/2014    A single polyp was found in the colon; removed by snare cautery polypectomy.   • ENDOSCOPY  06/15/2016    Mildly severe esophagitis. Several biopsies obtained in the upper third of the esophagus.   • INCISION AND DRAINAGE ABSCESS  10/29/2014    Incision and drainage of scrotal abscess. Hydrocelectomy, bottle procedure.   • INJECTION OF MEDICATION  01/26/2016    Kenalog     Family History   Problem Relation Age of Onset   • Cancer Mother         leukemia   • Heart disease Mother    • Heart disease Sister    • Lung disease Father    • Heart disease Maternal Grandmother    • Heart disease Maternal Grandfather        Current Outpatient Medications   Medication Sig Dispense Refill   • albuterol (VENTOLIN HFA) 108 (90 Base) MCG/ACT inhaler Inhale 2 puffs Every 4 (Four) Hours As Needed for Wheezing. 1 inhaler 3   • Alcohol Swabs ( STERILE ALCOHOL PREP) pads 1 each 4 (Four) Times a Day. 200 each 3   • amitriptyline (ELAVIL) 25 MG tablet TK 1 T PO UP TO TID PRA OR SLP  1   • ASPIRIN LOW DOSE 81 MG EC tablet TAKE 1 TABLET BY MOUTH EVERY DAY 30 tablet 0   • atorvastatin (LIPITOR) 20 MG tablet Take 1 tablet by mouth Daily. 90 tablet 3   • B-D UF III MINI PEN NEEDLES 31G X 5 MM misc USE AS DIRECTED UP TO THREE TIMES DAILY 100 each 3   • baclofen (LIORESAL) 10 MG tablet Take 1 tablet by mouth 3 (Three) Times a Day. 30  [address] [place] [address]        Equal Access to Services     [facility] : Hadii aad ku hadnatividadranjith Hummel, wacliftonda sascha, qaybcalin haynes. So [facility] [phone].    ATENCIÓN: Si habla español, tiene a dickey disposición servicios gratuitos de asistencia lingüística. Ll[facility] [phone].    We comply with applicable federal civil rights laws and [place] laws. We do not discriminate on the basis of race, color, national origin, age, disability, sex, sexual orientation, or gender identity.            Thank you!     [facility]. Our goal is always to provide you with excellent care. Hearing back from our patients is one way we can continue to improve our services. Please take a few minutes to complete the written survey that you may receive in the mail after your visit with us. Thank you!             Your Updated Medication List - Protect others around you: Learn how to safely use, store and throw away your medicines at www.disposemymeds.org.          This list is accurate as of 5/4/18 12:26 PM.  Always use your most recent med list.                   Brand Name Dispense Instructions for use Diagnosis    buPROPion 300 MG 24 hr tablet    WELLBUTRIN XL    90 tablet    Take 1 tablet (300 mg) by mouth every morning    Major depressive disorder, recurrent, in full remission (H)       CENTRUM SILVER per tablet      Take 1 tablet by mouth daily        CEPHALEXIN PO      Take 500 mg by mouth 3 times daily as needed cellulitis        diltiazem 120 MG 24 hr ER beaded capsule    TIAZAC     Take 120 mg by mouth every evening        doxycycline 100 MG capsule    VIBRAMYCIN    14 capsule    Take 1 capsule (100 mg) by mouth 2 times daily    HCAP (healthcare-associated pneumonia)       furosemide 20 MG tablet    LASIX    90 tablet    Take 1 tablet (20 mg) by mouth daily    Atrial flutter with rapid ventricular response  tablet 0   • budesonide-formoterol (SYMBICORT) 160-4.5 MCG/ACT inhaler Inhale 2 puffs 2 (Two) Times a Day. 1 inhaler 3   • busPIRone (BUSPAR) 5 MG tablet 1 tablet PO once a day  3   • cyclobenzaprine (FEXMID) 7.5 MG tablet Take 1 tablet by mouth 3 (Three) Times a Day As Needed for Muscle Spasms. 45 tablet 0   • doxazosin (CARDURA) 2 MG tablet TAKE 1/2 TABLET BY MOUTH EVERY NIGHT 30 tablet 1   • dutasteride (AVODART) 0.5 MG capsule Take 1 capsule by mouth Daily. 30 capsule 4   • fexofenadine (ALLEGRA ALLERGY) 180 MG tablet Take 1 tablet by mouth Daily. 30 tablet 3   • fluticasone (FLONASE) 50 MCG/ACT nasal spray 2 sprays into each nostril Daily. 9.9 mL 3   • glucose blood (ONE TOUCH ULTRA TEST) test strip Use to text 4 times a day.  Dx-e11.49 200 each 11   • Insulin Glargine (TOUJEO SOLOSTAR) 300 UNIT/ML solution pen-injector Inject 13 Units under the skin into the appropriate area as directed Daily. 3 pen 5   • Insulin Lispro (HUMALOG KWIKPEN) 100 UNIT/ML solution pen-injector Inject up to 8 units before meals and 2 on sliding scale 9 pen 5   • losartan (COZAAR) 50 MG tablet TAKE 1 TABLET BY MOUTH DAILY 90 tablet 0   • losartan-hydrochlorothiazide (HYZAAR) 100-12.5 MG per tablet TK 1 T PO QD WITH LUNCH  0   • MAGNESIUM-OXIDE 400 (241.3 Mg) MG tablet tablet TAKE 1 TABLET BY MOUTH TWICE DAILY 30 tablet 1   • metFORMIN ER (GLUCOPHAGE-XR) 750 MG 24 hr tablet TAKE 1 TABLET BY MOUTH EVERY DAY. 90 tablet 0   • metoprolol succinate XL (TOPROL-XL) 50 MG 24 hr tablet TAKE 1 TABLET BY MOUTH EVERY DAY 90 tablet 4   • naproxen (EC-NAPROXEN) 500 MG EC tablet Take 1 tablet by mouth 2 (Two) Times a Day With Meals. 60 tablet 2   • OLANZapine (ZYPREXA) 10 MG tablet Take 10 mg by mouth daily.     • ondansetron ODT (ZOFRAN ODT) 4 MG disintegrating tablet Take 1 tablet by mouth Every 8 (Eight) Hours As Needed for Nausea or Vomiting. 30 tablet 0   • pantoprazole (PROTONIX) 40 MG EC tablet Take 1 tablet by mouth Daily. 90 tablet 2   •  (H)       gabapentin 600 MG tablet    NEURONTIN     Take 1,200 mg by mouth 3 times daily        levofloxacin 500 MG tablet    LEVAQUIN    7 tablet    Take 1 tablet (500 mg) by mouth daily    HCAP (healthcare-associated pneumonia)       lidocaine 5 % Patch    LIDODERM     Place 3 patches onto the skin daily as needed for moderate pain (Apply up to 3 patches to painful area at once for up to 12 h within a 24 h period.  Remove after 12 hours.)        METOPROLOL SUCCINATE ER PO      Take 50 mg by mouth every evening        minocycline 50 MG capsule    MINOCIN/DYNACIN    180 capsule    Take 1 capsule (50 mg) by mouth 2 times daily    Methicillin susceptible Staphylococcus aureus septicemia (H)       nortriptyline 25 MG capsule    PAMELOR    180 capsule    Take 2 capsules (50 mg) by mouth At Bedtime    Post herpetic neuralgia       order for DME     1 Units    Equipment being ordered: padded transfer belt    Severe obesity (BMI >= 40) (H), Congestive heart failure, unspecified congestive heart failure chronicity, unspecified congestive heart failure type (H)       order for DME     1 Units    Equipment being ordered: Hospital Bed - wide, with top and bottom side rails    Severe obesity (BMI >= 40) (H), Congestive heart failure, unspecified congestive heart failure chronicity, unspecified congestive heart failure type (H)       pravastatin 10 MG tablet    PRAVACHOL    90 tablet    Take 1 tablet (10 mg) by mouth At Bedtime Needs labs before next refill    Hyperlipidemia LDL goal <100       pregabalin 150 MG capsule    LYRICA    180 capsule    Take 1 capsule (150 mg) by mouth 2 times daily    Postherpetic neuralgia       PROTONIX PO      Take 40 mg by mouth every morning (before breakfast)        venlafaxine 150 MG 24 hr capsule    EFFEXOR-XR     Take 150 mg by mouth every evening        warfarin 2.5 MG tablet    COUMADIN     Take by mouth: 5 mg Mon and Fri; 2.5 mg all other days or as directed by INR Clinic    Long-term  sertraline (ZOLOFT) 100 MG tablet Take 2 tablets by mouth Daily. (Patient taking differently: Take 100 mg by mouth Daily.) 60 tablet 3   • tamsulosin (FLOMAX) 0.4 MG capsule 24 hr capsule TAKE 2 CAPSULES BY MOUTH EVERY NIGHT 180 capsule 5   • tiotropium (SPIRIVA HANDIHALER) 18 MCG per inhalation capsule Place 1 capsule into inhaler and inhale Daily. 30 capsule 2   • vitamin D (ERGOCALCIFEROL) 92311 units capsule capsule Take 1 capsule by mouth Every 14 (Fourteen) Days. 4 capsule 11   • TOUJEO SOLOSTAR 300 UNIT/ML solution pen-injector injection Take 13 Units by mouth Daily.  5     No current facility-administered medications for this visit.      No Known Allergies  Social History     Socioeconomic History   • Marital status:      Spouse name: Not on file   • Number of children: Not on file   • Years of education: Not on file   • Highest education level: Not on file   Tobacco Use   • Smoking status: Former Smoker     Packs/day: 0.25     Years: 15.00     Pack years: 3.75     Types: Cigarettes     Last attempt to quit: 2000     Years since quittin.2   • Smokeless tobacco: Never Used   Substance and Sexual Activity   • Alcohol use: No     Frequency: Never   • Drug use: No   • Sexual activity: Not Currently     Comment: shortness of breath       Review of Systems  Review of Systems   Constitutional: Negative for activity change, appetite change, diaphoresis and fatigue.   HENT: Negative for facial swelling, sneezing, sore throat, tinnitus, trouble swallowing and voice change.    Eyes: Negative for photophobia, pain, discharge, redness, itching and visual disturbance.   Respiratory: Negative for apnea, cough, choking, chest tightness and shortness of breath.    Cardiovascular: Negative for chest pain, palpitations and leg swelling.   Gastrointestinal: Negative for abdominal distention, abdominal pain, constipation, diarrhea, nausea and vomiting.   Endocrine: Negative for cold intolerance, heat intolerance,  "polydipsia, polyphagia and polyuria.   Genitourinary: Negative for difficulty urinating, dysuria, frequency, hematuria and urgency.   Musculoskeletal: Negative for arthralgias, back pain, gait problem, joint swelling, myalgias, neck pain and neck stiffness.   Skin: Negative for color change, pallor, rash and wound.   Neurological: Negative for dizziness, tremors, weakness, light-headedness, numbness and headaches.   Hematological: Negative for adenopathy. Does not bruise/bleed easily.   Psychiatric/Behavioral: Negative for behavioral problems, confusion and sleep disturbance.        Objective    /70   Pulse 76   Ht 175.3 cm (69\")   Wt 104 kg (229 lb 1.6 oz)   SpO2 98%   BMI 33.83 kg/m²   Physical Exam   Constitutional: He is oriented to person, place, and time. He appears well-developed and well-nourished. No distress.   HENT:   Head: Normocephalic and atraumatic.   Right Ear: External ear normal.   Left Ear: External ear normal.   Nose: Nose normal.   Eyes: Pupils are equal, round, and reactive to light. Conjunctivae and EOM are normal.   Neck: Normal range of motion. Neck supple. No tracheal deviation present. No thyromegaly present.   Cardiovascular: Normal rate, regular rhythm and normal heart sounds.   No murmur heard.  Pulmonary/Chest: Effort normal and breath sounds normal. No respiratory distress. He has no wheezes.   Abdominal: Soft. Bowel sounds are normal. There is no tenderness. There is no rebound and no guarding.   Musculoskeletal: Normal range of motion. He exhibits no edema, tenderness or deformity.   Neurological: He is alert and oriented to person, place, and time. No cranial nerve deficit.   Skin: Skin is warm and dry. No rash noted.   Psychiatric: He has a normal mood and affect. His behavior is normal. Judgment and thought content normal.       Lab Review  Glucose (mg/dL)   Date Value   03/06/2020 108 (H)   11/11/2019 81   07/11/2019 93     Sodium (mmol/L)   Date Value   03/06/2020 " (current) use of anticoagulants, Atrial flutter with rapid ventricular response (H)          142   11/11/2019 144   07/11/2019 139     Potassium (mmol/L)   Date Value   03/06/2020 4.4   11/11/2019 4.2   07/11/2019 4.0     Chloride (mmol/L)   Date Value   03/06/2020 104   11/11/2019 106   07/11/2019 101     CO2 (mmol/L)   Date Value   03/06/2020 24.8   11/11/2019 26.1   07/11/2019 24.4     BUN (mg/dL)   Date Value   03/06/2020 17   11/11/2019 18   07/11/2019 12     Creatinine (mg/dL)   Date Value   03/06/2020 1.34 (H)   11/11/2019 1.19   07/11/2019 1.16     Hemoglobin A1C (%)   Date Value   03/06/2020 5.20   11/11/2019 6.43 (H)   10/18/2019 6.22 (H)     Triglycerides (mg/dL)   Date Value   03/06/2020 83   11/11/2019 84   07/11/2019 81     LDL Cholesterol  (mg/dL)   Date Value   03/06/2020 72   11/11/2019 64   07/11/2019 60       Assessment/Plan      1. Type 2 diabetes mellitus with hyperglycemia, with long-term current use of insulin (CMS/[facility])    2. Mixed hyperlipidemia    3. Essential hypertension    .    Medications prescribed:  Outpatient Encounter Medications as of 3/11/2020   Medication Sig Dispense Refill   • albuterol (VENTOLIN HFA) 108 (90 Base) MCG/ACT inhaler Inhale 2 puffs Every 4 (Four) Hours As Needed for Wheezing. 1 inhaler 3   • Alcohol Swabs ( STERILE ALCOHOL PREP) pads 1 each 4 (Four) Times a Day. 200 each 3   • amitriptyline (ELAVIL) 25 MG tablet TK 1 T PO UP TO TID PRA OR SLP  1   • ASPIRIN LOW DOSE 81 MG EC tablet TAKE 1 TABLET BY MOUTH EVERY DAY 30 tablet 0   • atorvastatin (LIPITOR) 20 MG tablet Take 1 tablet by mouth Daily. 90 tablet 3   • B-D UF III MINI PEN NEEDLES 31G X 5 MM misc USE AS DIRECTED UP TO THREE TIMES DAILY 100 each 3   • baclofen (LIORESAL) 10 MG tablet Take 1 tablet by mouth 3 (Three) Times a Day. 30 tablet 0   • budesonide-formoterol (SYMBICORT) 160-4.5 MCG/ACT inhaler Inhale 2 puffs 2 (Two) Times a Day. 1 inhaler 3   • busPIRone (BUSPAR) 5 MG tablet 1 tablet PO once a day  3   • cyclobenzaprine (FEXMID) 7.5 MG tablet Take 1 tablet by mouth 3 (Three) Times a Day As  Needed for Muscle Spasms. 45 tablet 0   • doxazosin (CARDURA) 2 MG tablet TAKE 1/2 TABLET BY MOUTH EVERY NIGHT 30 tablet 1   • dutasteride (AVODART) 0.5 MG capsule Take 1 capsule by mouth Daily. 30 capsule 4   • fexofenadine (ALLEGRA ALLERGY) 180 MG tablet Take 1 tablet by mouth Daily. 30 tablet 3   • fluticasone (FLONASE) 50 MCG/ACT nasal spray 2 sprays into each nostril Daily. 9.9 mL 3   • glucose blood (ONE TOUCH ULTRA TEST) test strip Use to text 4 times a day.  Dx-e11.49 200 each 11   • Insulin Glargine (TOUJEO SOLOSTAR) 300 UNIT/ML solution pen-injector Inject 13 Units under the skin into the appropriate area as directed Daily. 3 pen 5   • Insulin Lispro (HUMALOG KWIKPEN) 100 UNIT/ML solution pen-injector Inject up to 8 units before meals and 2 on sliding scale 9 pen 5   • losartan (COZAAR) 50 MG tablet TAKE 1 TABLET BY MOUTH DAILY 90 tablet 0   • losartan-hydrochlorothiazide (HYZAAR) 100-12.5 MG per tablet TK 1 T PO QD WITH LUNCH  0   • MAGNESIUM-OXIDE 400 (241.3 Mg) MG tablet tablet TAKE 1 TABLET BY MOUTH TWICE DAILY 30 tablet 1   • metFORMIN ER (GLUCOPHAGE-XR) 750 MG 24 hr tablet TAKE 1 TABLET BY MOUTH EVERY DAY. 90 tablet 0   • metoprolol succinate XL (TOPROL-XL) 50 MG 24 hr tablet TAKE 1 TABLET BY MOUTH EVERY DAY 90 tablet 4   • naproxen (EC-NAPROXEN) 500 MG EC tablet Take 1 tablet by mouth 2 (Two) Times a Day With Meals. 60 tablet 2   • OLANZapine (ZYPREXA) 10 MG tablet Take 10 mg by mouth daily.     • ondansetron ODT (ZOFRAN ODT) 4 MG disintegrating tablet Take 1 tablet by mouth Every 8 (Eight) Hours As Needed for Nausea or Vomiting. 30 tablet 0   • pantoprazole (PROTONIX) 40 MG EC tablet Take 1 tablet by mouth Daily. 90 tablet 2   • sertraline (ZOLOFT) 100 MG tablet Take 2 tablets by mouth Daily. (Patient taking differently: Take 100 mg by mouth Daily.) 60 tablet 3   • tamsulosin (FLOMAX) 0.4 MG capsule 24 hr capsule TAKE 2 CAPSULES BY MOUTH EVERY NIGHT 180 capsule 5   • tiotropium (SPIRIVA HANDIHALER)  18 MCG per inhalation capsule Place 1 capsule into inhaler and inhale Daily. 30 capsule 2   • vitamin D (ERGOCALCIFEROL) 41444 units capsule capsule Take 1 capsule by mouth Every 14 (Fourteen) Days. 4 capsule 11   • TOUJEO SOLOSTAR 300 UNIT/ML solution pen-injector injection Take 13 Units by mouth Daily.  5     No facility-administered encounter medications on file as of 3/11/2020.        Orders placed during this encounter include:  No orders of the defined types were placed in this encounter.    Glycemic Management                    Lab Results   Component Value Date     HGBA1C 6.4 (H) 03/05/2019                  Toujeo 13 units in the morning--     if you ever wake up with a sugar less than 100 - back off by 3 units     ---------------------     Invokana 100 mg before breakfast --not taking            Metformin 500 mg tablets---take one with supper ----taking 750 mg                     invokamet 150/1000 mg twice daily ( it combines metformin and invokana )---stopped the invokamet due to low blood sugars      --------------------        once you run out of januvia , you will use in its place trulicity 0.75 mg weekly ---stopped due to low Blood sugars   if nausea try to eat less      ------------------     novolog --uses occasional      Use for sliding scale            2 units for every 15 grams of carbohydrate     once glucose readings are staying in the low 100s we can try 1 unit for every 15 grams and if it stays in the low 100s we will only use sliding scale         Lipid Management        on pravachol 20 mg qhs      Lab Results   Component Value Date    CHOL 123 03/06/2020    CHLPL 139 11/04/2016    TRIG 83 03/06/2020    HDL 34 (L) 03/06/2020    LDL 72 03/06/2020             Non fasting      Blood Pressure Management     on amlodipine 5 mg daily      On lisinopril 2.5 mg daily --stopped due to allergic reaction     On metoprolol         Microvascular Complication Monitoring: No Microalbuminuria, No Diabetic  Retinopathy, positive  Diabetic Neuropathy        Neuropathy     Taking Neurontin before, resolved        Component      Latest Ref Rng & Units 7/12/2018 7/12/2018           9:34 AM  9:34 AM   Protein/Creatinine Ratio, Urine      0.0 - 200.0 mg/G Crea 59.1     Creatinine, Urine      mg/dL 211.6 211.6   Total Protein, Urine      mg/dL 12.5     Microalbumin/Creatinine Ratio      0.0 - 30.0 mg/g   37.3 (H)   Microalbumin, Urine      mg/L   7.9                  Preventive Care: Patient is not smoking        Weight Related: Obesity, Counseled on nutrition, Counseled on physical activity     Patient's Body mass index is 33.83 kg/m². BMI is above normal parameters. Recommendations include: educational material.       Decrease daily caloric intake by 500 calories per day        Dietary changes     Handout given diet and diabetes      Bone Health     Vitamin d def     Nov. 2016      Vit d - normal      Vitamin d Oct 2017     29     Start otc 1000 units daily               Component      Latest Ref Rng & Units 3/5/2019   25 Hydroxy, Vitamin D      30.0 - 100.0 ng/ml 31.5                      Lab Results   Component Value Date     [id number] 413 03/05/2019                  Component      Latest Ref Rng & Units 4/11/2019   Hepatitis B Surface Ag      Non-Reactive Non-Reactive   Hep A IgM      Non-Reactive Non-Reactive   Hep B Core IgM      Non-Reactive Non-Reactive   Hepatitis C Ab      Non-Reactive Non-Reactive               4. Follow-up: No follow-ups on file.

## 2020-03-19 RX ORDER — INSULIN GLARGINE 100 [IU]/ML
INJECTION, SOLUTION SUBCUTANEOUS
Qty: 2 PEN | Refills: 11 | Status: SHIPPED | OUTPATIENT
Start: 2020-03-19 | End: 2020-09-11 | Stop reason: SDUPTHER

## 2020-03-19 RX ORDER — INSULIN LISPRO 100 U/ML
INJECTION, SOLUTION SUBCUTANEOUS
Qty: 3 PEN | Refills: 11 | Status: SHIPPED | OUTPATIENT
Start: 2020-03-19 | End: 2020-09-11 | Stop reason: SDUPTHER

## 2020-04-01 ENCOUNTER — TELEPHONE (OUTPATIENT)
Dept: ENDOCRINOLOGY | Facility: CLINIC | Age: 55
End: 2020-04-01

## 2020-04-01 NOTE — TELEPHONE ENCOUNTER
[facility] called andwanted to know if the pt was testing 4x a day, if not could they get a call at [phone].

## 2020-04-02 ENCOUNTER — TELEPHONE (OUTPATIENT)
Dept: ENDOCRINOLOGY | Facility: CLINIC | Age: 55
End: 2020-04-02

## 2020-04-23 ENCOUNTER — OFFICE VISIT (OUTPATIENT)
Dept: GASTROENTEROLOGY | Facility: CLINIC | Age: 55
End: 2020-04-23

## 2020-04-23 VITALS
BODY MASS INDEX: 34.75 KG/M2 | OXYGEN SATURATION: 93 % | WEIGHT: 234.6 LBS | SYSTOLIC BLOOD PRESSURE: 110 MMHG | HEIGHT: 69 IN | HEART RATE: 74 BPM | DIASTOLIC BLOOD PRESSURE: 70 MMHG

## 2020-04-23 DIAGNOSIS — K63.5 POLYP OF COLON, UNSPECIFIED PART OF COLON, UNSPECIFIED TYPE: Primary | ICD-10-CM

## 2020-04-23 PROCEDURE — S0285 CNSLT BEFORE SCREEN COLONOSC: HCPCS | Performed by: INTERNAL MEDICINE

## 2020-04-23 RX ORDER — DEXTROSE AND SODIUM CHLORIDE 5; .45 G/100ML; G/100ML
30 INJECTION, SOLUTION INTRAVENOUS CONTINUOUS PRN
Status: CANCELLED | OUTPATIENT
Start: 2020-06-22

## 2020-04-23 NOTE — PROGRESS NOTES
[facility]      Chief Complaint:   Chief Complaint   Patient presents with   • Colon Cancer Screening       Subjective     HPI:   Patient with family history of colon cancer and personal history of villous adenoma with intermediate grade dysplasia.  Patient is due for repeat colonoscopy.  Patient denies any changes in bowel habits or blood in the stool.  Patient is high risk for repeat colon polyps or colon cancer.  Because of current pandemic will hold off on procedure until we are able to accommodate this procedure but will need to do this as soon as there is an opening in endoscopy.    Plan; schedule patient for screening colonoscopy    Past Medical History:   Past Medical History:   Diagnosis Date   • Abnormal computed tomography scan    • Adenomatous polyp of colon    • Allergic rhinitis    • Anemia    • Chronic back pain    • Coronary arteriosclerosis    • Depression    • Diabetes mellitus (CMS/HCC)    • Diabetic polyneuropathy associated with type 2 diabetes mellitus (CMS/HCC) 7/17/2017   • Dizziness    • Dyspnea     unspecified   • Epigastric pain    • Essential hypertension    • Ex-smoker    • Hemangioma of liver    • Hyperlipidemia    • Infected hydrocele     with orchitis and epididymis   • Nausea    • Nausea with vomiting    • Obesity with body mass index of 30.0 - 39.9    • Pain in right knee    • Right upper quadrant pain    • Type II diabetes mellitus, uncontrolled (CMS/HCC)    • Upper respiratory infection    • Urgency of urination    • Villous adenoma of colon        Past Surgical History:  Past Surgical History:   Procedure Laterality Date   • CARDIAC CATHETERIZATION  11/30/2015    No evidence of any obstructive disease in the circumflex, the RCA , or LAD. 1st diagonal branch in the midportion with 20-30% stenosis. Preserved LV systolic function with EF 55%.   • COLONOSCOPY  01/21/2013    Normal   • COLONOSCOPY W/ POLYPECTOMY  10/13/2014    A single polyp was found in the  colon; removed by snare cautery polypectomy.   • ENDOSCOPY  06/15/2016    Mildly severe esophagitis. Several biopsies obtained in the upper third of the esophagus.   • INCISION AND DRAINAGE ABSCESS  10/29/2014    Incision and drainage of scrotal abscess. Hydrocelectomy, bottle procedure.   • INJECTION OF MEDICATION  01/26/2016    Kenalog       Family History:  Family History   Problem Relation Age of Onset   • Cancer Mother         leukemia   • Heart disease Mother    • Heart disease Sister    • Lung disease Father    • Heart disease Maternal Grandmother    • Heart disease Maternal Grandfather        Social History:   reports that he quit smoking about 20 years ago. His smoking use included cigarettes. He has a 3.75 pack-year smoking history. He has never used smokeless tobacco. He reports that he does not drink alcohol or use drugs.    Medications:   Prior to Admission medications    Medication Sig Start Date End Date Taking? Authorizing Provider   albuterol (VENTOLIN HFA) 108 (90 Base) MCG/ACT inhaler Inhale 2 puffs Every 4 (Four) Hours As Needed for Wheezing. 10/3/18  Yes Jeremy Mahmood MD   Alcohol Swabs ( STERILE ALCOHOL PREP) pads 1 each 4 (Four) Times a Day. 7/16/18  Yes Tato David APRN   amitriptyline (ELAVIL) 25 MG tablet TK 1 T PO UP TO TID PRA OR SLP 9/17/19  Yes ProviderAracelis MD   ASPIRIN LOW DOSE 81 MG EC tablet TAKE 1 TABLET BY MOUTH EVERY DAY 8/31/16  Yes Jacobo Burris MD   atorvastatin (LIPITOR) 20 MG tablet Take 1 tablet by mouth Daily. 12/9/19  Yes Charles Olivares MD   baclofen (LIORESAL) 10 MG tablet Take 1 tablet by mouth 3 (Three) Times a Day. 12/9/19  Yes Charles Olivares MD   budesonide-formoterol (SYMBICORT) 160-4.5 MCG/ACT inhaler Inhale 2 puffs 2 (Two) Times a Day. 10/3/18  Yes Jeremy Mahmood MD   busPIRone (BUSPAR) 5 MG tablet 1 tablet PO once a day 12/6/17  Yes Aracelis Jordan MD   cyclobenzaprine (FEXMID) 7.5 MG tablet Take 1  tablet by mouth 3 (Three) Times a Day As Needed for Muscle Spasms. 10/18/19  Yes Charles Olivares MD   doxazosin (CARDURA) 2 MG tablet TAKE 1/2 TABLET BY MOUTH EVERY NIGHT 3/3/20  Yes Charles Olivares MD   dutasteride (AVODART) 0.5 MG capsule Take 1 capsule by mouth Daily. 10/18/19  Yes Charles Olivares MD   fexofenadine (ALLEGRA ALLERGY) 180 MG tablet Take 1 tablet by mouth Daily. 10/18/19  Yes Charles Olivares MD   fluticasone (FLONASE) 50 MCG/ACT nasal spray 2 sprays into each nostril Daily. 1/15/18  Yes Jeremy Mamhood MD   glucose blood (ONE TOUCH ULTRA TEST) test strip Use to text 4 times a day.  Dx-e11.49 6/28/19  Yes Tato David APRN   Insulin Glargine (BASAGLAR KWIKPEN) 100 UNIT/ML injection pen Inject 13 Units under the skin into the appropriate area as directed every night at bedtime. 3/19/20  Yes Joe Ackerman MD   Insulin Lispro (ADMELOG SOLOSTAR) 100 UNIT/ML injection pen Up to 10 units with meals. 3/19/20  Yes Joe Ackerman MD   Insulin Pen Needle (PEN NEEDLES) 32G X 4 MM misc 1 each 4 (Four) Times a Day. Use 4 x daily, Dx code E11.65 3/11/20  Yes Joe Ackerman MD   losartan-hydrochlorothiazide (HYZAAR) 100-12.5 MG per tablet TK 1 T PO QD WITH LUNCH 10/6/19  Yes Aracelis Jordan MD   MAGNESIUM-OXIDE 400 (241.3 Mg) MG tablet tablet TAKE 1 TABLET BY MOUTH TWICE DAILY 4/1/20  Yes Charles Olivares MD   metFORMIN ER (GLUCOPHAGE-XR) 750 MG 24 hr tablet Take 1 tablet by mouth Daily. 3/11/20  Yes Joe Ackerman MD   metoprolol succinate XL (TOPROL-XL) 50 MG 24 hr tablet TAKE 1 TABLET BY MOUTH EVERY DAY 10/11/19  Yes Charles Olivares MD   naproxen (EC-NAPROXEN) 500 MG EC tablet Take 1 tablet by mouth 2 (Two) Times a Day With Meals. 10/8/19  Yes Charles Olivares MD   OLANZapine (ZYPREXA) 10 MG tablet Take 10 mg by mouth daily.   Yes Provider, MD Aracelis   ondansetron ODT (ZOFRAN ODT) 4 MG  disintegrating tablet Take 1 tablet by mouth Every 8 (Eight) Hours As Needed for Nausea or Vomiting. 10/18/19  Yes Charles Olivares MD   pantoprazole (PROTONIX) 40 MG EC tablet Take 1 tablet by mouth Daily. 12/9/19  Yes Charles Olivares MD   sertraline (ZOLOFT) 100 MG tablet Take 2 tablets by mouth Daily.  Patient taking differently: Take 100 mg by mouth Daily. 4/30/18  Yes Jeremy Mahmood MD   tamsulosin (FLOMAX) 0.4 MG capsule 24 hr capsule TAKE 2 CAPSULES BY MOUTH EVERY NIGHT 1/24/20  Yes Charles Olivares MD   tiotropium (SPIRIVA HANDIHALER) 18 MCG per inhalation capsule Place 1 capsule into inhaler and inhale Daily. 6/28/19  Yes Jeremy Mahmood MD   vitamin D (ERGOCALCIFEROL) 91162 units capsule capsule Take 1 capsule by mouth Every 14 (Fourteen) Days. 7/31/19  Yes Charles Olivares MD   polyethylene glycol (GoLYTELY) 236 g solution Starting at noon on day prior to procedure, drink 8 ounces every 30 minutes until all gone or stools are clear. May add flavor packet. 4/23/20   Raymundo Gan MD       Allergies:  Patient has no known allergies.    ROS:    Review of Systems   Constitutional: Negative for activity change, appetite change and unexpected weight change.   HENT: Negative for congestion, sore throat and trouble swallowing.    Respiratory: Negative for cough, choking and shortness of breath.    Cardiovascular: Negative for chest pain.   Gastrointestinal: Negative for abdominal distention, abdominal pain, anal bleeding, blood in stool, constipation, diarrhea, nausea, rectal pain and vomiting.   Endocrine: Negative for heat intolerance, polydipsia and polyphagia.   Genitourinary: Negative for difficulty urinating.   Musculoskeletal: Negative for arthralgias.   Skin: Negative for color change, pallor, rash and wound.   Allergic/Immunologic: Negative for food allergies.   Neurological: Negative for dizziness, syncope, weakness and headaches.   Psychiatric/Behavioral:  "Negative for agitation, behavioral problems, confusion and decreased concentration.     Objective     Blood pressure 110/70, pulse 74, height 175.3 cm (69\"), weight 106 kg (234 lb 9.6 oz), SpO2 93 %.    Physical Exam   Constitutional: He is oriented to person, place, and time. He appears well-developed and well-nourished. No distress.   HENT:   Head: Normocephalic and atraumatic.   Cardiovascular: Normal rate, regular rhythm, normal heart sounds and intact distal pulses. Exam reveals no gallop and no friction rub.   No murmur heard.  Pulmonary/Chest: Breath sounds normal. No respiratory distress. He has no wheezes. He has no rales. He exhibits no tenderness.   Abdominal: Soft. Bowel sounds are normal. He exhibits no distension and no mass. There is no tenderness. There is no rebound and no guarding. No hernia.   Musculoskeletal: Normal range of motion. He exhibits no edema.   Neurological: He is alert and oriented to person, place, and time.   Skin: Skin is warm and dry. No rash noted. He is not diaphoretic. No erythema. No pallor.   Psychiatric: He has a normal mood and affect. His behavior is normal. Judgment and thought content normal.        Assessment/Plan   Nirav was seen today for colon cancer screening.    Diagnoses and all orders for this visit:    Polyp of colon, unspecified part of colon, unspecified type  -     Case Request; Standing  -     Case Request    Other orders  -     Follow Anesthesia Guidelines / Standing Orders; Future  -     Obtain Informed Consent; Future  -     polyethylene glycol (GoLYTELY) 236 g solution; Starting at noon on day prior to procedure, drink 8 ounces every 30 minutes until all gone or stools are clear. May add flavor packet.        COLONOSCOPY (N/A)     Diagnosis Plan   1. Polyp of colon, unspecified part of colon, unspecified type  Case Request    dextrose 5 % and sodium chloride 0.45 % infusion    Case Request       Anticipated Surgical Procedure:  Orders Placed This " Encounter   Procedures   • Follow Anesthesia Guidelines / Standing Orders     Standing Status:   Future   • Obtain Informed Consent     Standing Status:   Future     Order Specific Question:   Informed Consent Given For     Answer:   colonoscopy       The risks, benefits, and alternatives of this procedure have been discussed with the patient or the responsible party- the patient understands and agrees to proceed.

## 2020-04-23 NOTE — PATIENT INSTRUCTIONS

## 2020-05-05 ENCOUNTER — TELEPHONE (OUTPATIENT)
Dept: ENDOCRINOLOGY | Facility: CLINIC | Age: 55
End: 2020-05-05

## 2020-05-05 DIAGNOSIS — E11.49 DM (DIABETES MELLITUS), TYPE 2 WITH NEUROLOGICAL COMPLICATIONS (HCC): ICD-10-CM

## 2020-05-05 NOTE — TELEPHONE ENCOUNTER
One [facility] Verio- needs test strips only     Pharmacy -[facility]     Thank you

## 2020-05-06 ENCOUNTER — TELEPHONE (OUTPATIENT)
Dept: ENDOCRINOLOGY | Facility: CLINIC | Age: 55
End: 2020-05-06

## 2020-05-06 DIAGNOSIS — E11.49 DM (DIABETES MELLITUS), TYPE 2 WITH NEUROLOGICAL COMPLICATIONS (HCC): Primary | ICD-10-CM

## 2020-05-12 DIAGNOSIS — N40.1 BENIGN PROSTATIC HYPERPLASIA WITH WEAK URINARY STREAM: ICD-10-CM

## 2020-05-12 DIAGNOSIS — R39.12 BENIGN PROSTATIC HYPERPLASIA WITH WEAK URINARY STREAM: ICD-10-CM

## 2020-05-12 RX ORDER — DOXAZOSIN 2 MG/1
TABLET ORAL
Qty: 15 TABLET | Refills: 2 | Status: SHIPPED | OUTPATIENT
Start: 2020-05-12 | End: 2020-06-12 | Stop reason: SDUPTHER

## 2020-05-26 RX ORDER — NAPROXEN 500 MG/1
500 TABLET ORAL 2 TIMES DAILY WITH MEALS
Qty: 60 TABLET | Refills: 2 | Status: SHIPPED | OUTPATIENT
Start: 2020-05-26 | End: 2020-09-23

## 2020-06-12 ENCOUNTER — OFFICE VISIT (OUTPATIENT)
Dept: FAMILY MEDICINE CLINIC | Facility: CLINIC | Age: 55
End: 2020-06-12

## 2020-06-12 VITALS
DIASTOLIC BLOOD PRESSURE: 76 MMHG | HEIGHT: 69 IN | SYSTOLIC BLOOD PRESSURE: 112 MMHG | BODY MASS INDEX: 35.72 KG/M2 | TEMPERATURE: 97 F | WEIGHT: 241.19 LBS | HEART RATE: 74 BPM | OXYGEN SATURATION: 98 %

## 2020-06-12 DIAGNOSIS — N40.1 BENIGN PROSTATIC HYPERPLASIA WITH WEAK URINARY STREAM: ICD-10-CM

## 2020-06-12 DIAGNOSIS — F32.5 MAJOR DEPRESSIVE DISORDER WITH SINGLE EPISODE, IN FULL REMISSION (HCC): Chronic | ICD-10-CM

## 2020-06-12 DIAGNOSIS — R39.12 BENIGN PROSTATIC HYPERPLASIA WITH WEAK URINARY STREAM: ICD-10-CM

## 2020-06-12 DIAGNOSIS — E11.49 DM (DIABETES MELLITUS), TYPE 2 WITH NEUROLOGICAL COMPLICATIONS (HCC): ICD-10-CM

## 2020-06-12 DIAGNOSIS — I10 ESSENTIAL HYPERTENSION: ICD-10-CM

## 2020-06-12 DIAGNOSIS — F07.81 POST CONCUSSION SYNDROME: ICD-10-CM

## 2020-06-12 DIAGNOSIS — E78.2 MIXED HYPERLIPIDEMIA: Primary | ICD-10-CM

## 2020-06-12 PROCEDURE — 99213 OFFICE O/P EST LOW 20 MIN: CPT | Performed by: STUDENT IN AN ORGANIZED HEALTH CARE EDUCATION/TRAINING PROGRAM

## 2020-06-12 RX ORDER — DOXAZOSIN 2 MG/1
1 TABLET ORAL
Qty: 30 TABLET | Refills: 1 | Status: CANCELLED | OUTPATIENT
Start: 2020-06-12

## 2020-06-12 RX ORDER — LOSARTAN POTASSIUM AND HYDROCHLOROTHIAZIDE 12.5; 1 MG/1; MG/1
1 TABLET ORAL DAILY
Qty: 90 TABLET | Refills: 2 | Status: SHIPPED | OUTPATIENT
Start: 2020-06-12 | End: 2020-12-10

## 2020-06-12 RX ORDER — ONDANSETRON 4 MG/1
4 TABLET, ORALLY DISINTEGRATING ORAL EVERY 8 HOURS PRN
Qty: 30 TABLET | Refills: 0 | Status: SHIPPED | OUTPATIENT
Start: 2020-06-12 | End: 2021-06-03 | Stop reason: SDUPTHER

## 2020-06-12 RX ORDER — TAMSULOSIN HYDROCHLORIDE 0.4 MG/1
0.8 CAPSULE ORAL NIGHTLY
Qty: 180 CAPSULE | Refills: 5 | Status: SHIPPED | OUTPATIENT
Start: 2020-06-12 | End: 2020-12-10

## 2020-06-12 RX ORDER — PANTOPRAZOLE SODIUM 40 MG/1
40 TABLET, DELAYED RELEASE ORAL DAILY
Qty: 90 TABLET | Refills: 2 | Status: SHIPPED | OUTPATIENT
Start: 2020-06-12 | End: 2021-03-12

## 2020-06-12 RX ORDER — ATORVASTATIN CALCIUM 40 MG/1
40 TABLET, FILM COATED ORAL DAILY
Qty: 30 TABLET | Refills: 4 | Status: SHIPPED | OUTPATIENT
Start: 2020-06-12 | End: 2020-11-13

## 2020-06-12 RX ORDER — SERTRALINE HYDROCHLORIDE 100 MG/1
100 TABLET, FILM COATED ORAL DAILY
Start: 2020-06-12 | End: 2021-06-03 | Stop reason: SDUPTHER

## 2020-06-12 NOTE — PROGRESS NOTES
I have spoken with the patient.  I have reviewed the notes, assessments, and/or procedures performed by Dr. Timo Olivares, I concur with his  documentation and assessment and plan for Nirav Lind.          This document has been electronically signed by Tray Acevedo MD on June 12, 2020 16:23

## 2020-06-12 NOTE — PROGRESS NOTES
[facility]  Charles Olivares MD    Subjective:     Nirav Lind is a 55 y.o. male who presents for hyperlipidemia.    Last LDL slightly above ADA goal <70. He denies any muscle pain. Agreeable to increase statin dose.    Has T2DM managed by endocrine. FBS 's. On metformin, basaglar in PM, and admelog in AM. No hypoglycemic episodes.    Has COPD supposed to be managed with Spiriva, Symbicort, and albuterol. Does not use Spiriva or Symbicort. Rarely uses albuterol. Stopped his allegra and flonase as well.     Mood managed with SSRI, stable, no anhedonia or SI/HI.     Colonoscopy soon.     No other complaints.     The following portions of the patient's history were reviewed and updated as appropriate: allergies, current medications, past family history, past medical history, past social history, past surgical history and problem list.    Past Medical Hx:  Past Medical History:   Diagnosis Date   • Abnormal computed tomography scan    • Adenomatous polyp of colon    • Allergic rhinitis    • Anemia    • Chronic back pain    • Coronary arteriosclerosis    • Depression    • Diabetes mellitus (CMS/HCC)    • Diabetic polyneuropathy associated with type 2 diabetes mellitus (CMS/HCC) 7/17/2017   • Dizziness    • Dyspnea     unspecified   • Epigastric pain    • Essential hypertension    • Ex-smoker    • Hemangioma of liver    • Hyperlipidemia    • Infected hydrocele     with orchitis and epididymis   • Nausea    • Nausea with vomiting    • Obesity with body mass index of 30.0 - 39.9    • Pain in right knee    • Right upper quadrant pain    • Type II diabetes mellitus, uncontrolled (CMS/HCC)    • Upper respiratory infection    • Urgency of urination    • Villous adenoma of colon        Past Surgical Hx:  Past Surgical History:   Procedure Laterality Date   • CARDIAC CATHETERIZATION  11/30/2015    No evidence of any obstructive disease in the circumflex, the RCA , or LAD. 1st diagonal branch in the  midportion with 20-30% stenosis. Preserved LV systolic function with EF 55%.   • COLONOSCOPY  01/21/2013    Normal   • COLONOSCOPY W/ POLYPECTOMY  10/13/2014    A single polyp was found in the colon; removed by snare cautery polypectomy.   • ENDOSCOPY  06/15/2016    Mildly severe esophagitis. Several biopsies obtained in the upper third of the esophagus.   • INCISION AND DRAINAGE ABSCESS  10/29/2014    Incision and drainage of scrotal abscess. Hydrocelectomy, bottle procedure.   • INJECTION OF MEDICATION  01/26/2016    Kenalog       Current Meds:    Current Outpatient Medications:   •  albuterol (VENTOLIN HFA) 108 (90 Base) MCG/ACT inhaler, Inhale 2 puffs Every 4 (Four) Hours As Needed for Wheezing., Disp: 1 inhaler, Rfl: 3  •  Alcohol Swabs ( STERILE ALCOHOL PREP) pads, 1 each 4 (Four) Times a Day., Disp: 200 each, Rfl: 3  •  amitriptyline (ELAVIL) 25 MG tablet, TK 1 T PO UP TO TID PRA OR SLP, Disp: , Rfl: 1  •  ASPIRIN LOW DOSE 81 MG EC tablet, TAKE 1 TABLET BY MOUTH EVERY DAY, Disp: 30 tablet, Rfl: 0  •  atorvastatin (LIPITOR) 40 MG tablet, Take 1 tablet by mouth Daily., Disp: 30 tablet, Rfl: 4  •  baclofen (LIORESAL) 10 MG tablet, Take 1 tablet by mouth 3 (Three) Times a Day., Disp: 30 tablet, Rfl: 0  •  busPIRone (BUSPAR) 5 MG tablet, 1 tablet PO once a day, Disp: , Rfl: 3  •  cyclobenzaprine (FEXMID) 7.5 MG tablet, Take 1 tablet by mouth 3 (Three) Times a Day As Needed for Muscle Spasms., Disp: 45 tablet, Rfl: 0  •  dutasteride (AVODART) 0.5 MG capsule, Take 1 capsule by mouth Daily., Disp: 30 capsule, Rfl: 4  •  glucose blood (ONE TOUCH ULTRA TEST) test strip, Use to text 4 times a day.  Dx-e11.49, Disp: 200 each, Rfl: 11  •  glucose blood test strip, Use 4x day E.11.49, Disp: 200 each, Rfl: 11  •  Insulin Glargine (BASAGLAR KWIKPEN) 100 UNIT/ML injection pen, Inject 13 Units under the skin into the appropriate area as directed every night at bedtime., Disp: 2 pen, Rfl: 11  •  Insulin Lispro (ADMELOG  SOLOSTAR) 100 UNIT/ML injection pen, Up to 10 units with meals., Disp: 3 pen, Rfl: 11  •  Insulin Pen Needle (PEN NEEDLES) 32G X 4 MM misc, 1 each 4 (Four) Times a Day. Use 4 x daily, Dx code E11.65, Disp: 120 each, Rfl: 11  •  losartan-hydrochlorothiazide (HYZAAR) 100-12.5 MG per tablet, Take 1 tablet by mouth Daily., Disp: 90 tablet, Rfl: 2  •  magnesium oxide (MAGnesium-Oxide) 400 (241.3 Mg) MG tablet tablet, Take 1 tablet by mouth 2 (Two) Times a Day., Disp: 30 tablet, Rfl: 1  •  metFORMIN ER (GLUCOPHAGE-XR) 750 MG 24 hr tablet, Take 1 tablet by mouth Daily., Disp: 90 tablet, Rfl: 0  •  metoprolol succinate XL (TOPROL-XL) 50 MG 24 hr tablet, TAKE 1 TABLET BY MOUTH EVERY DAY, Disp: 90 tablet, Rfl: 4  •  naproxen (EC-Naproxen) 500 MG EC tablet, Take 1 tablet by mouth 2 (Two) Times a Day With Meals., Disp: 60 tablet, Rfl: 2  •  OLANZapine (ZYPREXA) 10 MG tablet, Take 10 mg by mouth daily., Disp: , Rfl:   •  ondansetron ODT (Zofran ODT) 4 MG disintegrating tablet, Place 1 tablet on the tongue Every 8 (Eight) Hours As Needed for Nausea or Vomiting., Disp: 30 tablet, Rfl: 0  •  pantoprazole (PROTONIX) 40 MG EC tablet, Take 1 tablet by mouth Daily., Disp: 90 tablet, Rfl: 2  •  polyethylene glycol (GoLYTELY) 236 g solution, Starting at noon on day prior to procedure, drink 8 ounces every 30 minutes until all gone or stools are clear. May add flavor packet., Disp: 4000 mL, Rfl: 0  •  sertraline (ZOLOFT) 100 MG tablet, Take 1 tablet by mouth Daily., Disp: , Rfl:   •  tamsulosin (FLOMAX) 0.4 MG capsule 24 hr capsule, Take 2 capsules by mouth Every Night., Disp: 180 capsule, Rfl: 5  •  vitamin D (ERGOCALCIFEROL) 59308 units capsule capsule, Take 1 capsule by mouth Every 14 (Fourteen) Days., Disp: 4 capsule, Rfl: 11    Allergies:  No Known Allergies    Family Hx:  Family History   Problem Relation Age of Onset   • Cancer Mother         leukemia   • Heart disease Mother    • Heart disease Sister    • Lung disease Father    •  "Heart disease Maternal Grandmother    • Heart disease Maternal Grandfather         Social History:  Social History     Socioeconomic History   • Marital status:      Spouse name: Not on file   • Number of children: Not on file   • Years of education: Not on file   • Highest education level: Not on file   Tobacco Use   • Smoking status: Former Smoker     Packs/day: 0.25     Years: 15.00     Pack years: 3.75     Types: Cigarettes     Last attempt to quit: 2000     Years since quittin.4   • Smokeless tobacco: Never Used   Substance and Sexual Activity   • Alcohol use: No     Frequency: Never   • Drug use: No   • Sexual activity: Not Currently     Comment: shortness of breath       Review of Systems  Review of Systems   Constitutional: Negative for fatigue and fever.   HENT: Negative for sore throat.    Eyes: Negative for pain.   Respiratory: Negative for cough and shortness of breath.    Cardiovascular: Negative for chest pain and palpitations.   Gastrointestinal: Negative for abdominal pain and nausea.   Genitourinary: Negative for dysuria.   Musculoskeletal: Negative for back pain and gait problem.   Skin: Negative for pallor and rash.   Neurological: Negative for dizziness and headaches.   Psychiatric/Behavioral: Negative for confusion and dysphoric mood.       Objective:     /76   Pulse 74   Temp 97 °F (36.1 °C) (Tympanic)   Ht 175.3 cm (69\")   Wt 109 kg (241 lb 3 oz)   SpO2 98%   BMI 35.62 kg/m²      PHQ-9 Depression Screening  Little interest or pleasure in doing things? 0   Feeling down, depressed, or hopeless? 0   Trouble falling or staying asleep, or sleeping too much? 0   Feeling tired or having little energy? 0   Poor appetite or overeating? 0   Feeling bad about yourself - or that you are a failure or have let yourself or your family down? 0   Trouble concentrating on things, such as reading the newspaper or watching television? 0   Moving or speaking so slowly that other people " could have noticed? Or the opposite - being so fidgety or restless that you have been moving around a lot more than usual? 0   Thoughts that you would be better off dead, or of hurting yourself in some way? 0   PHQ-9 Total Score 0   If you checked off any problems, how difficult have these problems made it for you to do your work, take care of things at home, or get along with other people? Not difficult at all       Physical Exam   Constitutional: He is oriented to person, place, and time. He appears well-developed and well-nourished. No distress.   HENT:   Head: Normocephalic and atraumatic.   Nose: Nose normal.   Mouth/Throat: Oropharynx is clear and moist.   Eyes: Pupils are equal, round, and reactive to light. EOM are normal.   Neck: Normal range of motion. Neck supple.   Cardiovascular: Normal rate, regular rhythm, normal heart sounds and intact distal pulses.   Pulmonary/Chest: Effort normal and breath sounds normal. He has no wheezes.   Abdominal: Soft. Bowel sounds are normal. There is no tenderness.   Musculoskeletal: Normal range of motion. He exhibits no edema.    Nirav had a diabetic foot exam performed today.   During the foot exam he had a monofilament test performed.    Neurological Sensory Findings -  Unaltered sharp/dull right ankle/foot discrimination and unaltered sharp/dull left ankle/foot discrimination. No right ankle/foot altered proprioception and no left ankle/foot altered proprioception  Vascular Status -  His right foot exhibits normal foot vasculature  and no edema. His left foot exhibits normal foot vasculature  and no edema.  Skin Integrity  -  His right foot skin is intact.His left foot skin is intact..  Neurological: He is alert and oriented to person, place, and time.   Skin: Skin is warm. Capillary refill takes less than 2 seconds.   Psychiatric: He has a normal mood and affect. His behavior is normal.        Assessment/Plan:     Nirav was seen today for  hyperlipidemia.    Diagnoses and all orders for this visit:    Mixed hyperlipidemia  Stable, controlled. Will increase to 40 mg statin  -     atorvastatin (LIPITOR) 40 MG tablet; Take 1 tablet by mouth Daily.    Major depressive disorder with single episode, in full remission (CMS/HCC)  Stable, well controlled. Continue:  -     sertraline (ZOLOFT) 100 MG tablet; Take 1 tablet by mouth Daily.    Benign prostatic hyperplasia with weak urinary stream  Stable, controlled. Continue  -     tamsulosin (FLOMAX) 0.4 MG capsule 24 hr capsule; Take 2 capsules by mouth Every Night.    Essential hypertension  Stable, controlled. Continue:  -     losartan-hydrochlorothiazide (HYZAAR) 100-12.5 MG per tablet; Take 1 tablet by mouth Daily.    Post concussion syndrome  Stable, controlled. Continue:  -     ondansetron ODT (Zofran ODT) 4 MG disintegrating tablet; Place 1 tablet on the tongue Every 8 (Eight) Hours As Needed for Nausea or Vomiting.    Other orders  -     pantoprazole (PROTONIX) 40 MG EC tablet; Take 1 tablet by mouth Daily.      · Rx changes: n/a  · Patient Education: diet, exercise  · Compliance at present is estimated to be good.   · Efforts to improve compliance (if necessary) will be directed at increased exercise.     Follow-up:     Return in about 6 months (around 12/12/2020) for Recheck COPD.    Preventative:  Health Maintenance   Topic Date Due   • ZOSTER VACCINE (1 of 2) 05/01/2013   • MEDICARE ANNUAL WELLNESS  04/30/2019   • DIABETIC FOOT EXAM  11/16/2019   • DIABETIC EYE EXAM  03/05/2020   • INFLUENZA VACCINE  08/01/2020   • HEMOGLOBIN A1C  09/06/2020   • LIPID PANEL  03/06/2021   • URINE MICROALBUMIN  03/06/2021   • COLONOSCOPY  04/05/2026   • TDAP/TD VACCINES (3 - Td) 10/16/2029   • HEPATITIS C SCREENING  Completed   • PNEUMOCOCCAL VACCINE (19-64 MEDIUM RISK)  Addressed     Male Preventative: Medicare wellness in future  Recommended: none  Vaccine Counseling: N/A    Weight  -Class: Obese Class II:  35-39.9kg/m2  -Patient's Body mass index is 35.62 kg/m². BMI is above normal parameters. Recommendations include: exercise counseling and nutrition counseling.   decrease soda or juice intake, increase water intake, increase physical activity, reduce fast food intake, plan meals and have 3 meals a day    Alcohol use:  reports that he does not drink alcohol.  Nicotine status  reports that he quit smoking about 20 years ago. His smoking use included cigarettes. He has a 3.75 pack-year smoking history. He has never used smokeless tobacco.    Goals        Patient Stated    • Exercise 150 minutes per week (moderate activity) (pt-stated)      - increasing exercise, walking  - improve diet            RISK SCORE: 3      Charles Olivares M.D. PGY2  [facility]  [address] [place] [address]  Office: [phone]    This document has been electronically signed by Charles Olivares MD on June 12, 2020 16:16

## 2020-08-02 NOTE — PROGRESS NOTES
Subjective    Nirav Lind is a 52 y.o. male. he is here today for follow-up.    History of Present Illness       Duration/Timing: Diabetes mellitus type 2, onset of symptoms gradual   dm dx at age 51 y      constant     not controlled      severity high      Patient was admitted to the hospital for chest pain but cardiac was ruled out      Severity (Complications/Hospitalizations)  Secondary Macrovascular Complications: No CAD, No CVA, No PAD  Secondary Microvascular Complications: No Microalbuminuria, No Diabetic Retinopathy, No Diabetic Neuropathy     Context  Diabetes Regimen: Insulin, Oral Medications, Last HgbA1c 6.0 from Jan. 2017     Lab Results   Component Value Date    HGBA1C 6.2 (H) 10/18/2017        Blood Glucose Readings     Blood glucose log ranging from 88 up to 144      Diet  adhering to 1800 calorie diet   Exercise: Does not exercise     Associated Signs/Symptoms  Hyperglycemic Symptoms: No polyuria, No polydipsia, No polyphagia  Hypoglycemic Episodes: No documented hypoglycemia since last visit                The following portions of the patient's history were reviewed and updated as appropriate:   Past Medical History:   Diagnosis Date   • Abnormal computed tomography scan    • Adenomatous polyp of colon    • Allergic rhinitis    • Anemia    • Chronic back pain    • Coronary arteriosclerosis    • Diabetes mellitus    • Dizziness    • Dyspnea     unspecified   • Epigastric pain    • Essential hypertension    • Ex-smoker    • Hemangioma of liver    • Hyperlipidemia    • Infected hydrocele     with orchitis and epididymis   • Nausea    • Nausea with vomiting    • Obesity with body mass index of 30.0 - 39.9    • Pain in right knee    • Right upper quadrant pain    • Type II diabetes mellitus, uncontrolled    • Upper respiratory infection    • Urgency of urination    • Villous adenoma of colon      Past Surgical History:   Procedure Laterality Date   • CARDIAC CATHETERIZATION  11/30/2015    No  FP at bedside Problem: Falls - Risk of:  Goal: Will remain free from falls  Description  Will remain free from falls  5/25/2019 1255 by Lambert Velásquez RN  Outcome: Met This Shift  5/25/2019 0225 by Ara Mclean RN  Outcome: Ongoing     Problem: Anxiety:  Goal: Level of anxiety will decrease  Description  Level of anxiety will decrease  5/25/2019 1255 by Lambert Velásquez RN  Outcome: Met This Shift  5/25/2019 0225 by Ara Mclean RN  Outcome: Ongoing     Problem: Falls - Risk of:  Goal: Absence of physical injury  Description  Absence of physical injury  5/25/2019 1255 by Lambert Velásquez RN  Outcome: Ongoing  5/25/2019 0225 by Ara Mclean RN  Outcome: Ongoing     Problem: Discharge Planning:  Goal: Discharged to appropriate level of care  Description  Discharged to appropriate level of care  5/25/2019 1255 by Lambert Velásquez RN  Outcome: Ongoing  5/25/2019 0225 by Ara Mclean RN  Outcome: Ongoing     Problem:  Activity Intolerance:  Goal: Ability to tolerate increased activity will improve  Description  Ability to tolerate increased activity will improve  5/25/2019 1255 by Lambert Velásquez RN  Outcome: Ongoing  5/25/2019 0225 by Ara Mclean RN  Outcome: Ongoing     Problem: Airway Clearance - Ineffective:  Goal: Ability to maintain a clear airway will improve  Description  Ability to maintain a clear airway will improve  5/25/2019 0225 by Ara Mclean RN  Outcome: Ongoing     Problem: Gas Exchange - Impaired:  Goal: Levels of oxygenation will improve  Description  Levels of oxygenation will improve  5/25/2019 0225 by Ara Mclean RN  Outcome: Ongoing     Problem: Nutrition  Goal: Optimal nutrition therapy  5/25/2019 1255 by Lambert Velásquez RN  Outcome: Ongoing  5/25/2019 0225 by Ara Mclean RN  Outcome: Ongoing     Problem: Musculor/Skeletal Functional Status  Goal: Highest potential functional level  5/25/2019 1255 by Lorenzo Kapoor RHIANNON Donnelly  Outcome: Ongoing  5/25/2019 0225 by Enoc Sterling RN  Outcome: Ongoing  Goal: Absence of falls  5/25/2019 1255 by Barbi Toledo RN  Outcome: Ongoing  5/25/2019 0225 by Enoc Sterling RN  Outcome: Ongoing evidence of any obstructive disease in the circumflex, the RCA , or LAD. 1st diagonal branch in the midportion with 20-30% stenosis. Preserved LV systolic function with EF 55%.   • COLONOSCOPY  01/21/2013    Normal   • COLONOSCOPY W/ POLYPECTOMY  10/13/2014    A single polyp was found in the colon; removed by snare cautery polypectomy.   • ENDOSCOPY  06/15/2016    Mildly severe esophagitis. Several biopsies obtained in the upper third of the esophagus.   • INCISION AND DRAINAGE ABSCESS  10/29/2014    Incision and drainage of scrotal abscess. Hydrocelectomy, bottle procedure.   • INJECTION OF MEDICATION  01/26/2016    Kenalog     Family History   Problem Relation Age of Onset   • Cancer Mother    • Heart disease Sister        Current Outpatient Prescriptions   Medication Sig Dispense Refill   • albuterol (VENTOLIN HFA) 108 (90 Base) MCG/ACT inhaler Inhale 2 puffs 2 (Two) Times a Day. 1 inhaler 3   • Alcohol Swabs 70 % pads USE UTD QID  11   • amitriptyline (ELAVIL) 25 MG tablet Take 25 mg by mouth 3 (three) times a day.     • ASPIRIN LOW DOSE 81 MG EC tablet TAKE 1 TABLET BY MOUTH EVERY DAY 30 tablet 0   • atorvastatin (LIPITOR) 20 MG tablet Take 1 tablet by mouth Daily. 30 tablet 5   • B-D UF III MINI PEN NEEDLES 31G X 5 MM misc 4 x  daily 200 each 11   • budesonide-formoterol (SYMBICORT) 160-4.5 MCG/ACT inhaler Inhale 2 puffs 2 (Two) Times a Day. 1 inhaler 3   • cyclobenzaprine (FLEXERIL) 5 MG tablet Take 1 tablet by mouth 3 (Three) Times a Day As Needed for Muscle Spasms. 90 tablet 3   • diclofenac (VOLTAREN) 50 MG EC tablet Take 1 tablet by mouth 2 (Two) Times a Day. 30 tablet 0   • fexofenadine (ALLEGRA ALLERGY) 180 MG tablet Take 1 tablet by mouth Daily. 30 tablet 1   • FREESTYLE LITE test strip 200 each by Other route As Needed (prn). Use as instructed 30 each 0   • gabapentin (NEURONTIN) 400 MG capsule Take 1 capsule by mouth 3 (Three) Times a Day. 90 capsule 5   • HYDROcodone-acetaminophen (NORCO) 5-325 MG  per tablet Take 1 tablet by mouth Every 4 (Four) Hours As Needed for Moderate Pain (4-6). 20 tablet 0   • Insulin Glargine (TOUJEO SOLOSTAR) 300 UNIT/ML solution pen-injector Inject  under the skin daily.     • Lancets (FREESTYLE) lancets USE TO CHECK BLOOD SUGAR QID  11   • losartan (COZAAR) 25 MG tablet Take 25 mg by mouth Daily.  12   • MAGNESIUM-OXIDE 400 (241.3 MG) MG tablet tablet TK 1 T PO BID  12   • metFORMIN ER (GLUCOPHAGE-XR) 750 MG 24 hr tablet Take 1 tablet by mouth Daily. 30 tablet 6   • metoprolol succinate XL (TOPROL-XL) 25 MG 24 hr tablet TK 1 T PO BID  3   • OLANZapine (ZYPREXA) 10 MG tablet Take 10 mg by mouth daily.     • ondansetron (ZOFRAN) 4 MG tablet Take 1 tablet by mouth every 8 (eight) hours as needed for nausea or vomiting. 30 tablet 0   • pantoprazole (PROTONIX) 40 MG EC tablet Take 1 tablet by mouth Daily. 30 tablet 2   • sertraline (ZOLOFT) 100 MG tablet Take 100 mg by mouth daily.     • tiotropium (SPIRIVA HANDIHALER) 18 MCG per inhalation capsule Place 1 capsule into inhaler and inhale Daily. 30 capsule 2     No current facility-administered medications for this visit.      No Known Allergies  Social History     Social History   • Marital status:      Spouse name: N/A   • Number of children: N/A   • Years of education: N/A     Social History Main Topics   • Smoking status: Never Smoker   • Smokeless tobacco: Never Used   • Alcohol use Yes   • Drug use: Defer   • Sexual activity: Not Asked     Other Topics Concern   • None     Social History Narrative       Review of Systems  Review of Systems   Constitutional: Negative for activity change, appetite change, diaphoresis and fatigue.   HENT: Negative for congestion, dental problem, facial swelling, sneezing, sore throat, tinnitus, trouble swallowing and voice change.    Eyes: Negative for photophobia, pain, discharge, redness, itching and visual disturbance.   Respiratory: Negative for apnea, cough, choking, chest tightness and  "shortness of breath.    Cardiovascular: Negative for chest pain, palpitations and leg swelling.   Gastrointestinal: Negative for abdominal distention, abdominal pain, constipation, diarrhea, nausea and vomiting.   Endocrine: Negative for cold intolerance, heat intolerance, polydipsia, polyphagia and polyuria.   Genitourinary: Negative for difficulty urinating, dysuria, frequency, hematuria and urgency.   Musculoskeletal: Negative for arthralgias, back pain, gait problem, joint swelling, myalgias, neck pain and neck stiffness.   Skin: Negative for color change, pallor, rash and wound.   Allergic/Immunologic: Negative for environmental allergies and food allergies.   Neurological: Negative for dizziness, tremors, facial asymmetry, weakness, light-headedness, numbness and headaches.   Hematological: Negative for adenopathy. Does not bruise/bleed easily.   Psychiatric/Behavioral: Negative for agitation, behavioral problems, confusion and sleep disturbance.        Objective    /90 (BP Location: Left arm, Patient Position: Sitting, Cuff Size: Adult)  Pulse 70  Ht 72\" (182.9 cm)  Wt 224 lb 2 oz (102 kg)  SpO2 97%  BMI 30.4 kg/m2  Physical Exam   Constitutional: He is oriented to person, place, and time. He appears well-developed and well-nourished. No distress.   HENT:   Head: Normocephalic and atraumatic.   Right Ear: External ear normal.   Left Ear: External ear normal.   Nose: Nose normal.   Eyes: Conjunctivae and EOM are normal. Pupils are equal, round, and reactive to light.   Neck: Normal range of motion. Neck supple. No tracheal deviation present. No thyromegaly present.   Cardiovascular: Normal rate, regular rhythm and normal heart sounds.    No murmur heard.  Pulmonary/Chest: Effort normal and breath sounds normal. No respiratory distress. He has no wheezes.   Abdominal: Soft. Bowel sounds are normal. There is no tenderness. There is no rebound and no guarding.   Musculoskeletal: Normal range of motion. " He exhibits no edema, tenderness or deformity.   Neurological: He is alert and oriented to person, place, and time. No cranial nerve deficit.   Skin: Skin is warm and dry. No rash noted.   Psychiatric: He has a normal mood and affect. His behavior is normal. Judgment and thought content normal.       Lab Review  Glucose (mg/dL)   Date Value   10/18/2017 81   06/29/2017 85   03/09/2017 136 (H)     Sodium (mmol/L)   Date Value   10/18/2017 141   06/29/2017 143   03/09/2017 134 (L)     Potassium (mmol/L)   Date Value   10/18/2017 4.1   06/29/2017 4.1   03/09/2017 3.9     Chloride (mmol/L)   Date Value   10/18/2017 104   06/29/2017 103   03/09/2017 103     CO2 (mmol/L)   Date Value   10/18/2017 26.0   06/29/2017 25.0   03/09/2017 25.0     BUN (mg/dL)   Date Value   10/18/2017 16   06/29/2017 14   03/09/2017 10     Creatinine (mg/dL)   Date Value   10/18/2017 1.06   06/29/2017 1.02   03/09/2017 0.89     Hemoglobin A1C (%)   Date Value   10/18/2017 6.2 (H)   07/18/2017 6.22 (H)   06/29/2017 6.15 (H)     Triglycerides   Date Value   06/29/2017 63 mg/dL   11/04/2016 111 mg/dl       Assessment/Plan      1. DM (diabetes mellitus), type 2 with neurological complications    2. Vitamin D deficiency    3. Essential hypertension    4. Mixed hyperlipidemia    .    Medications prescribed:  Outpatient Encounter Prescriptions as of 11/17/2017   Medication Sig Dispense Refill   • albuterol (VENTOLIN HFA) 108 (90 Base) MCG/ACT inhaler Inhale 2 puffs 2 (Two) Times a Day. 1 inhaler 3   • Alcohol Swabs 70 % pads USE UTD QID  11   • amitriptyline (ELAVIL) 25 MG tablet Take 25 mg by mouth 3 (three) times a day.     • ASPIRIN LOW DOSE 81 MG EC tablet TAKE 1 TABLET BY MOUTH EVERY DAY 30 tablet 0   • atorvastatin (LIPITOR) 20 MG tablet Take 1 tablet by mouth Daily. 30 tablet 5   • B-D UF III MINI PEN NEEDLES 31G X 5 MM misc 4 x  daily 200 each 11   • budesonide-formoterol (SYMBICORT) 160-4.5 MCG/ACT inhaler Inhale 2 puffs 2 (Two) Times a Day. 1  inhaler 3   • cyclobenzaprine (FLEXERIL) 5 MG tablet Take 1 tablet by mouth 3 (Three) Times a Day As Needed for Muscle Spasms. 90 tablet 3   • diclofenac (VOLTAREN) 50 MG EC tablet Take 1 tablet by mouth 2 (Two) Times a Day. 30 tablet 0   • fexofenadine (ALLEGRA ALLERGY) 180 MG tablet Take 1 tablet by mouth Daily. 30 tablet 1   • FREESTYLE LITE test strip 200 each by Other route As Needed (prn). Use as instructed 30 each 0   • gabapentin (NEURONTIN) 400 MG capsule Take 1 capsule by mouth 3 (Three) Times a Day. 90 capsule 5   • HYDROcodone-acetaminophen (NORCO) 5-325 MG per tablet Take 1 tablet by mouth Every 4 (Four) Hours As Needed for Moderate Pain (4-6). 20 tablet 0   • Insulin Glargine (TOUJEO SOLOSTAR) 300 UNIT/ML solution pen-injector Inject  under the skin daily.     • Lancets (FREESTYLE) lancets USE TO CHECK BLOOD SUGAR QID  11   • losartan (COZAAR) 25 MG tablet Take 25 mg by mouth Daily.  12   • MAGNESIUM-OXIDE 400 (241.3 MG) MG tablet tablet TK 1 T PO BID  12   • metFORMIN ER (GLUCOPHAGE-XR) 750 MG 24 hr tablet Take 1 tablet by mouth Daily. 30 tablet 6   • metoprolol succinate XL (TOPROL-XL) 25 MG 24 hr tablet TK 1 T PO BID  3   • OLANZapine (ZYPREXA) 10 MG tablet Take 10 mg by mouth daily.     • ondansetron (ZOFRAN) 4 MG tablet Take 1 tablet by mouth every 8 (eight) hours as needed for nausea or vomiting. 30 tablet 0   • pantoprazole (PROTONIX) 40 MG EC tablet Take 1 tablet by mouth Daily. 30 tablet 2   • sertraline (ZOLOFT) 100 MG tablet Take 100 mg by mouth daily.     • tiotropium (SPIRIVA HANDIHALER) 18 MCG per inhalation capsule Place 1 capsule into inhaler and inhale Daily. 30 capsule 2   • [DISCONTINUED] ibuprofen (ADVIL,MOTRIN) 600 MG tablet TK 1 T PO Q 8 H  0   • [DISCONTINUED] lisinopril (PRINIVIL,ZESTRIL) 2.5 MG tablet Take 1 tablet by mouth Daily. 30 tablet 6     No facility-administered encounter medications on file as of 11/17/2017.        Orders placed during this encounter include:  Orders  Placed This Encounter   Procedures   • Comprehensive Metabolic Panel   • Hemoglobin A1c   • Lipid Panel   • Vitamin D 25 Hydroxy   • CBC & Differential     Order Specific Question:   Manual Differential     Answer:   No   Glycemic Management           Lab Results   Component Value Date     HGBA1C 6.15 (H) 06/29/2017        Lab Results   Component Value Date    HGBA1C 6.2 (H) 10/18/2017          Toujeo 25 units in the morning---taking 20 units ---taking 13 units      if you ever wake up with a sugar less than 100 - back off by 3 units     ---------------------     Invokana 100 mg before breakfast --not taking            Metformin 500 mg tablets---take one with supper ----taking 750 mg                     invokamet 150/1000 mg twice daily ( it combines metformin and invokana )---stopped the invokamet due to low blood sugars      --------------------        once you run out of januvia , you will use in its place trulicity 0.75 mg weekly ---stopped due to low Blood sugars   if nausea try to eat less      ------------------     novolog --not using --stopped      you are doing sliding scale   and  2 units for every 15 grams of carbohydrate     once glucose readings are staying in the low 100s we can try 1 unit for every 15 grams and if it stays in the low 100s we will only use sliding scale         Lipid Management  on pravachol 20 mg qhs      Nov. 2016     Lipids at goal            Total Cholesterol   Date Value Ref Range Status   06/29/2017 86 0 - 199 mg/dL Final            Triglycerides   Date Value Ref Range Status   06/29/2017 63 20 - 199 mg/dL Final            HDL Cholesterol   Date Value Ref Range Status   06/29/2017 30 (L) 60 - 200 mg/dL Final            LDL Cholesterol    Date Value Ref Range Status   06/29/2017 41 1 - 129 mg/dL Final               Blood Pressure Management     on amlodipine     On lisinopril 2.5 mg daily --stopped due to allergic reaction     On metoprolol         Microvascular Complication  Monitoring: No Microalbuminuria, No Diabetic Retinopathy, positive  Diabetic Neuropathy        Neuropathy     Taking Neurontin           Preventive Care: Patient is not smoking        Weight Related: Obesity, Counseled on nutrition, Counseled on physical activity     Bone Health     Vitamin d def     Nov. 2016      Vit d - normal     Vitamin d Oct 2017    29    Start otc 1000 units daily               4. Follow-up: Return in about 4 months (around 3/17/2018) for Recheck.

## 2020-08-06 DIAGNOSIS — E11.42 DIABETIC POLYNEUROPATHY ASSOCIATED WITH TYPE 2 DIABETES MELLITUS (HCC): ICD-10-CM

## 2020-08-06 RX ORDER — METFORMIN HYDROCHLORIDE 750 MG/1
TABLET, EXTENDED RELEASE ORAL
Qty: 90 TABLET | Refills: 0 | Status: SHIPPED | OUTPATIENT
Start: 2020-08-06 | End: 2020-09-11 | Stop reason: SDUPTHER

## 2020-08-06 RX ORDER — ERGOCALCIFEROL 1.25 MG/1
CAPSULE ORAL
Qty: 4 CAPSULE | Refills: 11 | Status: SHIPPED | OUTPATIENT
Start: 2020-08-06 | End: 2020-12-10

## 2020-08-10 ENCOUNTER — TELEPHONE (OUTPATIENT)
Dept: ENDOCRINOLOGY | Facility: CLINIC | Age: 55
End: 2020-08-10

## 2020-08-10 NOTE — TELEPHONE ENCOUNTER
PA FOR HUMALOG KWIKPEN COMPLETED ON 8/10/2020.    PA Approved quantity: 27 units per 75 day(s). You may fill up to a 90 day supply except for those on Specialty Tier 5, which can be filled up to a 30 day supply

## 2020-09-11 ENCOUNTER — LAB (OUTPATIENT)
Dept: LAB | Facility: HOSPITAL | Age: 55
End: 2020-09-11

## 2020-09-11 ENCOUNTER — OFFICE VISIT (OUTPATIENT)
Dept: ENDOCRINOLOGY | Facility: CLINIC | Age: 55
End: 2020-09-11

## 2020-09-11 VITALS
WEIGHT: 244.3 LBS | HEIGHT: 69 IN | BODY MASS INDEX: 36.18 KG/M2 | HEART RATE: 65 BPM | DIASTOLIC BLOOD PRESSURE: 78 MMHG | OXYGEN SATURATION: 99 % | SYSTOLIC BLOOD PRESSURE: 124 MMHG

## 2020-09-11 DIAGNOSIS — I10 ESSENTIAL HYPERTENSION: ICD-10-CM

## 2020-09-11 DIAGNOSIS — Z79.4 TYPE 2 DIABETES MELLITUS WITH HYPERGLYCEMIA, WITH LONG-TERM CURRENT USE OF INSULIN (HCC): Primary | ICD-10-CM

## 2020-09-11 DIAGNOSIS — E55.9 VITAMIN D DEFICIENCY: ICD-10-CM

## 2020-09-11 DIAGNOSIS — E78.2 MIXED HYPERLIPIDEMIA: ICD-10-CM

## 2020-09-11 DIAGNOSIS — E11.65 TYPE 2 DIABETES MELLITUS WITH HYPERGLYCEMIA, WITH LONG-TERM CURRENT USE OF INSULIN (HCC): Primary | ICD-10-CM

## 2020-09-11 DIAGNOSIS — E11.42 DIABETIC POLYNEUROPATHY ASSOCIATED WITH TYPE 2 DIABETES MELLITUS (HCC): ICD-10-CM

## 2020-09-11 LAB
25(OH)D3 SERPL-MCNC: 39.3 NG/ML (ref 30–100)
ALBUMIN UR-MCNC: 1.7 MG/DL
BASOPHILS # BLD AUTO: 0.06 10*3/MM3 (ref 0–0.2)
BASOPHILS NFR BLD AUTO: 0.7 % (ref 0–1.5)
CHOLEST SERPL-MCNC: 108 MG/DL (ref 0–200)
CREAT UR-MCNC: 291.1 MG/DL
DEPRECATED RDW RBC AUTO: 39.5 FL (ref 37–54)
EOSINOPHIL # BLD AUTO: 0.36 10*3/MM3 (ref 0–0.4)
EOSINOPHIL NFR BLD AUTO: 4.5 % (ref 0.3–6.2)
ERYTHROCYTE [DISTWIDTH] IN BLOOD BY AUTOMATED COUNT: 13 % (ref 12.3–15.4)
HBA1C MFR BLD: 6.1 % (ref 4.8–5.6)
HCT VFR BLD AUTO: 41.8 % (ref 37.5–51)
HDLC SERPL-MCNC: 29 MG/DL (ref 40–60)
HGB BLD-MCNC: 13.6 G/DL (ref 13–17.7)
IMM GRANULOCYTES # BLD AUTO: 0.08 10*3/MM3 (ref 0–0.05)
IMM GRANULOCYTES NFR BLD AUTO: 1 % (ref 0–0.5)
LDLC SERPL CALC-MCNC: 47 MG/DL (ref 0–100)
LDLC/HDLC SERPL: 1.63 {RATIO}
LYMPHOCYTES # BLD AUTO: 1.54 10*3/MM3 (ref 0.7–3.1)
LYMPHOCYTES NFR BLD AUTO: 19.1 % (ref 19.6–45.3)
MCH RBC QN AUTO: 27.5 PG (ref 26.6–33)
MCHC RBC AUTO-ENTMCNC: 32.5 G/DL (ref 31.5–35.7)
MCV RBC AUTO: 84.4 FL (ref 79–97)
MICROALBUMIN/CREAT UR: 5.8 MG/G
MONOCYTES # BLD AUTO: 0.55 10*3/MM3 (ref 0.1–0.9)
MONOCYTES NFR BLD AUTO: 6.8 % (ref 5–12)
NEUTROPHILS NFR BLD AUTO: 5.47 10*3/MM3 (ref 1.7–7)
NEUTROPHILS NFR BLD AUTO: 67.9 % (ref 42.7–76)
NRBC BLD AUTO-RTO: 0 /100 WBC (ref 0–0.2)
PLATELET # BLD AUTO: 281 10*3/MM3 (ref 140–450)
PMV BLD AUTO: 10.8 FL (ref 6–12)
RBC # BLD AUTO: 4.95 10*6/MM3 (ref 4.14–5.8)
TRIGL SERPL-MCNC: 159 MG/DL (ref 0–150)
TSH SERPL DL<=0.05 MIU/L-ACNC: 2.44 UIU/ML (ref 0.27–4.2)
VIT B12 BLD-MCNC: 556 PG/ML (ref 211–946)
VLDLC SERPL-MCNC: 31.8 MG/DL (ref 5–40)
WBC # BLD AUTO: 8.06 10*3/MM3 (ref 3.4–10.8)

## 2020-09-11 PROCEDURE — 99214 OFFICE O/P EST MOD 30 MIN: CPT | Performed by: INTERNAL MEDICINE

## 2020-09-11 PROCEDURE — 82043 UR ALBUMIN QUANTITATIVE: CPT | Performed by: INTERNAL MEDICINE

## 2020-09-11 PROCEDURE — 85025 COMPLETE CBC W/AUTO DIFF WBC: CPT | Performed by: INTERNAL MEDICINE

## 2020-09-11 PROCEDURE — 83036 HEMOGLOBIN GLYCOSYLATED A1C: CPT | Performed by: INTERNAL MEDICINE

## 2020-09-11 PROCEDURE — 82570 ASSAY OF URINE CREATININE: CPT | Performed by: INTERNAL MEDICINE

## 2020-09-11 PROCEDURE — 82607 VITAMIN B-12: CPT | Performed by: INTERNAL MEDICINE

## 2020-09-11 PROCEDURE — 80061 LIPID PANEL: CPT | Performed by: INTERNAL MEDICINE

## 2020-09-11 PROCEDURE — 82306 VITAMIN D 25 HYDROXY: CPT | Performed by: INTERNAL MEDICINE

## 2020-09-11 PROCEDURE — 36415 COLL VENOUS BLD VENIPUNCTURE: CPT | Performed by: INTERNAL MEDICINE

## 2020-09-11 PROCEDURE — 84443 ASSAY THYROID STIM HORMONE: CPT | Performed by: INTERNAL MEDICINE

## 2020-09-11 RX ORDER — INSULIN GLARGINE 100 [IU]/ML
INJECTION, SOLUTION SUBCUTANEOUS
Qty: 2 PEN | Refills: 11 | Status: SHIPPED | OUTPATIENT
Start: 2020-09-11 | End: 2021-06-03 | Stop reason: SDUPTHER

## 2020-09-11 RX ORDER — INSULIN LISPRO 100 U/ML
INJECTION, SOLUTION SUBCUTANEOUS
Qty: 3 PEN | Refills: 11 | Status: SHIPPED | OUTPATIENT
Start: 2020-09-11 | End: 2022-04-13

## 2020-09-11 RX ORDER — METFORMIN HYDROCHLORIDE 750 MG/1
750 TABLET, EXTENDED RELEASE ORAL DAILY
Qty: 30 TABLET | Refills: 11 | Status: SHIPPED | OUTPATIENT
Start: 2020-09-11 | End: 2020-11-16

## 2020-09-11 NOTE — PROGRESS NOTES
Subjective    Nirav Lind is a 55 y.o. male. he is here today for follow-up.    Diabetes   Pertinent negatives for hypoglycemia include no confusion, dizziness, headaches, pallor or tremors. Pertinent negatives for diabetes include no chest pain, no fatigue, no polydipsia, no polyphagia, no polyuria and no weakness.          Reason - Diabetes Mellitus type 2      Duration/Timing: Diabetes mellitus type 2, onset of symptoms gradual      dm dx at age 51 y      constant     Now controlled      severity high      Patient was admitted to the hospital for chest pain but cardiac was ruled out      Severity (Complications/Hospitalizations)  Secondary Macrovascular Complications: No CAD, No CVA, No PAD  Secondary Microvascular Complications: No Microalbuminuria, No Diabetic Retinopathy, No Diabetic Neuropathy     Context  Diabetes Regimen: Insulin, Oral Medications,         Lab Results   Component Value Date    HGBA1C 5.20 03/06/2020                Blood Glucose Readings     States at goal            Diet  adhering to 1800 calorie diet      Exercise: Does not exercise     Associated Signs/Symptoms  Hyperglycemic Symptoms: No polyuria, No polydipsia, No polyphagia  Hypoglycemic Episodes: No documented hypoglycemia since last visit                  The following portions of the patient's history were reviewed and updated as appropriate:   Past Medical History:   Diagnosis Date   • Abnormal computed tomography scan    • Adenomatous polyp of colon    • Allergic rhinitis    • Anemia    • Chronic back pain    • Coronary arteriosclerosis    • Depression    • Diabetes mellitus (CMS/HCC)    • Diabetic polyneuropathy associated with type 2 diabetes mellitus (CMS/HCC) 7/17/2017   • Dizziness    • Dyspnea     unspecified   • Epigastric pain    • Essential hypertension    • Ex-smoker    • Hemangioma of liver    • Hyperlipidemia    • Infected hydrocele     with orchitis and epididymis   • Nausea    • Nausea with vomiting    •  Obesity with body mass index of 30.0 - 39.9    • Pain in right knee    • Right upper quadrant pain    • Type II diabetes mellitus, uncontrolled (CMS/HCC)    • Upper respiratory infection    • Urgency of urination    • Villous adenoma of colon      Past Surgical History:   Procedure Laterality Date   • CARDIAC CATHETERIZATION  11/30/2015    No evidence of any obstructive disease in the circumflex, the RCA , or LAD. 1st diagonal branch in the midportion with 20-30% stenosis. Preserved LV systolic function with EF 55%.   • COLONOSCOPY  01/21/2013    Normal   • COLONOSCOPY W/ POLYPECTOMY  10/13/2014    A single polyp was found in the colon; removed by snare cautery polypectomy.   • ENDOSCOPY  06/15/2016    Mildly severe esophagitis. Several biopsies obtained in the upper third of the esophagus.   • INCISION AND DRAINAGE ABSCESS  10/29/2014    Incision and drainage of scrotal abscess. Hydrocelectomy, bottle procedure.   • INJECTION OF MEDICATION  01/26/2016    Kenalog     Family History   Problem Relation Age of Onset   • Cancer Mother         leukemia   • Heart disease Mother    • Heart disease Sister    • Lung disease Father    • Heart disease Maternal Grandmother    • Heart disease Maternal Grandfather        Current Outpatient Medications   Medication Sig Dispense Refill   • albuterol (VENTOLIN HFA) 108 (90 Base) MCG/ACT inhaler Inhale 2 puffs Every 4 (Four) Hours As Needed for Wheezing. 1 inhaler 3   • Alcohol Swabs ( STERILE ALCOHOL PREP) pads 1 each 4 (Four) Times a Day. 200 each 3   • amitriptyline (ELAVIL) 25 MG tablet TK 1 T PO UP TO TID PRA OR SLP  1   • ASPIRIN LOW DOSE 81 MG EC tablet TAKE 1 TABLET BY MOUTH EVERY DAY 30 tablet 0   • atorvastatin (LIPITOR) 40 MG tablet Take 1 tablet by mouth Daily. 30 tablet 4   • baclofen (LIORESAL) 10 MG tablet Take 1 tablet by mouth 3 (Three) Times a Day. 30 tablet 0   • busPIRone (BUSPAR) 5 MG tablet 1 tablet PO once a day  3   • cyclobenzaprine (FEXMID) 7.5 MG tablet  Take 1 tablet by mouth 3 (Three) Times a Day As Needed for Muscle Spasms. 45 tablet 0   • dutasteride (AVODART) 0.5 MG capsule Take 1 capsule by mouth Daily. 30 capsule 4   • glucose blood (ONE TOUCH ULTRA TEST) test strip Use to text 4 times a day.  Dx-e11.49 200 each 11   • glucose blood test strip Use 4x day E.11.49 200 each 11   • Insulin Glargine (BASAGLAR KWIKPEN) 100 UNIT/ML injection pen Inject 13 Units under the skin into the appropriate area as directed every night at bedtime. 2 pen 11   • Insulin Lispro (ADMELOG SOLOSTAR) 100 UNIT/ML injection pen Up to 10 units with meals. 3 pen 11   • Insulin Pen Needle (PEN NEEDLES) 32G X 4 MM misc 1 each 4 (Four) Times a Day. Use 4 x daily, Dx code E11.65 120 each 11   • losartan-hydrochlorothiazide (HYZAAR) 100-12.5 MG per tablet Take 1 tablet by mouth Daily. 90 tablet 2   • MAGNESIUM-OXIDE 400 (241.3 Mg) MG tablet tablet TAKE 1 TABLET BY MOUTH TWICE DAILY 30 tablet 1   • metFORMIN ER (GLUCOPHAGE-XR) 750 MG 24 hr tablet TAKE 1 TABLET BY MOUTH ONCE DAILY 90 tablet 0   • metoprolol succinate XL (TOPROL-XL) 50 MG 24 hr tablet TAKE 1 TABLET BY MOUTH EVERY DAY 90 tablet 4   • naproxen (EC-Naproxen) 500 MG EC tablet Take 1 tablet by mouth 2 (Two) Times a Day With Meals. 60 tablet 2   • OLANZapine (ZYPREXA) 10 MG tablet Take 10 mg by mouth daily.     • ondansetron ODT (Zofran ODT) 4 MG disintegrating tablet Place 1 tablet on the tongue Every 8 (Eight) Hours As Needed for Nausea or Vomiting. 30 tablet 0   • pantoprazole (PROTONIX) 40 MG EC tablet Take 1 tablet by mouth Daily. 90 tablet 2   • polyethylene glycol (GoLYTELY) 236 g solution Starting at noon on day prior to procedure, drink 8 ounces every 30 minutes until all gone or stools are clear. May add flavor packet. 4000 mL 0   • sertraline (ZOLOFT) 100 MG tablet Take 1 tablet by mouth Daily.     • tamsulosin (FLOMAX) 0.4 MG capsule 24 hr capsule Take 2 capsules by mouth Every Night. 180 capsule 5   • vitamin D  (ERGOCALCIFEROL) 1.25 MG (94031 UT) capsule capsule TAKE 1 CAPSULE BY MOUTH EVERY 14 DAYS 4 capsule 11     No current facility-administered medications for this visit.      No Known Allergies  Social History     Socioeconomic History   • Marital status:      Spouse name: Not on file   • Number of children: Not on file   • Years of education: Not on file   • Highest education level: Not on file   Tobacco Use   • Smoking status: Former Smoker     Packs/day: 0.25     Years: 15.00     Pack years: 3.75     Types: Cigarettes     Last attempt to quit: 2000     Years since quittin.7   • Smokeless tobacco: Never Used   Substance and Sexual Activity   • Alcohol use: No     Frequency: Never   • Drug use: No   • Sexual activity: Not Currently     Comment: shortness of breath       Review of Systems  Review of Systems   Constitutional: Negative for activity change, appetite change, diaphoresis and fatigue.   HENT: Negative for facial swelling, sneezing, sore throat, tinnitus, trouble swallowing and voice change.    Eyes: Negative for photophobia, pain, discharge, redness, itching and visual disturbance.   Respiratory: Negative for apnea, cough, choking, chest tightness and shortness of breath.    Cardiovascular: Negative for chest pain, palpitations and leg swelling.   Gastrointestinal: Negative for abdominal distention, abdominal pain, constipation, diarrhea, nausea and vomiting.   Endocrine: Negative for cold intolerance, heat intolerance, polydipsia, polyphagia and polyuria.   Genitourinary: Negative for difficulty urinating, dysuria, frequency, hematuria and urgency.   Musculoskeletal: Negative for arthralgias, back pain, gait problem, joint swelling, myalgias, neck pain and neck stiffness.   Skin: Negative for color change, pallor, rash and wound.   Neurological: Negative for dizziness, tremors, weakness, light-headedness, numbness and headaches.   Hematological: Negative for adenopathy. Does not bruise/bleed  easily.   Psychiatric/Behavioral: Negative for behavioral problems, confusion and sleep disturbance.        Objective    There were no vitals taken for this visit.  Physical Exam   Constitutional: He is oriented to person, place, and time. He appears well-developed and well-nourished. No distress.   HENT:   Head: Normocephalic and atraumatic.   Right Ear: External ear normal.   Left Ear: External ear normal.   Nose: Nose normal.   Eyes: Pupils are equal, round, and reactive to light. Conjunctivae and EOM are normal.   Neck: Normal range of motion. Neck supple. No tracheal deviation present. No thyromegaly present.   Cardiovascular: Normal rate, regular rhythm and normal heart sounds.   No murmur heard.  Pulmonary/Chest: Effort normal and breath sounds normal. No respiratory distress. He has no wheezes.   Abdominal: Soft. Bowel sounds are normal. There is no tenderness. There is no rebound and no guarding.   Musculoskeletal: Normal range of motion. He exhibits no edema, tenderness or deformity.   Neurological: He is alert and oriented to person, place, and time. No cranial nerve deficit.   Skin: Skin is warm and dry. No rash noted.   Psychiatric: He has a normal mood and affect. His behavior is normal. Judgment and thought content normal.       Lab Review  Glucose (mg/dL)   Date Value   03/06/2020 108 (H)   11/11/2019 81   07/11/2019 93     Sodium (mmol/L)   Date Value   03/06/2020 142   11/11/2019 144   07/11/2019 139     Potassium (mmol/L)   Date Value   03/06/2020 4.4   11/11/2019 4.2   07/11/2019 4.0     Chloride (mmol/L)   Date Value   03/06/2020 104   11/11/2019 106   07/11/2019 101     CO2 (mmol/L)   Date Value   03/06/2020 24.8   11/11/2019 26.1   07/11/2019 24.4     BUN (mg/dL)   Date Value   03/06/2020 17   11/11/2019 18   07/11/2019 12     Creatinine (mg/dL)   Date Value   03/06/2020 1.34 (H)   11/11/2019 1.19   07/11/2019 1.16     Hemoglobin A1C (%)   Date Value   03/06/2020 5.20   11/11/2019 6.43 (H)    10/18/2019 6.22 (H)     Triglycerides (mg/dL)   Date Value   03/06/2020 83   11/11/2019 84   07/11/2019 81     LDL Cholesterol  (mg/dL)   Date Value   03/06/2020 72   11/11/2019 64   07/11/2019 60       Assessment/Plan      1. Type 2 diabetes mellitus with hyperglycemia, with long-term current use of insulin (CMS/[facility])    2. Mixed hyperlipidemia    3. Essential hypertension    4. Vitamin D deficiency    5. Diabetic polyneuropathy associated with type 2 diabetes mellitus (CMS/[facility])    .    Medications prescribed:  Outpatient Encounter Medications as of 9/11/2020   Medication Sig Dispense Refill   • albuterol (VENTOLIN HFA) 108 (90 Base) MCG/ACT inhaler Inhale 2 puffs Every 4 (Four) Hours As Needed for Wheezing. 1 inhaler 3   • Alcohol Swabs ( STERILE ALCOHOL PREP) pads 1 each 4 (Four) Times a Day. 200 each 3   • amitriptyline (ELAVIL) 25 MG tablet TK 1 T PO UP TO TID PRA OR SLP  1   • ASPIRIN LOW DOSE 81 MG EC tablet TAKE 1 TABLET BY MOUTH EVERY DAY 30 tablet 0   • atorvastatin (LIPITOR) 40 MG tablet Take 1 tablet by mouth Daily. 30 tablet 4   • baclofen (LIORESAL) 10 MG tablet Take 1 tablet by mouth 3 (Three) Times a Day. 30 tablet 0   • busPIRone (BUSPAR) 5 MG tablet 1 tablet PO once a day  3   • cyclobenzaprine (FEXMID) 7.5 MG tablet Take 1 tablet by mouth 3 (Three) Times a Day As Needed for Muscle Spasms. 45 tablet 0   • dutasteride (AVODART) 0.5 MG capsule Take 1 capsule by mouth Daily. 30 capsule 4   • glucose blood (ONE TOUCH ULTRA TEST) test strip Use to text 4 times a day.  Dx-e11.49 200 each 11   • glucose blood test strip Use 4x day E.11.49 200 each 11   • Insulin Glargine (BASAGLAR KWIKPEN) 100 UNIT/ML injection pen Inject 13 Units under the skin into the appropriate area as directed every night at bedtime. 2 pen 11   • Insulin Lispro (ADMELOG SOLOSTAR) 100 UNIT/ML injection pen Up to 10 units with meals. 3 pen 11   • Insulin Pen Needle (PEN NEEDLES) 32G X 4 MM misc 1 each 4 (Four) Times a Day. Use 4 x  daily, Dx code E11.65 120 each 11   • losartan-hydrochlorothiazide (HYZAAR) 100-12.5 MG per tablet Take 1 tablet by mouth Daily. 90 tablet 2   • MAGNESIUM-OXIDE 400 (241.3 Mg) MG tablet tablet TAKE 1 TABLET BY MOUTH TWICE DAILY 30 tablet 1   • metFORMIN ER (GLUCOPHAGE-XR) 750 MG 24 hr tablet TAKE 1 TABLET BY MOUTH ONCE DAILY 90 tablet 0   • metoprolol succinate XL (TOPROL-XL) 50 MG 24 hr tablet TAKE 1 TABLET BY MOUTH EVERY DAY 90 tablet 4   • naproxen (EC-Naproxen) 500 MG EC tablet Take 1 tablet by mouth 2 (Two) Times a Day With Meals. 60 tablet 2   • OLANZapine (ZYPREXA) 10 MG tablet Take 10 mg by mouth daily.     • ondansetron ODT (Zofran ODT) 4 MG disintegrating tablet Place 1 tablet on the tongue Every 8 (Eight) Hours As Needed for Nausea or Vomiting. 30 tablet 0   • pantoprazole (PROTONIX) 40 MG EC tablet Take 1 tablet by mouth Daily. 90 tablet 2   • polyethylene glycol (GoLYTELY) 236 g solution Starting at noon on day prior to procedure, drink 8 ounces every 30 minutes until all gone or stools are clear. May add flavor packet. 4000 mL 0   • sertraline (ZOLOFT) 100 MG tablet Take 1 tablet by mouth Daily.     • tamsulosin (FLOMAX) 0.4 MG capsule 24 hr capsule Take 2 capsules by mouth Every Night. 180 capsule 5   • vitamin D (ERGOCALCIFEROL) 1.25 MG (76355 UT) capsule capsule TAKE 1 CAPSULE BY MOUTH EVERY 14 DAYS 4 capsule 11     No facility-administered encounter medications on file as of 9/11/2020.        Orders placed during this encounter include:  No orders of the defined types were placed in this encounter.    Glycemic Management     Lab Results   Component Value Date    HGBA1C 5.20 03/06/2020          Basaglar 13 units, qhs, fasting at goal     Metformin 750 mg XR once a day     Couldn't tolerate trulicity or invokana    Using admelog but small amounts since glucose rarely high, about 2 units for supper    Add back januvia 100 mg daily that I previously stopped when I tried trulicity.          Lipid  Management        Atorvastatin 40 mg qhs      Lab Results   Component Value Date    CHOL 123 03/06/2020    CHLPL 139 11/04/2016    TRIG 83 03/06/2020    HDL 34 (L) 03/06/2020    LDL 72 03/06/2020             Non fasting      Blood Pressure Management     Per Jonathon, losartan, hctz  And toprol    Allergic to lisinopril         Microvascular Complication Monitoring: No Microalbuminuria, No Diabetic Retinopathy, positive  Diabetic Neuropathy        Neuropathy     Taking Neurontin before, resolved        Component      Latest Ref Rng & Units 7/12/2018 7/12/2018           9:34 AM  9:34 AM   Protein/Creatinine Ratio, Urine      0.0 - 200.0 mg/G Crea 59.1     Creatinine, Urine      mg/dL 211.6 211.6   Total Protein, Urine      mg/dL 12.5     Microalbumin/Creatinine Ratio      0.0 - 30.0 mg/g   37.3 (H)   Microalbumin, Urine      mg/L   7.9                  Preventive Care: Patient is not smoking        Weight Related: Obesity, Counseled on nutrition, Counseled on physical activity     Patient's There is no height or weight on file to calculate BMI. BMI is above normal parameters. Recommendations include: educational material.       Decrease daily caloric intake by 500 calories per day        Dietary changes     Handout given diet and diabetes      Bone Health     Vitamin d def     Nov. 2016      Vit d - normal      Vitamin d Oct 2017     29     Start otc 1000 units daily               Component      Latest Ref Rng & Units 3/5/2019   25 Hydroxy, Vitamin D      30.0 - 100.0 ng/ml 31.5                      Lab Results   Component Value Date     [id number] 03/05/2019                  Component      Latest Ref Rng & Units 4/11/2019   Hepatitis B Surface Ag      Non-Reactive Non-Reactive   Hep A IgM      Non-Reactive Non-Reactive   Hep B Core IgM      Non-Reactive Non-Reactive   Hepatitis C Ab      Non-Reactive Non-Reactive               4. Follow-up: No follow-ups on file.

## 2020-09-22 NOTE — TELEPHONE ENCOUNTER
Pt called and said he needs     One Touch Verio     and a different One Touch test strip was called in.     Needs script for Verio Test strips     Thank You    Fall risk

## 2020-10-15 RX ORDER — METOPROLOL SUCCINATE 50 MG/1
TABLET, EXTENDED RELEASE ORAL
Qty: 90 TABLET | Refills: 4 | Status: SHIPPED | OUTPATIENT
Start: 2020-10-15 | End: 2020-12-10

## 2020-11-13 DIAGNOSIS — E78.2 MIXED HYPERLIPIDEMIA: ICD-10-CM

## 2020-11-13 RX ORDER — ATORVASTATIN CALCIUM 40 MG/1
40 TABLET, FILM COATED ORAL DAILY
Qty: 30 TABLET | Refills: 4 | Status: SHIPPED | OUTPATIENT
Start: 2020-11-13 | End: 2021-04-12

## 2020-11-13 NOTE — TELEPHONE ENCOUNTER
PATIENT CALLED AND IS NEEDING REFILLS ON HIS metFORMIN ER (GLUCOPHAGE-XR) 750 MG 24 hr tablet AND [facility] HIS NUMBER -[phone].        THANK YOU,      REN

## 2020-11-16 DIAGNOSIS — E11.42 DIABETIC POLYNEUROPATHY ASSOCIATED WITH TYPE 2 DIABETES MELLITUS (HCC): ICD-10-CM

## 2020-11-16 RX ORDER — METFORMIN HYDROCHLORIDE 750 MG/1
TABLET, EXTENDED RELEASE ORAL
Qty: 90 TABLET | Refills: 3 | Status: SHIPPED | OUTPATIENT
Start: 2020-11-16 | End: 2020-12-04 | Stop reason: SDUPTHER

## 2020-11-16 RX ORDER — METFORMIN HYDROCHLORIDE 750 MG/1
TABLET, EXTENDED RELEASE ORAL
Qty: 90 TABLET | Refills: 3 | Status: SHIPPED | OUTPATIENT
Start: 2020-11-16 | End: 2020-12-09 | Stop reason: SDUPTHER

## 2020-11-17 DIAGNOSIS — S83.281D TEAR OF LATERAL MENISCUS OF RIGHT KNEE, CURRENT, UNSPECIFIED TEAR TYPE, SUBSEQUENT ENCOUNTER: Primary | ICD-10-CM

## 2020-11-18 ENCOUNTER — OFFICE VISIT (OUTPATIENT)
Dept: ORTHOPEDIC SURGERY | Facility: CLINIC | Age: 55
End: 2020-11-18

## 2020-11-18 VITALS — BODY MASS INDEX: 35.99 KG/M2 | WEIGHT: 243 LBS | HEIGHT: 69 IN

## 2020-11-18 DIAGNOSIS — S83.281D TEAR OF LATERAL MENISCUS OF RIGHT KNEE, CURRENT, UNSPECIFIED TEAR TYPE, SUBSEQUENT ENCOUNTER: Primary | ICD-10-CM

## 2020-11-18 PROCEDURE — 99214 OFFICE O/P EST MOD 30 MIN: CPT | Performed by: ORTHOPAEDIC SURGERY

## 2020-11-18 NOTE — PROGRESS NOTES
Nirav Lind is a 55 y.o. male returns for     Chief Complaint   Patient presents with   • Right Knee - Follow-up, Pain       HISTORY OF PRESENT ILLNESS: Patient is here today for follow up of right knee. Patient was sent to xray upon arrival. He states that his pain is 6/10.  Still hurting was to give way at some point.  Is been almost 10 months since of seen him.  He tells me this really no time it really got well so the same problems he was having before with regard to his knee.  Is been treated with physical therapy injections in the past and really not improved.       CONCURRENT MEDICAL HISTORY:    Past Medical History:   Diagnosis Date   • Abnormal computed tomography scan    • Adenomatous polyp of colon    • Allergic rhinitis    • Anemia    • Chronic back pain    • Coronary arteriosclerosis    • Depression    • Diabetes mellitus (CMS/HCC)    • Diabetic polyneuropathy associated with type 2 diabetes mellitus (CMS/HCC) 7/17/2017   • Dizziness    • Dyspnea     unspecified   • Epigastric pain    • Essential hypertension    • Ex-smoker    • Hemangioma of liver    • Hyperlipidemia    • Infected hydrocele     with orchitis and epididymis   • Nausea    • Nausea with vomiting    • Obesity with body mass index of 30.0 - 39.9    • Pain in right knee    • Right upper quadrant pain    • Type II diabetes mellitus, uncontrolled (CMS/HCC)    • Upper respiratory infection    • Urgency of urination    • Villous adenoma of colon        No Known Allergies      Current Outpatient Medications:   •  albuterol (VENTOLIN HFA) 108 (90 Base) MCG/ACT inhaler, Inhale 2 puffs Every 4 (Four) Hours As Needed for Wheezing., Disp: 1 inhaler, Rfl: 3  •  Alcohol Swabs ( STERILE ALCOHOL PREP) pads, 1 each 4 (Four) Times a Day., Disp: 200 each, Rfl: 3  •  amitriptyline (ELAVIL) 25 MG tablet, TK 1 T PO UP TO TID PRA OR SLP, Disp: , Rfl: 1  •  ASPIRIN LOW DOSE 81 MG EC tablet, TAKE 1 TABLET BY MOUTH EVERY DAY, Disp: 30 tablet, Rfl: 0  •   atorvastatin (LIPITOR) 40 MG tablet, TAKE 1 TABLET BY MOUTH DAILY, Disp: 30 tablet, Rfl: 4  •  baclofen (LIORESAL) 10 MG tablet, Take 1 tablet by mouth 3 (Three) Times a Day., Disp: 30 tablet, Rfl: 0  •  busPIRone (BUSPAR) 5 MG tablet, 1 tablet PO once a day, Disp: , Rfl: 3  •  cyclobenzaprine (FEXMID) 7.5 MG tablet, Take 1 tablet by mouth 3 (Three) Times a Day As Needed for Muscle Spasms., Disp: 45 tablet, Rfl: 0  •  dutasteride (AVODART) 0.5 MG capsule, Take 1 capsule by mouth Daily., Disp: 30 capsule, Rfl: 4  •  glucose blood (ONE TOUCH ULTRA TEST) test strip, Use to text 4 times a day.  Dx-e11.49, Disp: 200 each, Rfl: 11  •  glucose blood test strip, Use 4x day E.11.49, Disp: 200 each, Rfl: 11  •  Insulin Glargine (BASAGLAR KWIKPEN) 100 UNIT/ML injection pen, Inject 13 Units under the skin into the appropriate area as directed every night at bedtime., Disp: 2 pen, Rfl: 11  •  Insulin Lispro (ADMELOG SOLOSTAR) 100 UNIT/ML injection pen, Up to 10 units with meals., Disp: 3 pen, Rfl: 11  •  Insulin Pen Needle (PEN NEEDLES) 32G X 4 MM misc, 1 each 4 (Four) Times a Day. Use 4 x daily, Dx code E11.65, Disp: 120 each, Rfl: 11  •  losartan-hydrochlorothiazide (HYZAAR) 100-12.5 MG per tablet, Take 1 tablet by mouth Daily., Disp: 90 tablet, Rfl: 2  •  MAGNESIUM-OXIDE 400 (241.3 Mg) MG tablet tablet, TAKE 1 TABLET BY MOUTH TWICE DAILY, Disp: 30 tablet, Rfl: 1  •  metFORMIN ER (GLUCOPHAGE-XR) 750 MG 24 hr tablet, TAKE 1 TABLET BY MOUTH EVERY DAY, Disp: 90 tablet, Rfl: 3  •  metFORMIN ER (GLUCOPHAGE-XR) 750 MG 24 hr tablet, TAKE 1 TABLET BY MOUTH EVERY DAY, Disp: 90 tablet, Rfl: 3  •  metoprolol succinate XL (TOPROL-XL) 50 MG 24 hr tablet, TAKE 1 TABLET BY MOUTH EVERY DAY, Disp: 90 tablet, Rfl: 4  •  Naproxen  MG EC tablet, TAKE 1 TABLET BY MOUTH TWICE DAILY WITH MEALS, Disp: 60 tablet, Rfl: 2  •  OLANZapine (ZYPREXA) 10 MG tablet, Take 10 mg by mouth daily., Disp: , Rfl:   •  ondansetron ODT (Zofran ODT) 4 MG  disintegrating tablet, Place 1 tablet on the tongue Every 8 (Eight) Hours As Needed for Nausea or Vomiting., Disp: 30 tablet, Rfl: 0  •  pantoprazole (PROTONIX) 40 MG EC tablet, Take 1 tablet by mouth Daily., Disp: 90 tablet, Rfl: 2  •  polyethylene glycol (GoLYTELY) 236 g solution, Starting at noon on day prior to procedure, drink 8 ounces every 30 minutes until all gone or stools are clear. May add flavor packet., Disp: 4000 mL, Rfl: 0  •  sertraline (ZOLOFT) 100 MG tablet, Take 1 tablet by mouth Daily., Disp: , Rfl:   •  SITagliptin (Januvia) 100 MG tablet, Take 1 tablet by mouth Daily., Disp: 30 tablet, Rfl: 11  •  tamsulosin (FLOMAX) 0.4 MG capsule 24 hr capsule, Take 2 capsules by mouth Every Night., Disp: 180 capsule, Rfl: 5  •  vitamin D (ERGOCALCIFEROL) 1.25 MG (74633 UT) capsule capsule, TAKE 1 CAPSULE BY MOUTH EVERY 14 DAYS, Disp: 4 capsule, Rfl: 11    Past Surgical History:   Procedure Laterality Date   • CARDIAC CATHETERIZATION  11/30/2015    No evidence of any obstructive disease in the circumflex, the RCA , or LAD. 1st diagonal branch in the midportion with 20-30% stenosis. Preserved LV systolic function with EF 55%.   • COLONOSCOPY  01/21/2013    Normal   • COLONOSCOPY W/ POLYPECTOMY  10/13/2014    A single polyp was found in the colon; removed by snare cautery polypectomy.   • ENDOSCOPY  06/15/2016    Mildly severe esophagitis. Several biopsies obtained in the upper third of the esophagus.   • INCISION AND DRAINAGE ABSCESS  10/29/2014    Incision and drainage of scrotal abscess. Hydrocelectomy, bottle procedure.   • INJECTION OF MEDICATION  01/26/2016    [facility]     Social History     Socioeconomic History   • Marital status:      Spouse name: Not on file   • Number of children: Not on file   • Years of education: Not on file   • Highest education level: Not on file   Tobacco Use   • Smoking status: Former Smoker     Packs/day: 0.25     Years: 15.00     Pack years: 3.75     Types: Cigarettes  "    Quit date:      Years since quittin.8   • Smokeless tobacco: Never Used   Substance and Sexual Activity   • Alcohol use: No     Frequency: Never   • Drug use: No   • Sexual activity: Not Currently     Comment: shortness of breath     Family History   Problem Relation Age of Onset   • Cancer Mother         leukemia   • Heart disease Mother    • Heart disease Sister    • Lung disease Father    • Heart disease Maternal Grandmother    • Heart disease Maternal Grandfather              ROS: Review of systems has been updated as of today's date.  All other systems are negative except as noted previously.  No new cardiac, pulmonary, GI,  or neurologic problems.  Still has gait problem with knee swelling and loss of motion.    PHYSICAL EXAMINATION:       Ht 175.3 cm (69\")   Wt 110 kg (243 lb)   BMI 35.88 kg/m²     Physical Exam  Constitutional:       Appearance: Normal appearance. He is well-developed.   HENT:      Head: Normocephalic and atraumatic.      Nose: Nose normal. No congestion.   Eyes:      General: No scleral icterus.     Pupils: Pupils are equal, round, and reactive to light.   Neck:      Musculoskeletal: Neck supple.      Trachea: No tracheal deviation.   Pulmonary:      Effort: Pulmonary effort is normal.   Musculoskeletal:         General: Swelling and tenderness present. No deformity.   Skin:     General: Skin is warm and dry.      Findings: No erythema.   Neurological:      General: No focal deficit present.      Mental Status: He is alert and oriented to person, place, and time.   Psychiatric:         Mood and Affect: Mood normal.         GAIT:     []  Normal  []  Antalgic    Assistive device: [x]  None  []  Walker     []  Crutches  []  Cane     []  Wheelchair  []  Stretcher    Ortho Exam  Tender medial joint line.  Motion is well-maintained.  No effusion today.  Ligaments grossly stable.  Fariba's is positive.  Calf is negative    No results found.          ASSESSMENT:    Diagnoses and " all orders for this visit:    Tear of lateral meniscus of right knee, current, unspecified tear type, subsequent encounter          PLAN he does have evidence of lateral meniscal tear previously as well as some degenerative arthritic change.  At this point he wants to consider arthroscopic procedure for lateral meniscectomy.  This is been going for over a year he is failed conservative treatment with injections, medications, bracing, activity modifications and therapeutic exercise.  He is for knee with arthroscopy at this point and lateral meniscectomy which would as an outpatient.  Risk benefits discussed include but not limited to bleeding, blood clot, infection, loss of motion, need for further surgery, failed resolve his pain, neurovascular injury, medical anesthetic complications, etc.  He understands going proceed as planned.  Patient's Body mass index is 35.88 kg/m². BMI is within normal parameters. No follow-up required..  No follow-ups on file.      This document has been electronically signed by Christen Duvall MA on November 18, 2020 14:25 CST

## 2020-11-19 RX ORDER — BUPIVACAINE HCL/0.9 % NACL/PF 0.1 %
2 PLASTIC BAG, INJECTION (ML) EPIDURAL ONCE
Status: CANCELLED | OUTPATIENT
Start: 2020-12-15 | End: 2020-11-19

## 2020-11-24 ENCOUNTER — TELEPHONE (OUTPATIENT)
Dept: ORTHOPEDIC SURGERY | Facility: CLINIC | Age: 55
End: 2020-11-24

## 2020-11-30 ENCOUNTER — TELEPHONE (OUTPATIENT)
Dept: ORTHOPEDIC SURGERY | Facility: CLINIC | Age: 55
End: 2020-11-30

## 2020-12-04 ENCOUNTER — OFFICE VISIT (OUTPATIENT)
Dept: FAMILY MEDICINE CLINIC | Facility: CLINIC | Age: 55
End: 2020-12-04

## 2020-12-04 VITALS
RESPIRATION RATE: 20 BRPM | TEMPERATURE: 97.2 F | HEIGHT: 69 IN | BODY MASS INDEX: 36.45 KG/M2 | OXYGEN SATURATION: 95 % | WEIGHT: 246.1 LBS | DIASTOLIC BLOOD PRESSURE: 82 MMHG | SYSTOLIC BLOOD PRESSURE: 148 MMHG | HEART RATE: 84 BPM

## 2020-12-04 DIAGNOSIS — Z79.4 TYPE 2 DIABETES MELLITUS WITHOUT COMPLICATION, WITH LONG-TERM CURRENT USE OF INSULIN (HCC): Primary | ICD-10-CM

## 2020-12-04 DIAGNOSIS — J44.9 CHRONIC OBSTRUCTIVE PULMONARY DISEASE, UNSPECIFIED COPD TYPE (HCC): Chronic | ICD-10-CM

## 2020-12-04 DIAGNOSIS — I10 ESSENTIAL HYPERTENSION: ICD-10-CM

## 2020-12-04 DIAGNOSIS — E11.9 TYPE 2 DIABETES MELLITUS WITHOUT COMPLICATION, WITH LONG-TERM CURRENT USE OF INSULIN (HCC): Primary | ICD-10-CM

## 2020-12-04 PROCEDURE — 99213 OFFICE O/P EST LOW 20 MIN: CPT | Performed by: STUDENT IN AN ORGANIZED HEALTH CARE EDUCATION/TRAINING PROGRAM

## 2020-12-04 RX ORDER — ALBUTEROL SULFATE 90 UG/1
2 AEROSOL, METERED RESPIRATORY (INHALATION) EVERY 4 HOURS PRN
Qty: 18 G | Refills: 11 | Status: SHIPPED | OUTPATIENT
Start: 2020-12-04

## 2020-12-04 RX ORDER — LOSARTAN POTASSIUM AND HYDROCHLOROTHIAZIDE 25; 100 MG/1; MG/1
1 TABLET ORAL DAILY
Qty: 30 TABLET | Refills: 11 | Status: SHIPPED | OUTPATIENT
Start: 2020-12-04 | End: 2022-01-20 | Stop reason: SDUPTHER

## 2020-12-04 NOTE — PROGRESS NOTES
Family Medicine Residency  Charles Olivares MD    Subjective:     Nirav Lind is a 55 y.o. male who presents for follow up COPD, HTN, and COPD.    COPD: uses rescue inhaler 2-3x/week for wheezing, shortness of air.     HTN: No recent HA, CP, SOA, vision changes. Elevated BP in office today. On Hyzaar 100-12.5.     T2DM:  -111  Long acting 13 u in AM  SSI at meals  Januvia 100 mg daily  Metformin  mg once daily    No other complaints. Refuses pneumonia and shingles vaccine.     The following portions of the patient's history were reviewed and updated as appropriate: allergies, current medications, past family history, past medical history, past social history, past surgical history and problem list.    Past Medical Hx:  Past Medical History:   Diagnosis Date   • Abnormal computed tomography scan    • Adenomatous polyp of colon    • Allergic rhinitis    • Anemia    • Chronic back pain    • Coronary arteriosclerosis    • Depression    • Diabetes mellitus (CMS/HCC)    • Diabetic polyneuropathy associated with type 2 diabetes mellitus (CMS/HCC) 7/17/2017   • Dizziness    • Dyspnea     unspecified   • Epigastric pain    • Essential hypertension    • Ex-smoker    • Hemangioma of liver    • Hyperlipidemia    • Infected hydrocele     with orchitis and epididymis   • Nausea    • Nausea with vomiting    • Obesity with body mass index of 30.0 - 39.9    • Pain in right knee    • Right upper quadrant pain    • Type II diabetes mellitus, uncontrolled (CMS/HCC)    • Upper respiratory infection    • Urgency of urination    • Villous adenoma of colon        Past Surgical Hx:  Past Surgical History:   Procedure Laterality Date   • CARDIAC CATHETERIZATION  11/30/2015    No evidence of any obstructive disease in the circumflex, the RCA , or LAD. 1st diagonal branch in the midportion with 20-30% stenosis. Preserved LV systolic function with EF 55%.   • COLONOSCOPY  01/21/2013    Normal   • COLONOSCOPY W/  POLYPECTOMY  10/13/2014    A single polyp was found in the colon; removed by snare cautery polypectomy.   • ENDOSCOPY  06/15/2016    Mildly severe esophagitis. Several biopsies obtained in the upper third of the esophagus.   • INCISION AND DRAINAGE ABSCESS  10/29/2014    Incision and drainage of scrotal abscess. Hydrocelectomy, bottle procedure.   • INJECTION OF MEDICATION  01/26/2016    Kenalog       Current Meds:    Current Outpatient Medications:   •  albuterol sulfate HFA (Ventolin HFA) 108 (90 Base) MCG/ACT inhaler, Inhale 2 puffs Every 4 (Four) Hours As Needed for Wheezing., Disp: 18 g, Rfl: 11  •  Alcohol Swabs ( STERILE ALCOHOL PREP) pads, 1 each 4 (Four) Times a Day., Disp: 200 each, Rfl: 3  •  amitriptyline (ELAVIL) 25 MG tablet, TK 1 T PO UP TO TID PRA OR SLP, Disp: , Rfl: 1  •  ASPIRIN LOW DOSE 81 MG EC tablet, TAKE 1 TABLET BY MOUTH EVERY DAY, Disp: 30 tablet, Rfl: 0  •  atorvastatin (LIPITOR) 40 MG tablet, TAKE 1 TABLET BY MOUTH DAILY, Disp: 30 tablet, Rfl: 4  •  baclofen (LIORESAL) 10 MG tablet, Take 1 tablet by mouth 3 (Three) Times a Day., Disp: 30 tablet, Rfl: 0  •  busPIRone (BUSPAR) 5 MG tablet, 1 tablet PO once a day, Disp: , Rfl: 3  •  cyclobenzaprine (FEXMID) 7.5 MG tablet, Take 1 tablet by mouth 3 (Three) Times a Day As Needed for Muscle Spasms., Disp: 45 tablet, Rfl: 0  •  dutasteride (AVODART) 0.5 MG capsule, Take 1 capsule by mouth Daily., Disp: 30 capsule, Rfl: 4  •  glucose blood (ONE TOUCH ULTRA TEST) test strip, Use to text 4 times a day.  Dx-e11.49, Disp: 200 each, Rfl: 11  •  glucose blood test strip, Use 4x day E.11.49, Disp: 200 each, Rfl: 11  •  Insulin Glargine (BASAGLAR KWIKPEN) 100 UNIT/ML injection pen, Inject 13 Units under the skin into the appropriate area as directed every night at bedtime., Disp: 2 pen, Rfl: 11  •  Insulin Lispro (ADMELOG SOLOSTAR) 100 UNIT/ML injection pen, Up to 10 units with meals., Disp: 3 pen, Rfl: 11  •  Insulin Pen Needle (PEN NEEDLES) 32G X 4 MM  misc, 1 each 4 (Four) Times a Day. Use 4 x daily, Dx code E11.65, Disp: 120 each, Rfl: 11  •  losartan-hydrochlorothiazide (HYZAAR) 100-12.5 MG per tablet, Take 1 tablet by mouth Daily., Disp: 90 tablet, Rfl: 2  •  MAGNESIUM-OXIDE 400 (241.3 Mg) MG tablet tablet, TAKE 1 TABLET BY MOUTH TWICE DAILY, Disp: 30 tablet, Rfl: 1  •  metFORMIN ER (GLUCOPHAGE-XR) 750 MG 24 hr tablet, TAKE 1 TABLET BY MOUTH EVERY DAY, Disp: 90 tablet, Rfl: 3  •  metoprolol succinate XL (TOPROL-XL) 50 MG 24 hr tablet, TAKE 1 TABLET BY MOUTH EVERY DAY, Disp: 90 tablet, Rfl: 4  •  Naproxen  MG EC tablet, TAKE 1 TABLET BY MOUTH TWICE DAILY WITH MEALS, Disp: 60 tablet, Rfl: 2  •  OLANZapine (ZYPREXA) 10 MG tablet, Take 10 mg by mouth daily., Disp: , Rfl:   •  ondansetron ODT (Zofran ODT) 4 MG disintegrating tablet, Place 1 tablet on the tongue Every 8 (Eight) Hours As Needed for Nausea or Vomiting., Disp: 30 tablet, Rfl: 0  •  pantoprazole (PROTONIX) 40 MG EC tablet, Take 1 tablet by mouth Daily., Disp: 90 tablet, Rfl: 2  •  sertraline (ZOLOFT) 100 MG tablet, Take 1 tablet by mouth Daily., Disp: , Rfl:   •  SITagliptin (Januvia) 100 MG tablet, Take 1 tablet by mouth Daily., Disp: 30 tablet, Rfl: 11  •  tamsulosin (FLOMAX) 0.4 MG capsule 24 hr capsule, Take 2 capsules by mouth Every Night., Disp: 180 capsule, Rfl: 5  •  vitamin D (ERGOCALCIFEROL) 1.25 MG (58533 UT) capsule capsule, TAKE 1 CAPSULE BY MOUTH EVERY 14 DAYS, Disp: 4 capsule, Rfl: 11  •  losartan-hydrochlorothiazide (Hyzaar) 100-25 MG per tablet, Take 1 tablet by mouth Daily., Disp: 30 tablet, Rfl: 11  •  polyethylene glycol (GoLYTELY) 236 g solution, Starting at noon on day prior to procedure, drink 8 ounces every 30 minutes until all gone or stools are clear. May add flavor packet., Disp: 4000 mL, Rfl: 0    Allergies:  No Known Allergies    Family Hx:  Family History   Problem Relation Age of Onset   • Cancer Mother         leukemia   • Heart disease Mother    • Heart disease  "Sister    • Lung disease Father    • Heart disease Maternal Grandmother    • Heart disease Maternal Grandfather         Social History:  Social History     Socioeconomic History   • Marital status:      Spouse name: Not on file   • Number of children: Not on file   • Years of education: Not on file   • Highest education level: Not on file   Tobacco Use   • Smoking status: Former Smoker     Packs/day: 0.25     Years: 15.00     Pack years: 3.75     Types: Cigarettes     Quit date: 2000     Years since quittin.9   • Smokeless tobacco: Never Used   Substance and Sexual Activity   • Alcohol use: No     Frequency: Never   • Drug use: No   • Sexual activity: Not Currently     Comment: shortness of breath       Review of Systems  Review of Systems   Constitutional: Negative for appetite change, diaphoresis, fatigue and fever.   HENT: Negative for ear pain, postnasal drip, rhinorrhea and sore throat.    Eyes: Negative for pain and visual disturbance.   Respiratory: Negative for cough, chest tightness and shortness of breath.    Cardiovascular: Negative for chest pain, palpitations and leg swelling.   Gastrointestinal: Negative for abdominal pain, constipation, diarrhea, nausea and vomiting.   Genitourinary: Negative for dysuria, flank pain, frequency and urgency.   Musculoskeletal: Negative for arthralgias, back pain and myalgias.   Skin: Negative for pallor and rash.   Neurological: Negative for dizziness, seizures, syncope, weakness and numbness.   Psychiatric/Behavioral: Negative for agitation, confusion, decreased concentration and dysphoric mood.       Objective:     /82 (BP Location: Right arm, Patient Position: Sitting, Cuff Size: Adult)   Pulse 84   Temp 97.2 °F (36.2 °C) (Tympanic)   Resp 20   Ht 175.3 cm (69\")   Wt 112 kg (246 lb 1.6 oz)   SpO2 95%   BMI 36.34 kg/m²   Physical Exam  Constitutional:       Appearance: He is well-developed. He is not diaphoretic.   HENT:      Head: " Normocephalic and atraumatic.      Nose: Nose normal.   Eyes:      Conjunctiva/sclera: Conjunctivae normal.      Pupils: Pupils are equal, round, and reactive to light.   Neck:      Musculoskeletal: Normal range of motion and neck supple.      Thyroid: No thyromegaly.      Trachea: No tracheal deviation.   Cardiovascular:      Rate and Rhythm: Normal rate and regular rhythm.      Heart sounds: Normal heart sounds. No murmur. No friction rub. No gallop.    Pulmonary:      Effort: Pulmonary effort is normal.      Breath sounds: Wheezing (RUL- mild end-expiratory) present. No rales.   Abdominal:      General: Bowel sounds are normal. There is no distension.      Palpations: Abdomen is soft.      Tenderness: There is no abdominal tenderness. There is no rebound.      Hernia: No hernia is present.   Musculoskeletal: Normal range of motion.   Skin:     General: Skin is warm and dry.      Capillary Refill: Capillary refill takes less than 2 seconds.   Neurological:      Mental Status: He is alert and oriented to person, place, and time.          Assessment/Plan:     Diagnoses and all orders for this visit:    1. Type 2 diabetes mellitus without complication, with long-term current use of insulin (CMS/[facility]) (Primary)  Stable, well controlled. Last A1c 9/2020 6.10%. Will recheck A1c in march 2020. Continue present management. Not interest in de-escalating treatment. He is to re-schedule his missed diabetic eye exam appointment.    2. Essential hypertension  Stable, not controlled. Will increase Hyzaar 100-12.5 to 100-25 mg daily.   -     losartan-hydrochlorothiazide (Hyzaar) 100-25 MG per tablet; Take 1 tablet by mouth Daily.  Dispense: 30 tablet; Refill: 11    3. Chronic obstructive pulmonary disease, unspecified COPD type (CMS/HCC)  Stable, controlled. Continue:  -     albuterol sulfate HFA (Ventolin HFA) 108 (90 Base) MCG/ACT inhaler; Inhale 2 puffs Every 4 (Four) Hours As Needed for Wheezing.  Dispense: 18 g; Refill:  11        · Rx changes: increase hyzaar  · Patient Education: n/a  · Compliance at present is estimated to be good.   · Efforts to improve compliance (if necessary) will be directed at increased exercise.     Follow-up:     Return in about 3 months (around 3/4/2021) for Recheck T2DM.    Preventative:  Health Maintenance   Topic Date Due   • ZOSTER VACCINE (1 of 2) 05/01/2013   • ANNUAL WELLNESS VISIT  04/30/2019   • DIABETIC EYE EXAM  03/05/2020   • Pneumococcal Vaccine 0-64 (1 of 1 - PPSV23) 12/04/2021 (Originally 5/1/1971)   • HEMOGLOBIN A1C  03/11/2021   • DIABETIC FOOT EXAM  06/12/2021   • LIPID PANEL  09/11/2021   • URINE MICROALBUMIN  09/11/2021   • COLONOSCOPY  04/05/2026   • TDAP/TD VACCINES (3 - Td) 10/16/2029   • HEPATITIS C SCREENING  Completed   • Hepatitis B  Completed   • INFLUENZA VACCINE  Completed     Male Preventative: UTD  Recommended: Prevnar  Vaccine Counseling: N/A    Weight  -Class: Obese Class II: 35-39.9kg/m2  -Patient's Body mass index is 36.34 kg/m². BMI is above normal parameters. Recommendations include: exercise counseling and nutrition counseling.   eat more fruits and vegetables, decrease soda or juice intake, increase water intake, increase physical activity, reduce portion size and cut out extra servings    Alcohol use:  reports no history of alcohol use.  Nicotine status  reports that he quit smoking about 20 years ago. His smoking use included cigarettes. He has a 3.75 pack-year smoking history. He has never used smokeless tobacco.    Goals        Patient Stated    • Exercise 150 minutes per week (moderate activity) (pt-stated)      - increasing exercise, walking  - improve diet            RISK SCORE: 4      Charles Olivares M.D. PGY3  [facility]  [address] [place] [address]  Office: [phone]    This document has been electronically signed by Charles Olivares MD on December 4, 2020 10:46 CST

## 2020-12-09 DIAGNOSIS — E11.42 DIABETIC POLYNEUROPATHY ASSOCIATED WITH TYPE 2 DIABETES MELLITUS (HCC): ICD-10-CM

## 2020-12-09 RX ORDER — METFORMIN HYDROCHLORIDE 750 MG/1
750 TABLET, EXTENDED RELEASE ORAL DAILY
Qty: 90 TABLET | Refills: 3 | Status: SHIPPED | OUTPATIENT
Start: 2020-12-09 | End: 2021-06-03 | Stop reason: SDUPTHER

## 2020-12-10 ENCOUNTER — APPOINTMENT (OUTPATIENT)
Dept: PREADMISSION TESTING | Facility: HOSPITAL | Age: 55
End: 2020-12-10

## 2020-12-10 VITALS
WEIGHT: 242 LBS | BODY MASS INDEX: 35.84 KG/M2 | SYSTOLIC BLOOD PRESSURE: 130 MMHG | OXYGEN SATURATION: 97 % | HEART RATE: 77 BPM | DIASTOLIC BLOOD PRESSURE: 80 MMHG | RESPIRATION RATE: 18 BRPM | HEIGHT: 69 IN

## 2020-12-10 LAB
ANION GAP SERPL CALCULATED.3IONS-SCNC: 10 MMOL/L (ref 5–15)
BUN SERPL-MCNC: 17 MG/DL (ref 6–20)
BUN/CREAT SERPL: 13.9 (ref 7–25)
CALCIUM SPEC-SCNC: 9.7 MG/DL (ref 8.6–10.5)
CHLORIDE SERPL-SCNC: 104 MMOL/L (ref 98–107)
CO2 SERPL-SCNC: 26 MMOL/L (ref 22–29)
CREAT SERPL-MCNC: 1.22 MG/DL (ref 0.76–1.27)
GFR SERPL CREATININE-BSD FRML MDRD: 74 ML/MIN/1.73
GLUCOSE SERPL-MCNC: 131 MG/DL (ref 65–99)
POTASSIUM SERPL-SCNC: 4.1 MMOL/L (ref 3.5–5.2)
SODIUM SERPL-SCNC: 140 MMOL/L (ref 136–145)

## 2020-12-10 PROCEDURE — 93010 ELECTROCARDIOGRAM REPORT: CPT | Performed by: INTERNAL MEDICINE

## 2020-12-10 PROCEDURE — 93005 ELECTROCARDIOGRAM TRACING: CPT

## 2020-12-10 PROCEDURE — 80048 BASIC METABOLIC PNL TOTAL CA: CPT

## 2020-12-10 PROCEDURE — 36415 COLL VENOUS BLD VENIPUNCTURE: CPT

## 2020-12-10 RX ORDER — ASPIRIN 81 MG/1
81 TABLET ORAL DAILY
COMMUNITY

## 2020-12-10 RX ORDER — BUDESONIDE AND FORMOTEROL FUMARATE DIHYDRATE 160; 4.5 UG/1; UG/1
2 AEROSOL RESPIRATORY (INHALATION) DAILY PRN
COMMUNITY

## 2020-12-10 RX ORDER — METOPROLOL SUCCINATE 50 MG/1
50 TABLET, EXTENDED RELEASE ORAL DAILY
COMMUNITY
End: 2022-01-20 | Stop reason: SDUPTHER

## 2020-12-10 RX ORDER — NAPROXEN 500 MG/1
500 TABLET ORAL 2 TIMES DAILY PRN
COMMUNITY
End: 2021-06-03 | Stop reason: SDUPTHER

## 2020-12-10 RX ORDER — TAMSULOSIN HYDROCHLORIDE 0.4 MG/1
1 CAPSULE ORAL 2 TIMES DAILY
COMMUNITY
End: 2021-06-03 | Stop reason: SDUPTHER

## 2020-12-10 RX ORDER — SODIUM CHLORIDE 9 MG/ML
1000 INJECTION, SOLUTION INTRAVENOUS CONTINUOUS
Status: CANCELLED | OUTPATIENT
Start: 2020-12-15

## 2020-12-10 RX ORDER — BACLOFEN 10 MG/1
10 TABLET ORAL 3 TIMES DAILY PRN
COMMUNITY
End: 2021-12-03 | Stop reason: SDUPTHER

## 2020-12-10 NOTE — DISCHARGE INSTRUCTIONS
[facility]  Pre-op Information and Guidelines    You will be called after 2 p.m. the day before your surgery (Friday for Monday surgery) and notified of your time for arrival and approximate surgery time.  If you have not received a call by 4P.M., please contact Same Day Surgery at [phone] of if outside [facility] call [phone].    Please Follow these Important Safety Guidelines:    • The morning of your procedure, take only the medications listed below with   A sip of water:_____________________________________________       ______________________________________________    • DO NOT eat or drink anything after 12:00 midnight the night before surgery  Specific instructions concerning drinking clear liquids will be discussed during  the pre-surgery instruction call the day before your surgery.    • If you take a blood thinner (ex. Plavix, Coumadin, aspirin), ask your doctor when to stop it before surgery  STOP DATE: _________________    • Only 2 visitors are allowed in patient rooms at a time  Your visitors will be asked to wait in the lobby until the admission process is complete with the exception of a parent with a child and patients in need of special assistance.    • YOU CANNOT DRIVE YOURSELF HOME  You must be accompanied by someone who will be responsible for driving you home after surgery and for your care at home.    • DO NOT chew gum, use breath mints, hard candy, or smoke the day of surgery  • DO NOT drink alcohol for at least 24 hours before your surgery  • DO NOT wear any jewelry and remove all body piercing before coming to the hospital  • DO NOT wear make-up to the hospital  • If you are having surgery on an extremity (arm/leg/foot) remove nail polish/artificial nails on the surgical side  • Clothing, glasses, contacts, dentures, and hairpieces must be removed before surgery  • Bathe the night before or the morning of your surgery and do not use powders/lotions on  skin.

## 2020-12-10 NOTE — PAT
Chlorhexidine scrub given with instruction sheet.  Instruction reviewed in PAT, understanding verbalized.    Patient states he has stopped ASA as instructed by surgeon.

## 2020-12-11 LAB
QT INTERVAL: 400 MS
QTC INTERVAL: 419 MS

## 2020-12-12 ENCOUNTER — LAB (OUTPATIENT)
Dept: LAB | Facility: HOSPITAL | Age: 55
End: 2020-12-12

## 2020-12-12 DIAGNOSIS — Z01.818 PREOP TESTING: Primary | ICD-10-CM

## 2020-12-12 PROCEDURE — U0003 INFECTIOUS AGENT DETECTION BY NUCLEIC ACID (DNA OR RNA); SEVERE ACUTE RESPIRATORY SYNDROME CORONAVIRUS 2 (SARS-COV-2) (CORONAVIRUS DISEASE [COVID-19]), AMPLIFIED PROBE TECHNIQUE, MAKING USE OF HIGH THROUGHPUT TECHNOLOGIES AS DESCRIBED BY CMS-2020-01-R: HCPCS

## 2020-12-12 PROCEDURE — C9803 HOPD COVID-19 SPEC COLLECT: HCPCS

## 2020-12-13 LAB
COVID LABCORP PRIORITY: NORMAL
SARS-COV-2 RNA RESP QL NAA+PROBE: NOT DETECTED

## 2020-12-14 ENCOUNTER — ANESTHESIA EVENT (OUTPATIENT)
Dept: PERIOP | Facility: HOSPITAL | Age: 55
End: 2020-12-14

## 2020-12-15 ENCOUNTER — ANESTHESIA (OUTPATIENT)
Dept: PERIOP | Facility: HOSPITAL | Age: 55
End: 2020-12-15

## 2020-12-15 ENCOUNTER — HOSPITAL ENCOUNTER (OUTPATIENT)
Facility: HOSPITAL | Age: 55
Setting detail: HOSPITAL OUTPATIENT SURGERY
Discharge: HOME | End: 2020-12-15
Attending: ORTHOPAEDIC SURGERY | Admitting: ORTHOPAEDIC SURGERY

## 2020-12-15 VITALS
HEIGHT: 69 IN | RESPIRATION RATE: 18 BRPM | TEMPERATURE: 96.7 F | WEIGHT: 241.84 LBS | DIASTOLIC BLOOD PRESSURE: 75 MMHG | OXYGEN SATURATION: 96 % | BODY MASS INDEX: 35.82 KG/M2 | SYSTOLIC BLOOD PRESSURE: 129 MMHG | HEART RATE: 63 BPM

## 2020-12-15 DIAGNOSIS — S83.281D TEAR OF LATERAL MENISCUS OF RIGHT KNEE, CURRENT, UNSPECIFIED TEAR TYPE, SUBSEQUENT ENCOUNTER: ICD-10-CM

## 2020-12-15 LAB
GLUCOSE BLDC GLUCOMTR-MCNC: 128 MG/DL (ref 70–130)
GLUCOSE BLDC GLUCOMTR-MCNC: 80 MG/DL (ref 70–130)

## 2020-12-15 PROCEDURE — 29881 ARTHRS KNE SRG MNISECTMY M/L: CPT | Performed by: ORTHOPAEDIC SURGERY

## 2020-12-15 PROCEDURE — 25010000002 FENTANYL CITRATE (PF) 100 MCG/2ML SOLUTION: Performed by: NURSE ANESTHETIST, CERTIFIED REGISTERED

## 2020-12-15 PROCEDURE — 25010000002 MIDAZOLAM PER 1 MG: Performed by: NURSE ANESTHETIST, CERTIFIED REGISTERED

## 2020-12-15 PROCEDURE — 25010000002 NEOSTIGMINE 4 MG/4ML SOLUTION PREFILLED SYRINGE: Performed by: NURSE ANESTHETIST, CERTIFIED REGISTERED

## 2020-12-15 PROCEDURE — 25010000002 PHENYLEPHRINE PER 1 ML: Performed by: NURSE ANESTHETIST, CERTIFIED REGISTERED

## 2020-12-15 PROCEDURE — 25010000002 PROPOFOL 10 MG/ML EMULSION: Performed by: NURSE ANESTHETIST, CERTIFIED REGISTERED

## 2020-12-15 PROCEDURE — 82962 GLUCOSE BLOOD TEST: CPT

## 2020-12-15 PROCEDURE — 25010000002 DEXAMETHASONE PER 1 MG: Performed by: NURSE ANESTHETIST, CERTIFIED REGISTERED

## 2020-12-15 PROCEDURE — 25010000002 CEFAZOLIN PER 500 MG: Performed by: ORTHOPAEDIC SURGERY

## 2020-12-15 PROCEDURE — 25010000002 ONDANSETRON PER 1 MG: Performed by: NURSE ANESTHETIST, CERTIFIED REGISTERED

## 2020-12-15 RX ORDER — DEXAMETHASONE SODIUM PHOSPHATE 4 MG/ML
INJECTION, SOLUTION INTRA-ARTICULAR; INTRALESIONAL; INTRAMUSCULAR; INTRAVENOUS; SOFT TISSUE AS NEEDED
Status: DISCONTINUED | OUTPATIENT
Start: 2020-12-15 | End: 2020-12-15 | Stop reason: SURG

## 2020-12-15 RX ORDER — PROMETHAZINE HYDROCHLORIDE 25 MG/1
25 SUPPOSITORY RECTAL ONCE AS NEEDED
Status: DISCONTINUED | OUTPATIENT
Start: 2020-12-15 | End: 2020-12-15 | Stop reason: HOSPADM

## 2020-12-15 RX ORDER — ROCURONIUM BROMIDE 10 MG/ML
INJECTION, SOLUTION INTRAVENOUS AS NEEDED
Status: DISCONTINUED | OUTPATIENT
Start: 2020-12-15 | End: 2020-12-15 | Stop reason: SURG

## 2020-12-15 RX ORDER — PROMETHAZINE HYDROCHLORIDE 25 MG/1
25 TABLET ORAL ONCE AS NEEDED
Status: DISCONTINUED | OUTPATIENT
Start: 2020-12-15 | End: 2020-12-15 | Stop reason: HOSPADM

## 2020-12-15 RX ORDER — SCOLOPAMINE TRANSDERMAL SYSTEM 1 MG/1
1 PATCH, EXTENDED RELEASE TRANSDERMAL
Status: DISCONTINUED | OUTPATIENT
Start: 2020-12-15 | End: 2020-12-15 | Stop reason: HOSPADM

## 2020-12-15 RX ORDER — SODIUM CHLORIDE 9 MG/ML
1000 INJECTION, SOLUTION INTRAVENOUS CONTINUOUS
Status: DISCONTINUED | OUTPATIENT
Start: 2020-12-15 | End: 2020-12-15 | Stop reason: HOSPADM

## 2020-12-15 RX ORDER — LIDOCAINE HYDROCHLORIDE 20 MG/ML
INJECTION, SOLUTION INFILTRATION; PERINEURAL AS NEEDED
Status: DISCONTINUED | OUTPATIENT
Start: 2020-12-15 | End: 2020-12-15 | Stop reason: SURG

## 2020-12-15 RX ORDER — ONDANSETRON 2 MG/ML
INJECTION INTRAMUSCULAR; INTRAVENOUS AS NEEDED
Status: DISCONTINUED | OUTPATIENT
Start: 2020-12-15 | End: 2020-12-15 | Stop reason: SURG

## 2020-12-15 RX ORDER — OXYCODONE HYDROCHLORIDE AND ACETAMINOPHEN 5; 325 MG/1; MG/1
1-2 TABLET ORAL EVERY 4 HOURS PRN
Qty: 30 TABLET | Refills: 0 | Status: SHIPPED | OUTPATIENT
Start: 2020-12-15 | End: 2021-01-29

## 2020-12-15 RX ORDER — BUPIVACAINE HCL/0.9 % NACL/PF 0.1 %
2 PLASTIC BAG, INJECTION (ML) EPIDURAL ONCE
Status: COMPLETED | OUTPATIENT
Start: 2020-12-15 | End: 2020-12-15

## 2020-12-15 RX ORDER — SCOLOPAMINE TRANSDERMAL SYSTEM 1 MG/1
1 PATCH, EXTENDED RELEASE TRANSDERMAL CONTINUOUS
Status: DISCONTINUED | OUTPATIENT
Start: 2020-12-15 | End: 2020-12-15 | Stop reason: HOSPADM

## 2020-12-15 RX ORDER — NEOSTIGMINE METHYLSULFATE 4 MG/4 ML
SYRINGE (ML) INTRAVENOUS AS NEEDED
Status: DISCONTINUED | OUTPATIENT
Start: 2020-12-15 | End: 2020-12-15 | Stop reason: SURG

## 2020-12-15 RX ORDER — ONDANSETRON 2 MG/ML
4 INJECTION INTRAMUSCULAR; INTRAVENOUS ONCE AS NEEDED
Status: DISCONTINUED | OUTPATIENT
Start: 2020-12-15 | End: 2020-12-15 | Stop reason: HOSPADM

## 2020-12-15 RX ORDER — FENTANYL CITRATE 50 UG/ML
INJECTION, SOLUTION INTRAMUSCULAR; INTRAVENOUS AS NEEDED
Status: DISCONTINUED | OUTPATIENT
Start: 2020-12-15 | End: 2020-12-15 | Stop reason: SURG

## 2020-12-15 RX ORDER — MIDAZOLAM HYDROCHLORIDE 1 MG/ML
INJECTION INTRAMUSCULAR; INTRAVENOUS AS NEEDED
Status: DISCONTINUED | OUTPATIENT
Start: 2020-12-15 | End: 2020-12-15 | Stop reason: SURG

## 2020-12-15 RX ORDER — PROPOFOL 10 MG/ML
VIAL (ML) INTRAVENOUS AS NEEDED
Status: DISCONTINUED | OUTPATIENT
Start: 2020-12-15 | End: 2020-12-15 | Stop reason: SURG

## 2020-12-15 RX ADMIN — Medication 2 MG: at 11:43

## 2020-12-15 RX ADMIN — ROCURONIUM BROMIDE 30 MG: 10 INJECTION INTRAVENOUS at 10:57

## 2020-12-15 RX ADMIN — PROPOFOL 50 MG: 10 INJECTION, EMULSION INTRAVENOUS at 11:27

## 2020-12-15 RX ADMIN — SCOPALAMINE 1 PATCH: 1 PATCH, EXTENDED RELEASE TRANSDERMAL at 08:49

## 2020-12-15 RX ADMIN — PHENYLEPHRINE HYDROCHLORIDE 100 MCG: 10 INJECTION INTRAVENOUS at 11:07

## 2020-12-15 RX ADMIN — DEXAMETHASONE SODIUM PHOSPHATE 4 MG: 4 INJECTION, SOLUTION INTRAMUSCULAR; INTRAVENOUS at 11:06

## 2020-12-15 RX ADMIN — GLYCOPYRROLATE 0.4 MG: 0.2 INJECTION, SOLUTION INTRAMUSCULAR; INTRAVITREAL at 11:43

## 2020-12-15 RX ADMIN — MIDAZOLAM HYDROCHLORIDE 2 MG: 2 INJECTION, SOLUTION INTRAMUSCULAR; INTRAVENOUS at 10:39

## 2020-12-15 RX ADMIN — ROCURONIUM BROMIDE 5 MG: 10 INJECTION INTRAVENOUS at 10:46

## 2020-12-15 RX ADMIN — Medication 2 G: at 10:51

## 2020-12-15 RX ADMIN — LIDOCAINE HYDROCHLORIDE 80 MG: 20 INJECTION, SOLUTION INFILTRATION; PERINEURAL at 10:46

## 2020-12-15 RX ADMIN — PROPOFOL 100 MG: 10 INJECTION, EMULSION INTRAVENOUS at 10:47

## 2020-12-15 RX ADMIN — SODIUM CHLORIDE 1000 ML: 9 INJECTION, SOLUTION INTRAVENOUS at 08:49

## 2020-12-15 RX ADMIN — ONDANSETRON 4 MG: 2 INJECTION INTRAMUSCULAR; INTRAVENOUS at 11:44

## 2020-12-15 RX ADMIN — PROPOFOL 200 MG: 10 INJECTION, EMULSION INTRAVENOUS at 10:46

## 2020-12-15 RX ADMIN — FENTANYL CITRATE 25 MCG: 50 INJECTION, SOLUTION INTRAMUSCULAR; INTRAVENOUS at 11:32

## 2020-12-15 RX ADMIN — FENTANYL CITRATE 50 MCG: 50 INJECTION, SOLUTION INTRAMUSCULAR; INTRAVENOUS at 10:46

## 2020-12-15 NOTE — ANESTHESIA PREPROCEDURE EVALUATION
Anesthesia Evaluation     Patient summary reviewed and Nursing notes reviewed   history of anesthetic complications (Scopalamine patch ordered pre-op.): PONV  NPO Solid Status: > 8 hours  NPO Liquid Status: > 8 hours           Airway   Mallampati: II  TM distance: <3 FB  Neck ROM: full  Large neck circumference, Difficult intubation highly probable and Anterior  Comment: Desai available on induction.  Dental    (+) poor dentation    Pulmonary     breath sounds clear to auscultation  (+) a smoker Former, COPD mild, recent URI resolved, decreased breath sounds,   Cardiovascular - normal exam    ECG reviewed  Patient on routine beta blocker and Beta blocker given within 24 hours of surgery  Rhythm: regular  Rate: normal    (+) hypertension, CAD, HEADLEY, hyperlipidemia,   (-) murmur    ROS comment: Normal sinus rhythm  Possible Left atrial enlargement  Incomplete right bundle branch block  Borderline ECG  When compared with ECG of 14-JAN-2019 11:47,  Incomplete right bundle branch block is now present  Confirmed by RAYMON VALENTINE ([id number]) on 12/11/2020 1:34:10 PMLeft Ventricle Estimated EF appears to be in the range of 61 - 65%. Normal left ventricular cavity size noted. All left ventricular wall segments contract normally. Left ventricular wall thickness is consistent with mild concentric hypertrophy. Left ventricular diastolic dysfunction is noted (grade I) consistent with impaired relaxation. There is no evidence of a left ventricular mass or thrombus present.     Neuro/Psych  (+) numbness (Diabetic peripheral neuropathy.), psychiatric history Depression,     GI/Hepatic/Renal/Endo    (+) obesity,  GERD well controlled,  diabetes mellitus type 2 using insulin,     Musculoskeletal     Abdominal   (+) obese,    Substance History - negative use     OB/GYN negative ob/gyn ROS         Other   arthritis,                      Anesthesia Plan    ASA 3     general     intravenous induction     Anesthetic plan, all risks, benefits,  and alternatives have been provided, discussed and informed consent has been obtained with: patient.

## 2020-12-15 NOTE — OP NOTE
Procedure(s):  KNEE ARTHROSCOPY WITH  MEDIAL MENISCECTOMY  Procedure Note    Nirav Lind  12/15/2020    Pre-op Diagnosis:   Tear of lateral meniscus of right knee, current, unspecified tear type, subsequent encounter [S83.281D]    Post-op Diagnosis:     Post-Op Diagnosis Codes:     * Tear of lateral meniscus of right knee, current, unspecified tear type, subsequent encounter [S83.281D]    Procedure/CPT® Codes:      Procedure(s):  KNEE ARTHROSCOPY WITH  MEDIAL MENISCECTOMY right knee    Surgeon(s):  Jerry Oakes MD    Anesthesia: General    Staff:   Circulator: Olive Carrillo RN  Scrub Person: Emma Onofre  Assistant: Christen Duvall MA    Assistant: Christen Duvall MA was responsible for performing the following activities: Retraction, Suction, Closing, Placing Dressing and Positioning and their skilled assistance was necessary for the success of this case.     Estimated Blood Loss: minimal    Specimens:                None      Drains: * No LDAs found *    Findings: Medial femoral condyle degenerative change.  Grade 3-4.  Posterior horn medial meniscal tear lateral meniscus appeared intact    Complications: None    Dictation: Indications: 55-year-old male with over a year of pain in his knee with mechanical type symptoms consistent with buckling catching and giving way.  He is failed conservative treatment including exercises, bracing, injections, medications.  He is for diagnostic arthroscopy at this point.  Risk and benefits of bleeding, blood clot, infection, failed resolve his pain, need for further surgery, neurovascular injury, medical anesthetic complications, etc. this is all been discussed he understands willing to proceed as planned.    Procedure: After adequate anesthesia attained the patient's leg prepped and draped usual sterile fashion a surgical timeout was performed.    Standard anterolateral anteromedial portals are inserted the fat pad is debrided to aid in  visualization.  ACL PCL were noted to be intact synovitis is noted of the suprapatellar pouch the patellofemoral joint including the trochlea were noted to be intact.  A large thickened plica was noted.  The plica was resected.    The lateral compartment still to be fairly intact grade 2 changes noted the lateral tibial plateau the meniscus was probed and noted to be intact.  The medial meniscus had a posterior horn tear which was displaceable.  The left medial tibial plateau was intact the medial femoral condyle had an area probably 2 cm x 2 cm eaters in the weightbearing surface that was had near full-thickness defect with some undermining of the articular surface.  A chondroplasty was performed removing the detached articular surface.  Likewise a basket was used to resect the posterior horn of the medial meniscus followed by a shaver to go back to a stable remnant.  It was again probed we ensured that no further displaceable meniscal tissue was noted.  Secondary inspection revealed no further pathology there is withdrawn portals closed with 4 nylon suture interrupted fashion.  Sterile bulky dressings were applied he taught the procedure well without complications.    Jerry Oakes MD  12/15/20  12:38 CST

## 2020-12-15 NOTE — ANESTHESIA POSTPROCEDURE EVALUATION
Patient: Nirav Lind    Procedure Summary     Date: 12/15/20 Room / Location: [facility] OR  / [facility] OR    Anesthesia Start: 1041 Anesthesia Stop: 1223    Procedure: KNEE ARTHROSCOPY WITH  MEDIAL MENISCECTOMY (Right Knee) Diagnosis:       Tear of lateral meniscus of right knee, current, unspecified tear type, subsequent encounter      (Tear of lateral meniscus of right knee, current, unspecified tear type, subsequent encounter [S83.281D])    Surgeon: Jerry Oakes MD Provider: Josef Mathis CRNA    Anesthesia Type: general ASA Status: 3          Anesthesia Type: general    Vitals  Vitals Value Taken Time   /69 12/15/20 1213   Temp 97.2 °F (36.2 °C) 12/15/20 1213   Pulse 60 12/15/20 1213   Resp 18 12/15/20 1213   SpO2 95 % 12/15/20 1213           Post Anesthesia Care and Evaluation    Patient location during evaluation: PACU  Patient participation: complete - patient participated  Level of consciousness: awake and alert  Pain score: 1  Pain management: adequate  Airway patency: patent  Anesthetic complications: No anesthetic complications  PONV Status: none  Cardiovascular status: acceptable  Respiratory status: acceptable  Hydration status: acceptable  Post Neuraxial Block status: Motor and sensory function returned to baseline

## 2020-12-15 NOTE — ANESTHESIA PROCEDURE NOTES
Airway  Urgency: elective    Date/Time: 12/15/2020 10:48 AM  Airway not difficult    General Information and Staff    Patient location during procedure: OR    Indications and Patient Condition  Indications for airway management: airway protection    Preoxygenated: yes  MILS maintained throughout  Mask difficulty assessment: 2 - vent by mask + OA or adjuvant +/- NMBA    Final Airway Details  Final airway type: endotracheal airway      Successful airway: ETT  Cuffed: yes   Successful intubation technique: direct laryngoscopy  Endotracheal tube insertion site: oral  ETT size (mm): 7.5  Cormack-Lehane Classification: grade I - full view of glottis  Placement verified by: chest auscultation and capnometry   Cuff volume (mL): 10  Measured from: teeth  ETT/EBT  to teeth (cm): 21  Number of attempts at approach: 2  Assessment: lips, teeth, and gum same as pre-op    Additional Comments  Attempted first time by Ria ROSS

## 2020-12-15 NOTE — INTERVAL H&P NOTE
H&P reviewed. The patient was examined and there are no changes to the H&P.      Temp:  [97.7 °F (36.5 °C)] 97.7 °F (36.5 °C)  Heart Rate:  [57] 57  Resp:  [18] 18  BP: (109)/(70) 109/70    No Known Allergies    Prior to Admission medications    Medication Sig Start Date End Date Taking? Authorizing Provider   albuterol sulfate HFA (Ventolin HFA) 108 (90 Base) MCG/ACT inhaler Inhale 2 puffs Every 4 (Four) Hours As Needed for Wheezing. 12/4/20  Yes Charles Olivares MD   amitriptyline (ELAVIL) 25 MG tablet Take 25 mg by mouth At Night As Needed. 9/17/19  Yes Aracelis Jordan MD   Ergocalciferol (VITAMIN D2 PO) Take 50,000 Units by mouth Every 14 (Fourteen) Days.   Yes Aracelis Jordan MD   Insulin Glargine (BASAGLAR KWIKPEN) 100 UNIT/ML injection pen Inject 13 Units under the skin into the appropriate area as directed every night at bedtime. 9/11/20  Yes Joe Ackerman MD   Insulin Lispro (ADMELOG SOLOSTAR) 100 UNIT/ML injection pen Up to 10 units with meals. 9/11/20  Yes Joe Ackerman MD   losartan-hydrochlorothiazide (Hyzaar) 100-25 MG per tablet Take 1 tablet by mouth Daily. 12/4/20  Yes Charles Olivares MD   magnesium oxide (MAGOX) 400 (241.3 Mg) MG tablet tablet Take 400 mg by mouth 2 (Two) Times a Day.   Yes Aracelis Jordan MD   metFORMIN ER (GLUCOPHAGE-XR) 750 MG 24 hr tablet Take 1 tablet by mouth Daily. 12/9/20  Yes Tato David APRN   metoprolol succinate XL (TOPROL-XL) 50 MG 24 hr tablet Take 50 mg by mouth Daily.   Yes Aracelis Jordan MD   naproxen (NAPROSYN) 500 MG tablet Take 500 mg by mouth 2 (Two) Times a Day As Needed for Mild Pain .   Yes Aracelis Jordan MD   ondansetron ODT (Zofran ODT) 4 MG disintegrating tablet Place 1 tablet on the tongue Every 8 (Eight) Hours As Needed for Nausea or Vomiting. 6/12/20  Yes Charles Olivares MD   pantoprazole (PROTONIX) 40 MG EC tablet Take 1 tablet by mouth Daily. 6/12/20  Yes Marshall,  Charles AGOSTO MD   sertraline (ZOLOFT) 100 MG tablet Take 1 tablet by mouth Daily. 6/12/20  Yes Charlse Olivares MD   SITagliptin (Januvia) 100 MG tablet Take 1 tablet by mouth Daily. 9/11/20  Yes Joe Ackerman MD   tamsulosin (FLOMAX) 0.4 MG capsule 24 hr capsule Take 1 capsule by mouth 2 (Two) Times a Day.   Yes Aracelis Jordan MD   Alcohol Swabs ( STERILE ALCOHOL PREP) pads 1 each 4 (Four) Times a Day. 7/16/18   Tato David APRN   aspirin 81 MG EC tablet Take 81 mg by mouth Daily.    Aracelis Jordan MD   atorvastatin (LIPITOR) 40 MG tablet TAKE 1 TABLET BY MOUTH DAILY 11/13/20   Charles Olivares MD   baclofen (LIORESAL) 10 MG tablet Take 10 mg by mouth 3 (Three) Times a Day As Needed for Muscle Spasms.    Aracelis Jordan MD   budesonide-formoterol (SYMBICORT) 160-4.5 MCG/ACT inhaler Inhale 2 puffs Daily As Needed.    Aracelis Jordan MD   busPIRone (BUSPAR) 5 MG tablet Take 5 mg by mouth 2 (Two) Times a Day. 12/6/17   Aracelis Jordan MD   glucose blood (ONE TOUCH ULTRA TEST) test strip Use to text 4 times a day.  Dx-e11.49 5/5/20   Joe Ackerman MD   glucose blood test strip Use 4x day E.11.49 5/6/20   Lisa Baker APRN   Insulin Pen Needle (PEN NEEDLES) 32G X 4 MM misc 1 each 4 (Four) Times a Day. Use 4 x daily, Dx code E11.65 3/11/20   Joe Ackerman MD   metoprolol succinate XL (TOPROL-XL) 50 MG 24 hr tablet TAKE 1 TABLET BY MOUTH EVERY DAY 10/11/19   Charles Olivares MD       Past Medical History:   Diagnosis Date   • Abnormal computed tomography scan    • Adenomatous polyp of colon    • Allergic rhinitis    • Anemia    • Chronic back pain    • COPD (chronic obstructive pulmonary disease) (CMS/HCC)    • Coronary arteriosclerosis    • Depression    • Diabetes mellitus (CMS/HCC)    • Diabetic polyneuropathy associated with type 2 diabetes mellitus (CMS/HCC) 7/17/2017   • Dizziness    • Dyspnea     unspecified   •  Epigastric pain    • Essential hypertension    • Ex-smoker    • GERD (gastroesophageal reflux disease)    • Hemangioma of liver    • Hyperlipidemia    • Infected hydrocele     with orchitis and epididymis   • Nausea    • Nausea with vomiting    • Obesity with body mass index of 30.0 - 39.9    • Pain in right knee    • Right upper quadrant pain    • Type II diabetes mellitus, uncontrolled (CMS/HCC)    • Upper respiratory infection    • Urgency of urination    • Villous adenoma of colon        Past Surgical History:   Procedure Laterality Date   • CARDIAC CATHETERIZATION  2015    No evidence of any obstructive disease in the circumflex, the RCA , or LAD. 1st diagonal branch in the midportion with 20-30% stenosis. Preserved LV systolic function with EF 55%.   • COLONOSCOPY  2013    Normal   • COLONOSCOPY W/ POLYPECTOMY  10/13/2014    A single polyp was found in the colon; removed by snare cautery polypectomy.   • ENDOSCOPY  06/15/2016    Mildly severe esophagitis. Several biopsies obtained in the upper third of the esophagus.   • INCISION AND DRAINAGE ABSCESS  10/29/2014    Incision and drainage of scrotal abscess. Hydrocelectomy, bottle procedure.       Social History     Socioeconomic History   • Marital status:      Spouse name: Not on file   • Number of children: Not on file   • Years of education: Not on file   • Highest education level: Not on file   Tobacco Use   • Smoking status: Former Smoker     Packs/day: 0.25     Years: 15.00     Pack years: 3.75     Types: Cigarettes     Quit date: 2000     Years since quittin.9   • Smokeless tobacco: Never Used   Substance and Sexual Activity   • Alcohol use: No     Frequency: Never   • Drug use: No   • Sexual activity: Defer       Family History   Problem Relation Age of Onset   • Cancer Mother         leukemia   • Heart disease Mother    • Heart disease Sister    • Lung disease Father    • Heart disease Maternal Grandmother    • Heart disease  Maternal Grandfather          Tear of lateral meniscus of right knee, current      Review of Systems   HENT: Negative.    Respiratory: Negative.    Cardiovascular: Negative.    Gastrointestinal: Negative.    All other systems reviewed and are negative.

## 2020-12-16 NOTE — ADDENDUM NOTE
Addendum  created 12/16/20 1658 by Yannick Yanez MD    Attestation recorded in Intraprocedure, Intraprocedure Attestations filed, Intraprocedure Staff edited

## 2020-12-21 ENCOUNTER — OFFICE VISIT (OUTPATIENT)
Dept: ORTHOPEDIC SURGERY | Facility: CLINIC | Age: 55
End: 2020-12-21

## 2020-12-21 VITALS — WEIGHT: 241 LBS | BODY MASS INDEX: 35.7 KG/M2 | HEIGHT: 69 IN

## 2020-12-21 DIAGNOSIS — M25.561 RIGHT KNEE PAIN, UNSPECIFIED CHRONICITY: ICD-10-CM

## 2020-12-21 DIAGNOSIS — S83.281D TEAR OF LATERAL MENISCUS OF RIGHT KNEE, CURRENT, UNSPECIFIED TEAR TYPE, SUBSEQUENT ENCOUNTER: Primary | ICD-10-CM

## 2020-12-21 PROCEDURE — 99024 POSTOP FOLLOW-UP VISIT: CPT | Performed by: ORTHOPAEDIC SURGERY

## 2020-12-21 NOTE — PROGRESS NOTES
Nirav Lind is a 55 y.o. male is s/p       Chief Complaint   Patient presents with   • Right Knee - Post-op      12/15/20 (6d) Jerry Oakes MD    Knee Arthroscopy With  Medial Meniscectomy - Right       HISTORY OF PRESENT ILLNESS: Post op right knee. Pain level of 6/10.  1 week postop knee arthroscopy       No Known Allergies      Current Outpatient Medications:   •  albuterol sulfate HFA (Ventolin HFA) 108 (90 Base) MCG/ACT inhaler, Inhale 2 puffs Every 4 (Four) Hours As Needed for Wheezing., Disp: 18 g, Rfl: 11  •  Alcohol Swabs ( STERILE ALCOHOL PREP) pads, 1 each 4 (Four) Times a Day., Disp: 200 each, Rfl: 3  •  amitriptyline (ELAVIL) 25 MG tablet, Take 25 mg by mouth At Night As Needed., Disp: , Rfl: 1  •  aspirin 81 MG EC tablet, Take 81 mg by mouth Daily., Disp: , Rfl:   •  atorvastatin (LIPITOR) 40 MG tablet, TAKE 1 TABLET BY MOUTH DAILY, Disp: 30 tablet, Rfl: 4  •  baclofen (LIORESAL) 10 MG tablet, Take 10 mg by mouth 3 (Three) Times a Day As Needed for Muscle Spasms., Disp: , Rfl:   •  budesonide-formoterol (SYMBICORT) 160-4.5 MCG/ACT inhaler, Inhale 2 puffs Daily As Needed., Disp: , Rfl:   •  busPIRone (BUSPAR) 5 MG tablet, Take 5 mg by mouth 2 (Two) Times a Day., Disp: , Rfl: 3  •  Ergocalciferol (VITAMIN D2 PO), Take 50,000 Units by mouth Every 14 (Fourteen) Days., Disp: , Rfl:   •  glucose blood (ONE TOUCH ULTRA TEST) test strip, Use to text 4 times a day.  Dx-e11.49, Disp: 200 each, Rfl: 11  •  glucose blood test strip, Use 4x day E.11.49, Disp: 200 each, Rfl: 11  •  Insulin Glargine (BASAGLAR KWIKPEN) 100 UNIT/ML injection pen, Inject 13 Units under the skin into the appropriate area as directed every night at bedtime., Disp: 2 pen, Rfl: 11  •  Insulin Lispro (ADMELOG SOLOSTAR) 100 UNIT/ML injection pen, Up to 10 units with meals., Disp: 3 pen, Rfl: 11  •  Insulin Pen Needle (PEN NEEDLES) 32G X 4 MM misc, 1 each 4 (Four) Times a Day. Use 4 x daily, Dx code E11.65, Disp: 120 each, Rfl:  11  •  losartan-hydrochlorothiazide (Hyzaar) 100-25 MG per tablet, Take 1 tablet by mouth Daily., Disp: 30 tablet, Rfl: 11  •  magnesium oxide (MAGOX) 400 (241.3 Mg) MG tablet tablet, Take 400 mg by mouth 2 (Two) Times a Day., Disp: , Rfl:   •  metFORMIN ER (GLUCOPHAGE-XR) 750 MG 24 hr tablet, Take 1 tablet by mouth Daily., Disp: 90 tablet, Rfl: 3  •  metoprolol succinate XL (TOPROL-XL) 50 MG 24 hr tablet, Take 50 mg by mouth Daily., Disp: , Rfl:   •  naproxen (NAPROSYN) 500 MG tablet, Take 500 mg by mouth 2 (Two) Times a Day As Needed for Mild Pain ., Disp: , Rfl:   •  ondansetron ODT (Zofran ODT) 4 MG disintegrating tablet, Place 1 tablet on the tongue Every 8 (Eight) Hours As Needed for Nausea or Vomiting., Disp: 30 tablet, Rfl: 0  •  oxyCODONE-acetaminophen (PERCOCET) 5-325 MG per tablet, Take 1-2 tablets by mouth Every 4 (Four) Hours As Needed (Pain)., Disp: 30 tablet, Rfl: 0  •  pantoprazole (PROTONIX) 40 MG EC tablet, Take 1 tablet by mouth Daily., Disp: 90 tablet, Rfl: 2  •  sertraline (ZOLOFT) 100 MG tablet, Take 1 tablet by mouth Daily., Disp: , Rfl:   •  SITagliptin (Januvia) 100 MG tablet, Take 1 tablet by mouth Daily., Disp: 30 tablet, Rfl: 11  •  tamsulosin (FLOMAX) 0.4 MG capsule 24 hr capsule, Take 1 capsule by mouth 2 (Two) Times a Day., Disp: , Rfl:         PHYSICAL EXAMINATION:       Nirav Lind is a 55 y.o. male    Patient is awake and alert, answers questions appropriately and is in no apparent distress.    GAIT:     []  Normal  []  Antalgic    Assistive device: []  None  []  Walker     []  Crutches  []  Cane     []  Wheelchair  []  Stretcher    Ortho Exam  Wounds look good.  Calf is negative.    No results found.        ASSESSMENT:    Diagnoses and all orders for this visit:    Tear of lateral meniscus of right knee, current, unspecified tear type, subsequent encounter    Right knee pain, unspecified chronicity          PLAN have gone over his pictures with him.  We will take his sutures  out today.  Work on range of motion follow-up in 3 weeks call sooner with any problems.      Body mass index is 35.59 kg/m².            This document has been electronically signed by Candy Mancuso MA on December 21, 2020 09:51 CST

## 2020-12-28 ENCOUNTER — OFFICE VISIT (OUTPATIENT)
Dept: ORTHOPEDIC SURGERY | Facility: CLINIC | Age: 55
End: 2020-12-28

## 2020-12-28 VITALS — HEIGHT: 69 IN | BODY MASS INDEX: 35.7 KG/M2 | WEIGHT: 241 LBS

## 2020-12-28 DIAGNOSIS — Z98.890 STATUS POST ARTHROSCOPY OF RIGHT KNEE: Primary | ICD-10-CM

## 2020-12-28 DIAGNOSIS — M54.31 SCIATICA OF RIGHT SIDE: ICD-10-CM

## 2020-12-28 PROCEDURE — 96372 THER/PROPH/DIAG INJ SC/IM: CPT | Performed by: ORTHOPAEDIC SURGERY

## 2020-12-28 PROCEDURE — 99212 OFFICE O/P EST SF 10 MIN: CPT | Performed by: ORTHOPAEDIC SURGERY

## 2020-12-28 PROCEDURE — 99024 POSTOP FOLLOW-UP VISIT: CPT | Performed by: ORTHOPAEDIC SURGERY

## 2020-12-28 RX ORDER — HYDROCODONE BITARTRATE AND ACETAMINOPHEN 5; 325 MG/1; MG/1
1 TABLET ORAL EVERY 6 HOURS PRN
Qty: 20 TABLET | Refills: 0 | Status: SHIPPED | OUTPATIENT
Start: 2020-12-28 | End: 2021-12-29

## 2020-12-28 RX ORDER — CYCLOBENZAPRINE HCL 10 MG
10 TABLET ORAL NIGHTLY PRN
Qty: 20 TABLET | Refills: 0 | Status: SHIPPED | OUTPATIENT
Start: 2020-12-28 | End: 2021-12-03

## 2020-12-28 RX ORDER — METHYLPREDNISOLONE ACETATE 80 MG/ML
80 INJECTION, SUSPENSION INTRA-ARTICULAR; INTRALESIONAL; INTRAMUSCULAR; SOFT TISSUE ONCE
Status: COMPLETED | OUTPATIENT
Start: 2020-12-28 | End: 2020-12-28

## 2020-12-28 RX ADMIN — METHYLPREDNISOLONE ACETATE 80 MG: 80 INJECTION, SUSPENSION INTRA-ARTICULAR; INTRALESIONAL; INTRAMUSCULAR; SOFT TISSUE at 11:05

## 2020-12-28 NOTE — PROGRESS NOTES
Nirav Lind is a 55 y.o. male is s/p  Knee Arthroscopy With  Medial Meniscectomy - Right     Chief Complaint   Patient presents with   • Right Knee - Follow-up, Pain       HISTORY OF PRESENT ILLNESS: Patient is here today for follow up of right knee. He is 13 days post op of right knee arthroscopy with medial meniscectomy. He states that his pain is 10/10.  It started on Christmas Day had pain shooting from his hip all the way down to his foot with a hard time walking even getting up and moving.  He is never had any pain like this.  He describes as the worst pain he is ever had.       No Known Allergies      Current Outpatient Medications:   •  albuterol sulfate HFA (Ventolin HFA) 108 (90 Base) MCG/ACT inhaler, Inhale 2 puffs Every 4 (Four) Hours As Needed for Wheezing., Disp: 18 g, Rfl: 11  •  Alcohol Swabs ( STERILE ALCOHOL PREP) pads, 1 each 4 (Four) Times a Day., Disp: 200 each, Rfl: 3  •  amitriptyline (ELAVIL) 25 MG tablet, Take 25 mg by mouth At Night As Needed., Disp: , Rfl: 1  •  aspirin 81 MG EC tablet, Take 81 mg by mouth Daily., Disp: , Rfl:   •  atorvastatin (LIPITOR) 40 MG tablet, TAKE 1 TABLET BY MOUTH DAILY, Disp: 30 tablet, Rfl: 4  •  baclofen (LIORESAL) 10 MG tablet, Take 10 mg by mouth 3 (Three) Times a Day As Needed for Muscle Spasms., Disp: , Rfl:   •  budesonide-formoterol (SYMBICORT) 160-4.5 MCG/ACT inhaler, Inhale 2 puffs Daily As Needed., Disp: , Rfl:   •  busPIRone (BUSPAR) 5 MG tablet, Take 5 mg by mouth 2 (Two) Times a Day., Disp: , Rfl: 3  •  Ergocalciferol (VITAMIN D2 PO), Take 50,000 Units by mouth Every 14 (Fourteen) Days., Disp: , Rfl:   •  glucose blood (ONE TOUCH ULTRA TEST) test strip, Use to text 4 times a day.  Dx-e11.49, Disp: 200 each, Rfl: 11  •  glucose blood test strip, Use 4x day E.11.49, Disp: 200 each, Rfl: 11  •  Insulin Glargine (BASAGLAR KWIKPEN) 100 UNIT/ML injection pen, Inject 13 Units under the skin into the appropriate area as directed every night at  bedtime., Disp: 2 pen, Rfl: 11  •  Insulin Lispro (ADMELOG SOLOSTAR) 100 UNIT/ML injection pen, Up to 10 units with meals., Disp: 3 pen, Rfl: 11  •  Insulin Pen Needle (PEN NEEDLES) 32G X 4 MM misc, 1 each 4 (Four) Times a Day. Use 4 x daily, Dx code E11.65, Disp: 120 each, Rfl: 11  •  losartan-hydrochlorothiazide (Hyzaar) 100-25 MG per tablet, Take 1 tablet by mouth Daily., Disp: 30 tablet, Rfl: 11  •  metFORMIN ER (GLUCOPHAGE-XR) 750 MG 24 hr tablet, Take 1 tablet by mouth Daily., Disp: 90 tablet, Rfl: 3  •  metoprolol succinate XL (TOPROL-XL) 50 MG 24 hr tablet, Take 50 mg by mouth Daily., Disp: , Rfl:   •  naproxen (NAPROSYN) 500 MG tablet, Take 500 mg by mouth 2 (Two) Times a Day As Needed for Mild Pain ., Disp: , Rfl:   •  ondansetron ODT (Zofran ODT) 4 MG disintegrating tablet, Place 1 tablet on the tongue Every 8 (Eight) Hours As Needed for Nausea or Vomiting., Disp: 30 tablet, Rfl: 0  •  oxyCODONE-acetaminophen (PERCOCET) 5-325 MG per tablet, Take 1-2 tablets by mouth Every 4 (Four) Hours As Needed (Pain)., Disp: 30 tablet, Rfl: 0  •  pantoprazole (PROTONIX) 40 MG EC tablet, Take 1 tablet by mouth Daily., Disp: 90 tablet, Rfl: 2  •  sertraline (ZOLOFT) 100 MG tablet, Take 1 tablet by mouth Daily., Disp: , Rfl:   •  SITagliptin (Januvia) 100 MG tablet, Take 1 tablet by mouth Daily., Disp: 30 tablet, Rfl: 11  •  tamsulosin (FLOMAX) 0.4 MG capsule 24 hr capsule, Take 1 capsule by mouth 2 (Two) Times a Day., Disp: , Rfl:   •  cyclobenzaprine (FLEXERIL) 10 MG tablet, Take 1 tablet by mouth At Night As Needed for Muscle Spasms., Disp: 20 tablet, Rfl: 0  •  HYDROcodone-acetaminophen (NORCO) 5-325 MG per tablet, Take 1 tablet by mouth Every 6 (Six) Hours As Needed for Moderate Pain ., Disp: 20 tablet, Rfl: 0  •  magnesium oxide (MAGOX) 400 (241.3 Mg) MG tablet tablet, Take 1 tablet by mouth 2 (Two) Times a Day., Disp: 30 each, Rfl: 3  No current facility-administered medications for this visit.         PHYSICAL  EXAMINATION:       Nirav Lind is a 55 y.o. male    Patient is awake and alert, answers questions appropriately and is in no apparent distress.  Breathing comfortably in.  Skin is warm and dry.    GAIT:     []  Normal  []  Antalgic    Assistive device: []  None  []  Walker     []  Crutches  []  Cane     []  Wheelchair  []  Stretcher    Ortho Exam  Under sciatic notch on the right.  Straight raising is positive.  Strength testing is intact.  The knee is not swollen the wounds look good no sign of infection calf is negative.  Burning sensation with no numbness in the lateral aspect of the lower leg.    No results found.        ASSESSMENT:    Diagnoses and all orders for this visit:    Status post arthroscopy of right knee  -     methylPREDNISolone acetate (DEPO-medrol) injection 80 mg    Sciatica of right side  -     HYDROcodone-acetaminophen (NORCO) 5-325 MG per tablet; Take 1 tablet by mouth Every 6 (Six) Hours As Needed for Moderate Pain .  -     methylPREDNISolone acetate (DEPO-medrol) injection 80 mg    Other orders  -     cyclobenzaprine (FLEXERIL) 10 MG tablet; Take 1 tablet by mouth At Night As Needed for Muscle Spasms.      Patient's Body mass index is 35.59 kg/m². BMI is within normal parameters. No follow-up required..    PLAN at this point given Depo-Medrol IM on the right side.  I given a short-term prescription for Flexeril and also provided some pain medication we will check her back in a week to make sure he is improved.                This document has been electronically signed by Jerry Oakes MD on December 28, 2020 13:27 CST

## 2021-01-29 ENCOUNTER — OFFICE VISIT (OUTPATIENT)
Dept: ORTHOPEDIC SURGERY | Facility: CLINIC | Age: 56
End: 2021-01-29

## 2021-01-29 VITALS — BODY MASS INDEX: 36.43 KG/M2 | WEIGHT: 246 LBS | HEIGHT: 69 IN

## 2021-01-29 DIAGNOSIS — M54.30 SCIATICA, UNSPECIFIED LATERALITY: Primary | ICD-10-CM

## 2021-01-29 DIAGNOSIS — Z98.890 STATUS POST ARTHROSCOPY OF RIGHT KNEE: ICD-10-CM

## 2021-01-29 PROCEDURE — 99024 POSTOP FOLLOW-UP VISIT: CPT | Performed by: ORTHOPAEDIC SURGERY

## 2021-01-29 RX ORDER — MELOXICAM 15 MG/1
15 TABLET ORAL DAILY
Qty: 30 TABLET | Refills: 2 | Status: SHIPPED | OUTPATIENT
Start: 2021-01-29 | End: 2022-06-10

## 2021-01-29 NOTE — PROGRESS NOTES
Nirav Lind is a 55 y.o. male is s/p  Knee Arthroscopy With  Medial Meniscectomy - Right     Chief Complaint   Patient presents with   • Post-op     Right knee       HISTORY OF PRESENT ILLNESS: Patient is here today s/p Knee Arthroscopy With  Medial Meniscectomy - Right on 12/15/2020. He states that his pain 6/10.  Back is better still have some pain in his knee he describes it on the kneecap and some medially.  He is a little over a month out at this point almost 6 weeks.       No Known Allergies      Current Outpatient Medications:   •  albuterol sulfate HFA (Ventolin HFA) 108 (90 Base) MCG/ACT inhaler, Inhale 2 puffs Every 4 (Four) Hours As Needed for Wheezing., Disp: 18 g, Rfl: 11  •  Alcohol Swabs ( STERILE ALCOHOL PREP) pads, 1 each 4 (Four) Times a Day., Disp: 200 each, Rfl: 3  •  amitriptyline (ELAVIL) 25 MG tablet, Take 25 mg by mouth At Night As Needed., Disp: , Rfl: 1  •  aspirin 81 MG EC tablet, Take 81 mg by mouth Daily., Disp: , Rfl:   •  atorvastatin (LIPITOR) 40 MG tablet, TAKE 1 TABLET BY MOUTH DAILY, Disp: 30 tablet, Rfl: 4  •  baclofen (LIORESAL) 10 MG tablet, Take 10 mg by mouth 3 (Three) Times a Day As Needed for Muscle Spasms., Disp: , Rfl:   •  budesonide-formoterol (SYMBICORT) 160-4.5 MCG/ACT inhaler, Inhale 2 puffs Daily As Needed., Disp: , Rfl:   •  busPIRone (BUSPAR) 5 MG tablet, Take 5 mg by mouth 2 (Two) Times a Day., Disp: , Rfl: 3  •  cyclobenzaprine (FLEXERIL) 10 MG tablet, Take 1 tablet by mouth At Night As Needed for Muscle Spasms., Disp: 20 tablet, Rfl: 0  •  Ergocalciferol (VITAMIN D2 PO), Take 50,000 Units by mouth Every 14 (Fourteen) Days., Disp: , Rfl:   •  glucose blood (ONE TOUCH ULTRA TEST) test strip, Use to text 4 times a day.  Dx-e11.49, Disp: 200 each, Rfl: 11  •  glucose blood test strip, Use 4x day E.11.49, Disp: 200 each, Rfl: 11  •  HYDROcodone-acetaminophen (NORCO) 5-325 MG per tablet, Take 1 tablet by mouth Every 6 (Six) Hours As Needed for Moderate Pain .,  Disp: 20 tablet, Rfl: 0  •  Insulin Glargine (BASAGLAR KWIKPEN) 100 UNIT/ML injection pen, Inject 13 Units under the skin into the appropriate area as directed every night at bedtime., Disp: 2 pen, Rfl: 11  •  Insulin Lispro (ADMELOG SOLOSTAR) 100 UNIT/ML injection pen, Up to 10 units with meals., Disp: 3 pen, Rfl: 11  •  Insulin Pen Needle (PEN NEEDLES) 32G X 4 MM misc, 1 each 4 (Four) Times a Day. Use 4 x daily, Dx code E11.65, Disp: 120 each, Rfl: 11  •  losartan-hydrochlorothiazide (Hyzaar) 100-25 MG per tablet, Take 1 tablet by mouth Daily., Disp: 30 tablet, Rfl: 11  •  magnesium oxide (MAGOX) 400 (241.3 Mg) MG tablet tablet, Take 1 tablet by mouth 2 (Two) Times a Day., Disp: 30 each, Rfl: 3  •  metFORMIN ER (GLUCOPHAGE-XR) 750 MG 24 hr tablet, Take 1 tablet by mouth Daily., Disp: 90 tablet, Rfl: 3  •  metoprolol succinate XL (TOPROL-XL) 50 MG 24 hr tablet, Take 50 mg by mouth Daily., Disp: , Rfl:   •  naproxen (NAPROSYN) 500 MG tablet, Take 500 mg by mouth 2 (Two) Times a Day As Needed for Mild Pain ., Disp: , Rfl:   •  ondansetron ODT (Zofran ODT) 4 MG disintegrating tablet, Place 1 tablet on the tongue Every 8 (Eight) Hours As Needed for Nausea or Vomiting., Disp: 30 tablet, Rfl: 0  •  pantoprazole (PROTONIX) 40 MG EC tablet, Take 1 tablet by mouth Daily., Disp: 90 tablet, Rfl: 2  •  sertraline (ZOLOFT) 100 MG tablet, Take 1 tablet by mouth Daily., Disp: , Rfl:   •  SITagliptin (Januvia) 100 MG tablet, Take 1 tablet by mouth Daily., Disp: 30 tablet, Rfl: 11  •  tamsulosin (FLOMAX) 0.4 MG capsule 24 hr capsule, Take 1 capsule by mouth 2 (Two) Times a Day., Disp: , Rfl:         PHYSICAL EXAMINATION:       Nirav Lind is a 55 y.o. male    Patient is awake and alert, answers questions appropriately and is in no apparent distress.    GAIT:     []  Normal  []  Antalgic    Assistive device: []  None  []  Walker     []  Crutches  []  Cane     []  Wheelchair  []  Stretcher    Ortho Exam  Straight raising is  negative is tenderness mildly at the medial aspect the knee but not much better in that patella there is probably a trace to 1+ effusion.  Motion well-maintained calf is negative.    No results found.        ASSESSMENT:    Diagnoses and all orders for this visit:    Status post arthroscopy of right knee          PLAN this point and let us send him to the therapist work on this we will put him on some Mobic.  He will stay off of any the Naprosyn that he had been taking it as we work well for him.  I will check him back in 2 weeks if not better consider intra-articular injection at that point.                This document has been electronically signed by Christen Duvall MA on January 29, 2021 13:02 CST

## 2021-02-12 ENCOUNTER — OFFICE VISIT (OUTPATIENT)
Dept: ORTHOPEDIC SURGERY | Facility: CLINIC | Age: 56
End: 2021-02-12

## 2021-02-12 VITALS — BODY MASS INDEX: 36.14 KG/M2 | WEIGHT: 244 LBS | HEIGHT: 69 IN

## 2021-02-12 DIAGNOSIS — Z98.890 STATUS POST ARTHROSCOPY OF RIGHT KNEE: Primary | ICD-10-CM

## 2021-02-12 PROCEDURE — 99024 POSTOP FOLLOW-UP VISIT: CPT | Performed by: ORTHOPAEDIC SURGERY

## 2021-02-12 NOTE — PROGRESS NOTES
"Nirav Lind is a 55 y.o. male returns for     Chief Complaint   Patient presents with   • Right Knee - Follow-up       HISTORY OF PRESENT ILLNESS:  Follow up Right knee post arthroscopy.  Patient reports improvement since last visit. Knee is improving biggest issue is that he is lost 2 brothers in the past 4 weeks and he is upset about this understandably     12/15/20 (59d) Jerry Oakes MD    Knee Arthroscopy With  Medial Meniscectomy - Right            CONCURRENT MEDICAL HISTORY:    The following portions of the patient's history were reviewed and updated as appropriate: allergies, current medications, past family history, past medical history, past social history, past surgical history and problem list.     ROS  No fevers or chills.  No chest pain or shortness of air.  No GI or  disturbances.  No cardiac issues noted.  All other systems are reported negative or without change.    PHYSICAL EXAMINATION:       Ht 175.3 cm (69\")   Wt 111 kg (244 lb)   BMI 36.03 kg/m²     Physical Exam    GAIT:     [x]  Normal  []  Antalgic    Assistive device: [x]  None  []  Walker     []  Crutches  []  Cane     []  Wheelchair  []  Stretcher    Ortho Exam  Swelling is less. Knee moves well walking with a mild limp.  No results found.          ASSESSMENT:    Diagnoses and all orders for this visit:    Status post arthroscopy of right knee          PLAN anti-inflammatories helping and will continue this continue strengthening exercises which she is doing at home will call us with any issues  Patient's Body mass index is 36.03 kg/m². BMI is above normal parameters. Recommendations include: exercise counseling and nutrition counseling.      No follow-ups on file.        This document has been electronically signed by Jerry Oakes MD on February 12, 2021 13:35 CST    "

## 2021-02-23 ENCOUNTER — LAB (OUTPATIENT)
Dept: LAB | Facility: HOSPITAL | Age: 56
End: 2021-02-23

## 2021-02-23 DIAGNOSIS — Z01.818 PREOP TESTING: Primary | ICD-10-CM

## 2021-02-23 PROCEDURE — U0004 COV-19 TEST NON-CDC HGH THRU: HCPCS

## 2021-02-23 PROCEDURE — C9803 HOPD COVID-19 SPEC COLLECT: HCPCS

## 2021-02-24 LAB — SARS-COV-2 ORF1AB RESP QL NAA+PROBE: NOT DETECTED

## 2021-02-26 ENCOUNTER — ANESTHESIA EVENT (OUTPATIENT)
Dept: PERIOP | Facility: HOSPITAL | Age: 56
End: 2021-02-26

## 2021-02-26 ENCOUNTER — ANESTHESIA (OUTPATIENT)
Dept: PERIOP | Facility: HOSPITAL | Age: 56
End: 2021-02-26

## 2021-02-26 ENCOUNTER — HOSPITAL ENCOUNTER (OUTPATIENT)
Facility: HOSPITAL | Age: 56
Setting detail: HOSPITAL OUTPATIENT SURGERY
Discharge: HOME | End: 2021-02-26
Attending: OPHTHALMOLOGY | Admitting: OPHTHALMOLOGY

## 2021-02-26 VITALS
HEIGHT: 69 IN | RESPIRATION RATE: 18 BRPM | HEART RATE: 55 BPM | SYSTOLIC BLOOD PRESSURE: 127 MMHG | OXYGEN SATURATION: 94 % | TEMPERATURE: 97.3 F | BODY MASS INDEX: 35.69 KG/M2 | DIASTOLIC BLOOD PRESSURE: 88 MMHG | WEIGHT: 240.96 LBS

## 2021-02-26 LAB — GLUCOSE BLDC GLUCOMTR-MCNC: 99 MG/DL (ref 70–130)

## 2021-02-26 PROCEDURE — 25010000002 VANCOMYCIN 1 G RECONSTITUTED SOLUTION 1 EACH VIAL: Performed by: OPHTHALMOLOGY

## 2021-02-26 PROCEDURE — 25010000002 MIDAZOLAM PER 1 MG: Performed by: NURSE ANESTHETIST, CERTIFIED REGISTERED

## 2021-02-26 PROCEDURE — V2632 POST CHMBR INTRAOCULAR LENS: HCPCS | Performed by: OPHTHALMOLOGY

## 2021-02-26 PROCEDURE — 82962 GLUCOSE BLOOD TEST: CPT

## 2021-02-26 PROCEDURE — 25010000002 FENTANYL CITRATE (PF) 100 MCG/2ML SOLUTION: Performed by: NURSE ANESTHETIST, CERTIFIED REGISTERED

## 2021-02-26 PROCEDURE — 25010000002 EPINEPHRINE PER 0.1 MG: Performed by: OPHTHALMOLOGY

## 2021-02-26 DEVICE — LENS ACRYSOF IQ 6X13MM SN60WF 19.0: Type: IMPLANTABLE DEVICE | Site: EYE | Status: FUNCTIONAL

## 2021-02-26 RX ORDER — PHENYLEPHRINE HCL 2.5 %
1 DROPS OPHTHALMIC (EYE)
Status: COMPLETED | OUTPATIENT
Start: 2021-02-26 | End: 2021-02-26

## 2021-02-26 RX ORDER — PREDNISOLONE ACETATE 10 MG/ML
SUSPENSION/ DROPS OPHTHALMIC AS NEEDED
Status: DISCONTINUED | OUTPATIENT
Start: 2021-02-26 | End: 2021-02-26 | Stop reason: HOSPADM

## 2021-02-26 RX ORDER — TETRACAINE HYDROCHLORIDE 5 MG/ML
1 SOLUTION OPHTHALMIC AS NEEDED
Status: COMPLETED | OUTPATIENT
Start: 2021-02-26 | End: 2021-02-26

## 2021-02-26 RX ORDER — MIDAZOLAM HYDROCHLORIDE 1 MG/ML
INJECTION INTRAMUSCULAR; INTRAVENOUS AS NEEDED
Status: DISCONTINUED | OUTPATIENT
Start: 2021-02-26 | End: 2021-02-26 | Stop reason: SURG

## 2021-02-26 RX ORDER — ONDANSETRON 2 MG/ML
4 INJECTION INTRAMUSCULAR; INTRAVENOUS ONCE AS NEEDED
Status: CANCELLED | OUTPATIENT
Start: 2021-02-26

## 2021-02-26 RX ORDER — CYCLOPENTOLATE HYDROCHLORIDE 20 MG/ML
1 SOLUTION/ DROPS OPHTHALMIC
Status: COMPLETED | OUTPATIENT
Start: 2021-02-26 | End: 2021-02-26

## 2021-02-26 RX ORDER — SODIUM CHLORIDE 0.9 % (FLUSH) 0.9 %
10 SYRINGE (ML) INJECTION AS NEEDED
Status: DISCONTINUED | OUTPATIENT
Start: 2021-02-26 | End: 2021-02-26 | Stop reason: HOSPADM

## 2021-02-26 RX ORDER — MOXIFLOXACIN 5 MG/ML
SOLUTION/ DROPS OPHTHALMIC AS NEEDED
Status: DISCONTINUED | OUTPATIENT
Start: 2021-02-26 | End: 2021-02-26 | Stop reason: HOSPADM

## 2021-02-26 RX ORDER — TETRACAINE HYDROCHLORIDE 5 MG/ML
SOLUTION OPHTHALMIC AS NEEDED
Status: DISCONTINUED | OUTPATIENT
Start: 2021-02-26 | End: 2021-02-26 | Stop reason: HOSPADM

## 2021-02-26 RX ORDER — FENTANYL CITRATE 50 UG/ML
INJECTION, SOLUTION INTRAMUSCULAR; INTRAVENOUS AS NEEDED
Status: DISCONTINUED | OUTPATIENT
Start: 2021-02-26 | End: 2021-02-26 | Stop reason: SURG

## 2021-02-26 RX ORDER — BRIMONIDINE TARTRATE 0.15 %
DROPS OPHTHALMIC (EYE) AS NEEDED
Status: DISCONTINUED | OUTPATIENT
Start: 2021-02-26 | End: 2021-02-26 | Stop reason: HOSPADM

## 2021-02-26 RX ADMIN — PHENYLEPHRINE HYDROCHLORIDE 1 DROP: 25 SOLUTION/ DROPS OPHTHALMIC at 08:08

## 2021-02-26 RX ADMIN — MIDAZOLAM HYDROCHLORIDE 2 MG: 2 INJECTION, SOLUTION INTRAMUSCULAR; INTRAVENOUS at 10:07

## 2021-02-26 RX ADMIN — PHENYLEPHRINE HYDROCHLORIDE 1 DROP: 25 SOLUTION/ DROPS OPHTHALMIC at 07:58

## 2021-02-26 RX ADMIN — PHENYLEPHRINE HYDROCHLORIDE 1 DROP: 25 SOLUTION/ DROPS OPHTHALMIC at 08:22

## 2021-02-26 RX ADMIN — FENTANYL CITRATE 50 MCG: 50 INJECTION INTRAMUSCULAR; INTRAVENOUS at 10:12

## 2021-02-26 RX ADMIN — MIDAZOLAM HYDROCHLORIDE 1 MG: 2 INJECTION, SOLUTION INTRAMUSCULAR; INTRAVENOUS at 10:15

## 2021-02-26 RX ADMIN — CYCLOPENTOLATE HYDROCHLORIDE 1 DROP: 20 SOLUTION/ DROPS OPHTHALMIC at 07:58

## 2021-02-26 RX ADMIN — TETRACAINE HYDROCHLORIDE 1 DROP: 5 SOLUTION OPHTHALMIC at 08:22

## 2021-02-26 RX ADMIN — CYCLOPENTOLATE HYDROCHLORIDE 1 DROP: 20 SOLUTION/ DROPS OPHTHALMIC at 08:22

## 2021-02-26 RX ADMIN — FENTANYL CITRATE 50 MCG: 50 INJECTION INTRAMUSCULAR; INTRAVENOUS at 10:09

## 2021-02-26 RX ADMIN — TETRACAINE HYDROCHLORIDE 1 DROP: 5 SOLUTION OPHTHALMIC at 07:58

## 2021-02-26 RX ADMIN — CYCLOPENTOLATE HYDROCHLORIDE 1 DROP: 20 SOLUTION/ DROPS OPHTHALMIC at 08:08

## 2021-02-26 RX ADMIN — SODIUM CHLORIDE, PRESERVATIVE FREE 10 ML: 5 INJECTION INTRAVENOUS at 08:13

## 2021-02-26 NOTE — ANESTHESIA PREPROCEDURE EVALUATION
Anesthesia Evaluation     Patient summary reviewed and Nursing notes reviewed   no history of anesthetic complications (Scopalamine patch ordered pre-op.):  NPO Solid Status: > 8 hours  NPO Liquid Status: > 8 hours           Airway   Mallampati: III  TM distance: <3 FB  Neck ROM: full  Large neck circumference, Difficult intubation highly probable and Anterior  Comment: Desai available on induction.  Dental    (+) poor dentation    Pulmonary     breath sounds clear to auscultation  (+) a smoker Former, COPD mild, recent URI resolved, decreased breath sounds,   (-) asthma, sleep apnea  Cardiovascular - normal exam  Exercise tolerance: good (4-7 METS)    ECG reviewed  Patient on routine beta blocker and Beta blocker given within 24 hours of surgery  Rhythm: regular  Rate: normal    (+) hypertension poorly controlled 2 medications or greater, CHF Diastolic >=55%, HEADLEY, hyperlipidemia,   (-) pacemaker, valvular problems/murmurs, past MI, CAD, dysrhythmias, murmur, cardiac stents, CABG, DVT    ROS comment: Normal sinus rhythm  Possible Left atrial enlargement  Incomplete right bundle branch block  Borderline ECG  When compared with ECG of 14-JAN-2019 11:47,  Incomplete right bundle branch block is now present  Confirmed by RAYMON VALENTINE ([id number]) on 12/11/2020 1:34:10 PMLeft Ventricle Estimated EF appears to be in the range of 61 - 65%. Normal left ventricular cavity size noted. All left ventricular wall segments contract normally. Left ventricular wall thickness is consistent with mild concentric hypertrophy. Left ventricular diastolic dysfunction is noted (grade I) consistent with impaired relaxation. There is no evidence of a left ventricular mass or thrombus present.     Neuro/Psych  (+) numbness (Diabetic peripheral neuropathy.), psychiatric history Depression,     (-) seizures, TIA, CVA  GI/Hepatic/Renal/Endo    (+) obesity,  GERD well controlled,  diabetes mellitus type 2 well controlled using insulin,   (-) morbid  obesity, liver disease    Musculoskeletal     (+) back pain,   Abdominal   (+) obese,    Substance History - negative use     OB/GYN negative ob/gyn ROS         Other   arthritis,      ROS/Med Hx Other: Hx of COPD- albuterol use a couple times a month. Symbicort minimal use.     TTE 1/15/2019:  · Left ventricular wall thickness is consistent with mild concentric hypertrophy.  · Left ventricular diastolic dysfunction (grade I) consistent with impaired relaxation.Estimated EF appears to be in the range of 61 - 65%.     Hx of left heart cath 5 years ago per pt- No stents placed       Last SpoE6E=7.1    RBBB on EKG    GERD controlled on protonix      Phys Exam Other: Short chin- straight to hernandez if intubating case                  Anesthesia Plan    ASA 3     MAC   (Hernandez if intubating case due to mallampati 3, macroglossia, <3)  intravenous induction     Anesthetic plan, all risks, benefits, and alternatives have been provided, discussed and informed consent has been obtained with: patient.    Plan discussed with CRNA.

## 2021-02-26 NOTE — ANESTHESIA POSTPROCEDURE EVALUATION
Patient: Nirav Lind    Procedure Summary     Date: 02/26/21 Room / Location: [facility] OR 06 / [facility] OR    Anesthesia Start: 1007 Anesthesia Stop: 1021    Procedure: REMOVE CATARACT AND IMPLANT INTRAOCULAR LENS (Left Eye) Diagnosis:       Age-related nuclear cataract of left eye      (Age-related nuclear cataract of left eye [H25.12])    Surgeon: Del Sharpe MD Provider:     Anesthesia Type: MAC ASA Status: 3          Anesthesia Type: MAC    Vitals  No vitals data found for the desired time range.          Post Anesthesia Care and Evaluation    Patient location during evaluation: PHASE II  Patient participation: complete - patient participated  Level of consciousness: awake  Pain score: 0  Pain management: adequate  Airway patency: patent  Anesthetic complications: No anesthetic complications  PONV Status: none  Cardiovascular status: acceptable  Respiratory status: acceptable  Hydration status: acceptable

## 2021-03-05 ENCOUNTER — LAB (OUTPATIENT)
Dept: LAB | Facility: HOSPITAL | Age: 56
End: 2021-03-05

## 2021-03-05 ENCOUNTER — OFFICE VISIT (OUTPATIENT)
Dept: FAMILY MEDICINE CLINIC | Facility: CLINIC | Age: 56
End: 2021-03-05

## 2021-03-05 VITALS
BODY MASS INDEX: 35.7 KG/M2 | OXYGEN SATURATION: 98 % | HEIGHT: 69 IN | WEIGHT: 241 LBS | SYSTOLIC BLOOD PRESSURE: 120 MMHG | HEART RATE: 74 BPM | TEMPERATURE: 96.9 F | DIASTOLIC BLOOD PRESSURE: 82 MMHG

## 2021-03-05 DIAGNOSIS — E11.9 TYPE 2 DIABETES MELLITUS WITHOUT COMPLICATION, WITH LONG-TERM CURRENT USE OF INSULIN (HCC): Primary | ICD-10-CM

## 2021-03-05 DIAGNOSIS — Z79.4 TYPE 2 DIABETES MELLITUS WITHOUT COMPLICATION, WITH LONG-TERM CURRENT USE OF INSULIN (HCC): Primary | ICD-10-CM

## 2021-03-05 DIAGNOSIS — E11.9 TYPE 2 DIABETES MELLITUS WITHOUT COMPLICATION, WITH LONG-TERM CURRENT USE OF INSULIN (HCC): ICD-10-CM

## 2021-03-05 DIAGNOSIS — Z79.4 TYPE 2 DIABETES MELLITUS WITHOUT COMPLICATION, WITH LONG-TERM CURRENT USE OF INSULIN (HCC): ICD-10-CM

## 2021-03-05 DIAGNOSIS — I10 ESSENTIAL HYPERTENSION: ICD-10-CM

## 2021-03-05 LAB — HBA1C MFR BLD: 6.34 % (ref 4.8–5.6)

## 2021-03-05 PROCEDURE — 83036 HEMOGLOBIN GLYCOSYLATED A1C: CPT

## 2021-03-05 PROCEDURE — 36415 COLL VENOUS BLD VENIPUNCTURE: CPT

## 2021-03-05 PROCEDURE — 99213 OFFICE O/P EST LOW 20 MIN: CPT | Performed by: STUDENT IN AN ORGANIZED HEALTH CARE EDUCATION/TRAINING PROGRAM

## 2021-03-05 NOTE — PROGRESS NOTES
I have seen the patient.  I have reviewed the notes, assessments, and/or procedures performed by *Charles Olivares MD  ** during office visit I concur with her/his documentation and assessment and plan for Nirav Lind.              This document has been electronically signed by Jose Lopez MD on March 5, 2021 09:47 CST

## 2021-03-05 NOTE — PROGRESS NOTES
Family Medicine Residency  Charles Olivares MD    Subjective:     Nirav Lind is a 55 y.o. male who presents for follow up HTN, T2DM.    HTN: no ambulatory measures available. No vision changes, chest pain, shortness of air, HA. On losartan HCTZ combo pill.     T2DM. FBS 90-low 100's. Checks tid. After lunch 100-130's. After dinner: 100-110's.  Managed Metformin, basaglar and mealtime insulin SSI. Needs SSI very rarely 1x/wk.         The following portions of the patient's history were reviewed and updated as appropriate: allergies, current medications, past family history, past medical history, past social history, past surgical history and problem list.    Past Medical Hx:  Past Medical History:   Diagnosis Date   • Abnormal computed tomography scan    • Adenomatous polyp of colon    • Allergic rhinitis    • Anemia    • Chronic back pain    • COPD (chronic obstructive pulmonary disease) (CMS/HCC)    • Coronary arteriosclerosis    • Depression    • Diabetes mellitus (CMS/HCC)    • Diabetic polyneuropathy associated with type 2 diabetes mellitus (CMS/HCC) 7/17/2017   • Dizziness    • Dyspnea     unspecified   • Epigastric pain    • Essential hypertension    • Ex-smoker    • GERD (gastroesophageal reflux disease)    • Hemangioma of liver    • Hyperlipidemia    • Infected hydrocele     with orchitis and epididymis   • Nausea    • Nausea with vomiting    • Obesity with body mass index of 30.0 - 39.9    • Pain in right knee    • Right upper quadrant pain    • Type II diabetes mellitus, uncontrolled (CMS/HCC)    • Upper respiratory infection    • Urgency of urination    • Villous adenoma of colon        Past Surgical Hx:  Past Surgical History:   Procedure Laterality Date   • CARDIAC CATHETERIZATION  11/30/2015    No evidence of any obstructive disease in the circumflex, the RCA , or LAD. 1st diagonal branch in the midportion with 20-30% stenosis. Preserved LV systolic function with EF 55%.   • CATARACT  EXTRACTION W/ INTRAOCULAR LENS IMPLANT Left 2/26/2021    Procedure: REMOVE CATARACT AND IMPLANT INTRAOCULAR LENS;  Surgeon: Del Sharpe MD;  Location: [facility];  Service: Ophthalmology;  Laterality: Left;   • COLONOSCOPY  01/21/2013    Normal   • COLONOSCOPY W/ POLYPECTOMY  10/13/2014    A single polyp was found in the colon; removed by snare cautery polypectomy.   • ENDOSCOPY  06/15/2016    Mildly severe esophagitis. Several biopsies obtained in the upper third of the esophagus.   • INCISION AND DRAINAGE ABSCESS  10/29/2014    Incision and drainage of scrotal abscess. Hydrocelectomy, bottle procedure.   • KNEE ARTHROSCOPY Right 12/15/2020    Procedure: KNEE ARTHROSCOPY WITH  MEDIAL MENISCECTOMY;  Surgeon: Jerry Oakes MD;  Location: [facility];  Service: Orthopedics;  Laterality: Right;  24 pictures        Current Meds:    Current Outpatient Medications:   •  albuterol sulfate HFA (Ventolin HFA) 108 (90 Base) MCG/ACT inhaler, Inhale 2 puffs Every 4 (Four) Hours As Needed for Wheezing., Disp: 18 g, Rfl: 11  •  Alcohol Swabs ( STERILE ALCOHOL PREP) pads, 1 each 4 (Four) Times a Day., Disp: 200 each, Rfl: 3  •  amitriptyline (ELAVIL) 25 MG tablet, Take 25 mg by mouth At Night As Needed., Disp: , Rfl: 1  •  aspirin 81 MG EC tablet, Take 81 mg by mouth Daily., Disp: , Rfl:   •  atorvastatin (LIPITOR) 40 MG tablet, TAKE 1 TABLET BY MOUTH DAILY, Disp: 30 tablet, Rfl: 4  •  baclofen (LIORESAL) 10 MG tablet, Take 10 mg by mouth 3 (Three) Times a Day As Needed for Muscle Spasms., Disp: , Rfl:   •  budesonide-formoterol (SYMBICORT) 160-4.5 MCG/ACT inhaler, Inhale 2 puffs Daily As Needed., Disp: , Rfl:   •  busPIRone (BUSPAR) 5 MG tablet, Take 5 mg by mouth 2 (Two) Times a Day., Disp: , Rfl: 3  •  cyclobenzaprine (FLEXERIL) 10 MG tablet, Take 1 tablet by mouth At Night As Needed for Muscle Spasms., Disp: 20 tablet, Rfl: 0  •  Ergocalciferol (VITAMIN D2 PO), Take 50,000 Units by mouth Every 14 (Fourteen)  Days., Disp: , Rfl:   •  glucose blood (ONE TOUCH ULTRA TEST) test strip, Use to text 4 times a day.  Dx-e11.49, Disp: 200 each, Rfl: 11  •  glucose blood test strip, Use 4x day E.11.49, Disp: 200 each, Rfl: 11  •  HYDROcodone-acetaminophen (NORCO) 5-325 MG per tablet, Take 1 tablet by mouth Every 6 (Six) Hours As Needed for Moderate Pain ., Disp: 20 tablet, Rfl: 0  •  Insulin Glargine (BASAGLAR KWIKPEN) 100 UNIT/ML injection pen, Inject 13 Units under the skin into the appropriate area as directed every night at bedtime., Disp: 2 pen, Rfl: 11  •  Insulin Lispro (ADMELOG SOLOSTAR) 100 UNIT/ML injection pen, Up to 10 units with meals., Disp: 3 pen, Rfl: 11  •  Insulin Pen Needle (PEN NEEDLES) 32G X 4 MM misc, 1 each 4 (Four) Times a Day. Use 4 x daily, Dx code E11.65, Disp: 120 each, Rfl: 11  •  losartan-hydrochlorothiazide (Hyzaar) 100-25 MG per tablet, Take 1 tablet by mouth Daily., Disp: 30 tablet, Rfl: 11  •  magnesium oxide (MAGOX) 400 (241.3 Mg) MG tablet tablet, Take 1 tablet by mouth 2 (Two) Times a Day., Disp: 30 each, Rfl: 3  •  meloxicam (Mobic) 15 MG tablet, Take 1 tablet by mouth Daily., Disp: 30 tablet, Rfl: 2  •  metFORMIN ER (GLUCOPHAGE-XR) 750 MG 24 hr tablet, Take 1 tablet by mouth Daily., Disp: 90 tablet, Rfl: 3  •  metoprolol succinate XL (TOPROL-XL) 50 MG 24 hr tablet, Take 50 mg by mouth Daily., Disp: , Rfl:   •  naproxen (NAPROSYN) 500 MG tablet, Take 500 mg by mouth 2 (Two) Times a Day As Needed for Mild Pain ., Disp: , Rfl:   •  ondansetron ODT (Zofran ODT) 4 MG disintegrating tablet, Place 1 tablet on the tongue Every 8 (Eight) Hours As Needed for Nausea or Vomiting., Disp: 30 tablet, Rfl: 0  •  pantoprazole (PROTONIX) 40 MG EC tablet, Take 1 tablet by mouth Daily., Disp: 90 tablet, Rfl: 2  •  sertraline (ZOLOFT) 100 MG tablet, Take 1 tablet by mouth Daily., Disp: , Rfl:   •  SITagliptin (Januvia) 100 MG tablet, Take 1 tablet by mouth Daily., Disp: 30 tablet, Rfl: 11  •  tamsulosin (FLOMAX)  "0.4 MG capsule 24 hr capsule, Take 1 capsule by mouth 2 (Two) Times a Day., Disp: , Rfl:     Allergies:  No Known Allergies    Family Hx:  Family History   Problem Relation Age of Onset   • Cancer Mother         leukemia   • Heart disease Mother    • Heart disease Sister    • Lung disease Father    • Heart disease Maternal Grandmother    • Heart disease Maternal Grandfather         Social History:  Social History     Socioeconomic History   • Marital status:      Spouse name: Not on file   • Number of children: Not on file   • Years of education: Not on file   • Highest education level: Not on file   Tobacco Use   • Smoking status: Former Smoker     Packs/day: 0.25     Years: 15.00     Pack years: 3.75     Types: Cigarettes     Quit date:      Years since quittin.1   • Smokeless tobacco: Never Used   Substance and Sexual Activity   • Alcohol use: No     Frequency: Never   • Drug use: No   • Sexual activity: Defer       Review of Systems  Review of Systems   Constitutional: Negative for appetite change, chills, diaphoresis, fatigue and fever.   HENT: Negative for ear pain, postnasal drip, rhinorrhea and sore throat.    Eyes: Negative for pain and visual disturbance.   Respiratory: Negative for cough, chest tightness and shortness of breath.    Cardiovascular: Negative for chest pain, palpitations and leg swelling.   Gastrointestinal: Negative for abdominal pain, constipation, diarrhea, nausea and vomiting.   Genitourinary: Negative for dysuria, flank pain, frequency and urgency.   Musculoskeletal: Negative for arthralgias, back pain and myalgias.   Skin: Negative for pallor and rash.   Neurological: Negative for dizziness, seizures, syncope, weakness and numbness.   Psychiatric/Behavioral: Negative for agitation, confusion, decreased concentration and dysphoric mood.       Objective:     /82   Pulse 74   Temp 96.9 °F (36.1 °C)   Ht 175.3 cm (69\")   Wt 109 kg (241 lb)   SpO2 98%   BMI 35.59 " kg/m²   Physical Exam  Constitutional:       Appearance: He is well-developed. He is not diaphoretic.   HENT:      Head: Normocephalic and atraumatic.      Right Ear: Tympanic membrane normal.      Left Ear: Tympanic membrane normal.      Nose: Nose normal.      Mouth/Throat:      Mouth: Mucous membranes are moist.   Eyes:      Conjunctiva/sclera: Conjunctivae normal.      Pupils: Pupils are equal, round, and reactive to light.   Neck:      Musculoskeletal: Normal range of motion and neck supple.      Thyroid: No thyromegaly.      Trachea: No tracheal deviation.   Cardiovascular:      Rate and Rhythm: Normal rate and regular rhythm.      Heart sounds: Normal heart sounds. No murmur. No friction rub. No gallop.    Pulmonary:      Effort: Pulmonary effort is normal.      Breath sounds: Normal breath sounds. No wheezing or rales.   Abdominal:      General: Bowel sounds are normal. There is no distension.      Palpations: Abdomen is soft.      Tenderness: There is no abdominal tenderness. There is no rebound.      Hernia: No hernia is present.   Musculoskeletal: Normal range of motion.      Right lower leg: No edema.      Left lower leg: No edema.   Skin:     General: Skin is warm and dry.      Capillary Refill: Capillary refill takes less than 2 seconds.   Neurological:      General: No focal deficit present.      Mental Status: He is alert and oriented to person, place, and time. Mental status is at baseline.          Assessment/Plan:     Diagnoses and all orders for this visit:    1. Type 2 diabetes mellitus without complication, with long-term current use of insulin (CMS/[facility]) (Primary)  Very well controlled. Will recheck A1c, may be able to stop all insulin and continue with metformin and perhaps a GLP1.   -     Hemoglobin A1c; Future    2. Essential hypertension  Stable, very well controlled.    Other orders  -     magnesium oxide (MAGOX) 400 (241.3 Mg) MG tablet tablet; Take 1 tablet by mouth 2 (Two) Times a Day.   Dispense: 30 each; Refill: 3        · Rx changes: n/a  · Patient Education: diet, exercsie  · Compliance at present is estimated to be good.   · Efforts to improve compliance (if necessary) will be directed at increased exercise.    Depression screening: Up to date; last screen 12/4/2020     Follow-up:     Return in about 3 months (around 6/5/2021) for Medicare Wellness.    Preventative:  Health Maintenance   Topic Date Due   • ZOSTER VACCINE (1 of 2) 05/01/2013   • ANNUAL WELLNESS VISIT  04/30/2019   • DIABETIC EYE EXAM  03/05/2020   • Pneumococcal Vaccine 0-64 (1 of 1 - PPSV23) 12/04/2021 (Originally 5/1/1971)   • HEMOGLOBIN A1C  03/11/2021   • DIABETIC FOOT EXAM  06/12/2021   • LIPID PANEL  09/11/2021   • URINE MICROALBUMIN  09/11/2021   • COLONOSCOPY  04/05/2026   • TDAP/TD VACCINES (3 - Td) 10/16/2029   • HEPATITIS C SCREENING  Completed   • Hepatitis B  Completed   • INFLUENZA VACCINE  Completed   • MENINGOCOCCAL VACCINE  Aged Out     Male Preventative: UTD  Recommended: none  Vaccine Counseling: N/A    Weight  -Class: Obese Class II: 35-39.9kg/m2  -Patient's Body mass index is 35.59 kg/m². BMI is above normal parameters. Recommendations include: exercise counseling and nutrition counseling.   eat more fruits and vegetables, decrease soda or juice intake, increase water intake and increase physical activity    Alcohol use:  reports no history of alcohol use.  Nicotine status  reports that he quit smoking about 21 years ago. His smoking use included cigarettes. He has a 3.75 pack-year smoking history. He has never used smokeless tobacco.    Goals        Patient Stated    • Exercise 150 minutes per week (moderate activity) (pt-stated)      - increasing exercise, walking  - improve diet            RISK SCORE: 4      Charles Olivares M.D. PGY3  [facility] Medicine Residency  [address] [place] [address]  Office: [phone]    This document has been electronically signed by  Charles Olivares MD on March 5, 2021 09:32 CST

## 2021-03-11 ENCOUNTER — OFFICE VISIT (OUTPATIENT)
Dept: ENDOCRINOLOGY | Facility: CLINIC | Age: 56
End: 2021-03-11

## 2021-03-11 VITALS
OXYGEN SATURATION: 96 % | WEIGHT: 238.3 LBS | DIASTOLIC BLOOD PRESSURE: 74 MMHG | HEIGHT: 69 IN | BODY MASS INDEX: 35.29 KG/M2 | SYSTOLIC BLOOD PRESSURE: 122 MMHG | HEART RATE: 83 BPM

## 2021-03-11 DIAGNOSIS — E11.65 TYPE 2 DIABETES MELLITUS WITH HYPERGLYCEMIA, WITH LONG-TERM CURRENT USE OF INSULIN (HCC): Primary | ICD-10-CM

## 2021-03-11 DIAGNOSIS — E55.9 VITAMIN D DEFICIENCY: ICD-10-CM

## 2021-03-11 DIAGNOSIS — E11.42 DIABETIC POLYNEUROPATHY ASSOCIATED WITH TYPE 2 DIABETES MELLITUS (HCC): ICD-10-CM

## 2021-03-11 DIAGNOSIS — Z79.4 TYPE 2 DIABETES MELLITUS WITH HYPERGLYCEMIA, WITH LONG-TERM CURRENT USE OF INSULIN (HCC): Primary | ICD-10-CM

## 2021-03-11 DIAGNOSIS — E78.2 MIXED HYPERLIPIDEMIA: ICD-10-CM

## 2021-03-11 DIAGNOSIS — I10 ESSENTIAL HYPERTENSION: ICD-10-CM

## 2021-03-11 PROCEDURE — 99214 OFFICE O/P EST MOD 30 MIN: CPT | Performed by: INTERNAL MEDICINE

## 2021-03-11 RX ORDER — ICOSAPENT ETHYL 1000 MG/1
2 CAPSULE ORAL 2 TIMES DAILY WITH MEALS
Qty: 120 CAPSULE | Refills: 11 | Status: SHIPPED | OUTPATIENT
Start: 2021-03-11 | End: 2021-09-10

## 2021-03-11 NOTE — PROGRESS NOTES
Subjective    Nirav Lind is a 55 y.o. male. he is here today for follow-up.    Diabetes  Pertinent negatives for hypoglycemia include no confusion, dizziness, headaches, pallor or tremors. Pertinent negatives for diabetes include no chest pain, no fatigue, no polydipsia, no polyphagia, no polyuria and no weakness.          Reason - Diabetes Mellitus type 2      Duration/Timing: Diabetes mellitus type 2, onset of symptoms gradual      dm dx at age 51 y      constant     Now controlled      severity high      Patient was admitted to the hospital for chest pain but cardiac was ruled out      Severity (Complications/Hospitalizations)  Secondary Macrovascular Complications: No CAD, No CVA, No PAD  Secondary Microvascular Complications: No Microalbuminuria, No Diabetic Retinopathy, No Diabetic Neuropathy     Context  Diabetes Regimen: Insulin, Oral Medications,         Lab Results   Component Value Date    HGBA1C 6.34 (H) 03/05/2021                Blood Glucose Readings                Diet  adhering to 1800 calorie diet      Exercise: Does not exercise     Associated Signs/Symptoms  Hyperglycemic Symptoms: No polyuria, No polydipsia, No polyphagia            Hypoglycemic Episodes:   if prolonged fasting , 50s             Review of Systems  Review of Systems   Constitutional: Negative for activity change, appetite change, diaphoresis and fatigue.   HENT: Negative for facial swelling, sneezing, sore throat, tinnitus, trouble swallowing and voice change.    Eyes: Negative for photophobia, pain, discharge, redness, itching and visual disturbance.   Respiratory: Negative for apnea, cough, choking, chest tightness and shortness of breath.    Cardiovascular: Negative for chest pain, palpitations and leg swelling.   Gastrointestinal: Negative for abdominal distention, abdominal pain, constipation, diarrhea, nausea and vomiting.   Endocrine: Negative for cold intolerance, heat intolerance, polydipsia, polyphagia and  "polyuria.   Genitourinary: Negative for difficulty urinating, dysuria, frequency, hematuria and urgency.   Musculoskeletal: Negative for arthralgias, back pain, gait problem, joint swelling, myalgias, neck pain and neck stiffness.   Skin: Negative for color change, pallor, rash and wound.   Neurological: Negative for dizziness, tremors, weakness, light-headedness, numbness and headaches.   Hematological: Negative for adenopathy. Does not bruise/bleed easily.   Psychiatric/Behavioral: Negative for behavioral problems, confusion and sleep disturbance.        Objective    /74   Pulse 83   Ht 175.3 cm (69\")   Wt 108 kg (238 lb 4.8 oz)   SpO2 96%   BMI 35.19 kg/m²   Physical Exam   Constitutional: He is oriented to person, place, and time. He appears well-developed. No distress.   HENT:   Head: Normocephalic and atraumatic.   Right Ear: External ear normal.   Left Ear: External ear normal.   Nose: Nose normal.   Eyes: Pupils are equal, round, and reactive to light. Conjunctivae are normal.   Neck: No tracheal deviation present. No thyromegaly present.   Cardiovascular: Normal rate, regular rhythm and normal heart sounds.   No murmur heard.  Pulmonary/Chest: Effort normal and breath sounds normal. No respiratory distress. He has no wheezes.   Abdominal: Soft. Bowel sounds are normal. There is no abdominal tenderness. There is no rebound and no guarding.   Musculoskeletal: Normal range of motion. No tenderness or deformity.   Neurological: He is alert and oriented to person, place, and time. No cranial nerve deficit.   Skin: Skin is warm and dry. No rash noted.   Psychiatric: His behavior is normal. Judgment and thought content normal.       Lab Review  Glucose (mg/dL)   Date Value   12/10/2020 131 (H)   03/06/2020 108 (H)   11/11/2019 81     Sodium (mmol/L)   Date Value   12/10/2020 140   03/06/2020 142   11/11/2019 144     Potassium (mmol/L)   Date Value   12/10/2020 4.1   03/06/2020 4.4   11/11/2019 4.2 "     Chloride (mmol/L)   Date Value   12/10/2020 104   03/06/2020 104   11/11/2019 106     CO2 (mmol/L)   Date Value   12/10/2020 26.0   03/06/2020 24.8   11/11/2019 26.1     BUN (mg/dL)   Date Value   12/10/2020 17   03/06/2020 17   11/11/2019 18     Creatinine (mg/dL)   Date Value   12/10/2020 1.22   03/06/2020 1.34 (H)   11/11/2019 1.19     Hemoglobin A1C (%)   Date Value   03/05/2021 6.34 (H)   09/11/2020 6.10 (H)   03/06/2020 5.20     Triglycerides (mg/dL)   Date Value   09/11/2020 159 (H)   03/06/2020 83   11/11/2019 84     LDL Cholesterol  (mg/dL)   Date Value   09/11/2020 47   03/06/2020 72   11/11/2019 64       Assessment/Plan      1. Type 2 diabetes mellitus with hyperglycemia, with long-term current use of insulin (CMS/[facility])    2. Mixed hyperlipidemia    3. Essential hypertension    4. Vitamin D deficiency    5. Diabetic polyneuropathy associated with type 2 diabetes mellitus (CMS/[facility])    .    Medications prescribed:  Outpatient Encounter Medications as of 3/11/2021   Medication Sig Dispense Refill   • albuterol sulfate HFA (Ventolin HFA) 108 (90 Base) MCG/ACT inhaler Inhale 2 puffs Every 4 (Four) Hours As Needed for Wheezing. 18 g 11   • Alcohol Swabs ( STERILE ALCOHOL PREP) pads 1 each 4 (Four) Times a Day. 200 each 3   • amitriptyline (ELAVIL) 25 MG tablet Take 25 mg by mouth At Night As Needed.  1   • aspirin 81 MG EC tablet Take 81 mg by mouth Daily.     • atorvastatin (LIPITOR) 40 MG tablet TAKE 1 TABLET BY MOUTH DAILY 30 tablet 4   • baclofen (LIORESAL) 10 MG tablet Take 10 mg by mouth 3 (Three) Times a Day As Needed for Muscle Spasms.     • budesonide-formoterol (SYMBICORT) 160-4.5 MCG/ACT inhaler Inhale 2 puffs Daily As Needed.     • busPIRone (BUSPAR) 5 MG tablet Take 5 mg by mouth 2 (Two) Times a Day.  3   • cyclobenzaprine (FLEXERIL) 10 MG tablet Take 1 tablet by mouth At Night As Needed for Muscle Spasms. 20 tablet 0   • Ergocalciferol (VITAMIN D2 PO) Take 50,000 Units by mouth Every 14  (Fourteen) Days.     • glucose blood (ONE TOUCH ULTRA TEST) test strip Use to text 4 times a day.  [id number] 200 each 11   • glucose blood test strip Use 4x day E.11.49 200 each 11   • HYDROcodone-acetaminophen (NORCO) 5-325 MG per tablet Take 1 tablet by mouth Every 6 (Six) Hours As Needed for Moderate Pain . 20 tablet 0   • Insulin Glargine (BASAGLAR KWIKPEN) 100 UNIT/ML injection pen Inject 13 Units under the skin into the appropriate area as directed every night at bedtime. 2 pen 11   • Insulin Lispro (ADMELOG SOLOSTAR) 100 UNIT/ML injection pen Up to 10 units with meals. 3 pen 11   • Insulin Pen Needle (PEN NEEDLES) 32G X 4 MM misc 1 each 4 (Four) Times a Day. Use 4 x daily, Dx code E11.65 120 each 11   • losartan-hydrochlorothiazide (Hyzaar) 100-25 MG per tablet Take 1 tablet by mouth Daily. 30 tablet 11   • magnesium oxide (MAGOX) 400 (241.3 Mg) MG tablet tablet Take 1 tablet by mouth 2 (Two) Times a Day. 30 each 3   • meloxicam (Mobic) 15 MG tablet Take 1 tablet by mouth Daily. 30 tablet 2   • metFORMIN ER (GLUCOPHAGE-XR) 750 MG 24 hr tablet Take 1 tablet by mouth Daily. 90 tablet 3   • metoprolol succinate XL (TOPROL-XL) 50 MG 24 hr tablet Take 50 mg by mouth Daily.     • naproxen (NAPROSYN) 500 MG tablet Take 500 mg by mouth 2 (Two) Times a Day As Needed for Mild Pain .     • ondansetron ODT (Zofran ODT) 4 MG disintegrating tablet Place 1 tablet on the tongue Every 8 (Eight) Hours As Needed for Nausea or Vomiting. 30 tablet 0   • pantoprazole (PROTONIX) 40 MG EC tablet Take 1 tablet by mouth Daily. 90 tablet 2   • sertraline (ZOLOFT) 100 MG tablet Take 1 tablet by mouth Daily.     • SITagliptin (Januvia) 100 MG tablet Take 1 tablet by mouth Daily. 30 tablet 11   • tamsulosin (FLOMAX) 0.4 MG capsule 24 hr capsule Take 1 capsule by mouth 2 (Two) Times a Day.     • [DISCONTINUED] metoprolol succinate XL (TOPROL-XL) 50 MG 24 hr tablet TAKE 1 TABLET BY MOUTH EVERY DAY 90 tablet 4     No facility-administered  encounter medications on file as of 3/11/2021.       Orders placed during this encounter include:  No orders of the defined types were placed in this encounter.    Glycemic Management     Lab Results   Component Value Date    HGBA1C 6.34 (H) 03/05/2021          Basaglar 13 units  - decrease to 11     Metformin 750 mg XR once a day     Couldn't tolerate trulicity or invokana    Using admelog but small amounts since glucose rarely high, about 2 units for supper    januvia 100 mg daily     Approve for  Insulin pump and or Continuous Glucose Sensor     #1  Patient has diabetes mellitus, insulin-dependent.    #2 He performs blood glucose testing at least 4 times daily and blood glucose log was brought to office with variability from   #3  He is requiring  Basal insulin  and Prandial Insulin for a total of at least  4 injections or boluses per day and has been doing this for at least 6 months     #4 Patient tests blood sugars at least 4 times daily and makes frequent self-adjustments based on information provided by fingerstick and patient is injecting insulin at least 4 times daily. He has been doing this for more than 6 months . He tests frequently due to hypoglycemia and hyperglycemia.   He has frequent variability with activity     #5 I have personally seen patient within the past 6 months    #6 We plan on seeing her every 2-3 months for continuous adjustment of her diabetes regimen     #7 patient has hypoglycemia with episodes of unawareness.    #8 patient has day-to-day variation in her mealtime which confounds the degree of insulin dosing with multiple daily injections.    #9 patient has completed diabetes education program with us.    #10 He has demonstrated the ability to self monitor her glucose.        #11 Patient is motivated in improving  diabetes control              Lipid Management        Atorvastatin 40 mg qhs      Lab Results   Component Value Date    CHOL 108 09/11/2020    CHLPL 139 11/04/2016     TRIG 159 (H) 09/11/2020    HDL 29 (L) 09/11/2020    LDL 47 09/11/2020             Non fasting      Blood Pressure Management     Per Jonathon, losartan, hctz  And toprol    Allergic to lisinopril         Microvascular Complication Monitoring: No Microalbuminuria, No Diabetic Retinopathy, positive  Diabetic Neuropathy        Neuropathy     Taking Neurontin before, resolved        Component      Latest Ref Rng & Units 7/12/2018 7/12/2018           9:34 AM  9:34 AM   Protein/Creatinine Ratio, Urine      0.0 - 200.0 mg/G Crea 59.1     Creatinine, Urine      mg/dL 211.6 211.6   Total Protein, Urine      mg/dL 12.5     Microalbumin/Creatinine Ratio      0.0 - 30.0 mg/g   37.3 (H)   Microalbumin, Urine      mg/L   7.9                  Preventive Care: Patient is not smoking        Weight Related: Obesity, Counseled on nutrition, Counseled on physical activity     Patient's Body mass index is 35.19 kg/m². BMI is above normal parameters. Recommendations include: educational material.       Decrease daily caloric intake by 500 calories per day        Dietary changes     Handout given diet and diabetes      Bone Health     Vitamin d def     Nov. 2016      Vit d - normal      Vitamin d Oct 2017     29     Start otc 1000 units daily               Component      Latest Ref Rng & Units 3/5/2019   25 Hydroxy, Vitamin D      30.0 - 100.0 ng/ml 31.5                      Lab Results   Component Value Date     [id number] 03/05/2019                  Component      Latest Ref Rng & Units 4/11/2019   Hepatitis B Surface Ag      Non-Reactive Non-Reactive   Hep A IgM      Non-Reactive Non-Reactive   Hep B Core IgM      Non-Reactive Non-Reactive   Hepatitis C Ab      Non-Reactive Non-Reactive               4. Follow-up: No follow-ups on file.

## 2021-03-12 RX ORDER — PANTOPRAZOLE SODIUM 40 MG/1
40 TABLET, DELAYED RELEASE ORAL DAILY
Qty: 90 TABLET | Refills: 2 | Status: SHIPPED | OUTPATIENT
Start: 2021-03-12 | End: 2021-12-29 | Stop reason: SDUPTHER

## 2021-03-18 ENCOUNTER — DOCUMENTATION (OUTPATIENT)
Dept: ENDOCRINOLOGY | Facility: CLINIC | Age: 56
End: 2021-03-18

## 2021-03-18 NOTE — PROGRESS NOTES
[facility]

## 2021-03-23 ENCOUNTER — IMMUNIZATION (OUTPATIENT)
Dept: VACCINE CLINIC | Facility: HOSPITAL | Age: 56
End: 2021-03-23

## 2021-03-23 PROCEDURE — 0001A: CPT | Performed by: THORACIC SURGERY (CARDIOTHORACIC VASCULAR SURGERY)

## 2021-03-23 PROCEDURE — 91300 HC SARSCOV02 VAC 30MCG/0.3ML IM: CPT | Performed by: THORACIC SURGERY (CARDIOTHORACIC VASCULAR SURGERY)

## 2021-03-27 RX ORDER — SITAGLIPTIN 100 MG/1
TABLET, FILM COATED ORAL
Qty: 30 TABLET | Refills: 11 | Status: SHIPPED | OUTPATIENT
Start: 2021-03-27 | End: 2021-06-03

## 2021-04-12 DIAGNOSIS — E78.2 MIXED HYPERLIPIDEMIA: ICD-10-CM

## 2021-04-12 RX ORDER — ATORVASTATIN CALCIUM 40 MG/1
40 TABLET, FILM COATED ORAL DAILY
Qty: 30 TABLET | Refills: 4 | Status: SHIPPED | OUTPATIENT
Start: 2021-04-12 | End: 2021-06-03 | Stop reason: SDUPTHER

## 2021-04-13 ENCOUNTER — IMMUNIZATION (OUTPATIENT)
Dept: VACCINE CLINIC | Facility: HOSPITAL | Age: 56
End: 2021-04-13

## 2021-04-13 PROCEDURE — 91300 HC SARSCOV02 VAC 30MCG/0.3ML IM: CPT | Performed by: THORACIC SURGERY (CARDIOTHORACIC VASCULAR SURGERY)

## 2021-04-13 PROCEDURE — 0002A: CPT | Performed by: THORACIC SURGERY (CARDIOTHORACIC VASCULAR SURGERY)

## 2021-05-03 RX ORDER — NAPROXEN 500 MG/1
TABLET ORAL
Qty: 60 TABLET | Refills: 2 | OUTPATIENT
Start: 2021-05-03

## 2021-05-10 ENCOUNTER — DOCUMENTATION (OUTPATIENT)
Dept: ENDOCRINOLOGY | Facility: CLINIC | Age: 56
End: 2021-05-10

## 2021-05-10 NOTE — PROGRESS NOTES
[facility] canceled Myrna 2 order because the pt called to place order on hold due to out of pocket cost.   Therefore, [facility] canceled until the pt calls to reactivate it.

## 2021-06-03 ENCOUNTER — LAB (OUTPATIENT)
Dept: LAB | Facility: HOSPITAL | Age: 56
End: 2021-06-03

## 2021-06-03 ENCOUNTER — OFFICE VISIT (OUTPATIENT)
Dept: FAMILY MEDICINE CLINIC | Facility: CLINIC | Age: 56
End: 2021-06-03

## 2021-06-03 VITALS
WEIGHT: 243.6 LBS | BODY MASS INDEX: 36.08 KG/M2 | HEIGHT: 69 IN | DIASTOLIC BLOOD PRESSURE: 88 MMHG | HEART RATE: 67 BPM | SYSTOLIC BLOOD PRESSURE: 122 MMHG | OXYGEN SATURATION: 97 %

## 2021-06-03 DIAGNOSIS — Z79.4 TYPE 2 DIABETES MELLITUS WITHOUT COMPLICATION, WITH LONG-TERM CURRENT USE OF INSULIN (HCC): ICD-10-CM

## 2021-06-03 DIAGNOSIS — E11.42 DIABETIC POLYNEUROPATHY ASSOCIATED WITH TYPE 2 DIABETES MELLITUS (HCC): ICD-10-CM

## 2021-06-03 DIAGNOSIS — F07.81 POST CONCUSSION SYNDROME: ICD-10-CM

## 2021-06-03 DIAGNOSIS — E78.2 MIXED HYPERLIPIDEMIA: ICD-10-CM

## 2021-06-03 DIAGNOSIS — F32.5 MAJOR DEPRESSIVE DISORDER WITH SINGLE EPISODE, IN FULL REMISSION (HCC): Chronic | ICD-10-CM

## 2021-06-03 DIAGNOSIS — Z00.00 MEDICARE ANNUAL WELLNESS VISIT, SUBSEQUENT: Primary | ICD-10-CM

## 2021-06-03 DIAGNOSIS — E11.9 TYPE 2 DIABETES MELLITUS WITHOUT COMPLICATION, WITH LONG-TERM CURRENT USE OF INSULIN (HCC): ICD-10-CM

## 2021-06-03 LAB
ANION GAP SERPL CALCULATED.3IONS-SCNC: 9.4 MMOL/L (ref 5–15)
BUN SERPL-MCNC: 14 MG/DL (ref 6–20)
BUN/CREAT SERPL: 13.1 (ref 7–25)
CALCIUM SPEC-SCNC: 8.8 MG/DL (ref 8.6–10.5)
CHLORIDE SERPL-SCNC: 105 MMOL/L (ref 98–107)
CO2 SERPL-SCNC: 23.6 MMOL/L (ref 22–29)
CREAT SERPL-MCNC: 1.07 MG/DL (ref 0.76–1.27)
GFR SERPL CREATININE-BSD FRML MDRD: 86 ML/MIN/1.73
GLUCOSE SERPL-MCNC: 173 MG/DL (ref 65–99)
HBA1C MFR BLD: 6.99 % (ref 4.8–5.6)
POTASSIUM SERPL-SCNC: 3.9 MMOL/L (ref 3.5–5.2)
SODIUM SERPL-SCNC: 138 MMOL/L (ref 136–145)

## 2021-06-03 PROCEDURE — G0439 PPPS, SUBSEQ VISIT: HCPCS | Performed by: STUDENT IN AN ORGANIZED HEALTH CARE EDUCATION/TRAINING PROGRAM

## 2021-06-03 PROCEDURE — 80048 BASIC METABOLIC PNL TOTAL CA: CPT

## 2021-06-03 PROCEDURE — 83036 HEMOGLOBIN GLYCOSYLATED A1C: CPT

## 2021-06-03 PROCEDURE — 36415 COLL VENOUS BLD VENIPUNCTURE: CPT

## 2021-06-03 RX ORDER — BUSPIRONE HYDROCHLORIDE 5 MG/1
5 TABLET ORAL 2 TIMES DAILY
Qty: 180 TABLET | Refills: 3 | Status: SHIPPED | OUTPATIENT
Start: 2021-06-03 | End: 2022-06-10 | Stop reason: SDUPTHER

## 2021-06-03 RX ORDER — TAMSULOSIN HYDROCHLORIDE 0.4 MG/1
1 CAPSULE ORAL NIGHTLY
Qty: 30 CAPSULE | Refills: 3 | Status: SHIPPED | OUTPATIENT
Start: 2021-06-03 | End: 2021-09-10 | Stop reason: SDUPTHER

## 2021-06-03 RX ORDER — SERTRALINE HYDROCHLORIDE 100 MG/1
100 TABLET, FILM COATED ORAL DAILY
Qty: 90 TABLET | Refills: 3 | Status: SHIPPED | OUTPATIENT
Start: 2021-06-03 | End: 2021-09-10

## 2021-06-03 RX ORDER — ATORVASTATIN CALCIUM 40 MG/1
40 TABLET, FILM COATED ORAL DAILY
Qty: 30 TABLET | Refills: 4 | Status: SHIPPED | OUTPATIENT
Start: 2021-06-03 | End: 2021-11-18 | Stop reason: SDUPTHER

## 2021-06-03 RX ORDER — NAPROXEN 500 MG/1
500 TABLET ORAL 2 TIMES DAILY PRN
Qty: 60 TABLET | Refills: 2 | Status: SHIPPED | OUTPATIENT
Start: 2021-06-03 | End: 2021-09-09 | Stop reason: SDUPTHER

## 2021-06-03 RX ORDER — INSULIN GLARGINE 100 [IU]/ML
INJECTION, SOLUTION SUBCUTANEOUS
Qty: 2 PEN | Refills: 11 | Status: SHIPPED | OUTPATIENT
Start: 2021-06-03 | End: 2021-08-06

## 2021-06-03 RX ORDER — ONDANSETRON 4 MG/1
4 TABLET, ORALLY DISINTEGRATING ORAL EVERY 8 HOURS PRN
Qty: 30 TABLET | Refills: 0 | Status: SHIPPED | OUTPATIENT
Start: 2021-06-03 | End: 2021-11-18 | Stop reason: SDUPTHER

## 2021-06-03 RX ORDER — METFORMIN HYDROCHLORIDE 750 MG/1
750 TABLET, EXTENDED RELEASE ORAL DAILY
Qty: 90 TABLET | Refills: 3 | Status: SHIPPED | OUTPATIENT
Start: 2021-06-03 | End: 2022-02-09 | Stop reason: DRUGHIGH

## 2021-06-03 NOTE — PROGRESS NOTES
The ABCs of the Annual Wellness Visit  Initial Medicare Wellness Visit    Chief Complaint   Patient presents with   • Medicare Wellness-subsequent       Subjective   History of Present Illness:  Nirav Lind is a 56 y.o. male who presents for an Initial Medicare Wellness Visit.    HEALTH RISK ASSESSMENT    Recent Hospitalizations:  No hospitalization(s) within the last year.    Current Medical Providers:  Patient Care Team:  Charles Olivares MD as PCP - General (Family Medicine)  Charles Olivares MD as Resident (Family Medicine)  Kulwinder Farris III, MD as Resident (Family Medicine)  Kemal Alvarado MD as Resident (Family Medicine)  Cortney Fernando MD as Resident (Family Medicine)  Bernice Zarate MA (Inactive) as Medical Assistant    Smoking Status:  Social History     Tobacco Use   Smoking Status Former Smoker   • Packs/day: 0.25   • Years: 15.00   • Pack years: 3.75   • Types: Cigarettes   • Quit date:    • Years since quittin.4   Smokeless Tobacco Never Used       Alcohol Consumption:  Social History     Substance and Sexual Activity   Alcohol Use No       Depression Screen:   PHQ-2/PHQ-9 Depression Screening 6/3/2021   Little interest or pleasure in doing things 0   Feeling down, depressed, or hopeless 0   Trouble falling or staying asleep, or sleeping too much -   Feeling tired or having little energy -   Poor appetite or overeating -   Feeling bad about yourself - or that you are a failure or have let yourself or your family down -   Trouble concentrating on things, such as reading the newspaper or watching television -   Moving or speaking so slowly that other people could have noticed. Or the opposite - being so fidgety or restless that you have been moving around a lot more than usual -   Thoughts that you would be better off dead, or of hurting yourself in some way -   Total Score 0   If you checked off any problems, how difficult have these problems made it for you to do  your work, take care of things at home, or get along with other people? -       Fall Risk Screen:  DOLLY Fall Risk Assessment has not been completed.    Health Habits and Functional and Cognitive Screening:  Functional & Cognitive Status 6/3/2021   Do you have difficulty preparing food and eating? No   Do you have difficulty bathing yourself, getting dressed or grooming yourself? No   Do you have difficulty using the toilet? No   Do you have difficulty moving around from place to place? No   Do you have trouble with steps or getting out of a bed or a chair? No   Current Diet Frequent Junk Food   Dental Exam Up to date   Eye Exam Up to date   Exercise (times per week) 0 times per week   Current Exercise Activities Include None   Do you need help using the phone?  No   Are you deaf or do you have serious difficulty hearing?  No   Do you need help with transportation? No   Do you need help shopping? No   Do you need help preparing meals?  No   Do you need help with housework?  No   Do you need help with laundry? No   Do you need help managing money? No   Do you ever drive or ride in a car without wearing a seat belt? No   Have you felt unusual stress, anger or loneliness in the last month? Yes   Who do you live with? Spouse   If you need help, do you have trouble finding someone available to you? No   Have you been bothered in the last four weeks by sexual problems? Yes   Do you have difficulty concentrating, remembering or making decisions? No         Does the patient have evidence of cognitive impairment? No    Asprin use counseling:Taking ASA appropriately as indicated    Age-appropriate Screening Schedule:  Refer to the list below for future screening recommendations based on patient's age, sex and/or medical conditions. Orders for these recommended tests are listed in the plan section. The patient has been provided with a written plan.    Health Maintenance   Topic Date Due   • ZOSTER VACCINE (1 of 2) Never done    • DIABETIC EYE EXAM  03/05/2020   • DIABETIC FOOT EXAM  06/12/2021   • INFLUENZA VACCINE  08/01/2021   • HEMOGLOBIN A1C  09/05/2021   • LIPID PANEL  09/11/2021   • URINE MICROALBUMIN  09/11/2021   • TDAP/TD VACCINES (3 - Td or Tdap) 10/16/2029          The following portions of the patient's history were reviewed and updated as appropriate: allergies, current medications, past family history, past medical history, past social history, past surgical history and problem list.    Outpatient Medications Prior to Visit   Medication Sig Dispense Refill   • albuterol sulfate HFA (Ventolin HFA) 108 (90 Base) MCG/ACT inhaler Inhale 2 puffs Every 4 (Four) Hours As Needed for Wheezing. 18 g 11   • Alcohol Swabs ( STERILE ALCOHOL PREP) pads 1 each 4 (Four) Times a Day. 200 each 3   • amitriptyline (ELAVIL) 25 MG tablet Take 25 mg by mouth At Night As Needed.  1   • aspirin 81 MG EC tablet Take 81 mg by mouth Daily.     • baclofen (LIORESAL) 10 MG tablet Take 10 mg by mouth 3 (Three) Times a Day As Needed for Muscle Spasms.     • budesonide-formoterol (SYMBICORT) 160-4.5 MCG/ACT inhaler Inhale 2 puffs Daily As Needed.     • cyclobenzaprine (FLEXERIL) 10 MG tablet Take 1 tablet by mouth At Night As Needed for Muscle Spasms. 20 tablet 0   • Ergocalciferol (VITAMIN D2 PO) Take 50,000 Units by mouth Every 14 (Fourteen) Days.     • glucose blood (ONE TOUCH ULTRA TEST) test strip Use to text 4 times a day.  Dx-e11.49 200 each 11   • glucose blood test strip Use 4x day E.11.49 200 each 11   • HYDROcodone-acetaminophen (NORCO) 5-325 MG per tablet Take 1 tablet by mouth Every 6 (Six) Hours As Needed for Moderate Pain . 20 tablet 0   • icosapent ethyl (Vascepa) 1 g capsule capsule Take 2 g by mouth 2 (Two) Times a Day With Meals. Pharmacy copay card BIN# [id number] PCN# CN GRP# ECVASCEPA ID# [id number] 120 capsule 11   • Insulin Lispro (ADMELOG SOLOSTAR) 100 UNIT/ML injection pen Up to 10 units with meals. 3 pen 11   • Insulin Pen Needle  (PEN NEEDLES) 32G X 4 MM misc 1 each 4 (Four) Times a Day. Use 4 x daily, Dx code E11.65 120 each 11   • losartan-hydrochlorothiazide (Hyzaar) 100-25 MG per tablet Take 1 tablet by mouth Daily. 30 tablet 11   • meloxicam (Mobic) 15 MG tablet Take 1 tablet by mouth Daily. 30 tablet 2   • metoprolol succinate XL (TOPROL-XL) 50 MG 24 hr tablet Take 50 mg by mouth Daily.     • pantoprazole (PROTONIX) 40 MG EC tablet TAKE 1 TABLET BY MOUTH DAILY 90 tablet 2   • atorvastatin (LIPITOR) 40 MG tablet TAKE 1 TABLET BY MOUTH DAILY 30 tablet 4   • busPIRone (BUSPAR) 5 MG tablet Take 5 mg by mouth 2 (Two) Times a Day.  3   • Insulin Glargine (BASAGLAR KWIKPEN) 100 UNIT/ML injection pen Inject 13 Units under the skin into the appropriate area as directed every night at bedtime. 2 pen 11   • Januvia 100 MG tablet TAKE 1 TABLET BY MOUTH DAILY 30 tablet 11   • magnesium oxide (MAGOX) 400 (241.3 Mg) MG tablet tablet Take 1 tablet by mouth 2 (Two) Times a Day. 30 each 3   • metFORMIN ER (GLUCOPHAGE-XR) 750 MG 24 hr tablet Take 1 tablet by mouth Daily. 90 tablet 3   • naproxen (NAPROSYN) 500 MG tablet Take 500 mg by mouth 2 (Two) Times a Day As Needed for Mild Pain .     • ondansetron ODT (Zofran ODT) 4 MG disintegrating tablet Place 1 tablet on the tongue Every 8 (Eight) Hours As Needed for Nausea or Vomiting. 30 tablet 0   • sertraline (ZOLOFT) 100 MG tablet Take 1 tablet by mouth Daily.     • tamsulosin (FLOMAX) 0.4 MG capsule 24 hr capsule Take 1 capsule by mouth 2 (Two) Times a Day.       No facility-administered medications prior to visit.       Patient Active Problem List   Diagnosis   • DM (diabetes mellitus), type 2 with neurological complications (CMS/HCC)   • Hyperlipidemia   • Essential hypertension   • Vitamin D deficiency   • Villous adenoma of colon   • Urgency of urination   • Infected hydrocele   • Hemangioma of liver   • Ex-smoker   • Dizziness   • Coronary arteriosclerosis   • Chronic back pain   • Anemia   •  Allergic rhinitis   • Abnormal computed tomography scan   • Weakness   • Class 2 severe obesity due to excess calories with serious comorbidity and body mass index (BMI) of 35.0 to 35.9 in adult (CMS/[facility])   • Spondylolisthesis at L3-L4 level   • DDD (degenerative disc disease), lumbar   • Major depressive disorder with single episode, in full remission (CMS/[facility])   • COPD (chronic obstructive pulmonary disease) (CMS/[facility])   • Type 2 diabetes mellitus with skin complication, with long-term current use of insulin (CMS/[facility])   • Left ventricular diastolic dysfunction   • Right knee pain   • Tear of lateral meniscus of right knee, current   • Polyp of colon   • Status post arthroscopy of right knee   • Sciatica of right side       Advanced Care Planning:  ACP discussion was held with the patient during this visit. Patient does not have an advance directive, information provided.    Review of Systems   Constitutional: Negative for appetite change, diaphoresis, fatigue and fever.   HENT: Negative for ear pain, postnasal drip, rhinorrhea and sore throat.    Eyes: Negative for pain and visual disturbance.   Respiratory: Negative for cough, chest tightness and shortness of breath.    Cardiovascular: Negative for chest pain, palpitations and leg swelling.   Gastrointestinal: Negative for abdominal pain, constipation, diarrhea, nausea and vomiting.   Genitourinary: Negative for dysuria, flank pain, frequency and urgency.   Musculoskeletal: Negative for arthralgias, back pain and myalgias.   Skin: Negative for pallor and rash.   Neurological: Negative for dizziness, seizures, syncope, weakness and numbness.   Psychiatric/Behavioral: Negative for agitation, confusion, decreased concentration and dysphoric mood.       Compared to one year ago, the patient feels his physical health is the same.  Compared to one year ago, the patient feels his mental health is the same.    Reviewed chart for potential of high risk medication in the  "elderly: yes  Reviewed chart for potential of harmful drug interactions in the elderly:yes    Objective         Vitals:    06/03/21 0846   BP: 122/88   Pulse: 67   SpO2: 97%   Weight: 110 kg (243 lb 9.6 oz)   Height: 175.3 cm (69\")   PainSc: 0-No pain       Body mass index is 35.97 kg/m².  Discussed the patient's BMI with him. The BMI is above average; BMI management plan is completed.    Physical Exam  Constitutional:       General: He is not in acute distress.     Appearance: He is obese. He is not ill-appearing, toxic-appearing or diaphoretic.   HENT:      Head: Normocephalic and atraumatic.      Nose: Nose normal.      Mouth/Throat:      Mouth: Mucous membranes are moist.   Eyes:      Extraocular Movements: Extraocular movements intact.      Pupils: Pupils are equal, round, and reactive to light.   Cardiovascular:      Pulses: Normal pulses.   Pulmonary:      Effort: Pulmonary effort is normal.      Breath sounds: No wheezing or rales.   Abdominal:      General: Bowel sounds are normal.      Palpations: Abdomen is soft.   Musculoskeletal:      Cervical back: Normal range of motion.      Right lower leg: No edema.      Left lower leg: No edema.   Skin:     General: Skin is warm.      Capillary Refill: Capillary refill takes less than 2 seconds.   Neurological:      Mental Status: He is alert.               Assessment/Plan   Medicare Risks and Personalized Health Plan  CMS Preventative Services Quick Reference  Advance Directive Discussion  Colon Cancer Screening  Depression/Dysphoria  Inactivity/Sedentary  Obesity/Overweight   Prostate Cancer Screening     The above risks/problems have been discussed with the patient.  Pertinent information has been shared with the patient in the After Visit Summary.  Follow up plans and orders are seen below in the Assessment/Plan Section.    Diagnoses and all orders for this visit:    1. Medicare annual wellness visit, subsequent (Primary)    2. Diabetic polyneuropathy " associated with type 2 diabetes mellitus (CMS/[facility])  -     metFORMIN ER (GLUCOPHAGE-XR) 750 MG 24 hr tablet; Take 1 tablet by mouth Daily.  Dispense: 90 tablet; Refill: 3  -     Hemoglobin A1c; Future  -     Basic metabolic panel; Future    3. Major depressive disorder with single episode, in full remission (CMS/[facility])  -     busPIRone (BUSPAR) 5 MG tablet; Take 1 tablet by mouth 2 (Two) Times a Day.  Dispense: 180 tablet; Refill: 3  -     sertraline (ZOLOFT) 100 MG tablet; Take 1 tablet by mouth Daily.  Dispense: 90 tablet; Refill: 3    4. Mixed hyperlipidemia  -     atorvastatin (LIPITOR) 40 MG tablet; Take 1 tablet by mouth Daily.  Dispense: 30 tablet; Refill: 4    5. Post concussion syndrome  -     ondansetron ODT (Zofran ODT) 4 MG disintegrating tablet; Place 1 tablet on the tongue Every 8 (Eight) Hours As Needed for Nausea or Vomiting.  Dispense: 30 tablet; Refill: 0    6. Type 2 diabetes mellitus without complication, with long-term current use of insulin (CMS/[facility])  If A1c still at goal would d/c basaglar and continue only metformin  -     Insulin Glargine (BASAGLAR KWIKPEN) 100 UNIT/ML injection pen; Inject 13 Units under the skin into the appropriate area as directed every night at bedtime.  Dispense: 2 pen; Refill: 11  -     metFORMIN ER (GLUCOPHAGE-XR) 750 MG 24 hr tablet; Take 1 tablet by mouth Daily.  Dispense: 90 tablet; Refill: 3  -     Hemoglobin A1c; Future  -     Basic metabolic panel; Future    Other orders  -     tamsulosin (FLOMAX) 0.4 MG capsule 24 hr capsule; Take 1 capsule by mouth Every Night.  Dispense: 30 capsule; Refill: 3  -     naproxen (NAPROSYN) 500 MG tablet; Take 1 tablet by mouth 2 (Two) Times a Day As Needed for Mild Pain .  Dispense: 60 tablet; Refill: 2  -     magnesium oxide (MAGOX) 400 (241.3 Mg) MG tablet tablet; Take 1 tablet by mouth 2 (Two) Times a Day.  Dispense: 30 each; Refill: 3      Follow Up:  Return in about 6 months (around 12/3/2021) for Recheck.     An After Visit  Summary and PPPS were given to the patient.         Charles Olivares M.D. PGY3  [facility]  [address] [place] [address]  Office: [phone]    This document has been electronically signed by Charles Olivares MD on Blessing 3, 2021 10:05 CDT

## 2021-08-04 DIAGNOSIS — E11.9 TYPE 2 DIABETES MELLITUS WITHOUT COMPLICATION, WITH LONG-TERM CURRENT USE OF INSULIN (HCC): ICD-10-CM

## 2021-08-04 DIAGNOSIS — Z79.4 TYPE 2 DIABETES MELLITUS WITHOUT COMPLICATION, WITH LONG-TERM CURRENT USE OF INSULIN (HCC): ICD-10-CM

## 2021-08-06 RX ORDER — INSULIN GLARGINE 100 [IU]/ML
INJECTION, SOLUTION SUBCUTANEOUS
Qty: 6 ML | Refills: 4 | Status: SHIPPED | OUTPATIENT
Start: 2021-08-06 | End: 2022-04-13

## 2021-08-06 RX ORDER — ERGOCALCIFEROL 1.25 MG/1
50000 CAPSULE ORAL
COMMUNITY
Start: 2021-07-02 | End: 2021-09-10

## 2021-09-08 ENCOUNTER — TELEPHONE (OUTPATIENT)
Dept: ENDOCRINOLOGY | Facility: CLINIC | Age: 56
End: 2021-09-08

## 2021-09-09 RX ORDER — NAPROXEN 500 MG/1
500 TABLET ORAL 2 TIMES DAILY PRN
Qty: 60 TABLET | Refills: 2 | Status: SHIPPED | OUTPATIENT
Start: 2021-09-09 | End: 2021-11-18

## 2021-09-10 ENCOUNTER — OFFICE VISIT (OUTPATIENT)
Dept: ENDOCRINOLOGY | Facility: CLINIC | Age: 56
End: 2021-09-10

## 2021-09-10 VITALS
BODY MASS INDEX: 33.6 KG/M2 | WEIGHT: 240 LBS | DIASTOLIC BLOOD PRESSURE: 70 MMHG | HEART RATE: 60 BPM | OXYGEN SATURATION: 97 % | SYSTOLIC BLOOD PRESSURE: 120 MMHG | HEIGHT: 71 IN

## 2021-09-10 DIAGNOSIS — E11.65 TYPE 2 DIABETES MELLITUS WITH HYPERGLYCEMIA, WITH LONG-TERM CURRENT USE OF INSULIN (HCC): Primary | ICD-10-CM

## 2021-09-10 DIAGNOSIS — Z79.4 TYPE 2 DIABETES MELLITUS WITH HYPERGLYCEMIA, WITH LONG-TERM CURRENT USE OF INSULIN (HCC): Primary | ICD-10-CM

## 2021-09-10 DIAGNOSIS — E78.2 MIXED HYPERLIPIDEMIA: ICD-10-CM

## 2021-09-10 DIAGNOSIS — E55.9 VITAMIN D DEFICIENCY: ICD-10-CM

## 2021-09-10 PROCEDURE — 99214 OFFICE O/P EST MOD 30 MIN: CPT | Performed by: INTERNAL MEDICINE

## 2021-09-10 RX ORDER — SEMAGLUTIDE 1.34 MG/ML
0.5 INJECTION, SOLUTION SUBCUTANEOUS WEEKLY
Qty: 1 PEN | Refills: 11 | Status: SHIPPED | OUTPATIENT
Start: 2021-09-10 | End: 2021-12-13

## 2021-09-10 NOTE — PROGRESS NOTES
"  Subjective    Nirav Lind is a 56 y.o. male. he is here today for follow-up of diabetes    HPI    t2dm since age 51 w/o complications w good control        PE    /70   Pulse 60   Ht 180.3 cm (71\")   Wt 109 kg (240 lb)   SpO2 97%   BMI 33.47 kg/m²   AOx3  No visible goiter  Normal Respiratory Effort , Lung CTA  RRR  No Edema    Labs    Lab Results   Component Value Date    WBC 8.06 09/11/2020    HGB 13.6 09/11/2020    HCT 41.8 09/11/2020    MCV 84.4 09/11/2020     09/11/2020     Lab Results   Component Value Date    GLUCOSE 173 (H) 06/03/2021    BUN 14 06/03/2021    CREATININE 1.07 06/03/2021    EGFRIFAFRI 86 06/03/2021    BCR 13.1 06/03/2021    K 3.9 06/03/2021    CO2 23.6 06/03/2021    CALCIUM 8.8 06/03/2021    ALBUMIN 4.30 03/06/2020    AST 24 03/06/2020    ALT 34 03/06/2020         Assessment/Plan      1. Type 2 diabetes mellitus with hyperglycemia, with long-term current use of insulin (CMS/[facility])    2. Mixed hyperlipidemia    3. Vitamin D deficiency    .    Medications prescribed:      Orders placed during this encounter include:  No orders of the defined types were placed in this encounter.    Glycemic Management     Lab Results   Component Value Date    HGBA1C 6.99 (H) 06/03/2021          Basaglar 13 units        Metformin 750 mg XR once a day     Couldn't tolerate trulicity or invokana  Start ozempic , low dose 0.125 mg  Weekly       Using admelog but small amounts since glucose rarely high, about 2 units for supper    januvia too expensive     Myrna too expensive                   Lipid Management        Atorvastatin 40 mg qhs      Lab Results   Component Value Date    CHOL 108 09/11/2020    CHLPL 139 11/04/2016    TRIG 159 (H) 09/11/2020    HDL 29 (L) 09/11/2020    LDL 47 09/11/2020           Non fasting      Blood Pressure Management     Per Jonathon, losartan, hctz  And toprol    Allergic to lisinopril         Microvascular Complication Monitoring: No Microalbuminuria, No Diabetic " "Retinopathy, positive  Diabetic Neuropathy        Neuropathy     Taking Neurontin before, resolved        Component      Latest Ref Rng & Units 7/12/2018 7/12/2018           9:34 AM  9:34 AM   Protein/Creatinine Ratio, Urine      0.0 - 200.0 mg/G Crea 59.1     Creatinine, Urine      mg/dL 211.6 211.6   Total Protein, Urine      mg/dL 12.5     Microalbumin/Creatinine Ratio      0.0 - 30.0 mg/g   37.3 (H)   Microalbumin, Urine      mg/L   7.9                  Preventive Care: Patient is not smoking        Weight Related: Obesity, Counseled on nutrition, Counseled on physical activity     Patient's Body mass index is 33.47 kg/m². BMI is above normal parameters. Recommendations include: educational material.       Decrease daily caloric intake by 500 calories per day        Dietary changes     Handout given diet and diabetes      Bone Health     Vitamin d def     Nov. 2016      Vit d - normal      Vitamin d Oct 2017     29     Start otc 1000 units daily           Lab Results   Component Value Date    LTAR17JZ 39.3 09/11/2020   \"               Lab Results   Component Value Date     OIMHCQKZ16 413 03/05/2019                  Component      Latest Ref Rng & Units 4/11/2019   Hepatitis B Surface Ag      Non-Reactive Non-Reactive   Hep A IgM      Non-Reactive Non-Reactive   Hep B Core IgM      Non-Reactive Non-Reactive   Hepatitis C Ab      Non-Reactive Non-Reactive               4. Follow-up: No follow-ups on file.                   "

## 2021-09-15 RX ORDER — TAMSULOSIN HYDROCHLORIDE 0.4 MG/1
1 CAPSULE ORAL NIGHTLY
Qty: 30 CAPSULE | Refills: 3 | Status: SHIPPED | OUTPATIENT
Start: 2021-09-15 | End: 2021-11-18 | Stop reason: SDUPTHER

## 2021-09-20 ENCOUNTER — DOCUMENTATION (OUTPATIENT)
Dept: ENDOCRINOLOGY | Facility: CLINIC | Age: 56
End: 2021-09-20

## 2021-09-21 ENCOUNTER — TELEPHONE (OUTPATIENT)
Dept: FAMILY MEDICINE CLINIC | Facility: CLINIC | Age: 56
End: 2021-09-21

## 2021-09-21 NOTE — TELEPHONE ENCOUNTER
Incoming Refill Request      Medication requested (name and dose):     vitamin D (ERGOCALCIFEROL) 1.25 MG (52944 UT) capsule capsule        Pharmacy where request should be sent:     [id number] [place] [id number] [facility] - [phone] [facility] [phone] FX    Additional details provided by patient:     Best call back number: [phone]     Does the patient have less than a 3 day supply:  [x] Yes  [] No    Brenda Yost  09/21/21, 11:43 CDT

## 2021-11-18 DIAGNOSIS — F07.81 POST CONCUSSION SYNDROME: ICD-10-CM

## 2021-11-18 DIAGNOSIS — E78.2 MIXED HYPERLIPIDEMIA: ICD-10-CM

## 2021-11-18 RX ORDER — TAMSULOSIN HYDROCHLORIDE 0.4 MG/1
1 CAPSULE ORAL NIGHTLY
Qty: 30 CAPSULE | Refills: 3 | Status: SHIPPED | OUTPATIENT
Start: 2021-11-18 | End: 2021-12-03 | Stop reason: SDUPTHER

## 2021-11-18 RX ORDER — NAPROXEN 500 MG/1
TABLET ORAL
Qty: 60 TABLET | Refills: 0 | Status: SHIPPED | OUTPATIENT
Start: 2021-11-18 | End: 2022-02-09

## 2021-11-18 RX ORDER — ONDANSETRON 4 MG/1
4 TABLET, ORALLY DISINTEGRATING ORAL EVERY 8 HOURS PRN
Qty: 30 TABLET | Refills: 0 | Status: SHIPPED | OUTPATIENT
Start: 2021-11-18 | End: 2022-04-01 | Stop reason: SDUPTHER

## 2021-11-18 RX ORDER — ATORVASTATIN CALCIUM 40 MG/1
40 TABLET, FILM COATED ORAL DAILY
Qty: 30 TABLET | Refills: 4 | Status: SHIPPED | OUTPATIENT
Start: 2021-11-18 | End: 2022-04-14 | Stop reason: SDUPTHER

## 2021-11-18 NOTE — TELEPHONE ENCOUNTER
Incoming Refill Request      Medication requested (name and dose): tamsulosin (FLOMAX) 0.4 MG capsule 24 hr capsule, ondansetron ODT (Zofran ODT) 4 MG disintegrating tablet,     Pharmacy where request should be sent: [facility]    Additional details provided by patient: COMPLETELY OUT    Best call back number: [phone]    Does the patient have less than a 3 day supply:  [x] Yes  [] No    Elayne Harris Workman  11/18/21, 12:14 CST

## 2021-11-22 ENCOUNTER — TELEPHONE (OUTPATIENT)
Dept: FAMILY MEDICINE CLINIC | Facility: CLINIC | Age: 56
End: 2021-11-22

## 2021-11-22 DIAGNOSIS — Z12.11 ENCOUNTER FOR SCREENING COLONOSCOPY: Primary | ICD-10-CM

## 2021-11-22 NOTE — TELEPHONE ENCOUNTER
PT WIFE CALLED STATING THAT HE RECEIVED A LETTER IN THE MAIL FROM  SUGGESTING THAT HE GET A COLONOSCOPY. PT WIFE IS WANTING TO KNOW IF A REFERRAL COULD BE SENT TO [facility] AT [facility] FOR THIS.    HER CALL BACK NUMBER [phone]

## 2021-11-24 ENCOUNTER — OFFICE VISIT (OUTPATIENT)
Dept: GASTROENTEROLOGY | Facility: CLINIC | Age: 56
End: 2021-11-24

## 2021-11-24 VITALS
HEIGHT: 71 IN | HEART RATE: 65 BPM | DIASTOLIC BLOOD PRESSURE: 40 MMHG | WEIGHT: 245 LBS | BODY MASS INDEX: 34.3 KG/M2 | SYSTOLIC BLOOD PRESSURE: 128 MMHG

## 2021-11-24 DIAGNOSIS — Z86.010 HISTORY OF COLON POLYPS: ICD-10-CM

## 2021-11-24 DIAGNOSIS — R19.5 POSITIVE FIT (FECAL IMMUNOCHEMICAL TEST): Primary | ICD-10-CM

## 2021-11-24 PROCEDURE — 99213 OFFICE O/P EST LOW 20 MIN: CPT | Performed by: PHYSICIAN ASSISTANT

## 2021-11-24 RX ORDER — DEXTROSE AND SODIUM CHLORIDE 5; .45 G/100ML; G/100ML
30 INJECTION, SOLUTION INTRAVENOUS CONTINUOUS PRN
Status: CANCELLED | OUTPATIENT
Start: 2021-12-15

## 2021-12-03 ENCOUNTER — LAB (OUTPATIENT)
Dept: LAB | Facility: HOSPITAL | Age: 56
End: 2021-12-03

## 2021-12-03 ENCOUNTER — OFFICE VISIT (OUTPATIENT)
Dept: FAMILY MEDICINE CLINIC | Facility: CLINIC | Age: 56
End: 2021-12-03

## 2021-12-03 VITALS
SYSTOLIC BLOOD PRESSURE: 138 MMHG | TEMPERATURE: 97.8 F | OXYGEN SATURATION: 97 % | BODY MASS INDEX: 33.54 KG/M2 | WEIGHT: 239.56 LBS | HEIGHT: 71 IN | DIASTOLIC BLOOD PRESSURE: 88 MMHG | HEART RATE: 76 BPM

## 2021-12-03 DIAGNOSIS — G89.29 CHRONIC MIDLINE LOW BACK PAIN, UNSPECIFIED WHETHER SCIATICA PRESENT: Primary | Chronic | ICD-10-CM

## 2021-12-03 DIAGNOSIS — M54.50 CHRONIC MIDLINE LOW BACK PAIN, UNSPECIFIED WHETHER SCIATICA PRESENT: Primary | Chronic | ICD-10-CM

## 2021-12-03 DIAGNOSIS — R39.12 BENIGN PROSTATIC HYPERPLASIA WITH WEAK URINARY STREAM: ICD-10-CM

## 2021-12-03 DIAGNOSIS — E11.628 TYPE 2 DIABETES MELLITUS WITH OTHER SKIN COMPLICATION, WITH LONG-TERM CURRENT USE OF INSULIN (HCC): ICD-10-CM

## 2021-12-03 DIAGNOSIS — N40.1 BENIGN PROSTATIC HYPERPLASIA WITH WEAK URINARY STREAM: ICD-10-CM

## 2021-12-03 DIAGNOSIS — Z79.4 TYPE 2 DIABETES MELLITUS WITH OTHER SKIN COMPLICATION, WITH LONG-TERM CURRENT USE OF INSULIN (HCC): ICD-10-CM

## 2021-12-03 PROCEDURE — 80061 LIPID PANEL: CPT | Performed by: INTERNAL MEDICINE

## 2021-12-03 PROCEDURE — 36415 COLL VENOUS BLD VENIPUNCTURE: CPT | Performed by: INTERNAL MEDICINE

## 2021-12-03 PROCEDURE — 82306 VITAMIN D 25 HYDROXY: CPT | Performed by: INTERNAL MEDICINE

## 2021-12-03 PROCEDURE — 82043 UR ALBUMIN QUANTITATIVE: CPT | Performed by: INTERNAL MEDICINE

## 2021-12-03 PROCEDURE — 84443 ASSAY THYROID STIM HORMONE: CPT | Performed by: INTERNAL MEDICINE

## 2021-12-03 PROCEDURE — 99213 OFFICE O/P EST LOW 20 MIN: CPT | Performed by: STUDENT IN AN ORGANIZED HEALTH CARE EDUCATION/TRAINING PROGRAM

## 2021-12-03 PROCEDURE — 85025 COMPLETE CBC W/AUTO DIFF WBC: CPT | Performed by: INTERNAL MEDICINE

## 2021-12-03 PROCEDURE — 82570 ASSAY OF URINE CREATININE: CPT | Performed by: INTERNAL MEDICINE

## 2021-12-03 PROCEDURE — 82607 VITAMIN B-12: CPT | Performed by: INTERNAL MEDICINE

## 2021-12-03 PROCEDURE — 80053 COMPREHEN METABOLIC PANEL: CPT | Performed by: INTERNAL MEDICINE

## 2021-12-03 PROCEDURE — 83036 HEMOGLOBIN GLYCOSYLATED A1C: CPT | Performed by: INTERNAL MEDICINE

## 2021-12-03 RX ORDER — SERTRALINE HYDROCHLORIDE 100 MG/1
100 TABLET, FILM COATED ORAL DAILY
COMMUNITY
Start: 2021-12-01 | End: 2022-06-07 | Stop reason: SDUPTHER

## 2021-12-03 RX ORDER — CHOLECALCIFEROL (VITAMIN D3) 1250 MCG
50000 CAPSULE ORAL
COMMUNITY
Start: 2021-11-10 | End: 2022-06-15 | Stop reason: SDUPTHER

## 2021-12-03 RX ORDER — BACLOFEN 10 MG/1
10 TABLET ORAL 3 TIMES DAILY PRN
Qty: 90 TABLET | Refills: 1 | Status: SHIPPED | OUTPATIENT
Start: 2021-12-03 | End: 2022-02-09 | Stop reason: CLARIF

## 2021-12-03 RX ORDER — TAMSULOSIN HYDROCHLORIDE 0.4 MG/1
1 CAPSULE ORAL NIGHTLY
Qty: 30 CAPSULE | Refills: 3 | Status: SHIPPED | OUTPATIENT
Start: 2021-12-03 | End: 2022-06-07 | Stop reason: SDUPTHER

## 2021-12-03 NOTE — PROGRESS NOTES
Family Medicine Residency  Cortney Fernando MD    Subjective:     Nirav Lind is a 56 y.o. male who presents for low back pain, BPH, & DM.     Low back pain  Stable with low back pain, has controlled it with cyclobenzaprine but lately has not been working. He wants to know if he can try something else.     BPH  Better controlled since he has been on tamsulosin, does need a refill. Night time symptoms well controlled on medication.     DM  States he has a Freestyle kayleigh that Dr. Tan gave him and has his reader, but lost the sensor this morning while in the shower. But states his blood sugar is well controlled usually in the 140s. Does need a referral to a diabetic eye exam. He has an appointment with Dr. Tan on 12/13 and also has a colonoscopy planned since his cologuard resulted positive on 10/4/2021.     The following portions of the patient's history were reviewed and updated as appropriate: allergies, current medications, past family history, past medical history, past social history, past surgical history and problem list.    Past Medical Hx:  Past Medical History:   Diagnosis Date   • Abnormal computed tomography scan    • Adenomatous polyp of colon    • Allergic rhinitis    • Anemia    • Chronic back pain    • COPD (chronic obstructive pulmonary disease) (HCC)    • Coronary arteriosclerosis    • Depression    • Diabetes mellitus (HCC)    • Diabetic polyneuropathy associated with type 2 diabetes mellitus (HCC) 7/17/2017   • Dizziness    • Dyspnea     unspecified   • Epigastric pain    • Essential hypertension    • Ex-smoker    • GERD (gastroesophageal reflux disease)    • Hemangioma of liver    • Hyperlipidemia    • Infected hydrocele     with orchitis and epididymis   • Nausea    • Nausea with vomiting    • Obesity with body mass index of 30.0 - 39.9    • Pain in right knee    • Right upper quadrant pain    • Type II diabetes mellitus, uncontrolled (HCC)    • Upper respiratory infection    •  Urgency of urination    • Villous adenoma of colon        Past Surgical Hx:  Past Surgical History:   Procedure Laterality Date   • CARDIAC CATHETERIZATION  11/30/2015    No evidence of any obstructive disease in the circumflex, the RCA , or LAD. 1st diagonal branch in the midportion with 20-30% stenosis. Preserved LV systolic function with EF 55%.   • CATARACT EXTRACTION W/ INTRAOCULAR LENS IMPLANT Left 2/26/2021    Procedure: REMOVE CATARACT AND IMPLANT INTRAOCULAR LENS;  Surgeon: Del Sharpe MD;  Location: [facility];  Service: Ophthalmology;  Laterality: Left;   • COLONOSCOPY  01/21/2013    Normal   • COLONOSCOPY W/ POLYPECTOMY  10/13/2014    A single polyp was found in the colon; removed by snare cautery polypectomy.   • ENDOSCOPY  06/15/2016    Mildly severe esophagitis. Several biopsies obtained in the upper third of the esophagus.   • INCISION AND DRAINAGE ABSCESS  10/29/2014    Incision and drainage of scrotal abscess. Hydrocelectomy, bottle procedure.   • KNEE ARTHROSCOPY Right 12/15/2020    Procedure: KNEE ARTHROSCOPY WITH  MEDIAL MENISCECTOMY;  Surgeon: Jerry Oakes MD;  Location: [facility];  Service: Orthopedics;  Laterality: Right;  24 pictures        Current Meds:    Current Outpatient Medications:   •  albuterol sulfate HFA (Ventolin HFA) 108 (90 Base) MCG/ACT inhaler, Inhale 2 puffs Every 4 (Four) Hours As Needed for Wheezing., Disp: 18 g, Rfl: 11  •  Alcohol Swabs ( STERILE ALCOHOL PREP) pads, 1 each 4 (Four) Times a Day., Disp: 200 each, Rfl: 3  •  amitriptyline (ELAVIL) 25 MG tablet, Take 25 mg by mouth At Night As Needed., Disp: , Rfl: 1  •  aspirin 81 MG EC tablet, Take 81 mg by mouth Daily., Disp: , Rfl:   •  atorvastatin (LIPITOR) 40 MG tablet, Take 1 tablet by mouth Daily., Disp: 30 tablet, Rfl: 4  •  baclofen (LIORESAL) 10 MG tablet, Take 1 tablet by mouth 3 (Three) Times a Day As Needed for Muscle Spasms., Disp: 90 tablet, Rfl: 1  •  budesonide-formoterol (SYMBICORT)  160-4.5 MCG/ACT inhaler, Inhale 2 puffs Daily As Needed., Disp: , Rfl:   •  busPIRone (BUSPAR) 5 MG tablet, Take 1 tablet by mouth 2 (Two) Times a Day., Disp: 180 tablet, Rfl: 3  •  Cholecalciferol (Vitamin D3) 1.25 MG (41128 UT) capsule, Take 50,000 Units by mouth Every 14 (Fourteen) Days., Disp: , Rfl:   •  glucose blood (ONE TOUCH ULTRA TEST) test strip, Use to text 4 times a day.  Dx-e11.49, Disp: 200 each, Rfl: 11  •  glucose blood test strip, Use 4x day E.11.49, Disp: 200 each, Rfl: 11  •  HYDROcodone-acetaminophen (NORCO) 5-325 MG per tablet, Take 1 tablet by mouth Every 6 (Six) Hours As Needed for Moderate Pain ., Disp: 20 tablet, Rfl: 0  •  Insulin Glargine (BASAGLAR KWIKPEN) 100 UNIT/ML injection pen, INJECT 13 UNITS UNDER THE SKIN INTO THE APPROPRIATE AREA AS DIRECTED EVERY NIGHT AT BEDTIME, Disp: 6 mL, Rfl: 4  •  Insulin Lispro (ADMELOG SOLOSTAR) 100 UNIT/ML injection pen, Up to 10 units with meals., Disp: 3 pen, Rfl: 11  •  Insulin Pen Needle (PEN NEEDLES) 32G X 4 MM misc, 1 each 4 (Four) Times a Day. Use 4 x daily, Dx code E11.65, Disp: 120 each, Rfl: 11  •  losartan-hydrochlorothiazide (Hyzaar) 100-25 MG per tablet, Take 1 tablet by mouth Daily., Disp: 30 tablet, Rfl: 11  •  MAGnesium-Oxide 400 (241.3 Mg) MG tablet tablet, TAKE 1 TABLET BY MOUTH TWICE DAILY, Disp: 30 tablet, Rfl: 3  •  meloxicam (Mobic) 15 MG tablet, Take 1 tablet by mouth Daily., Disp: 30 tablet, Rfl: 2  •  metFORMIN ER (GLUCOPHAGE-XR) 750 MG 24 hr tablet, Take 1 tablet by mouth Daily., Disp: 90 tablet, Rfl: 3  •  metoprolol succinate XL (TOPROL-XL) 50 MG 24 hr tablet, Take 50 mg by mouth Daily., Disp: , Rfl:   •  naproxen (NAPROSYN) 500 MG tablet, TAKE 1 TABLET BY MOUTH TWICE DAILY AS NEEDED FOR MILD PAIN, Disp: 60 tablet, Rfl: 0  •  ondansetron ODT (Zofran ODT) 4 MG disintegrating tablet, Place 1 tablet on the tongue Every 8 (Eight) Hours As Needed for Nausea or Vomiting., Disp: 30 tablet, Rfl: 0  •  pantoprazole (PROTONIX) 40 MG  EC tablet, TAKE 1 TABLET BY MOUTH DAILY, Disp: 90 tablet, Rfl: 2  •  polyethylene glycol (GoLYTELY) 236 g solution, AS DIRECTED PER INSTRUCTION SHEET FOR COLONOSCOPY, Disp: 4000 mL, Rfl: 0  •  Semaglutide,0.25 or 0.5MG/DOS, (Ozempic, 0.25 or 0.5 MG/DOSE,) 2 MG/1.5ML solution pen-injector, Inject 0.5 mg under the skin into the appropriate area as directed 1 (One) Time Per Week. 0.5 mg weekly, Disp: 1 pen, Rfl: 11  •  sertraline (ZOLOFT) 100 MG tablet, Take 100 mg by mouth Daily., Disp: , Rfl:   •  tamsulosin (FLOMAX) 0.4 MG capsule 24 hr capsule, Take 1 capsule by mouth Every Night., Disp: 30 capsule, Rfl: 3    Allergies:  No Known Allergies    Family Hx:  Family History   Problem Relation Age of Onset   • Cancer Mother         leukemia   • Heart disease Mother    • Heart disease Sister    • Lung disease Father    • Heart disease Maternal Grandmother    • Heart disease Maternal Grandfather         Social History:  Social History     Socioeconomic History   • Marital status:    Tobacco Use   • Smoking status: Former Smoker     Packs/day: 0.25     Years: 15.00     Pack years: 3.75     Types: Cigarettes     Quit date:      Years since quittin.9   • Smokeless tobacco: Never Used   Vaping Use   • Vaping Use: Never used   Substance and Sexual Activity   • Alcohol use: No   • Drug use: No   • Sexual activity: Defer       Review of Systems  Review of Systems   Constitutional: Negative for chills and fever.   HENT: Negative for rhinorrhea and sore throat.    Eyes: Negative for photophobia and visual disturbance.   Respiratory: Negative for cough and shortness of breath.    Cardiovascular: Negative for chest pain and palpitations.   Gastrointestinal: Negative for abdominal pain and nausea.   Endocrine: Negative for polydipsia, polyphagia and polyuria.   Genitourinary: Negative for difficulty urinating, dysuria, frequency and urgency.   Musculoskeletal: Negative for arthralgias and back pain.   Neurological:  "Negative for weakness, light-headedness and headaches.   Psychiatric/Behavioral: Negative for dysphoric mood and sleep disturbance.   All other systems reviewed and are negative.      Objective:     /88   Pulse 76   Temp 97.8 °F (36.6 °C)   Ht 180.3 cm (71\")   Wt 109 kg (239 lb 9 oz)   SpO2 97%   BMI 33.41 kg/m²   Physical Exam  Vitals reviewed.   Constitutional:       General: He is not in acute distress.     Appearance: Normal appearance. He is well-developed and well-groomed. He is obese.      Interventions: Face mask in place.   HENT:      Head: Normocephalic.      Right Ear: Hearing and external ear normal.      Left Ear: Hearing and external ear normal.      Nose: Nose normal.      Mouth/Throat:      Lips: Pink.      Mouth: Mucous membranes are moist.   Eyes:      General: Lids are normal.      Conjunctiva/sclera: Conjunctivae normal.      Pupils: Pupils are equal, round, and reactive to light.   Cardiovascular:      Rate and Rhythm: Normal rate and regular rhythm.      Pulses: Normal pulses.      Heart sounds: Normal heart sounds. No murmur heard.  No friction rub. No gallop.    Pulmonary:      Effort: Pulmonary effort is normal.      Breath sounds: Normal breath sounds and air entry. No wheezing, rhonchi or rales.   Abdominal:      General: Bowel sounds are normal.      Palpations: Abdomen is soft.   Skin:     General: Skin is warm.   Neurological:      Mental Status: He is alert and oriented to person, place, and time.   Psychiatric:         Attention and Perception: Attention and perception normal.         Mood and Affect: Mood and affect normal.         Speech: Speech normal.         Behavior: Behavior normal. Behavior is cooperative.         Thought Content: Thought content normal.         Cognition and Memory: Cognition and memory normal.         Judgment: Judgment normal.          Assessment/Plan:     Diagnoses and all orders for this visit:    1. Chronic midline low back pain, unspecified " whether sciatica present (Primary)  -     baclofen (LIORESAL) 10 MG tablet; Take 1 tablet by mouth 3 (Three) Times a Day As Needed for Muscle Spasms.  Dispense: 90 tablet; Refill: 1    2. Benign prostatic hyperplasia with weak urinary stream  -     tamsulosin (FLOMAX) 0.4 MG capsule 24 hr capsule; Take 1 capsule by mouth Every Night.  Dispense: 30 capsule; Refill: 3    3. Type 2 diabetes mellitus with other skin complication, with long-term current use of insulin ([facility])  -     Ambulatory Referral for Diabetic Eye Exam-Ophthalmology      1. Discontinue cyclobenzaprine and try Baclofen 10mg tid as needed. Told the patient if this did not work to call and let me know and we can adjust the dose. Understood and acknowledged.     2.  Refill Tamsulosin 0.4mg ngihtly.     3.  Diabetic eye exam referral.     · Rx changes: As above  · Patient Education: As above  · Compliance at present is estimated to be fair.   · Efforts to improve compliance (if necessary) will be directed at Continued physician-patient relationship.    Depression screening: Up to date; last screen 6/3/2021      Follow-up:     Return in about 2 months (around 2/3/2022) for Recheck DM, Med rec.    FOLLOW UP: (DM, CGM data, Med rec.)    Preventative:  Health Maintenance   Topic Date Due   • ZOSTER VACCINE (1 of 2) Never done   • DIABETIC EYE EXAM  03/05/2020   • LIPID PANEL  09/11/2021   • URINE MICROALBUMIN  09/11/2021   • COVID-19 Vaccine (3 - Booster for Pfizer series) 10/13/2021   • HEMOGLOBIN A1C  12/03/2021   • Pneumococcal Vaccine 0-64 (1 of 2 - PPSV23) 12/04/2021 (Originally 5/1/1971)   • ANNUAL WELLNESS VISIT  06/03/2022   • DIABETIC FOOT EXAM  12/03/2022   • COLORECTAL CANCER SCREENING  04/05/2026   • TDAP/TD VACCINES (3 - Td or Tdap) 10/16/2029   • HEPATITIS C SCREENING  Completed   • Hepatitis B  Completed   • INFLUENZA VACCINE  Completed     Male Preventative: Colon cancer screening is up coming 12/15/2021  Recommended: none  Vaccine Counseling:  N/A  Interest in Covid Vaccine: The patient has started, but not completed, their COVID-19 vaccination series.    Weight  -Class: Obese Class I: 30-34.9kg/m2  -Patient's Body mass index is 33.41 kg/m². indicating that he is obese (BMI >30). Obesity-related health conditions include the following: obstructive sleep apnea, hypertension, coronary heart disease, diabetes mellitus, dyslipidemias, GERD, peripheral vascular disease, idiopathic intracranial hypertension, osteoarthritis, lower extremity venous stasis disease, urinary stress incontinence and peripheral vascular disease. Obesity is unchanged. BMI is is above average. We discussed portion control and increasing exercise..   eat more fruits and vegetables, decrease soda or juice intake, increase water intake, increase physical activity, reduce portion size, cut out extra servings, reduce fast food intake, plan meals, and have 3 meals a day    Alcohol use:  reports no history of alcohol use.  Nicotine status  reports that he quit smoking about 21 years ago. His smoking use included cigarettes. He has a 3.75 pack-year smoking history. He has never used smokeless tobacco.    Goals        Patient Stated    •  Exercise 150 minutes per week (moderate activity) (pt-stated)       - increasing exercise, walking  - improve diet            RISK SCORE: 3      This document has been electronically signed by Cortney Fernando MD on December 3, 2021 12:34 CST

## 2021-12-04 LAB
25(OH)D3 SERPL-MCNC: 45.6 NG/ML
ALBUMIN SERPL-MCNC: 4.8 G/DL (ref 3.5–5.2)
ALBUMIN UR-MCNC: 1.3 MG/DL
ALBUMIN/GLOB SERPL: 1.3 G/DL
ALP SERPL-CCNC: 132 U/L (ref 39–117)
ALT SERPL W P-5'-P-CCNC: 40 U/L (ref 1–41)
ANION GAP SERPL CALCULATED.3IONS-SCNC: 10.1 MMOL/L (ref 5–15)
AST SERPL-CCNC: 27 U/L (ref 1–40)
BASOPHILS # BLD AUTO: 0.1 10*3/MM3 (ref 0–0.2)
BASOPHILS NFR BLD AUTO: 1 % (ref 0–1.5)
BILIRUB SERPL-MCNC: 0.3 MG/DL (ref 0–1.2)
BUN SERPL-MCNC: 19 MG/DL (ref 6–20)
BUN/CREAT SERPL: 15.7 (ref 7–25)
CALCIUM SPEC-SCNC: 9.9 MG/DL (ref 8.6–10.5)
CHLORIDE SERPL-SCNC: 104 MMOL/L (ref 98–107)
CHOLEST SERPL-MCNC: 111 MG/DL (ref 0–200)
CO2 SERPL-SCNC: 25.9 MMOL/L (ref 22–29)
CREAT SERPL-MCNC: 1.21 MG/DL (ref 0.76–1.27)
CREAT UR-MCNC: 158.5 MG/DL
DEPRECATED RDW RBC AUTO: 37.7 FL (ref 37–54)
EOSINOPHIL # BLD AUTO: 0.39 10*3/MM3 (ref 0–0.4)
EOSINOPHIL NFR BLD AUTO: 3.8 % (ref 0.3–6.2)
ERYTHROCYTE [DISTWIDTH] IN BLOOD BY AUTOMATED COUNT: 12.8 % (ref 12.3–15.4)
GFR SERPL CREATININE-BSD FRML MDRD: 75 ML/MIN/1.73
GLOBULIN UR ELPH-MCNC: 3.7 GM/DL
GLUCOSE SERPL-MCNC: 92 MG/DL (ref 65–99)
HBA1C MFR BLD: 7.94 % (ref 4.8–5.6)
HCT VFR BLD AUTO: 41.5 % (ref 37.5–51)
HDLC SERPL-MCNC: 27 MG/DL (ref 40–60)
HGB BLD-MCNC: 14 G/DL (ref 13–17.7)
IMM GRANULOCYTES # BLD AUTO: 0.09 10*3/MM3 (ref 0–0.05)
IMM GRANULOCYTES NFR BLD AUTO: 0.9 % (ref 0–0.5)
LDLC SERPL CALC-MCNC: 57 MG/DL (ref 0–100)
LDLC/HDLC SERPL: 1.93 {RATIO}
LYMPHOCYTES # BLD AUTO: 2.13 10*3/MM3 (ref 0.7–3.1)
LYMPHOCYTES NFR BLD AUTO: 20.6 % (ref 19.6–45.3)
MCH RBC QN AUTO: 27.7 PG (ref 26.6–33)
MCHC RBC AUTO-ENTMCNC: 33.7 G/DL (ref 31.5–35.7)
MCV RBC AUTO: 82.2 FL (ref 79–97)
MICROALBUMIN/CREAT UR: 8.2 MG/G
MONOCYTES # BLD AUTO: 1.09 10*3/MM3 (ref 0.1–0.9)
MONOCYTES NFR BLD AUTO: 10.6 % (ref 5–12)
NEUTROPHILS NFR BLD AUTO: 6.52 10*3/MM3 (ref 1.7–7)
NEUTROPHILS NFR BLD AUTO: 63.1 % (ref 42.7–76)
NRBC BLD AUTO-RTO: 0 /100 WBC (ref 0–0.2)
PLATELET # BLD AUTO: 322 10*3/MM3 (ref 140–450)
PMV BLD AUTO: 10.8 FL (ref 6–12)
POTASSIUM SERPL-SCNC: 4.1 MMOL/L (ref 3.5–5.2)
PROT SERPL-MCNC: 8.5 G/DL (ref 6–8.5)
RBC # BLD AUTO: 5.05 10*6/MM3 (ref 4.14–5.8)
SODIUM SERPL-SCNC: 140 MMOL/L (ref 136–145)
TRIGL SERPL-MCNC: 159 MG/DL (ref 0–150)
TSH SERPL DL<=0.05 MIU/L-ACNC: 2.53 UIU/ML (ref 0.27–4.2)
VIT B12 BLD-MCNC: 465 PG/ML (ref 211–946)
VLDLC SERPL-MCNC: 27 MG/DL (ref 5–40)
WBC NRBC COR # BLD: 10.32 10*3/MM3 (ref 3.4–10.8)

## 2021-12-13 ENCOUNTER — OFFICE VISIT (OUTPATIENT)
Dept: ENDOCRINOLOGY | Facility: CLINIC | Age: 56
End: 2021-12-13

## 2021-12-13 VITALS
WEIGHT: 240 LBS | DIASTOLIC BLOOD PRESSURE: 60 MMHG | HEIGHT: 73 IN | HEART RATE: 71 BPM | BODY MASS INDEX: 31.81 KG/M2 | OXYGEN SATURATION: 97 % | SYSTOLIC BLOOD PRESSURE: 110 MMHG

## 2021-12-13 DIAGNOSIS — E11.65 TYPE 2 DIABETES MELLITUS WITH HYPERGLYCEMIA, WITH LONG-TERM CURRENT USE OF INSULIN (HCC): Primary | ICD-10-CM

## 2021-12-13 DIAGNOSIS — E55.9 VITAMIN D DEFICIENCY: ICD-10-CM

## 2021-12-13 DIAGNOSIS — E78.2 MIXED HYPERLIPIDEMIA: ICD-10-CM

## 2021-12-13 DIAGNOSIS — Z79.4 TYPE 2 DIABETES MELLITUS WITH HYPERGLYCEMIA, WITH LONG-TERM CURRENT USE OF INSULIN (HCC): Primary | ICD-10-CM

## 2021-12-13 DIAGNOSIS — I10 ESSENTIAL HYPERTENSION: ICD-10-CM

## 2021-12-13 PROCEDURE — 95251 CONT GLUC MNTR ANALYSIS I&R: CPT | Performed by: INTERNAL MEDICINE

## 2021-12-13 PROCEDURE — 99214 OFFICE O/P EST MOD 30 MIN: CPT | Performed by: INTERNAL MEDICINE

## 2021-12-13 NOTE — PROGRESS NOTES
"  Subjective    Nirav Lind is a 56 y.o. male. he is here today for follow-up of diabetes    HPI    t2dm since age 51 w/o complications w good control        PE    /60   Pulse 71   Ht 185.4 cm (73\")   Wt 109 kg (240 lb)   SpO2 97%   BMI 31.66 kg/m²   AOx3  No visible goiter  Normal Respiratory Effort , Lung CTA  RRR  No Edema    Labs    Lab Results   Component Value Date    WBC 10.32 12/03/2021    HGB 14.0 12/03/2021    HCT 41.5 12/03/2021    MCV 82.2 12/03/2021     12/03/2021     Lab Results   Component Value Date    GLUCOSE 92 12/03/2021    BUN 19 12/03/2021    CREATININE 1.21 12/03/2021    EGFRIFAFRI 75 12/03/2021    BCR 15.7 12/03/2021    K 4.1 12/03/2021    CO2 25.9 12/03/2021    CALCIUM 9.9 12/03/2021    ALBUMIN 4.80 12/03/2021    AST 27 12/03/2021    ALT 40 12/03/2021         Assessment/Plan      1. Type 2 diabetes mellitus with hyperglycemia, with long-term current use of insulin (HCC)    2. Mixed hyperlipidemia    3. Essential hypertension    4. Vitamin D deficiency    .    Medications prescribed:      Orders placed during this encounter include:  No orders of the defined types were placed in this encounter.    Glycemic Management     Lab Results   Component Value Date    HGBA1C 7.94 (H) 12/03/2021          Basaglar 13 units        Metformin 750 mg XR once a day     Couldn't tolerate trulicity or invokana  Start ozempic , low dose 0.125 mg  Weekly , couldn't tolerate       Using admelog but small amounts since glucose rarely high, about 2 units for supper to 4units  Mainly as correction but does give before meals     januvia too expensive     Using kayleigh to guide dosing  Criteria for Approval of CGM and/or Insulin Pump     The patient has diabetes mellitus, insulin-dependent.    Our Diabetes Department has evaluated the patient in the last six months and will continue counseling on insulin adjustment.     The patient performs blood glucose testing at least four times daily with proven " glucose variability from 50 to 300 mg per dl.    The patient is administering basal insulin and prandial insulin four times per day for more than six months.    The patient uses a home blood glucose monitor to assess blood glucose at least four times daily for more than six months.    The patient requires frequent adjustment of insulin treatment regimen based on blood glucose readings.  Patient uses sliding scale as follows    Takes 2 extra units per 50 above 150     And    Takes 1 to 2 units extra if the arrow trends up before the meal  Take s 1 to 2 units less if the arrow trends down before the meal    The patient has frequent variability in blood glucose readings due to activity and variability in meal content and time.     The patient has completed a diabetes education program with us.    The patient has demonstrated the ability to self-monitor her glucose.     The patient is motivated in improving diabetes     Patient has hypoglycemia unawareness                            Lipid Management        Atorvastatin 40 mg qhs      Lab Results   Component Value Date    CHOL 111 12/03/2021    CHLPL 139 11/04/2016    TRIG 159 (H) 12/03/2021    HDL 27 (L) 12/03/2021    LDL 57 12/03/2021           Non fasting      Blood Pressure Management     Per Jonathon, losartan, hctz  And toprol    Allergic to lisinopril         Microvascular Complication Monitoring: No Microalbuminuria, No Diabetic Retinopathy, positive  Diabetic Neuropathy        Neuropathy     Taking Neurontin before, resolved        Renal    Lab Results   Component Value Date    MALBCRERATIO 8.2 12/03/2021    MALBCRERATIO 5.8 09/11/2020     No retinopathy         Preventive Care: Patient is not smoking        Weight Related: Obesity, Counseled on nutrition, Counseled on physical activity     Patient's Body mass index is 31.66 kg/m². BMI is above normal parameters. Recommendations include: educational material.       Decrease daily caloric intake by 500 calories  "per day        Dietary changes     Handout given diet and diabetes      Bone Health     Vitamin d def     Nov. 2016      Vit d - normal      Vitamin d Oct 2017     29     Start otc 1000 units daily           Lab Results   Component Value Date    DNER99MS 45.6 12/03/2021   \"               Lab Results   Component Value Date     ZBNBZGNF37 413 03/05/2019                  Component      Latest Ref Rng & Units 4/11/2019   Hepatitis B Surface Ag      Non-Reactive Non-Reactive   Hep A IgM      Non-Reactive Non-Reactive   Hep B Core IgM      Non-Reactive Non-Reactive   Hepatitis C Ab      Non-Reactive Non-Reactive               4. Follow-up: No follow-ups on file.                   "

## 2021-12-15 ENCOUNTER — HOSPITAL ENCOUNTER (OUTPATIENT)
Facility: HOSPITAL | Age: 56
Setting detail: HOSPITAL OUTPATIENT SURGERY
Discharge: HOME | End: 2021-12-15
Attending: INTERNAL MEDICINE | Admitting: INTERNAL MEDICINE

## 2021-12-15 ENCOUNTER — ANESTHESIA (OUTPATIENT)
Dept: GASTROENTEROLOGY | Facility: HOSPITAL | Age: 56
End: 2021-12-15

## 2021-12-15 ENCOUNTER — ANESTHESIA EVENT (OUTPATIENT)
Dept: GASTROENTEROLOGY | Facility: HOSPITAL | Age: 56
End: 2021-12-15

## 2021-12-15 ENCOUNTER — DOCUMENTATION (OUTPATIENT)
Dept: ENDOCRINOLOGY | Facility: CLINIC | Age: 56
End: 2021-12-15

## 2021-12-15 VITALS
RESPIRATION RATE: 18 BRPM | SYSTOLIC BLOOD PRESSURE: 116 MMHG | TEMPERATURE: 97.2 F | WEIGHT: 237.88 LBS | OXYGEN SATURATION: 93 % | HEART RATE: 54 BPM | BODY MASS INDEX: 31.53 KG/M2 | HEIGHT: 73 IN | DIASTOLIC BLOOD PRESSURE: 63 MMHG

## 2021-12-15 DIAGNOSIS — R19.5 POSITIVE FIT (FECAL IMMUNOCHEMICAL TEST): ICD-10-CM

## 2021-12-15 DIAGNOSIS — Z86.010 HISTORY OF COLON POLYPS: ICD-10-CM

## 2021-12-15 LAB — GLUCOSE BLDC GLUCOMTR-MCNC: 108 MG/DL (ref 70–130)

## 2021-12-15 PROCEDURE — 25010000002 FENTANYL CITRATE (PF) 50 MCG/ML SOLUTION: Performed by: NURSE ANESTHETIST, CERTIFIED REGISTERED

## 2021-12-15 PROCEDURE — 25010000002 MIDAZOLAM PER 1 MG: Performed by: NURSE ANESTHETIST, CERTIFIED REGISTERED

## 2021-12-15 PROCEDURE — G0105 COLORECTAL SCRN; HI RISK IND: HCPCS | Performed by: INTERNAL MEDICINE

## 2021-12-15 PROCEDURE — 25010000002 PROPOFOL 10 MG/ML EMULSION: Performed by: NURSE ANESTHETIST, CERTIFIED REGISTERED

## 2021-12-15 PROCEDURE — 82962 GLUCOSE BLOOD TEST: CPT

## 2021-12-15 RX ORDER — PROPOFOL 10 MG/ML
VIAL (ML) INTRAVENOUS AS NEEDED
Status: DISCONTINUED | OUTPATIENT
Start: 2021-12-15 | End: 2021-12-15 | Stop reason: SURG

## 2021-12-15 RX ORDER — FENTANYL CITRATE 50 UG/ML
INJECTION, SOLUTION INTRAMUSCULAR; INTRAVENOUS AS NEEDED
Status: DISCONTINUED | OUTPATIENT
Start: 2021-12-15 | End: 2021-12-15 | Stop reason: SURG

## 2021-12-15 RX ORDER — MIDAZOLAM HYDROCHLORIDE 1 MG/ML
INJECTION INTRAMUSCULAR; INTRAVENOUS AS NEEDED
Status: DISCONTINUED | OUTPATIENT
Start: 2021-12-15 | End: 2021-12-15 | Stop reason: SURG

## 2021-12-15 RX ORDER — DEXTROSE AND SODIUM CHLORIDE 5; .45 G/100ML; G/100ML
30 INJECTION, SOLUTION INTRAVENOUS CONTINUOUS PRN
Status: DISCONTINUED | OUTPATIENT
Start: 2021-12-15 | End: 2021-12-15 | Stop reason: HOSPADM

## 2021-12-15 RX ADMIN — PROPOFOL 50 MG: 10 INJECTION, EMULSION INTRAVENOUS at 15:18

## 2021-12-15 RX ADMIN — FENTANYL CITRATE 100 MCG: 50 INJECTION INTRAMUSCULAR; INTRAVENOUS at 15:17

## 2021-12-15 RX ADMIN — MIDAZOLAM HYDROCHLORIDE 2 MG: 1 INJECTION, SOLUTION INTRAMUSCULAR; INTRAVENOUS at 15:17

## 2021-12-15 NOTE — PROGRESS NOTES
Patient gets his Myrna 2 system from [facility] so I have faxed the order, chart note and demographics to get refills on the sensors

## 2021-12-15 NOTE — ANESTHESIA POSTPROCEDURE EVALUATION
Patient: Nirav Lind    Procedure Summary     Date: 12/15/21 Room / Location: [facility] ENDOSCOPY 1 / [facility] ENDOSCOPY    Anesthesia Start: 1510 Anesthesia Stop: 1529    Procedure: COLONOSCOPY (N/A ) Diagnosis:       History of colon polyps      Positive FIT (fecal immunochemical test)      (History of colon polyps [Z86.010])      (Positive FIT (fecal immunochemical test) [R19.5])    Surgeons: Raymundo Gan MD Provider: Joanna Cantu CRNA    Anesthesia Type: MAC ASA Status: 3          Anesthesia Type: MAC    Vitals  No vitals data found for the desired time range.          Post Anesthesia Care and Evaluation    Patient location during evaluation: PACU  Patient participation: complete - patient participated  Level of consciousness: awake and alert  Pain score: 0  Pain management: adequate  Airway patency: patent  Anesthetic complications: No anesthetic complications  PONV Status: none  Cardiovascular status: acceptable  Respiratory status: acceptable  Hydration status: acceptable

## 2021-12-15 NOTE — ANESTHESIA PREPROCEDURE EVALUATION
Anesthesia Evaluation     Patient summary reviewed and Nursing notes reviewed   no history of anesthetic complications (Scopalamine patch ordered pre-op.):  NPO Solid Status: > 8 hours  NPO Liquid Status: > 8 hours           Airway   Mallampati: III  TM distance: <3 FB  Neck ROM: full  Large neck circumference, Difficult intubation highly probable and Anterior  Comment: Desai available on induction.  Dental    (+) poor dentation    Pulmonary     breath sounds clear to auscultation  (+) a smoker Former, COPD mild, recent URI resolved, decreased breath sounds,   (-) asthma, sleep apnea  Cardiovascular - normal exam  Exercise tolerance: good (4-7 METS)    ECG reviewed  Patient on routine beta blocker and Beta blocker given within 24 hours of surgery  Rhythm: regular  Rate: normal    (+) hypertension poorly controlled 2 medications or greater, CHF Diastolic >=55%, HEADLEY, hyperlipidemia,   (-) pacemaker, valvular problems/murmurs, past MI, CAD, dysrhythmias, murmur, cardiac stents, CABG, DVT    ROS comment: Normal sinus rhythm  Possible Left atrial enlargement  Incomplete right bundle branch block  Borderline ECG  When compared with ECG of 14-JAN-2019 11:47,  Incomplete right bundle branch block is now present  Confirmed by RAYMON VALENTINE ([id number]) on 12/11/2020 1:34:10 PMLeft Ventricle Estimated EF appears to be in the range of 61 - 65%. Normal left ventricular cavity size noted. All left ventricular wall segments contract normally. Left ventricular wall thickness is consistent with mild concentric hypertrophy. Left ventricular diastolic dysfunction is noted (grade I) consistent with impaired relaxation. There is no evidence of a left ventricular mass or thrombus present.     Neuro/Psych  (+) numbness (Diabetic peripheral neuropathy.), psychiatric history Depression,     (-) seizures, TIA, CVA  GI/Hepatic/Renal/Endo    (+) obesity,  GERD well controlled,  diabetes mellitus type 2 well controlled using insulin,   (-) morbid  obesity, liver disease    Musculoskeletal     (+) back pain,   Abdominal   (+) obese,    Substance History - negative use     OB/GYN negative ob/gyn ROS         Other   arthritis,      ROS/Med Hx Other: Hx of COPD- albuterol use a couple times a month. Symbicort minimal use.     TTE 1/15/2019:  · Left ventricular wall thickness is consistent with mild concentric hypertrophy.  · Left ventricular diastolic dysfunction (grade I) consistent with impaired relaxation.Estimated EF appears to be in the range of 61 - 65%.     Hx of left heart cath 5 years ago per pt- No stents placed       Last EpvG6A=3.1    RBBB on EKG    GERD controlled on protonix      Phys Exam Other: Short chin- straight to hernandez if intubating case                  Anesthesia Plan    ASA 3     MAC   (Hernandez if intubating case due to mallampati 3, macroglossia, <3)  intravenous induction     Anesthetic plan, all risks, benefits, and alternatives have been provided, discussed and informed consent has been obtained with: patient.    Plan discussed with CRNA.

## 2021-12-17 NOTE — PROGRESS NOTES
I have seen the patient.  I have reviewed the notes, assessments, and/or procedures performed by *Cortney Fernando MD  ** during office visit I concur with her/his documentation and assessment and plan for Nirav Lind.              This document has been electronically signed by Jose Lopez MD on December 17, 2021 10:21 CST

## 2021-12-29 ENCOUNTER — OFFICE VISIT (OUTPATIENT)
Dept: GASTROENTEROLOGY | Facility: CLINIC | Age: 56
End: 2021-12-29

## 2021-12-29 VITALS
BODY MASS INDEX: 32.8 KG/M2 | SYSTOLIC BLOOD PRESSURE: 128 MMHG | HEIGHT: 72 IN | WEIGHT: 242.2 LBS | HEART RATE: 67 BPM | DIASTOLIC BLOOD PRESSURE: 72 MMHG

## 2021-12-29 DIAGNOSIS — K57.90 DIVERTICULOSIS: Primary | ICD-10-CM

## 2021-12-29 DIAGNOSIS — R74.8 ALKALINE PHOSPHATASE ELEVATION: ICD-10-CM

## 2021-12-29 PROCEDURE — 99213 OFFICE O/P EST LOW 20 MIN: CPT | Performed by: PHYSICIAN ASSISTANT

## 2021-12-29 NOTE — TELEPHONE ENCOUNTER
Incoming Refill Request      Medication requested (name and dose): pantoprazole (PROTONIX) 40 MG EC tablet     Pharmacy where request should be sent: [facility] #[id number] [address] - [phone] [facility] [phone] FX    Additional details provided by patient:   DR CANALES IS ON NIGHT FLOAT     Best call back number: [phone]    Does the patient have less than a 3 day supply:  [x] Yes  [] No    Brenda Yost  12/29/21, 14:42 CST

## 2021-12-30 RX ORDER — PANTOPRAZOLE SODIUM 40 MG/1
40 TABLET, DELAYED RELEASE ORAL DAILY
Qty: 90 TABLET | Refills: 2 | Status: SHIPPED | OUTPATIENT
Start: 2021-12-30 | End: 2022-04-01 | Stop reason: SDUPTHER

## 2022-01-10 RX ORDER — PEN NEEDLE, DIABETIC 32GX 5/32"
NEEDLE, DISPOSABLE MISCELLANEOUS
Qty: 100 EACH | Refills: 3 | Status: SHIPPED | OUTPATIENT
Start: 2022-01-10 | End: 2022-08-12 | Stop reason: SDUPTHER

## 2022-01-19 ENCOUNTER — TELEPHONE (OUTPATIENT)
Dept: FAMILY MEDICINE CLINIC | Facility: CLINIC | Age: 57
End: 2022-01-19

## 2022-01-19 NOTE — TELEPHONE ENCOUNTER
Incoming Refill Request      Medication requested (name and dose):   metoprolol succinate XL (TOPROL-XL) 50 MG 24 hr tablet  losartan-hydrochlorothiazide (Hyzaar) 100-25 MG per tablet    Pharmacy where request should be sent:   [facility] #[id number] [place] [id number] [facility] - [phone]  - [phone] FX    Additional details provided by patient:90 DAYS     Best call back number: [phone]     Does the patient have less than a 3 day supply:  [x] Yes  [] No    Brenda Yost  01/19/22, 15:46 CST

## 2022-01-20 DIAGNOSIS — I10 ESSENTIAL HYPERTENSION: ICD-10-CM

## 2022-01-20 RX ORDER — METOPROLOL SUCCINATE 50 MG/1
50 TABLET, EXTENDED RELEASE ORAL DAILY
Qty: 90 TABLET | Refills: 3 | OUTPATIENT
Start: 2022-01-20

## 2022-01-20 RX ORDER — METOPROLOL SUCCINATE 50 MG/1
50 TABLET, EXTENDED RELEASE ORAL DAILY
Qty: 90 TABLET | Refills: 3 | Status: SHIPPED | OUTPATIENT
Start: 2022-01-20 | End: 2022-06-15 | Stop reason: SDUPTHER

## 2022-01-20 RX ORDER — LOSARTAN POTASSIUM AND HYDROCHLOROTHIAZIDE 25; 100 MG/1; MG/1
1 TABLET ORAL DAILY
Qty: 90 TABLET | Refills: 3 | Status: SHIPPED | OUTPATIENT
Start: 2022-01-20 | End: 2022-06-10 | Stop reason: SDUPTHER

## 2022-02-08 ENCOUNTER — DOCUMENTATION (OUTPATIENT)
Dept: ENDOCRINOLOGY | Facility: CLINIC | Age: 57
End: 2022-02-08

## 2022-02-09 ENCOUNTER — LAB (OUTPATIENT)
Dept: LAB | Facility: HOSPITAL | Age: 57
End: 2022-02-09

## 2022-02-09 ENCOUNTER — OFFICE VISIT (OUTPATIENT)
Dept: FAMILY MEDICINE CLINIC | Facility: CLINIC | Age: 57
End: 2022-02-09

## 2022-02-09 VITALS
WEIGHT: 243.2 LBS | BODY MASS INDEX: 32.94 KG/M2 | SYSTOLIC BLOOD PRESSURE: 142 MMHG | DIASTOLIC BLOOD PRESSURE: 78 MMHG | HEART RATE: 81 BPM | TEMPERATURE: 97.7 F | HEIGHT: 72 IN | OXYGEN SATURATION: 93 %

## 2022-02-09 DIAGNOSIS — F45.8 OTHER SOMATOFORM DISORDERS: ICD-10-CM

## 2022-02-09 DIAGNOSIS — E11.9 TYPE 2 DIABETES MELLITUS WITHOUT COMPLICATION, WITH LONG-TERM CURRENT USE OF INSULIN: Primary | ICD-10-CM

## 2022-02-09 DIAGNOSIS — G25.81 RESTLESS LEGS: ICD-10-CM

## 2022-02-09 DIAGNOSIS — Z79.4 TYPE 2 DIABETES MELLITUS WITHOUT COMPLICATION, WITH LONG-TERM CURRENT USE OF INSULIN: Primary | ICD-10-CM

## 2022-02-09 PROCEDURE — 36415 COLL VENOUS BLD VENIPUNCTURE: CPT | Performed by: STUDENT IN AN ORGANIZED HEALTH CARE EDUCATION/TRAINING PROGRAM

## 2022-02-09 PROCEDURE — 83540 ASSAY OF IRON: CPT | Performed by: STUDENT IN AN ORGANIZED HEALTH CARE EDUCATION/TRAINING PROGRAM

## 2022-02-09 PROCEDURE — 99213 OFFICE O/P EST LOW 20 MIN: CPT | Performed by: STUDENT IN AN ORGANIZED HEALTH CARE EDUCATION/TRAINING PROGRAM

## 2022-02-09 PROCEDURE — 95251 CONT GLUC MNTR ANALYSIS I&R: CPT | Performed by: STUDENT IN AN ORGANIZED HEALTH CARE EDUCATION/TRAINING PROGRAM

## 2022-02-09 PROCEDURE — 84466 ASSAY OF TRANSFERRIN: CPT | Performed by: STUDENT IN AN ORGANIZED HEALTH CARE EDUCATION/TRAINING PROGRAM

## 2022-02-09 RX ORDER — ERGOCALCIFEROL 1.25 MG/1
50000 CAPSULE ORAL WEEKLY
COMMUNITY
End: 2022-04-13

## 2022-02-09 RX ORDER — OLANZAPINE 10 MG/1
10 TABLET ORAL NIGHTLY
COMMUNITY
End: 2022-02-09

## 2022-02-09 RX ORDER — METFORMIN HYDROCHLORIDE 750 MG/1
750 TABLET, EXTENDED RELEASE ORAL 2 TIMES DAILY
Qty: 90 TABLET | Refills: 3 | Status: SHIPPED | OUTPATIENT
Start: 2022-02-09 | End: 2022-06-15 | Stop reason: SDUPTHER

## 2022-02-09 NOTE — PROGRESS NOTES
"  Family Medicine Residency  Cortney Fernando MD    Subjective:     Nirav Lind is a 56 y.o. male who presents for diabetes and restless legs.    Diabetes  Patient has his CGM states he has been doing well, does cheat sometimes when he has the grand kids. Otherwise no symptoms or issues.     Restless legs  He states that the Baclofen was not covered under his insurance and was too expensive. He states his back hurts and needs something for pain but it really starts in his legs at night and wakes him up from sleep and goes away with \"movement and a little massage\". That's the pain he needs something for.     The following portions of the patient's history were reviewed and updated as appropriate: allergies, current medications, past family history, past medical history, past social history, past surgical history and problem list.    Past Medical Hx:  Past Medical History:   Diagnosis Date   • Abnormal computed tomography scan    • Adenomatous polyp of colon    • Allergic rhinitis    • Anemia    • Chronic back pain    • COPD (chronic obstructive pulmonary disease) (HCC)    • Coronary arteriosclerosis    • Depression    • Diabetes mellitus (HCC)    • Diabetic polyneuropathy associated with type 2 diabetes mellitus (HCC) 7/17/2017   • Dizziness    • Dyspnea     unspecified   • Epigastric pain    • Essential hypertension    • Ex-smoker    • GERD (gastroesophageal reflux disease)    • Hemangioma of liver    • Hyperlipidemia    • Infected hydrocele     with orchitis and epididymis   • Nausea    • Nausea with vomiting    • Obesity with body mass index of 30.0 - 39.9    • Pain in right knee    • Right upper quadrant pain    • Type II diabetes mellitus, uncontrolled (HCC)    • Upper respiratory infection    • Urgency of urination    • Villous adenoma of colon        Past Surgical Hx:  Past Surgical History:   Procedure Laterality Date   • CARDIAC CATHETERIZATION  11/30/2015    No evidence of any obstructive disease in the " circumflex, the RCA , or LAD. 1st diagonal branch in the midportion with 20-30% stenosis. Preserved LV systolic function with EF 55%.   • CATARACT EXTRACTION W/ INTRAOCULAR LENS IMPLANT Left 2/26/2021    Procedure: REMOVE CATARACT AND IMPLANT INTRAOCULAR LENS;  Surgeon: Del Sharpe MD;  Location: [facility] OR;  Service: Ophthalmology;  Laterality: Left;   • COLONOSCOPY  01/21/2013    Normal   • COLONOSCOPY N/A 12/15/2021    Procedure: COLONOSCOPY;  Surgeon: Raymundo Gan MD;  Location: [facility] ENDOSCOPY;  Service: Gastroenterology;  Laterality: N/A;   • COLONOSCOPY W/ POLYPECTOMY  10/13/2014    A single polyp was found in the colon; removed by snare cautery polypectomy.   • ENDOSCOPY  06/15/2016    Mildly severe esophagitis. Several biopsies obtained in the upper third of the esophagus.   • INCISION AND DRAINAGE ABSCESS  10/29/2014    Incision and drainage of scrotal abscess. Hydrocelectomy, bottle procedure.   • KNEE ARTHROSCOPY Right 12/15/2020    Procedure: KNEE ARTHROSCOPY WITH  MEDIAL MENISCECTOMY;  Surgeon: Jerry Oakes MD;  Location: [facility] OR;  Service: Orthopedics;  Laterality: Right;  24 pictures        Current Meds:    Current Outpatient Medications:   •  fluticasone (VERAMYST) 27.5 MCG/SPRAY nasal spray, 2 sprays into the nostril(s) as directed by provider As Needed., Disp: , Rfl:   •  tiotropium (SPIRIVA) 18 MCG per inhalation capsule, Place 1 capsule into inhaler and inhale Daily., Disp: , Rfl:   •  vitamin D (ERGOCALCIFEROL) 1.25 MG (18084 UT) capsule capsule, Take 50,000 Units by mouth 1 (One) Time Per Week., Disp: , Rfl:   •  albuterol sulfate HFA (Ventolin HFA) 108 (90 Base) MCG/ACT inhaler, Inhale 2 puffs Every 4 (Four) Hours As Needed for Wheezing., Disp: 18 g, Rfl: 11  •  Alcohol Swabs ( STERILE ALCOHOL PREP) pads, 1 each 4 (Four) Times a Day., Disp: 200 each, Rfl: 3  •  amitriptyline (ELAVIL) 25 MG tablet, Take 25 mg by mouth At Night As Needed., Disp: , Rfl: 1  •  aspirin  81 MG EC tablet, Take 81 mg by mouth Daily., Disp: , Rfl:   •  atorvastatin (LIPITOR) 40 MG tablet, Take 1 tablet by mouth Daily., Disp: 30 tablet, Rfl: 4  •  BD Pen Needle Lata 2nd Gen 32G X 4 MM misc, USE AS DIRECTED FOUR TIMES DAILY, Disp: 100 each, Rfl: 3  •  budesonide-formoterol (SYMBICORT) 160-4.5 MCG/ACT inhaler, Inhale 2 puffs Daily As Needed., Disp: , Rfl:   •  busPIRone (BUSPAR) 5 MG tablet, Take 1 tablet by mouth 2 (Two) Times a Day., Disp: 180 tablet, Rfl: 3  •  Cholecalciferol (Vitamin D3) 1.25 MG (02583 UT) capsule, Take 50,000 Units by mouth Every 14 (Fourteen) Days., Disp: , Rfl:   •  glucose blood (ONE TOUCH ULTRA TEST) test strip, Use to text 4 times a day.  [id number], Disp: 200 each, Rfl: 11  •  glucose blood test strip, Use 4x day E.11.49, Disp: 200 each, Rfl: 11  •  Insulin Glargine (BASAGLAR KWIKPEN) 100 UNIT/ML injection pen, INJECT 13 UNITS UNDER THE SKIN INTO THE APPROPRIATE AREA AS DIRECTED EVERY NIGHT AT BEDTIME, Disp: 6 mL, Rfl: 4  •  Insulin Lispro (ADMELOG SOLOSTAR) 100 UNIT/ML injection pen, Up to 10 units with meals., Disp: 3 pen, Rfl: 11  •  losartan-hydrochlorothiazide (Hyzaar) 100-25 MG per tablet, Take 1 tablet by mouth Daily., Disp: 90 tablet, Rfl: 3  •  magnesium oxide (MAGnesium-Oxide) 400 (241.3 Mg) MG tablet tablet, Take 1 tablet by mouth 2 (Two) Times a Day., Disp: 30 tablet, Rfl: 3  •  meloxicam (Mobic) 15 MG tablet, Take 1 tablet by mouth Daily., Disp: 30 tablet, Rfl: 2  •  metFORMIN ER (GLUCOPHAGE-XR) 750 MG 24 hr tablet, Take 1 tablet by mouth 2 (Two) Times a Day., Disp: 90 tablet, Rfl: 3  •  metoprolol succinate XL (TOPROL-XL) 50 MG 24 hr tablet, Take 1 tablet by mouth Daily., Disp: 90 tablet, Rfl: 3  •  ondansetron ODT (Zofran ODT) 4 MG disintegrating tablet, Place 1 tablet on the tongue Every 8 (Eight) Hours As Needed for Nausea or Vomiting., Disp: 30 tablet, Rfl: 0  •  pantoprazole (PROTONIX) 40 MG EC tablet, Take 1 tablet by mouth Daily., Disp: 90 tablet, Rfl: 2  •  " sertraline (ZOLOFT) 100 MG tablet, Take 100 mg by mouth Daily., Disp: , Rfl:   •  tamsulosin (FLOMAX) 0.4 MG capsule 24 hr capsule, Take 1 capsule by mouth Every Night., Disp: 30 capsule, Rfl: 3    Allergies:  No Known Allergies    Family Hx:  Family History   Problem Relation Age of Onset   • Cancer Mother         leukemia   • Heart disease Mother    • Heart disease Sister    • Lung disease Father    • Heart disease Maternal Grandmother    • Heart disease Maternal Grandfather         Social History:  Social History     Socioeconomic History   • Marital status:    Tobacco Use   • Smoking status: Former Smoker     Packs/day: 0.25     Years: 15.00     Pack years: 3.75     Types: Cigarettes     Quit date:      Years since quittin.1   • Smokeless tobacco: Never Used   Vaping Use   • Vaping Use: Never used   Substance and Sexual Activity   • Alcohol use: No   • Drug use: No   • Sexual activity: Defer       Review of Systems  Review of Systems   Constitutional: Negative for chills and fever.   HENT: Negative for rhinorrhea and sore throat.    Eyes: Negative for photophobia and visual disturbance.   Respiratory: Negative for cough and shortness of breath.    Cardiovascular: Negative for chest pain and palpitations.   Gastrointestinal: Negative for abdominal pain and nausea.   Genitourinary: Negative for difficulty urinating and dysuria.   Musculoskeletal: Positive for back pain and myalgias (  Leg pain waking from sleep). Negative for arthralgias.   Neurological: Negative for weakness, light-headedness and headaches.   Psychiatric/Behavioral: Negative for dysphoric mood and sleep disturbance.   All other systems reviewed and are negative.      Objective:     /78   Pulse 81   Temp 97.7 °F (36.5 °C)   Ht 182.9 cm (72\")   Wt 110 kg (243 lb 3.2 oz)   SpO2 93%   BMI 32.98 kg/m²   Physical Exam  Vitals reviewed.   Constitutional:       General: He is not in acute distress.     Appearance: Normal " appearance. He is well-developed and well-groomed.      Interventions: Face mask in place.   HENT:      Head: Normocephalic.      Right Ear: Hearing and external ear normal.      Left Ear: Hearing and external ear normal.      Nose: Nose normal.      Mouth/Throat:      Lips: Pink.      Mouth: Mucous membranes are moist.   Eyes:      General: Lids are normal.      Conjunctiva/sclera: Conjunctivae normal.      Pupils: Pupils are equal, round, and reactive to light.   Cardiovascular:      Rate and Rhythm: Normal rate and regular rhythm.      Pulses: Normal pulses.      Heart sounds: Normal heart sounds. No murmur heard.  No friction rub. No gallop.    Pulmonary:      Effort: Pulmonary effort is normal.      Breath sounds: Normal breath sounds and air entry. No wheezing, rhonchi or rales.   Abdominal:      General: Bowel sounds are normal.      Palpations: Abdomen is soft.   Musculoskeletal:      Cervical back: Neck supple.   Skin:     General: Skin is warm.   Neurological:      Mental Status: He is alert and oriented to person, place, and time.   Psychiatric:         Attention and Perception: Attention and perception normal.         Mood and Affect: Mood and affect normal.         Speech: Speech normal.         Behavior: Behavior normal. Behavior is cooperative.         Thought Content: Thought content normal.         Cognition and Memory: Cognition and memory normal.         Judgment: Judgment normal.          Assessment/Plan:     Diagnoses and all orders for this visit:    1. Type 2 diabetes mellitus without complication, with long-term current use of insulin (HCC) (Primary)  -     metFORMIN ER (GLUCOPHAGE-XR) 750 MG 24 hr tablet; Take 1 tablet by mouth 2 (Two) Times a Day.  Dispense: 90 tablet; Refill: 3  -     magnesium oxide (MAGnesium-Oxide) 400 (241.3 Mg) MG tablet tablet; Take 1 tablet by mouth 2 (Two) Times a Day.  Dispense: 30 tablet; Refill: 3    2. Restless legs  -     Iron Profile    3. Other somatoform  disorders   -     Iron Profile      1. Increase Metformin to 750mg bid. Refill Magnesium oxide 400mg bid.   Lab Results   Component Value Date    HGBA1C 7.94 (H) 12/03/2021     Patient's CGM data was downloaded and printed. Data showed patient's blood sugar has been 83% in target range, 14% high range, and 2% very high range. Low and very low range levels have been 1% & 0% respectively. Target range is defined as  mg/dL. CGM data print out was scanned as part of the patient's chart.     2.  Suspect restless legs. Iron profile as above. I called the lab and told them to just run the iron profile. The labs pending for Dr. Tan he will get prior to his appointment. Patient is aware and acknowledged. Patient will be called with the results and treatment, if necessary, will be addressed.     3.  Same as #2. Medication reconciliation was performed today, patient brought all of his medications, all were reviewed and modified as needed.     · Rx changes: As above  · Patient Education: As above  · Compliance at present is estimated to be good.   · Efforts to improve compliance (if necessary) will be directed at Continued physician-patient relationship.    Depression screening: Up to date; last screen 6/3/2021      Follow-up:     Return in about 1 month (around 3/9/2022) for Diabetes restless legs.    FOLLOW UP: (Restless legs, CGM)    Preventative:  Health Maintenance   Topic Date Due   • Pneumococcal Vaccine 0-64 (1 of 2 - PPSV23) Never done   • ZOSTER VACCINE (1 of 2) Never done   • DIABETIC EYE EXAM  03/05/2020   • COVID-19 Vaccine (3 - Booster for Pfizer series) 09/13/2021   • ANNUAL WELLNESS VISIT  06/03/2022   • HEMOGLOBIN A1C  06/03/2022   • DIABETIC FOOT EXAM  12/03/2022   • LIPID PANEL  12/03/2022   • URINE MICROALBUMIN  12/03/2022   • COLORECTAL CANCER SCREENING  12/15/2026   • TDAP/TD VACCINES (3 - Td or Tdap) 10/16/2029   • HEPATITIS C SCREENING  Completed   • Hepatitis B  Completed   • INFLUENZA  VACCINE  Completed     Male Preventative: Currently up to date  Recommended: none  Vaccine Counseling: N/A  Interest in Covid Vaccine: The patient has started, but not completed, their COVID-19 vaccination series.    Weight  -Class: Obese Class I: 30-34.9kg/m2  -Patient's Body mass index is 32.98 kg/m². indicating that he is obese (BMI >30). Obesity-related health conditions include the following: obstructive sleep apnea, hypertension, coronary heart disease, diabetes mellitus, dyslipidemias, GERD, peripheral vascular disease, idiopathic intracranial hypertension, osteoarthritis, lower extremity venous stasis disease, urinary stress incontinence and peripheral vascular disease. Obesity is improving with lifestyle modifications. BMI is is above average; BMI management plan is completed. We discussed portion control and increasing exercise..   eat more fruits and vegetables, decrease soda or juice intake, increase water intake, increase physical activity, reduce portion size, cut out extra servings, reduce fast food intake, plan meals, and have 3 meals a day    Alcohol use:  reports no history of alcohol use.  Nicotine status  reports that he quit smoking about 22 years ago. His smoking use included cigarettes. He has a 3.75 pack-year smoking history. He has never used smokeless tobacco.    Goals        Patient Stated    •  Exercise 150 minutes per week (moderate activity) (pt-stated)       - increasing exercise, walking  - improve diet            RISK SCORE: 3      This document has been electronically signed by Cortney Fernando MD on February 9, 2022 16:57 CST

## 2022-02-10 LAB
IRON 24H UR-MRATE: 59 MCG/DL (ref 59–158)
IRON SATN MFR SERPL: 17 % (ref 20–50)
TIBC SERPL-MCNC: 356 MCG/DL (ref 298–536)
TRANSFERRIN SERPL-MCNC: 239 MG/DL (ref 200–360)

## 2022-02-14 ENCOUNTER — TELEPHONE (OUTPATIENT)
Dept: FAMILY MEDICINE CLINIC | Facility: CLINIC | Age: 57
End: 2022-02-14

## 2022-02-14 NOTE — TELEPHONE ENCOUNTER
Patient has called asking for lab results.   Please return his call at [phone].    Thank you,  Malgorzata

## 2022-02-15 ENCOUNTER — DOCUMENTATION (OUTPATIENT)
Dept: FAMILY MEDICINE CLINIC | Facility: CLINIC | Age: 57
End: 2022-02-15

## 2022-02-15 NOTE — PROGRESS NOTES
I have seen the patient.  I have reviewed the notes, assessments, and/or procedures performed by Dr. Fernando, I concur with her/his documentation and assessment and plan for Nirav Lind.       This document has been electronically signed by Cory Kaur MD on February 15, 2022 11:34 CST

## 2022-02-15 NOTE — PROGRESS NOTES
Time of phone call: 08:20 CST 2/15/2022       Nirav Lind is a 56 y.o. y.o. male  who I contacted in relation to their recent Blood  results.  The patient was unavailable by phone and was able to reach by voicemail.       Signature  Hipolito Agustin MD  [place] [id number]  Office: [phone]

## 2022-03-10 ENCOUNTER — OFFICE VISIT (OUTPATIENT)
Dept: FAMILY MEDICINE CLINIC | Facility: CLINIC | Age: 57
End: 2022-03-10

## 2022-03-10 VITALS
HEIGHT: 72 IN | OXYGEN SATURATION: 97 % | DIASTOLIC BLOOD PRESSURE: 82 MMHG | TEMPERATURE: 97.1 F | HEART RATE: 76 BPM | BODY MASS INDEX: 31.77 KG/M2 | WEIGHT: 234.56 LBS | SYSTOLIC BLOOD PRESSURE: 136 MMHG

## 2022-03-10 DIAGNOSIS — M62.838 NIGHT MUSCLE SPASMS: Primary | ICD-10-CM

## 2022-03-10 DIAGNOSIS — Z23 ENCOUNTER FOR ADMINISTRATION OF VACCINE: ICD-10-CM

## 2022-03-10 DIAGNOSIS — E11.9 TYPE 2 DIABETES MELLITUS WITHOUT COMPLICATION, WITHOUT LONG-TERM CURRENT USE OF INSULIN: ICD-10-CM

## 2022-03-10 PROCEDURE — 99213 OFFICE O/P EST LOW 20 MIN: CPT | Performed by: STUDENT IN AN ORGANIZED HEALTH CARE EDUCATION/TRAINING PROGRAM

## 2022-03-10 PROCEDURE — 95251 CONT GLUC MNTR ANALYSIS I&R: CPT | Performed by: STUDENT IN AN ORGANIZED HEALTH CARE EDUCATION/TRAINING PROGRAM

## 2022-03-10 RX ORDER — CYCLOBENZAPRINE HYDROCHLORIDE 7.5 MG/1
7.5 TABLET, FILM COATED ORAL 3 TIMES DAILY PRN
Qty: 30 TABLET | Refills: 0 | Status: SHIPPED | OUTPATIENT
Start: 2022-03-10 | End: 2022-03-22

## 2022-03-10 RX ORDER — ZOSTER VACCINE RECOMBINANT, ADJUVANTED 50 MCG/0.5
0.5 KIT INTRAMUSCULAR ONCE
Qty: 1 EACH | Refills: 0 | Status: SHIPPED | OUTPATIENT
Start: 2022-03-10 | End: 2022-03-10

## 2022-03-10 NOTE — PROGRESS NOTES
"  Family Medicine Residency  Cortney Fernando MD    Subjective:     Nirav Lind is a 56 y.o. male who presents for follow up of Type 2 diabetes mellitus.      The patient was initially diagnosed with Type 2 diabetes mellitus based on the following criteria:    Lab Results   Component Value Date    HGBA1C 7.94 (H) 12/03/2021    HGBA1C 6.99 (H) 06/03/2021    HGBA1C 6.34 (H) 03/05/2021     Lab Results   Component Value Date    MICROALBUR 1.3 12/03/2021    CREATININE 1.21 12/03/2021   .    Known diabetic complications: none  Cardiovascular risk factors: advanced age (older than 55 for men, 65 for women), diabetes mellitus, dyslipidemia, hypertension, male gender, obesity (BMI >= 30 kg/m2) and h/o TOB use  Current diabetic medications include oral agent (monotherapy): Glucophage XR and insulin injections: Lispro & Glargine.     Eye exam current (within one year): Yes, 2/22/2022   Weight trend: stable  Prior visit with dietician: yes - At Dr. Tan's office  Current diet: in general, a \"healthy\" diet  , well balanced  Current exercise: walking    Current monitoring regimen: CGM  Patient's CGM data was downloaded and printed. Data showed patient's blood sugar has been 99% in target range, 0% high range, and 0% very high range. Low and very low range levels have been 1% & 0% respectively. Target range is defined as  mg/dL. CGM data print out was scanned as part of the patient's chart.     Home blood sugar records: fasting range: 108, postprandial range: 150 and trend: stable  Any episodes of hypoglycemia? no     ACE inhibitor or angiotensin II receptor blocker?     Yes     Hyzaar    N/A   Statin?     Yes    ASA?     Yes     Hemoglobin A1C (%)   Date Value   12/03/2021 7.94 (H)   06/03/2021 6.99 (H)   03/05/2021 6.34 (H)       COMORBID CONDITIONS:     ObesityYes    HypothyroidismNo    HyperlipidemiaYes    CADYes    Cerebrovascular diseaseNo   PVDNo    HTNYes    Sexual dysfunctionNo    DepressionNo        The " following portions of the patient's history were reviewed and updated as appropriate: allergies, current medications, past family history, past medical history, past social history, past surgical history and problem list.    Past Medical Hx:  Past Medical History:   Diagnosis Date   • Abnormal computed tomography scan    • Adenomatous polyp of colon    • Allergic rhinitis    • Anemia    • Chronic back pain    • COPD (chronic obstructive pulmonary disease) (HCC)    • Coronary arteriosclerosis    • Depression    • Diabetes mellitus (HCC)    • Diabetic polyneuropathy associated with type 2 diabetes mellitus (HCC) 7/17/2017   • Dizziness    • Dyspnea     unspecified   • Epigastric pain    • Essential hypertension    • Ex-smoker    • GERD (gastroesophageal reflux disease)    • Hemangioma of liver    • Hyperlipidemia    • Infected hydrocele     with orchitis and epididymis   • Nausea    • Nausea with vomiting    • Obesity with body mass index of 30.0 - 39.9    • Pain in right knee    • Right upper quadrant pain    • Type II diabetes mellitus, uncontrolled (HCC)    • Upper respiratory infection    • Urgency of urination    • Villous adenoma of colon        Past Surgical Hx:  Past Surgical History:   Procedure Laterality Date   • CARDIAC CATHETERIZATION  11/30/2015    No evidence of any obstructive disease in the circumflex, the RCA , or LAD. 1st diagonal branch in the midportion with 20-30% stenosis. Preserved LV systolic function with EF 55%.   • CATARACT EXTRACTION W/ INTRAOCULAR LENS IMPLANT Left 2/26/2021    Procedure: REMOVE CATARACT AND IMPLANT INTRAOCULAR LENS;  Surgeon: Del Sharpe MD;  Location: [facility] OR;  Service: Ophthalmology;  Laterality: Left;   • COLONOSCOPY  01/21/2013    Normal   • COLONOSCOPY N/A 12/15/2021    Procedure: COLONOSCOPY;  Surgeon: Raymundo Gan MD;  Location: [facility] ENDOSCOPY;  Service: Gastroenterology;  Laterality: N/A;   • COLONOSCOPY W/ POLYPECTOMY  10/13/2014    A single  polyp was found in the colon; removed by snare cautery polypectomy.   • ENDOSCOPY  06/15/2016    Mildly severe esophagitis. Several biopsies obtained in the upper third of the esophagus.   • INCISION AND DRAINAGE ABSCESS  10/29/2014    Incision and drainage of scrotal abscess. Hydrocelectomy, bottle procedure.   • KNEE ARTHROSCOPY Right 12/15/2020    Procedure: KNEE ARTHROSCOPY WITH  MEDIAL MENISCECTOMY;  Surgeon: Jerry Oakes MD;  Location: [facility];  Service: Orthopedics;  Laterality: Right;  24 pictures        Current Meds:    Current Outpatient Medications:   •  albuterol sulfate HFA (Ventolin HFA) 108 (90 Base) MCG/ACT inhaler, Inhale 2 puffs Every 4 (Four) Hours As Needed for Wheezing., Disp: 18 g, Rfl: 11  •  Alcohol Swabs ( STERILE ALCOHOL PREP) pads, 1 each 4 (Four) Times a Day., Disp: 200 each, Rfl: 3  •  amitriptyline (ELAVIL) 25 MG tablet, Take 25 mg by mouth At Night As Needed., Disp: , Rfl: 1  •  aspirin 81 MG EC tablet, Take 81 mg by mouth Daily., Disp: , Rfl:   •  atorvastatin (LIPITOR) 40 MG tablet, Take 1 tablet by mouth Daily., Disp: 30 tablet, Rfl: 4  •  BD Pen Needle Lata 2nd Gen 32G X 4 MM misc, USE AS DIRECTED FOUR TIMES DAILY, Disp: 100 each, Rfl: 3  •  budesonide-formoterol (SYMBICORT) 160-4.5 MCG/ACT inhaler, Inhale 2 puffs Daily As Needed., Disp: , Rfl:   •  busPIRone (BUSPAR) 5 MG tablet, Take 1 tablet by mouth 2 (Two) Times a Day., Disp: 180 tablet, Rfl: 3  •  Cholecalciferol (Vitamin D3) 1.25 MG (89194 UT) capsule, Take 50,000 Units by mouth Every 14 (Fourteen) Days., Disp: , Rfl:   •  fluticasone (VERAMYST) 27.5 MCG/SPRAY nasal spray, 2 sprays into the nostril(s) as directed by provider As Needed., Disp: , Rfl:   •  glucose blood (ONE TOUCH ULTRA TEST) test strip, Use to text 4 times a day.  [id number], Disp: 200 each, Rfl: 11  •  glucose blood test strip, Use 4x day E.11.49, Disp: 200 each, Rfl: 11  •  Insulin Glargine (BASAGLAR KWIKPEN) 100 UNIT/ML injection pen, INJECT 13  UNITS UNDER THE SKIN INTO THE APPROPRIATE AREA AS DIRECTED EVERY NIGHT AT BEDTIME, Disp: 6 mL, Rfl: 4  •  Insulin Lispro (ADMELOG SOLOSTAR) 100 UNIT/ML injection pen, Up to 10 units with meals., Disp: 3 pen, Rfl: 11  •  losartan-hydrochlorothiazide (Hyzaar) 100-25 MG per tablet, Take 1 tablet by mouth Daily., Disp: 90 tablet, Rfl: 3  •  magnesium oxide (MAGnesium-Oxide) 400 (241.3 Mg) MG tablet tablet, Take 1 tablet by mouth 2 (Two) Times a Day., Disp: 30 tablet, Rfl: 3  •  meloxicam (Mobic) 15 MG tablet, Take 1 tablet by mouth Daily., Disp: 30 tablet, Rfl: 2  •  metFORMIN ER (GLUCOPHAGE-XR) 750 MG 24 hr tablet, Take 1 tablet by mouth 2 (Two) Times a Day., Disp: 90 tablet, Rfl: 3  •  metoprolol succinate XL (TOPROL-XL) 50 MG 24 hr tablet, Take 1 tablet by mouth Daily., Disp: 90 tablet, Rfl: 3  •  ondansetron ODT (Zofran ODT) 4 MG disintegrating tablet, Place 1 tablet on the tongue Every 8 (Eight) Hours As Needed for Nausea or Vomiting., Disp: 30 tablet, Rfl: 0  •  pantoprazole (PROTONIX) 40 MG EC tablet, Take 1 tablet by mouth Daily., Disp: 90 tablet, Rfl: 2  •  sertraline (ZOLOFT) 100 MG tablet, Take 100 mg by mouth Daily., Disp: , Rfl:   •  tamsulosin (FLOMAX) 0.4 MG capsule 24 hr capsule, Take 1 capsule by mouth Every Night., Disp: 30 capsule, Rfl: 3  •  tiotropium (SPIRIVA) 18 MCG per inhalation capsule, Place 1 capsule into inhaler and inhale Daily., Disp: , Rfl:   •  vitamin D (ERGOCALCIFEROL) 1.25 MG (39996 UT) capsule capsule, Take 50,000 Units by mouth 1 (One) Time Per Week., Disp: , Rfl:   •  cyclobenzaprine (FEXMID) 7.5 MG tablet, Take 1 tablet by mouth 3 (Three) Times a Day As Needed for Muscle Spasms. Before bed for muscle spasms, Disp: 30 tablet, Rfl: 0  •  pneumococcal conj. 13-valent (PREVNAR-13) vaccine, Inject 0.5 mL into the appropriate muscle as directed by prescriber 1 (One) Time for 1 dose., Disp: 0.5 mL, Rfl: 0  •  Zoster Vac Recomb Adjuvanted (Shingrix) 50 MCG/0.5ML reconstituted suspension,  "Inject 0.5 mL into the appropriate muscle as directed by prescriber 1 (One) Time for 1 dose., Disp: 1 each, Rfl: 0    Allergies:  No Known Allergies    Family Hx:  Family History   Problem Relation Age of Onset   • Cancer Mother         leukemia   • Heart disease Mother    • Heart disease Sister    • Lung disease Father    • Heart disease Maternal Grandmother    • Heart disease Maternal Grandfather         Social History:  Social History     Socioeconomic History   • Marital status:    Tobacco Use   • Smoking status: Former Smoker     Packs/day: 0.25     Years: 15.00     Pack years: 3.75     Types: Cigarettes     Quit date:      Years since quittin.2   • Smokeless tobacco: Never Used   Vaping Use   • Vaping Use: Never used   Substance and Sexual Activity   • Alcohol use: No   • Drug use: No   • Sexual activity: Defer       Review of Systems  Review of Systems   Constitutional: Negative for chills and fever.   HENT: Negative for rhinorrhea and sore throat.    Eyes: Negative for photophobia and visual disturbance.   Respiratory: Negative for cough and shortness of breath.    Cardiovascular: Negative for chest pain and palpitations.   Gastrointestinal: Negative for abdominal pain and nausea.   Genitourinary: Negative for difficulty urinating and dysuria.   Musculoskeletal: Positive for myalgias (  At night). Negative for arthralgias and back pain.   Neurological: Negative for weakness, light-headedness and headaches.   Psychiatric/Behavioral: Negative for dysphoric mood and sleep disturbance.   All other systems reviewed and are negative.      Objective:     /82   Pulse 76   Temp 97.1 °F (36.2 °C)   Ht 182.9 cm (72\")   Wt 106 kg (234 lb 9 oz)   SpO2 97%   BMI 31.81 kg/m²   Physical Exam  Vitals reviewed.   Constitutional:       General: He is not in acute distress.     Appearance: Normal appearance. He is well-developed. He is obese.      Interventions: Face mask in place.   HENT:      Head: " Normocephalic.      Right Ear: Hearing and external ear normal.      Left Ear: Hearing and external ear normal.      Nose: Nose normal.      Mouth/Throat:      Lips: Pink.      Mouth: Mucous membranes are moist.   Eyes:      General: Lids are normal.      Conjunctiva/sclera: Conjunctivae normal.      Pupils: Pupils are equal, round, and reactive to light.   Cardiovascular:      Rate and Rhythm: Normal rate and regular rhythm.      Pulses: Normal pulses.      Heart sounds: Normal heart sounds. No murmur heard.    No friction rub. No gallop.   Pulmonary:      Effort: Pulmonary effort is normal.      Breath sounds: Normal breath sounds and air entry. No wheezing, rhonchi or rales.   Abdominal:      General: Bowel sounds are normal.      Palpations: Abdomen is soft.   Musculoskeletal:      Cervical back: Neck supple.   Skin:     General: Skin is warm.   Neurological:      Mental Status: He is alert and oriented to person, place, and time.   Psychiatric:         Attention and Perception: Attention and perception normal.         Mood and Affect: Mood and affect normal.         Speech: Speech normal.         Behavior: Behavior normal. Behavior is cooperative.         Thought Content: Thought content normal.         Cognition and Memory: Cognition and memory normal.         Judgment: Judgment normal.         Lab Review  Lab Results   Component Value Date    HGBA1C 7.94 (H) 12/03/2021     Lab Results   Component Value Date    CHOL 111 12/03/2021    CHLPL 139 11/04/2016    TRIG 159 (H) 12/03/2021    HDL 27 (L) 12/03/2021    LDL 57 12/03/2021     Lab Results   Component Value Date    GLUCOSE 92 12/03/2021    BUN 19 12/03/2021    CREATININE 1.21 12/03/2021    EGFRIFAFRI 75 12/03/2021    BCR 15.7 12/03/2021    K 4.1 12/03/2021    CO2 25.9 12/03/2021    CALCIUM 9.9 12/03/2021    ALBUMIN 4.80 12/03/2021    AST 27 12/03/2021    ALT 40 12/03/2021        Assessment:     Diabetes Mellitus type II, under excellent control.    Diagnoses  and all orders for this visit:    1. Night muscle spasms (Primary)  -     cyclobenzaprine (FEXMID) 7.5 MG tablet; Take 1 tablet by mouth 3 (Three) Times a Day As Needed for Muscle Spasms. Before bed for muscle spasms  Dispense: 30 tablet; Refill: 0    2. Type 2 diabetes mellitus without complication, without long-term current use of insulin (HCC)    3. Encounter for administration of vaccine  -     Zoster Vac Recomb Adjuvanted (Shingrix) 50 MCG/0.5ML reconstituted suspension; Inject 0.5 mL into the appropriate muscle as directed by prescriber 1 (One) Time for 1 dose.  Dispense: 1 each; Refill: 0  -     pneumococcal conj. 13-valent (PREVNAR-13) vaccine; Inject 0.5 mL into the appropriate muscle as directed by prescriber 1 (One) Time for 1 dose.  Dispense: 0.5 mL; Refill: 0        Plan:     1.  Rx changes: As above. Discussed seeing if the cyclobenzaprine was effective with sleep and muscle cramps and potentially d/c amitriptyline in the future. Counseled on not taking them together to see if effective. Understood and acknowledged.   2.  Education: Reviewed ‘ABCs’ of diabetes management (respective goals in parentheses):  A1C (<7), preprandial glucose (70 mg/dl - 130 mg/dl), postprandial glucose (< 180 mg/dl), blood pressure (<130/80), and cholesterol (LDL <100 mg/dl; HDL > 50 mg/dl; Trig < 150 mg/dl).  3.  Issues reviewed with Nirav Lind: low cholesterol diet, weight control and daily exercise discussed, home glucose monitoring emphasized, all medications, side effects and compliance discussed carefully, use and side effects of insulin is taught, foot care discussed and Podiatry visits discussed, annual eye examinations at Ophthalmology discussed, glycohemoglobin and other lab monitoring discussed, long term diabetic complications discussed and labs immediately prior to next visit.  4.  Compliance at present is estimated to be excellent. Efforts to improve compliance (if necessary) will be directed at increased  exercise and regular blood sugar monitorin times daily.Prevnar and Shingrix.  5. Patient has an ASCVD risk of 24.2% of a cardiovascular event (CAD, stroke death or non-fatal MI or stroke) in the next 10 years. Currently on a moderate intensity statin.      Return in about 6 weeks (around 2022) for Recheck muscle spasms night time.    Follow up: (Flexeril, maybe dc amitriptyline)    Preventative:  Health Maintenance   Topic Date Due   • Pneumococcal Vaccine 0-64 (1 of 2 - PPSV23) Never done   • ZOSTER VACCINE (1 of 2) Never done   • COVID-19 Vaccine (3 - Booster for Pfizer series) 2021   • ANNUAL WELLNESS VISIT  2022   • HEMOGLOBIN A1C  2022   • DIABETIC FOOT EXAM  2022   • LIPID PANEL  2022   • URINE MICROALBUMIN  2022   • DIABETIC EYE EXAM  2023   • COLORECTAL CANCER SCREENING  12/15/2026   • TDAP/TD VACCINES (3 - Td or Tdap) 10/16/2029   • HEPATITIS C SCREENING  Completed   • Hepatitis B  Completed   • INFLUENZA VACCINE  Completed     Male Preventative: Last colonoscopy was done 12/15/2021    Weight  -Class: Obese Class I: 30-34.9kg/m2  -Patient's Body mass index is 31.81 kg/m². indicating that he is obese (BMI >30). Obesity-related health conditions include the following: hypertension, coronary heart disease, diabetes mellitus and dyslipidemias. Obesity is improving with lifestyle modifications. BMI is is above average; BMI management plan is completed. We discussed portion control and increasing exercise..   eat more fruits and vegetables, decrease soda or juice intake, increase water intake, increase physical activity, reduce screen time, reduce portion size, cut out extra servings, reduce fast food intake, plan meals and have 3 meals a day    Alcohol use:  reports no history of alcohol use.  Nicotine status  reports that he quit smoking about 22 years ago. His smoking use included cigarettes. He has a 3.75 pack-year smoking history. He has never used smokeless  tobacco.     Goals        Patient Stated    •  Exercise 150 minutes per week (moderate activity) (pt-stated)       - increasing exercise, walking  - improve diet              RISK SCORE: 3        This document has been electronically signed by Cortney Fernando MD on March 10, 2022 13:53 CST

## 2022-03-11 NOTE — PROGRESS NOTES
I have reviewed the notes, assessments, and/or procedures performed by Cortney Fernando MD, I concur with her/his documentation and assessment and plan for Nirav Lind.                This document has been electronically signed by Donaldo Nolan MD on March 11, 2022 17:02 CST

## 2022-03-17 ENCOUNTER — TELEPHONE (OUTPATIENT)
Dept: FAMILY MEDICINE CLINIC | Facility: CLINIC | Age: 57
End: 2022-03-17

## 2022-03-17 NOTE — TELEPHONE ENCOUNTER
Patient has called stating Dr. Fernando was to send a RX for muscle relaxer to [facility] on [facility], however it was not there when he went to pick it up.    If you need to speak with him call [phone].    Thank you  Malgorzata

## 2022-03-17 NOTE — TELEPHONE ENCOUNTER
I called the pharmacy and they said it was a clarification, but that was an error. They ran it for me again and said it required a PA. Do you want us to wait for that or do we need to change the medication.

## 2022-03-22 RX ORDER — CYCLOBENZAPRINE HCL 5 MG
7.5 TABLET ORAL 3 TIMES DAILY PRN
Qty: 135 TABLET | Refills: 0 | Status: SHIPPED | OUTPATIENT
Start: 2022-03-22 | End: 2023-03-01

## 2022-03-22 NOTE — TELEPHONE ENCOUNTER
Dr. Fernando approved changing patient to 1.5 tablets of 5 mg flexeril instead of 7.5 mg due to coverage issues

## 2022-04-01 ENCOUNTER — TELEPHONE (OUTPATIENT)
Dept: FAMILY MEDICINE CLINIC | Facility: CLINIC | Age: 57
End: 2022-04-01

## 2022-04-01 DIAGNOSIS — E11.9 TYPE 2 DIABETES MELLITUS WITHOUT COMPLICATION, WITH LONG-TERM CURRENT USE OF INSULIN: ICD-10-CM

## 2022-04-01 DIAGNOSIS — F07.81 POST CONCUSSION SYNDROME: ICD-10-CM

## 2022-04-01 DIAGNOSIS — Z79.4 TYPE 2 DIABETES MELLITUS WITHOUT COMPLICATION, WITH LONG-TERM CURRENT USE OF INSULIN: ICD-10-CM

## 2022-04-01 RX ORDER — ONDANSETRON 4 MG/1
4 TABLET, ORALLY DISINTEGRATING ORAL EVERY 8 HOURS PRN
Qty: 30 TABLET | Refills: 0 | Status: SHIPPED | OUTPATIENT
Start: 2022-04-01 | End: 2023-02-27

## 2022-04-01 RX ORDER — PANTOPRAZOLE SODIUM 40 MG/1
40 TABLET, DELAYED RELEASE ORAL DAILY
Qty: 90 TABLET | Refills: 2 | Status: SHIPPED | OUTPATIENT
Start: 2022-04-01 | End: 2022-06-15 | Stop reason: SDUPTHER

## 2022-04-01 NOTE — TELEPHONE ENCOUNTER
Incoming Refill Request      Medication requested (name and dose): magnesium oxide (MAGnesium-Oxide) 400 (241.3 Mg) MG tablet tablet, pantoprazole (PROTONIX) 40 MG EC tablet, ondansetron ODT (Zofran ODT) 4 MG disintegrating tablet    Pharmacy where request should be sent: [facility] [place]    Additional details provided by patient:     Best call back number: [phone]    Does the patient have less than a 3 day supply:  [x] Yes  [] No    Elayne Boogie  04/01/22, 13:22 CDT

## 2022-04-13 ENCOUNTER — SPECIALTY PHARMACY (OUTPATIENT)
Dept: ENDOCRINOLOGY | Facility: CLINIC | Age: 57
End: 2022-04-13

## 2022-04-13 ENCOUNTER — OFFICE VISIT (OUTPATIENT)
Dept: ENDOCRINOLOGY | Facility: CLINIC | Age: 57
End: 2022-04-13

## 2022-04-13 VITALS
WEIGHT: 234 LBS | BODY MASS INDEX: 31.69 KG/M2 | HEIGHT: 72 IN | OXYGEN SATURATION: 96 % | SYSTOLIC BLOOD PRESSURE: 120 MMHG | HEART RATE: 82 BPM | DIASTOLIC BLOOD PRESSURE: 70 MMHG

## 2022-04-13 DIAGNOSIS — Z79.4 TYPE 2 DIABETES MELLITUS WITH HYPERGLYCEMIA, WITH LONG-TERM CURRENT USE OF INSULIN: Primary | ICD-10-CM

## 2022-04-13 DIAGNOSIS — E78.2 MIXED HYPERLIPIDEMIA: ICD-10-CM

## 2022-04-13 DIAGNOSIS — E55.9 VITAMIN D DEFICIENCY: ICD-10-CM

## 2022-04-13 DIAGNOSIS — E11.65 TYPE 2 DIABETES MELLITUS WITH HYPERGLYCEMIA, WITH LONG-TERM CURRENT USE OF INSULIN: Primary | ICD-10-CM

## 2022-04-13 DIAGNOSIS — I10 ESSENTIAL HYPERTENSION: ICD-10-CM

## 2022-04-13 PROCEDURE — 99214 OFFICE O/P EST MOD 30 MIN: CPT | Performed by: INTERNAL MEDICINE

## 2022-04-13 PROCEDURE — 95251 CONT GLUC MNTR ANALYSIS I&R: CPT | Performed by: INTERNAL MEDICINE

## 2022-04-13 RX ORDER — INSULIN GLARGINE AND LIXISENATIDE 100; 33 U/ML; UG/ML
60 INJECTION, SOLUTION SUBCUTANEOUS DAILY
Qty: 15 ML | Refills: 11 | Status: SHIPPED | OUTPATIENT
Start: 2022-04-13 | End: 2022-06-15 | Stop reason: SDUPTHER

## 2022-04-13 RX ORDER — INSULIN LISPRO 100 [IU]/ML
INJECTION, SOLUTION INTRAVENOUS; SUBCUTANEOUS
Qty: 3 PEN | Refills: 11 | Status: SHIPPED | OUTPATIENT
Start: 2022-04-13

## 2022-04-13 RX ORDER — INSULIN GLARGINE AND LIXISENATIDE 100; 33 U/ML; UG/ML
INJECTION, SOLUTION SUBCUTANEOUS
Qty: 6 PEN | Refills: 11 | Status: SHIPPED | OUTPATIENT
Start: 2022-04-13 | End: 2022-04-13

## 2022-04-13 NOTE — PROGRESS NOTES
"  Subjective    Nirav Lind is a 56 y.o. male. he is here today for follow-up of diabetes    HPI    t2dm since age 51 w/o complications     He is experiencing fasting hypoglycemia .        PE    /70   Pulse 82   Ht 182.9 cm (72\")   Wt 106 kg (234 lb)   SpO2 96%   BMI 31.74 kg/m²   AOx3  No visible goiter  Normal Respiratory Effort , Lung CTA  RRR  No Edema    Labs    Lab Results   Component Value Date    WBC 10.32 12/03/2021    HGB 14.0 12/03/2021    HCT 41.5 12/03/2021    MCV 82.2 12/03/2021     12/03/2021     Lab Results   Component Value Date    GLUCOSE 92 12/03/2021    BUN 19 12/03/2021    CREATININE 1.21 12/03/2021    EGFRIFAFRI 75 12/03/2021    BCR 15.7 12/03/2021    K 4.1 12/03/2021    CO2 25.9 12/03/2021    CALCIUM 9.9 12/03/2021    ALBUMIN 4.80 12/03/2021    AST 27 12/03/2021    ALT 40 12/03/2021         Assessment/Plan      1. Type 2 diabetes mellitus with hyperglycemia, with long-term current use of insulin (HCC)    2. Mixed hyperlipidemia    3. Essential hypertension    4. Vitamin D deficiency    .    Medications prescribed:      Orders placed during this encounter include:  No orders of the defined types were placed in this encounter.    Glycemic Management     Lab Results   Component Value Date    HGBA1C 7.94 (H) 12/03/2021        kayleigh 2 week report    Type 2 diabetes w hypoglycemia         Basaglar 13 units    --  10units , clarified how to adjust , he was feeding basal insulin     Metformin 750 mg XR once a day     Couldn't tolerate trulicity or invokana  Start ozempic , low dose 0.125 mg  Weekly , couldn't tolerate     Gives humalog / admelog , 2 to 3 units, since we backed off from basal he may need more     januvia too expensive , 47 dollars through us and this is too expensive     If tolerated, soliqua 10 units by [employer]     Lipid Management        Atorvastatin 40 mg qhs      Lab Results   Component Value Date    CHOL 111 12/03/2021    CHLPL 139 11/04/2016    TRIG 159 (H) " "12/03/2021    HDL 27 (L) 12/03/2021    LDL 57 12/03/2021           Non fasting      Blood Pressure Management   /70   Pulse 82   Ht 182.9 cm (72\")   Wt 106 kg (234 lb)   SpO2 96%   BMI 31.74 kg/m²     Per Jonathon, losartan, hctz  And toprol    Allergic to lisinopril         Microvascular Complication Monitoring: No Microalbuminuria, No Diabetic Retinopathy, positive  Diabetic Neuropathy        Neuropathy     Taking Neurontin before, resolved        Renal    Lab Results   Component Value Date    MALBCRERATIO 8.2 12/03/2021    MALBCRERATIO 5.8 09/11/2020     No retinopathy   Dr. Sharpe, scanned in system, Feb 2022      Preventive Care: Patient is not smoking        Weight Related: Obesity, Counseled on nutrition, Counseled on physical activity     Patient's Body mass index is 31.74 kg/m². BMI is above normal parameters. Recommendations include: educational material.       Decrease daily caloric intake by 500 calories per day        Dietary changes     Handout given diet and diabetes      Bone Health     Vitamin d def     Nov. 2016      Vit d - normal      Vitamin d Oct 2017     29     Start otc 1000 units daily           Lab Results   Component Value Date    LTHM96FJ 45.6 12/03/2021   \"               Lab Results   Component Value Date     [id number] 413 03/05/2019                  Component      Latest Ref Rng & Units 4/11/2019   Hepatitis B Surface Ag      Non-Reactive Non-Reactive   Hep A IgM      Non-Reactive Non-Reactive   Hep B Core IgM      Non-Reactive Non-Reactive   Hepatitis C Ab      Non-Reactive Non-Reactive               4. Follow-up: No follow-ups on file.                   "

## 2022-04-14 DIAGNOSIS — E78.2 MIXED HYPERLIPIDEMIA: ICD-10-CM

## 2022-04-14 RX ORDER — ATORVASTATIN CALCIUM 40 MG/1
40 TABLET, FILM COATED ORAL DAILY
Qty: 30 TABLET | Refills: 4 | Status: SHIPPED | OUTPATIENT
Start: 2022-04-14 | End: 2022-04-25

## 2022-04-14 NOTE — PROGRESS NOTES
Specialty Pharmacy Program - Endocrinology       Nirav Lind is a 56 y.o. male with Type 2 Diabetes enrolled in the Endocrinology Patient Management program offered by [facility].  An initial outreach was conducted, including assessment of therapy appropriateness and specialty medication education for Soliqua. The patient was introduced to services offered by our specialty pharmacy program, including: regular assessments, refill coordination, curbside pick-up or mail order delivery options, prior authorization maintenance, and financial assistance programs as applicable. The patient was also provided with contact information for the pharmacy team.     Insurance Coverage & Financial Support  Medicare    Relevant Past Medical History and Comorbidities  Relevant medical history and concomitant health conditions were discussed with the patient. The patient's chart has been reviewed for relevant past medical history and comorbid health conditions and updated as necessary.   Past Medical History:   Diagnosis Date   • Abnormal computed tomography scan    • Adenomatous polyp of colon    • Allergic rhinitis    • Anemia    • Chronic back pain    • COPD (chronic obstructive pulmonary disease) (HCC)    • Coronary arteriosclerosis    • Depression    • Diabetes mellitus (HCC)    • Diabetic polyneuropathy associated with type 2 diabetes mellitus (HCC) 7/17/2017   • Dizziness    • Dyspnea     unspecified   • Epigastric pain    • Essential hypertension    • Ex-smoker    • GERD (gastroesophageal reflux disease)    • Hemangioma of liver    • Hyperlipidemia    • Infected hydrocele     with orchitis and epididymis   • Nausea    • Nausea with vomiting    • Obesity with body mass index of 30.0 - 39.9    • Pain in right knee    • Right upper quadrant pain    • Type II diabetes mellitus, uncontrolled    • Upper respiratory infection    • Urgency of urination    • Villous adenoma of colon      Social History      Socioeconomic History   • Marital status:    Tobacco Use   • Smoking status: Former Smoker     Packs/day: 0.25     Years: 15.00     Pack years: 3.75     Types: Cigarettes     Quit date:      Years since quittin.2   • Smokeless tobacco: Never Used   Vaping Use   • Vaping Use: Never used   Substance and Sexual Activity   • Alcohol use: No   • Drug use: No   • Sexual activity: Defer       Problem list reviewed by Jeremy Davis PharmD on 2022 at  3:56 PM    Allergies  Known allergies and reactions were discussed with the patient. The patient's chart has been reviewed for  allergy information and updated as necessary.   Patient has no known allergies.    Allergies reviewed by Jeremy Davis, Abdifatah on 2022 at  3:56 PM    Current Medication List  This medication list has been reviewed with the patient and evaluated for any interactions or necessary modifications/recommendations, and updated to include all prescription medications, OTC medications, and supplements the patient is currently taking.  This list reflects what is contained in the patient's profile, which has also been marked as reviewed to communicate to other providers it is the most up to date version of the patient's current medication therapy.     Current Outpatient Medications:   •  albuterol sulfate HFA (Ventolin HFA) 108 (90 Base) MCG/ACT inhaler, Inhale 2 puffs Every 4 (Four) Hours As Needed for Wheezing., Disp: 18 g, Rfl: 11  •  Alcohol Swabs ( STERILE ALCOHOL PREP) pads, 1 each 4 (Four) Times a Day., Disp: 200 each, Rfl: 3  •  amitriptyline (ELAVIL) 25 MG tablet, Take 25 mg by mouth At Night As Needed., Disp: , Rfl: 1  •  aspirin 81 MG EC tablet, Take 81 mg by mouth Daily., Disp: , Rfl:   •  atorvastatin (LIPITOR) 40 MG tablet, Take 1 tablet by mouth Daily., Disp: 30 tablet, Rfl: 4  •  BD Pen Needle Lata 2nd Gen 32G X 4 MM misc, USE AS DIRECTED FOUR TIMES DAILY, Disp: 100 each, Rfl: 3  •  budesonide-formoterol  (SYMBICORT) 160-4.5 MCG/ACT inhaler, Inhale 2 puffs Daily As Needed., Disp: , Rfl:   •  busPIRone (BUSPAR) 5 MG tablet, Take 1 tablet by mouth 2 (Two) Times a Day., Disp: 180 tablet, Rfl: 3  •  Cholecalciferol (Vitamin D3) 1.25 MG (76248 UT) capsule, Take 50,000 Units by mouth Every 14 (Fourteen) Days., Disp: , Rfl:   •  cyclobenzaprine (FLEXERIL) 5 MG tablet, Take 1.5 tablets by mouth 3 (Three) Times a Day As Needed for Muscle Spasms., Disp: 135 tablet, Rfl: 0  •  fluticasone (VERAMYST) 27.5 MCG/SPRAY nasal spray, 2 sprays into the nostril(s) as directed by provider As Needed., Disp: , Rfl:   •  glucose blood (ONE TOUCH ULTRA TEST) test strip, Use to text 4 times a day.  [id number], Disp: 200 each, Rfl: 11  •  glucose blood test strip, Use 4x day [id number].11.49, Disp: 200 each, Rfl: 11  •  Insulin Glargine-Lixisenatide (Soliqua) 100-33 UNT-MCG/ML solution pen-injector injection, Inject up to 60 Units under the skin into the appropriate area as directed Daily 30 to 60 minutes before breakfast., Disp: 15 mL, Rfl: 11  •  Insulin Lispro, 1 Unit Dial, (HumaLOG KwikPen) 100 UNIT/ML solution pen-injector, Up to 10 units with meals, Disp: 3 pen, Rfl: 11  •  losartan-hydrochlorothiazide (Hyzaar) 100-25 MG per tablet, Take 1 tablet by mouth Daily., Disp: 90 tablet, Rfl: 3  •  magnesium oxide (MAGnesium-Oxide) 400 (241.3 Mg) MG tablet tablet, Take 1 tablet by mouth 2 (Two) Times a Day., Disp: 30 tablet, Rfl: 3  •  meloxicam (Mobic) 15 MG tablet, Take 1 tablet by mouth Daily., Disp: 30 tablet, Rfl: 2  •  metFORMIN ER (GLUCOPHAGE-XR) 750 MG 24 hr tablet, Take 1 tablet by mouth 2 (Two) Times a Day., Disp: 90 tablet, Rfl: 3  •  metoprolol succinate XL (TOPROL-XL) 50 MG 24 hr tablet, Take 1 tablet by mouth Daily., Disp: 90 tablet, Rfl: 3  •  ondansetron ODT (Zofran ODT) 4 MG disintegrating tablet, Place 1 tablet on the tongue Every 8 (Eight) Hours As Needed for Nausea or Vomiting., Disp: 30 tablet, Rfl: 0  •  pantoprazole (PROTONIX) 40 MG  EC tablet, Take 1 tablet by mouth Daily., Disp: 90 tablet, Rfl: 2  •  sertraline (ZOLOFT) 100 MG tablet, Take 100 mg by mouth Daily., Disp: , Rfl:   •  tamsulosin (FLOMAX) 0.4 MG capsule 24 hr capsule, Take 1 capsule by mouth Every Night., Disp: 30 capsule, Rfl: 3  •  tiotropium (SPIRIVA) 18 MCG per inhalation capsule, Place 1 capsule into inhaler and inhale Daily., Disp: , Rfl:     Medicines reviewed by Jeremy Davis, PharmD on 4/13/2022 at  3:56 PM    Drug Interactions  none     Recommended Medications Assessment  • Statin - Currently Taking  • ACEi/ARB - Currently Taking    Relevant Laboratory Values  A1C Last 3 Results    HGBA1C Last 3 Results 6/3/21 12/3/21   Hemoglobin A1C 6.99 (A) 7.94 (A)   (A) Abnormal value            Lab Results   Component Value Date    HGBA1C 7.94 (H) 12/03/2021     Lab Results   Component Value Date    GLUCOSE 92 12/03/2021    CALCIUM 9.9 12/03/2021     12/03/2021    K 4.1 12/03/2021    CO2 25.9 12/03/2021     12/03/2021    BUN 19 12/03/2021    CREATININE 1.21 12/03/2021    EGFRIFAFRI 75 12/03/2021    BCR 15.7 12/03/2021    ANIONGAP 10.1 12/03/2021     Lab Results   Component Value Date    CHOL 111 12/03/2021    CHLPL 139 11/04/2016    TRIG 159 (H) 12/03/2021    HDL 27 (L) 12/03/2021    LDL 57 12/03/2021     Microalbumin    Microalbumin 12/3/21   Microalbumin, Urine 1.3             Initial Education Provided for Specialty Medication  The patient has been provided with the following education and any applicable administration techniques (i.e. self-injection) have been demonstrated for the therapies indicated. All questions and concerns have been addressed prior to the patient receiving the medication, and the patient has verbalized understanding of the education and any materials provided.  Additional patient education shall be provided and documented upon request by the patient, provider or payer.      SOLIQUA® (Insulin Glargine & Lixisenatide)  Medication Expectations    Why am I taking this medication? You are taking Soliqua, along with diet and exercise, to lower blood sugar because you have type 2 diabetes. Diabetes is not curable but with proper medication and treatment, we can keep your blood sugar within your personalized target range. This medication may also help you lose some weight, and it helps reduce the risk of death from heart attack, and stroke in adults with type 2 diabetes and known heart disease.   What should I expect while on this medication? You should expect to see your blood sugar and A1c decrease over time and you may also lose some weight.   How does the medication work? Soliqua combines 2 diabetes medicines, insulin glargine (long-acting insulin) and lixisenatide (non-insulin), that work together in one injection.  Soliqua works with your body's own ability to lower blood sugar and A1c and helps your body release more insulin in response to your blood sugar rising.  This medication can also slow down food from leaving your stomach, making you feel freitas for longer.   How long will I be on this medication for? The amount of time you will be on this medication will be determined by your doctor based on blood sugar and A1c control. You will most likely be on this medication or another diabetes medication throughout your lifetime. Do not abruptly stop this medication without talking to your doctor first.    How do I take this medication? Take as directed on your prescription label. Soliqua is injected under the skin (subcutaneously) of your stomach, thigh, or upper arm. This medication should be taken once daily within one hour before your first meal of the day. Use a different injection site in the same body region each day.     Do not take less than 15 units or more than 60 units of Soliqua each day.    Injection directions:  • For each new pen, take it out of the refrigerator at least 1 hour before you inject  • Pull off the pen cap and wipe the rubber  seal with alcohol swab  • Attach a new needle for each injection; do not reuse needles (needles are sold separately)  • Prime each new needle before injection with 2 units of medication by turning the dose selector to the 2-milton and pressing the button to inject the liquid into the air  • After priming the needle, turn the dose selector to your prescribed dose  • Push the needle into your chosen injection site and press the injection button in and hold until the dial has returned to 0 and for an additional 10 seconds  • Gently remove the pen from your skin and replace the outer needle cap, remove the needle from the pen and discard the needle (detailed below in “Medication Storage / Handling”) •   What are some possible side effects? You may notice you don't feel as hungry, especially when you first start using Soliqua.  The most common side effects are low blood sugar (hypoglycemia), nausea, diarrhea, headache, congested/runny nose, upper respiratory infection, or injection-site itching/redness.      What happens if I miss a dose? If you miss a dose of Soliqua, skip the missed dose and take your prescribed dose the next day.  Never administer 2 doses of the same day or increase the dose to make up for a missed dose.     Medication Safety   What are things I should warn my doctor immediately about? Tell your doctor if you have or have had problems with your kidneys, liver, gallbladder, or pancreas.  • Stop using Soliqua and get medical help right away if you have severe pain in your stomach area that will not go away as this could be a sign of pancreatitis (inflammation of your pancreas).    Talk to your doctor if you have problem digesting food or slowed emptying of your stomach (gastroparesis).      Notify your doctor if you experience hypoglycemia which may include feeling dizzy, drowsy, weak, sweaty, hungry, jittery, confused, anxious, hungry, etc.    Taking pioglitazone (Actos) with Soliqua may cause heart  failure in some people.  If you experience shortness of breath, swelling in your ankles/feet, or weight gain, let your doctor know.     Talk to your doctor if you are pregnant, planning to become pregnant, or breastfeeding.     Get medical help right away if you notice any signs/symptoms of an allergic reaction (rash, hives, difficulty breathing, etc.).   What are things that I should be cautious of? Be cautious of any side effects from this medication. Talk to your doctor if any new ones develop or aren't getting better.   What are some medications that can interact with this one? Taking Soliqua with other medications that also lower your blood sugar such as insulin and glipizide/glimepiride/glyburide may increase the risk of low blood sugar.  • Your doctor may reduce the dose of these medications when you start Soliqua to minimize low blood sugars.     It should not be taken with other long-acting insulin or medicines called GLP-1 receptor agonists, because these work the same way as Soliqua.      Some medications (beta-clockers (metoprolol, carvedilol, etc.) and clonidine) may minimize the signs and symptoms of hypoglycemia; it is important to check your blood sugar before administering Soliqua.    Because Soliqua slows stomach emptying, it can affect the way some medicines work.    Always tell your doctor or pharmacist immediately if you start taking any new medications, including over-the-counter medications, vitamins, and herbal supplements.      Medication Storage/Handling   How should I handle this medication? Keep this medication out of reach of pets/children and keep the pen capped when not in use.  Do not share your medicine pens with others.   How does this medication need to be stored? Prior to use, store Soliqua in the refrigerator [2°C to 8°C (36°F to 46°F)] in its original packaging; do not freeze and do not use if Soliqua has been frozen.      After you use your pen, do not put it back in the  refrigerator.  Keep it at room temperature [below 25°C (77°F)].  Do not store with the needle attached and protect the pen from excessive heat and sunlight.     How should I dispose of this medication? Used needles should discarded after each use (for single use only). Place your used needles in an approved sharps container after use.  If you do not have a sharps container, you may use a household container made of heavy-duty plastic with a tight-fitting lid that is leak resistant (e.g., heavy-duty plastic laundry detergent bottle).      Throw the Soliqua pen away 28 days after the first use.    If your doctor decides to stop this medication, take to your local police station for proper disposal. Some pharmacies also have take-back bins for medication drop-off.     Resources/Support   How can I remind myself to take this medication? You can download reminder apps to help you manage your refills. You may also set an alarm on your phone to remind you.    Is financial support available?  [employer] can provide co-pay cards if you have commercial insurance or patient assistance if you have Medicare or no insurance.    Which vaccines are recommended for me? Talk to your doctor about these vaccines:  • Flu   • Coronavirus (COVID-19)   • Pneumococcal (pneumonia)   • Tdap   • Hepatitis B   • Zoster (shingles)          Adherence and Self-Administration  • Barriers to Patient Adherence and/or Self-Administration: None   • Methods for Supporting Patient Adherence and/or Self-Administration: None     Goals of Therapy   Goals     •  Exercise 150 minutes per week (moderate activity) (pt-stated)       - increasing exercise, walking  - improve diet             Reassessment Plan & Follow-Up  1. Medication Therapy Changes: Start soliqua, stop basaglar   2. Additional Plans, Therapy Recommendations, or Therapy Problems to Be Addressed: none   3. Pharmacist to perform regular reassessments no more than (6) months from the previous  assessment.  4. Welcome information and patient satisfaction survey to be sent by retail team with patient's initial fill.  5. Care Coordinator to set up future refill outreaches, coordinate prescription delivery, and escalate clinical questions to pharmacist.     Attestation  I attest that the initiated specialty medication(s) are appropriate for the patient based on my assessment.  If the prescribed therapy is at any point deemed not appropriate based on the current or future assessments, a consultation will be initiated with the patient's specialty care provider to determine the best course of action. The revised plan of therapy will be documented along with any additional patient education provided.     Neil Davis, JuanaD, MPH, BCPS    4/14/2022  13:48 CDT

## 2022-04-21 RX ORDER — CYCLOBENZAPRINE HCL 5 MG
TABLET ORAL
Qty: 135 TABLET | Refills: 0 | OUTPATIENT
Start: 2022-04-21

## 2022-04-24 DIAGNOSIS — E78.2 MIXED HYPERLIPIDEMIA: ICD-10-CM

## 2022-04-25 RX ORDER — ATORVASTATIN CALCIUM 40 MG/1
40 TABLET, FILM COATED ORAL DAILY
Qty: 30 TABLET | Refills: 4 | Status: SHIPPED | OUTPATIENT
Start: 2022-04-25 | End: 2022-06-15 | Stop reason: SDUPTHER

## 2022-06-06 ENCOUNTER — TELEPHONE (OUTPATIENT)
Dept: FAMILY MEDICINE CLINIC | Facility: CLINIC | Age: 57
End: 2022-06-06

## 2022-06-06 NOTE — TELEPHONE ENCOUNTER
Incoming Refill Request      Medication requested (name and dose): magnesium oxide (MAGnesium-Oxide) 400 (241.3 Mg) MG tablet tablet    sertraline (ZOLOFT) 100 MG tablet  albuterol sulfate HFA (Ventolin HFA) 108 (90 Base) MCG/ACT inhaler  tamsulosin (FLOMAX) 0.4 MG capsule 24 hr capsule  NAPROXEN 500 MG  Pharmacy where request should be sent: [facility]    Additional details provided by patient: WOULD LIKE 90 DAY SUPPLY    Best call back number: [phone]    Does the patient have less than a 3 day supply:  [] Yes  [] No    Elayne Boogie  06/06/22, 10:12 CDT

## 2022-06-07 ENCOUNTER — TELEPHONE (OUTPATIENT)
Dept: FAMILY MEDICINE CLINIC | Facility: CLINIC | Age: 57
End: 2022-06-07

## 2022-06-07 DIAGNOSIS — E11.9 TYPE 2 DIABETES MELLITUS WITHOUT COMPLICATION, WITH LONG-TERM CURRENT USE OF INSULIN: ICD-10-CM

## 2022-06-07 DIAGNOSIS — Z79.4 TYPE 2 DIABETES MELLITUS WITHOUT COMPLICATION, WITH LONG-TERM CURRENT USE OF INSULIN: ICD-10-CM

## 2022-06-07 DIAGNOSIS — R39.12 BENIGN PROSTATIC HYPERPLASIA WITH WEAK URINARY STREAM: ICD-10-CM

## 2022-06-07 DIAGNOSIS — N40.1 BENIGN PROSTATIC HYPERPLASIA WITH WEAK URINARY STREAM: ICD-10-CM

## 2022-06-07 RX ORDER — TAMSULOSIN HYDROCHLORIDE 0.4 MG/1
1 CAPSULE ORAL NIGHTLY
Qty: 30 CAPSULE | Refills: 3 | Status: SHIPPED | OUTPATIENT
Start: 2022-06-07 | End: 2022-06-15 | Stop reason: SDUPTHER

## 2022-06-07 RX ORDER — SERTRALINE HYDROCHLORIDE 100 MG/1
100 TABLET, FILM COATED ORAL DAILY
Qty: 90 TABLET | Refills: 0 | Status: SHIPPED | OUTPATIENT
Start: 2022-06-07 | End: 2022-06-10 | Stop reason: SDUPTHER

## 2022-06-07 NOTE — TELEPHONE ENCOUNTER
Incoming Refill Request      Medication requested (name and dose):   magnesium oxide (MAGnesium-Oxide) 400 (241.3 Mg) MG tablet tablet  MUCINEX  sertraline (ZOLOFT) 100 MG tablet    Pharmacy where request should be sent: [facility] on [address] [facility]    Additional details provided by patient:     Best call back number: [phone]    Does the patient have less than a 3 day supply:  [x] Yes  [] No    Malgorzata Canela  06/07/22, 11:02 CDT

## 2022-06-10 ENCOUNTER — OFFICE VISIT (OUTPATIENT)
Dept: FAMILY MEDICINE CLINIC | Facility: CLINIC | Age: 57
End: 2022-06-10

## 2022-06-10 VITALS
HEIGHT: 72 IN | DIASTOLIC BLOOD PRESSURE: 72 MMHG | HEART RATE: 90 BPM | TEMPERATURE: 97.1 F | OXYGEN SATURATION: 97 % | SYSTOLIC BLOOD PRESSURE: 128 MMHG | WEIGHT: 238.5 LBS | BODY MASS INDEX: 32.3 KG/M2

## 2022-06-10 DIAGNOSIS — Z09 FOLLOW-UP EXAM: Primary | ICD-10-CM

## 2022-06-10 DIAGNOSIS — I10 ESSENTIAL HYPERTENSION: ICD-10-CM

## 2022-06-10 DIAGNOSIS — F32.5 MAJOR DEPRESSIVE DISORDER WITH SINGLE EPISODE, IN FULL REMISSION: Chronic | ICD-10-CM

## 2022-06-10 DIAGNOSIS — R11.2 NAUSEA AND VOMITING, UNSPECIFIED VOMITING TYPE: ICD-10-CM

## 2022-06-10 PROCEDURE — 99213 OFFICE O/P EST LOW 20 MIN: CPT | Performed by: STUDENT IN AN ORGANIZED HEALTH CARE EDUCATION/TRAINING PROGRAM

## 2022-06-10 RX ORDER — BUSPIRONE HYDROCHLORIDE 5 MG/1
5 TABLET ORAL 2 TIMES DAILY
Qty: 180 TABLET | Refills: 3 | Status: SHIPPED | OUTPATIENT
Start: 2022-06-10 | End: 2022-06-15 | Stop reason: SDUPTHER

## 2022-06-10 RX ORDER — SERTRALINE HYDROCHLORIDE 100 MG/1
100 TABLET, FILM COATED ORAL DAILY
Qty: 90 TABLET | Refills: 4 | Status: SHIPPED | OUTPATIENT
Start: 2022-06-10 | End: 2022-06-15 | Stop reason: SDUPTHER

## 2022-06-10 RX ORDER — NAPROXEN 500 MG/1
500 TABLET ORAL 2 TIMES DAILY PRN
COMMUNITY
Start: 2022-06-06 | End: 2022-06-15 | Stop reason: SDUPTHER

## 2022-06-10 RX ORDER — LOSARTAN POTASSIUM AND HYDROCHLOROTHIAZIDE 25; 100 MG/1; MG/1
1 TABLET ORAL DAILY
Qty: 90 TABLET | Refills: 3 | Status: SHIPPED | OUTPATIENT
Start: 2022-06-10 | End: 2022-06-15 | Stop reason: SDUPTHER

## 2022-06-10 NOTE — PROGRESS NOTES
I have reviewed the patient.  I have reviewed the notes, assessments, and/or procedures performed by Dr Marshal Chisholm, I concur with his  documentation and assessment and plan for Nirav Lind.          This document has been electronically signed by Tray Acevedo MD on Blessing 10, 2022 11:34 CDT

## 2022-06-10 NOTE — PROGRESS NOTES
[facility]  Marshal Chisholm MD    Subjective:     Nirav Lind is a 57 y.o. male who presents for follow up.     Diabetes: not at goal. Last few readings listed below;  Hemoglobin A1C   Date Value Ref Range Status   12/03/2021 7.94 (H) 4.80 - 5.60 % Final   06/03/2021 6.99 (H) 4.80 - 5.60 % Final   03/05/2021 6.34 (H) 4.80 - 5.60 % Final     Patient's diabetic therapy is been managed by endocrinology and he reports adherence to the updated dosing since last hemoglobin a1c result.     Nausea and Vomiting: Patient reports occasional N/V. He is currently on Protonix for dyspepsia.  Patient reports recent colonoscopy done December 2021 but does not remember if he had an endoscopy done anytime soon.  Patient denies constipation.  Last bowel movement was yesterday.     Depression and anxiety: Patient states is stable on current Zoloft and BuSpar medication.  Patient denies suicidal ideations or dysphoric mood.  PHQ-9 was 0 today.      The following portions of the patient's history were reviewed and updated as appropriate: allergies, current medications, past family history, past medical history, past social history, past surgical history and problem list.    Past Medical Hx:  Past Medical History:   Diagnosis Date   • Abnormal computed tomography scan    • Adenomatous polyp of colon    • Allergic rhinitis    • Anemia    • Chronic back pain    • COPD (chronic obstructive pulmonary disease) (HCC)    • Coronary arteriosclerosis    • Depression    • Diabetes mellitus (HCC)    • Diabetic polyneuropathy associated with type 2 diabetes mellitus (HCC) 7/17/2017   • Dizziness    • Dyspnea     unspecified   • Epigastric pain    • Essential hypertension    • Ex-smoker    • GERD (gastroesophageal reflux disease)    • Hemangioma of liver    • Hyperlipidemia    • Infected hydrocele     with orchitis and epididymis   • Nausea    • Nausea with vomiting    • Obesity with body mass index of 30.0 - 39.9    • Pain in  right knee    • Right upper quadrant pain    • Type II diabetes mellitus, uncontrolled    • Upper respiratory infection    • Urgency of urination    • Villous adenoma of colon        Past Surgical Hx:  Past Surgical History:   Procedure Laterality Date   • CARDIAC CATHETERIZATION  11/30/2015    No evidence of any obstructive disease in the circumflex, the RCA , or LAD. 1st diagonal branch in the midportion with 20-30% stenosis. Preserved LV systolic function with EF 55%.   • CATARACT EXTRACTION W/ INTRAOCULAR LENS IMPLANT Left 2/26/2021    Procedure: REMOVE CATARACT AND IMPLANT INTRAOCULAR LENS;  Surgeon: Del Sharpe MD;  Location: [facility] OR;  Service: Ophthalmology;  Laterality: Left;   • COLONOSCOPY  01/21/2013    Normal   • COLONOSCOPY N/A 12/15/2021    Procedure: COLONOSCOPY;  Surgeon: Raymundo Gan MD;  Location: [facility] ENDOSCOPY;  Service: Gastroenterology;  Laterality: N/A;   • COLONOSCOPY W/ POLYPECTOMY  10/13/2014    A single polyp was found in the colon; removed by snare cautery polypectomy.   • ENDOSCOPY  06/15/2016    Mildly severe esophagitis. Several biopsies obtained in the upper third of the esophagus.   • INCISION AND DRAINAGE ABSCESS  10/29/2014    Incision and drainage of scrotal abscess. Hydrocelectomy, bottle procedure.   • KNEE ARTHROSCOPY Right 12/15/2020    Procedure: KNEE ARTHROSCOPY WITH  MEDIAL MENISCECTOMY;  Surgeon: Jerry Oakes MD;  Location: [facility] OR;  Service: Orthopedics;  Laterality: Right;  24 pictures        Current Meds:    Current Outpatient Medications:   •  busPIRone (BUSPAR) 5 MG tablet, Take 1 tablet by mouth 2 (Two) Times a Day., Disp: 180 tablet, Rfl: 3  •  losartan-hydrochlorothiazide (Hyzaar) 100-25 MG per tablet, Take 1 tablet by mouth Daily., Disp: 90 tablet, Rfl: 3  •  sertraline (ZOLOFT) 100 MG tablet, Take 1 tablet by mouth Daily., Disp: 90 tablet, Rfl: 4  •  albuterol sulfate HFA (Ventolin HFA) 108 (90 Base) MCG/ACT inhaler, Inhale 2 puffs  Every 4 (Four) Hours As Needed for Wheezing., Disp: 18 g, Rfl: 11  •  Alcohol Swabs ( STERILE ALCOHOL PREP) pads, 1 each 4 (Four) Times a Day., Disp: 200 each, Rfl: 3  •  aspirin 81 MG EC tablet, Take 81 mg by mouth Daily., Disp: , Rfl:   •  atorvastatin (LIPITOR) 40 MG tablet, TAKE 1 TABLET BY MOUTH DAILY, Disp: 30 tablet, Rfl: 4  •  BD Pen Needle Lata 2nd Gen 32G X 4 MM misc, USE AS DIRECTED FOUR TIMES DAILY, Disp: 100 each, Rfl: 3  •  budesonide-formoterol (SYMBICORT) 160-4.5 MCG/ACT inhaler, Inhale 2 puffs Daily As Needed., Disp: , Rfl:   •  Cholecalciferol (Vitamin D3) 1.25 MG (30180 UT) capsule, Take 50,000 Units by mouth Every 14 (Fourteen) Days., Disp: , Rfl:   •  cyclobenzaprine (FLEXERIL) 5 MG tablet, Take 1.5 tablets by mouth 3 (Three) Times a Day As Needed for Muscle Spasms., Disp: 135 tablet, Rfl: 0  •  fluticasone (VERAMYST) 27.5 MCG/SPRAY nasal spray, 2 sprays into the nostril(s) as directed by provider As Needed., Disp: , Rfl:   •  glucose blood (ONE TOUCH ULTRA TEST) test strip, Use to text 4 times a day.  [id number], Disp: 200 each, Rfl: 11  •  glucose blood test strip, Use 4x day [id number].11.49, Disp: 200 each, Rfl: 11  •  Insulin Glargine-Lixisenatide (Soliqua) 100-33 UNT-MCG/ML solution pen-injector injection, Inject up to 60 Units under the skin into the appropriate area as directed Daily 30 to 60 minutes before breakfast., Disp: 15 mL, Rfl: 11  •  Insulin Lispro, 1 Unit Dial, (HumaLOG KwikPen) 100 UNIT/ML solution pen-injector, Up to 10 units with meals, Disp: 3 pen, Rfl: 11  •  magnesium oxide (MAG-OX) 400 tablet tablet, Take 1 tablet by mouth 2 (Two) Times a Day., Disp: 60 each, Rfl: 3  •  metFORMIN ER (GLUCOPHAGE-XR) 750 MG 24 hr tablet, Take 1 tablet by mouth 2 (Two) Times a Day., Disp: 90 tablet, Rfl: 3  •  metoprolol succinate XL (TOPROL-XL) 50 MG 24 hr tablet, Take 1 tablet by mouth Daily., Disp: 90 tablet, Rfl: 3  •  naproxen (NAPROSYN) 500 MG tablet, Take 500 mg by mouth 2 (Two) Times a Day  As Needed., Disp: , Rfl:   •  ondansetron ODT (Zofran ODT) 4 MG disintegrating tablet, Place 1 tablet on the tongue Every 8 (Eight) Hours As Needed for Nausea or Vomiting., Disp: 30 tablet, Rfl: 0  •  pantoprazole (PROTONIX) 40 MG EC tablet, Take 1 tablet by mouth Daily., Disp: 90 tablet, Rfl: 2  •  tamsulosin (FLOMAX) 0.4 MG capsule 24 hr capsule, Take 1 capsule by mouth Every Night., Disp: 30 capsule, Rfl: 3  •  tiotropium (SPIRIVA) 18 MCG per inhalation capsule, Place 1 capsule into inhaler and inhale Daily., Disp: , Rfl:     Allergies:  No Known Allergies    Family Hx:  Family History   Problem Relation Age of Onset   • Cancer Mother         leukemia   • Heart disease Mother    • Heart disease Sister    • Lung disease Father    • Heart disease Maternal Grandmother    • Heart disease Maternal Grandfather         Social History:  Social History     Socioeconomic History   • Marital status:    Tobacco Use   • Smoking status: Former Smoker     Packs/day: 0.25     Years: 15.00     Pack years: 3.75     Types: Cigarettes     Quit date:      Years since quittin.4   • Smokeless tobacco: Never Used   Vaping Use   • Vaping Use: Never used   Substance and Sexual Activity   • Alcohol use: No   • Drug use: No   • Sexual activity: Defer       Review of Systems  Review of Systems   Constitutional: Negative for chills and fever.   HENT: Negative for congestion, rhinorrhea and sore throat.    Eyes: Negative for visual disturbance.   Respiratory: Negative for cough, chest tightness and shortness of breath.    Cardiovascular: Negative for chest pain, palpitations and leg swelling.   Gastrointestinal: Positive for nausea and vomiting. Negative for constipation.   Endocrine: Negative for polydipsia and polyuria.   Genitourinary: Negative for difficulty urinating, dysuria, frequency and urgency.   Musculoskeletal: Negative for joint swelling and myalgias.   Skin: Negative for rash and wound.   Neurological: Negative  "for dizziness, weakness, light-headedness and headaches.   Psychiatric/Behavioral: Negative for dysphoric mood and sleep disturbance.       Objective:     /72   Pulse 90   Temp 97.1 °F (36.2 °C)   Ht 182.9 cm (72\")   Wt 108 kg (238 lb 8 oz)   SpO2 97%   BMI 32.35 kg/m²   Physical Exam  Vitals and nursing note reviewed.   Constitutional:       General: He is not in acute distress.     Appearance: Normal appearance. He is not diaphoretic.   HENT:      Head: Normocephalic and atraumatic.      Right Ear: External ear normal.      Left Ear: External ear normal.   Eyes:      General: No scleral icterus.     Extraocular Movements: Extraocular movements intact.      Conjunctiva/sclera: Conjunctivae normal.      Pupils: Pupils are equal, round, and reactive to light.   Cardiovascular:      Rate and Rhythm: Normal rate and regular rhythm.      Heart sounds: Normal heart sounds.   Pulmonary:      Effort: Pulmonary effort is normal. No respiratory distress.      Breath sounds: Normal breath sounds.   Chest:      Chest wall: No tenderness.   Abdominal:      General: Bowel sounds are normal.      Palpations: Abdomen is soft.      Tenderness: There is no abdominal tenderness.   Musculoskeletal:         General: No swelling or tenderness.      Cervical back: Normal range of motion and neck supple.      Right lower leg: No edema.      Left lower leg: No edema.   Skin:     General: Skin is warm and dry.      Capillary Refill: Capillary refill takes less than 2 seconds.      Findings: No erythema or rash.   Neurological:      General: No focal deficit present.      Mental Status: He is alert and oriented to person, place, and time.      Motor: No weakness.   Psychiatric:         Behavior: Behavior normal.          Assessment/Plan:     Patient 57-year-old male with history of COPD, diabetes, hypertension, dyslipidemia and chronic nausea vomiting      Diagnoses and all orders for this visit:    1. Follow-up exam " (Primary)    2. Major depressive disorder with single episode, in full remission (HCC)  -     sertraline (ZOLOFT) 100 MG tablet; Take 1 tablet by mouth Daily.  Dispense: 90 tablet; Refill: 4  -     busPIRone (BUSPAR) 5 MG tablet; Take 1 tablet by mouth 2 (Two) Times a Day.  Dispense: 180 tablet; Refill: 3    3. Essential hypertension  -     losartan-hydrochlorothiazide (Hyzaar) 100-25 MG per tablet; Take 1 tablet by mouth Daily.  Dispense: 90 tablet; Refill: 3    4. Nausea and vomiting, unspecified vomiting type  -     Ambulatory Referral to Gastroenterology      Reviewed medication with patient.  Patient is currently taking naproxen which could potentially irritate the stomach in addition to polypharmacy.  I think is reasonable to refer patient to gastroenterology given frequency of symptoms of nausea and vomiting.    Will continue Zoloft and BuSpar.     Patient will follow-up in 1 month for Medicare wellness visit.     Depression screening: Up to date; last screen 6/10/2022     Follow-up:     Return in about 1 month (around 7/10/2022) for Medicare Wellness.    Preventative:  Health Maintenance   Topic Date Due   • Pneumococcal Vaccine 0-64 (1 - PCV) Never done   • ZOSTER VACCINE (1 of 2) Never done   • COVID-19 Vaccine (3 - Booster for Pfizer series) 09/13/2021   • ANNUAL WELLNESS VISIT  06/03/2022   • HEMOGLOBIN A1C  06/03/2022   • INFLUENZA VACCINE  08/01/2022   • DIABETIC FOOT EXAM  12/03/2022   • LIPID PANEL  12/03/2022   • URINE MICROALBUMIN  12/03/2022   • DIABETIC EYE EXAM  02/22/2023   • COLORECTAL CANCER SCREENING  12/15/2026   • TDAP/TD VACCINES (3 - Td or Tdap) 10/16/2029   • HEPATITIS C SCREENING  Completed   • Hepatitis B  Completed     Male Preventative: Exercises regularly  Recommended: Pneumovax and Zoster  Vaccine Counseling: declined. Wants to think about it     Weight  Body mass index is 32.35 kg/m².    Alcohol use:  reports no history of alcohol use.  Nicotine status  reports that he quit  smoking about 22 years ago. His smoking use included cigarettes. He has a 3.75 pack-year smoking history. He has never used smokeless tobacco.     Goals     •  Exercise 150 minutes per week (moderate activity) (pt-stated)       - increasing exercise, walking  - improve diet              RISK SCORE: 3      Marshal Chisholm M.D. PGY 2  [facility]  [address] [place] [address]  Office: [phone]  This document has been electronically signed by Marshal Chisholm MD on Blessing 10, 2022 11:25 CDT

## 2022-06-13 DIAGNOSIS — E11.9 TYPE 2 DIABETES MELLITUS WITHOUT COMPLICATION, WITH LONG-TERM CURRENT USE OF INSULIN: ICD-10-CM

## 2022-06-13 DIAGNOSIS — Z79.4 TYPE 2 DIABETES MELLITUS WITHOUT COMPLICATION, WITH LONG-TERM CURRENT USE OF INSULIN: ICD-10-CM

## 2022-06-15 DIAGNOSIS — I10 ESSENTIAL HYPERTENSION: ICD-10-CM

## 2022-06-15 DIAGNOSIS — E78.2 MIXED HYPERLIPIDEMIA: ICD-10-CM

## 2022-06-15 DIAGNOSIS — R39.12 BENIGN PROSTATIC HYPERPLASIA WITH WEAK URINARY STREAM: ICD-10-CM

## 2022-06-15 DIAGNOSIS — N40.1 BENIGN PROSTATIC HYPERPLASIA WITH WEAK URINARY STREAM: ICD-10-CM

## 2022-06-15 DIAGNOSIS — Z79.4 TYPE 2 DIABETES MELLITUS WITHOUT COMPLICATION, WITH LONG-TERM CURRENT USE OF INSULIN: ICD-10-CM

## 2022-06-15 DIAGNOSIS — E11.9 TYPE 2 DIABETES MELLITUS WITHOUT COMPLICATION, WITH LONG-TERM CURRENT USE OF INSULIN: ICD-10-CM

## 2022-06-15 DIAGNOSIS — F32.5 MAJOR DEPRESSIVE DISORDER WITH SINGLE EPISODE, IN FULL REMISSION: Chronic | ICD-10-CM

## 2022-06-15 RX ORDER — LOSARTAN POTASSIUM AND HYDROCHLOROTHIAZIDE 25; 100 MG/1; MG/1
1 TABLET ORAL DAILY
Qty: 90 TABLET | Refills: 3 | Status: SHIPPED | OUTPATIENT
Start: 2022-06-15 | End: 2022-08-15

## 2022-06-15 RX ORDER — BUSPIRONE HYDROCHLORIDE 5 MG/1
5 TABLET ORAL 2 TIMES DAILY
Qty: 180 TABLET | Refills: 3 | Status: SHIPPED | OUTPATIENT
Start: 2022-06-15 | End: 2022-10-05 | Stop reason: SDUPTHER

## 2022-06-15 RX ORDER — METOPROLOL SUCCINATE 50 MG/1
50 TABLET, EXTENDED RELEASE ORAL DAILY
Qty: 90 TABLET | Refills: 3 | Status: SHIPPED | OUTPATIENT
Start: 2022-06-15 | End: 2023-01-19 | Stop reason: SDUPTHER

## 2022-06-15 RX ORDER — TAMSULOSIN HYDROCHLORIDE 0.4 MG/1
1 CAPSULE ORAL NIGHTLY
Qty: 30 CAPSULE | Refills: 3 | Status: SHIPPED | OUTPATIENT
Start: 2022-06-15 | End: 2022-08-29

## 2022-06-15 RX ORDER — PANTOPRAZOLE SODIUM 40 MG/1
40 TABLET, DELAYED RELEASE ORAL DAILY
Qty: 90 TABLET | Refills: 2 | Status: SHIPPED | OUTPATIENT
Start: 2022-06-15 | End: 2023-03-21

## 2022-06-15 RX ORDER — SERTRALINE HYDROCHLORIDE 100 MG/1
100 TABLET, FILM COATED ORAL DAILY
Qty: 90 TABLET | Refills: 4 | Status: SHIPPED | OUTPATIENT
Start: 2022-06-15 | End: 2022-12-07 | Stop reason: SDUPTHER

## 2022-06-15 RX ORDER — CHOLECALCIFEROL (VITAMIN D3) 1250 MCG
50000 CAPSULE ORAL
Qty: 2 CAPSULE | Refills: 11 | Status: SHIPPED | OUTPATIENT
Start: 2022-06-15 | End: 2022-08-12

## 2022-06-15 RX ORDER — ATORVASTATIN CALCIUM 40 MG/1
40 TABLET, FILM COATED ORAL DAILY
Qty: 30 TABLET | Refills: 4 | Status: SHIPPED | OUTPATIENT
Start: 2022-06-15 | End: 2023-01-19

## 2022-06-15 RX ORDER — NAPROXEN 500 MG/1
500 TABLET ORAL 2 TIMES DAILY PRN
Qty: 60 TABLET | Refills: 1 | Status: SHIPPED | OUTPATIENT
Start: 2022-06-15 | End: 2022-08-24

## 2022-06-15 RX ORDER — INSULIN GLARGINE AND LIXISENATIDE 100; 33 U/ML; UG/ML
60 INJECTION, SOLUTION SUBCUTANEOUS DAILY
Qty: 15 ML | Refills: 11 | Status: SHIPPED | OUTPATIENT
Start: 2022-06-15 | End: 2023-02-13 | Stop reason: SDUPTHER

## 2022-06-15 RX ORDER — BUDESONIDE AND FORMOTEROL FUMARATE DIHYDRATE 160; 4.5 UG/1; UG/1
2 AEROSOL RESPIRATORY (INHALATION) DAILY PRN
OUTPATIENT
Start: 2022-06-15

## 2022-06-15 RX ORDER — METFORMIN HYDROCHLORIDE 750 MG/1
750 TABLET, EXTENDED RELEASE ORAL 2 TIMES DAILY
Qty: 90 TABLET | Refills: 3 | Status: SHIPPED | OUTPATIENT
Start: 2022-06-15 | End: 2022-08-12 | Stop reason: SDUPTHER

## 2022-06-21 ENCOUNTER — TELEPHONE (OUTPATIENT)
Dept: ENDOCRINOLOGY | Facility: CLINIC | Age: 57
End: 2022-06-21

## 2022-06-21 DIAGNOSIS — E11.9 TYPE 2 DIABETES MELLITUS WITHOUT COMPLICATION, WITH LONG-TERM CURRENT USE OF INSULIN: ICD-10-CM

## 2022-06-21 DIAGNOSIS — Z79.4 TYPE 2 DIABETES MELLITUS WITHOUT COMPLICATION, WITH LONG-TERM CURRENT USE OF INSULIN: ICD-10-CM

## 2022-06-22 DIAGNOSIS — E11.9 TYPE 2 DIABETES MELLITUS WITHOUT COMPLICATION, WITH LONG-TERM CURRENT USE OF INSULIN: ICD-10-CM

## 2022-06-22 DIAGNOSIS — Z79.4 TYPE 2 DIABETES MELLITUS WITHOUT COMPLICATION, WITH LONG-TERM CURRENT USE OF INSULIN: ICD-10-CM

## 2022-06-22 RX ORDER — METFORMIN HYDROCHLORIDE 750 MG/1
750 TABLET, EXTENDED RELEASE ORAL 2 TIMES DAILY
Qty: 90 TABLET | Refills: 3 | Status: CANCELLED | OUTPATIENT
Start: 2022-06-22

## 2022-06-27 ENCOUNTER — DOCUMENTATION (OUTPATIENT)
Dept: ENDOCRINOLOGY | Facility: CLINIC | Age: 57
End: 2022-06-27

## 2022-07-06 ENCOUNTER — DOCUMENTATION (OUTPATIENT)
Dept: ENDOCRINOLOGY | Facility: CLINIC | Age: 57
End: 2022-07-06

## 2022-07-12 ENCOUNTER — OFFICE VISIT (OUTPATIENT)
Dept: FAMILY MEDICINE CLINIC | Facility: CLINIC | Age: 57
End: 2022-07-12

## 2022-07-12 VITALS
TEMPERATURE: 96.8 F | WEIGHT: 234.5 LBS | SYSTOLIC BLOOD PRESSURE: 132 MMHG | DIASTOLIC BLOOD PRESSURE: 76 MMHG | BODY MASS INDEX: 31.76 KG/M2 | HEIGHT: 72 IN | OXYGEN SATURATION: 95 % | HEART RATE: 72 BPM

## 2022-07-12 DIAGNOSIS — Z00.00 MEDICARE ANNUAL WELLNESS VISIT, SUBSEQUENT: Primary | ICD-10-CM

## 2022-07-12 PROCEDURE — G0439 PPPS, SUBSEQ VISIT: HCPCS | Performed by: STUDENT IN AN ORGANIZED HEALTH CARE EDUCATION/TRAINING PROGRAM

## 2022-07-12 PROCEDURE — 1159F MED LIST DOCD IN RCRD: CPT | Performed by: STUDENT IN AN ORGANIZED HEALTH CARE EDUCATION/TRAINING PROGRAM

## 2022-07-12 PROCEDURE — 1170F FXNL STATUS ASSESSED: CPT | Performed by: STUDENT IN AN ORGANIZED HEALTH CARE EDUCATION/TRAINING PROGRAM

## 2022-07-12 NOTE — PROGRESS NOTES
The ABCs of the Annual Wellness Visit  Subsequent Medicare Wellness Visit    Chief Complaint   Patient presents with   • Medicare Wellness-subsequent      Subjective    History of Present Illness:  Nirav Lind is a 57 y.o. male who presents for a Subsequent Medicare Wellness Visit.    The following portions of the patient's history were reviewed and   updated as appropriate: allergies, current medications, past family history, past medical history, past social history, past surgical history and problem list.    Compared to one year ago, the patient feels his physical   health is the same.    Compared to one year ago, the patient feels his mental   health is the same.    Recent Hospitalizations:  He was not admitted to the hospital during the last year.       Current Medical Providers:  Patient Care Team:  Marshal Chisholm MD as PCP - General (Family Medicine)    Outpatient Medications Prior to Visit   Medication Sig Dispense Refill   • albuterol sulfate HFA (Ventolin HFA) 108 (90 Base) MCG/ACT inhaler Inhale 2 puffs Every 4 (Four) Hours As Needed for Wheezing. 18 g 11   • Alcohol Swabs ( STERILE ALCOHOL PREP) pads 1 each 4 (Four) Times a Day. 200 each 3   • aspirin 81 MG EC tablet Take 81 mg by mouth Daily.     • atorvastatin (LIPITOR) 40 MG tablet Take 1 tablet by mouth Daily. 30 tablet 4   • BD Pen Needle Lata 2nd Gen 32G X 4 MM misc USE AS DIRECTED FOUR TIMES DAILY 100 each 3   • budesonide-formoterol (SYMBICORT) 160-4.5 MCG/ACT inhaler Inhale 2 puffs Daily As Needed.     • busPIRone (BUSPAR) 5 MG tablet Take 1 tablet by mouth 2 (Two) Times a Day. 180 tablet 3   • Cholecalciferol (Vitamin D3) 1.25 MG (07591 UT) capsule Take 1 capsule by mouth Every 14 (Fourteen) Days. 2 capsule 11   • Continuous Blood Gluc Sensor (FreeStyle Myrna 2 Sensor) misc 1 each Every 14 (Fourteen) Days. Dx E11.65 6 each 3   • cyclobenzaprine (FLEXERIL) 5 MG tablet Take 1.5 tablets by mouth 3 (Three) Times a Day As Needed for  Muscle Spasms. 135 tablet 0   • fluticasone (VERAMYST) 27.5 MCG/SPRAY nasal spray 2 sprays into the nostril(s) as directed by provider As Needed.     • glucose blood (ONE TOUCH ULTRA TEST) test strip Use to text 4 times a day.  Dx-e11.49 200 each 11   • glucose blood test strip Use 4x day E.11.49 200 each 11   • Insulin Glargine-Lixisenatide (Soliqua) 100-33 UNT-MCG/ML solution pen-injector injection Inject up to 60 Units under the skin into the appropriate area as directed Daily 30 to 60 minutes before breakfast. 15 mL 11   • Insulin Lispro, 1 Unit Dial, (HumaLOG KwikPen) 100 UNIT/ML solution pen-injector Up to 10 units with meals 3 pen 11   • losartan-hydrochlorothiazide (Hyzaar) 100-25 MG per tablet Take 1 tablet by mouth Daily. 90 tablet 3   • magnesium oxide (MAG-OX) 400 tablet tablet Take 1 tablet by mouth 2 (Two) Times a Day. 60 each 3   • metFORMIN ER (GLUCOPHAGE-XR) 750 MG 24 hr tablet Take 1 tablet by mouth 2 (Two) Times a Day. 90 tablet 3   • metoprolol succinate XL (TOPROL-XL) 50 MG 24 hr tablet Take 1 tablet by mouth Daily. 90 tablet 3   • naproxen (NAPROSYN) 500 MG tablet Take 1 tablet by mouth 2 (Two) Times a Day As Needed for Mild Pain  or Moderate Pain . 60 tablet 1   • ondansetron ODT (Zofran ODT) 4 MG disintegrating tablet Place 1 tablet on the tongue Every 8 (Eight) Hours As Needed for Nausea or Vomiting. 30 tablet 0   • pantoprazole (PROTONIX) 40 MG EC tablet Take 1 tablet by mouth Daily. 90 tablet 2   • sertraline (ZOLOFT) 100 MG tablet Take 1 tablet by mouth Daily. 90 tablet 4   • tamsulosin (FLOMAX) 0.4 MG capsule 24 hr capsule Take 1 capsule by mouth Every Night. 30 capsule 3   • tiotropium (SPIRIVA) 18 MCG per inhalation capsule Place 1 capsule into inhaler and inhale Daily. 18 capsule 0     No facility-administered medications prior to visit.       No opioid medication identified on active medication list. I have reviewed chart for other potential  high risk medication/s and harmful drug  interactions in the elderly.          Aspirin is on active medication list. Aspirin use is indicated based on review of current medical condition/s. Pros and cons of this therapy have been discussed today. Benefits of this medication outweigh potential harm.  Patient has been encouraged to continue taking this medication.  .      Patient Active Problem List   Diagnosis   • Hyperlipidemia   • Essential hypertension   • Vitamin D insufficiency   • Villous adenoma of colon   • Benign prostatic hyperplasia with weak urinary stream   • Infected hydrocele   • Hemangioma of liver   • Ex-smoker   • Dizziness   • Coronary arteriosclerosis   • Chronic back pain   • Anemia   • Allergic rhinitis   • Abnormal computed tomography scan   • Weakness   • Class 2 severe obesity due to excess calories with serious comorbidity and body mass index (BMI) of 35.0 to 35.9 in adult ([facility])   • Spondylolisthesis at L3-L4 level   • DDD (degenerative disc disease), lumbar   • Major depressive disorder with single episode, in full remission ([facility])   • COPD (chronic obstructive pulmonary disease) ([facility])   • Type 2 diabetes mellitus without complication, without long-term current use of insulin ([facility])   • Left ventricular diastolic dysfunction   • Right knee pain   • Tear of lateral meniscus of right knee, current   • Polyp of colon   • Status post arthroscopy of right knee   • Sciatica of right side   • History of colon polyps   • Positive FIT (fecal immunochemical test)   • Night muscle spasms     Advance Care Planning  Advance Directive is not on file.  ACP discussion was held with the patient during this visit. Patient does not have an advance directive, information provided.    Review of Systems   Constitutional: Negative.    HENT: Negative.    Eyes: Negative.    Respiratory: Negative.    Cardiovascular: Negative.    Gastrointestinal: Negative.    Genitourinary: Negative.    Musculoskeletal: Negative.    Skin: Negative.    Neurological: Negative.  "   Psychiatric/Behavioral: Negative.         Objective    Vitals:    07/12/22 1547   BP: 132/76   Pulse: 72   Temp: 96.8 °F (36 °C)   SpO2: 95%   Weight: 106 kg (234 lb 8 oz)   Height: 182.9 cm (72\")   PainSc: 0-No pain     Estimated body mass index is 31.8 kg/m² as calculated from the following:    Height as of this encounter: 182.9 cm (72\").    Weight as of this encounter: 106 kg (234 lb 8 oz).    BMI is >= 30 and <35. (Class 1 Obesity). The following options were offered after discussion;: exercise counseling/recommendations and nutrition counseling/recommendations      Does the patient have evidence of cognitive impairment? Yes    Physical Exam  Vitals and nursing note reviewed.   Constitutional:       General: He is not in acute distress.     Appearance: Normal appearance. He is not diaphoretic.   HENT:      Head: Normocephalic and atraumatic.      Right Ear: External ear normal.      Left Ear: External ear normal.   Eyes:      General: No scleral icterus.     Extraocular Movements: Extraocular movements intact.      Conjunctiva/sclera: Conjunctivae normal.   Cardiovascular:      Rate and Rhythm: Normal rate and regular rhythm.      Heart sounds: Normal heart sounds.   Pulmonary:      Effort: Pulmonary effort is normal. No respiratory distress.      Breath sounds: Normal breath sounds.   Chest:      Chest wall: No tenderness.   Abdominal:      General: Bowel sounds are normal.      Palpations: Abdomen is soft.      Tenderness: There is no abdominal tenderness.   Musculoskeletal:         General: No swelling or tenderness.      Cervical back: Normal range of motion and neck supple.      Right lower leg: No edema.      Left lower leg: No edema.   Skin:     General: Skin is warm and dry.      Capillary Refill: Capillary refill takes less than 2 seconds.      Findings: No erythema or rash.   Neurological:      General: No focal deficit present.      Mental Status: He is alert and oriented to person, place, and " time.      Motor: No weakness.   Psychiatric:         Behavior: Behavior normal.                 HEALTH RISK ASSESSMENT    Smoking Status:  Social History     Tobacco Use   Smoking Status Former Smoker   • Packs/day: 0.25   • Years: 15.00   • Pack years: 3.75   • Types: Cigarettes   • Quit date:    • Years since quittin.5   Smokeless Tobacco Never Used     Alcohol Consumption:  Social History     Substance and Sexual Activity   Alcohol Use No     Fall Risk Screen:    STEADI Fall Risk Assessment has not been completed.    Depression Screening:  PHQ-2/PHQ-9 Depression Screening 2022   Retired PHQ-9 Total Score -   Retired Total Score -   Little Interest or Pleasure in Doing Things 0-->not at all   Feeling Down, Depressed or Hopeless 1-->several days   PHQ-9: Brief Depression Severity Measure Score 1       Health Habits and Functional and Cognitive Screening:  Functional & Cognitive Status 2022   Do you have difficulty preparing food and eating? No   Do you have difficulty bathing yourself, getting dressed or grooming yourself? No   Do you have difficulty using the toilet? No   Do you have difficulty moving around from place to place? No   Do you have trouble with steps or getting out of a bed or a chair? No   Current Diet Well Balanced Diet   Dental Exam Up to date   Eye Exam Up to date   Exercise (times per week) 1 times per week   Current Exercises Include No Regular Exercise   Current Exercise Activities Include -   Do you need help using the phone?  No   Are you deaf or do you have serious difficulty hearing?  No   Do you need help with transportation? No   Do you need help shopping? No   Do you need help preparing meals?  No   Do you need help with housework?  No   Do you need help with laundry? No   Do you need help managing money? No   Do you ever drive or ride in a car without wearing a seat belt? No   Have you felt unusual stress, anger or loneliness in the last month? -   Who do you live  with? -   If you need help, do you have trouble finding someone available to you? -   Have you been bothered in the last four weeks by sexual problems? -   Do you have difficulty concentrating, remembering or making decisions? -       Age-appropriate Screening Schedule:  Refer to the list below for future screening recommendations based on patient's age, sex and/or medical conditions. Orders for these recommended tests are listed in the plan section. The patient has been provided with a written plan.    Health Maintenance   Topic Date Due   • ZOSTER VACCINE (1 of 2) Never done   • HEMOGLOBIN A1C  06/03/2022   • INFLUENZA VACCINE  10/01/2022   • DIABETIC FOOT EXAM  12/03/2022   • LIPID PANEL  12/03/2022   • URINE MICROALBUMIN  12/03/2022   • DIABETIC EYE EXAM  02/22/2023   • TDAP/TD VACCINES (3 - Td or Tdap) 10/16/2029              Assessment & Plan   CMS Preventative Services Quick Reference  Risk Factors Identified During Encounter  Obesity/Overweight   The above risks/problems have been discussed with the patient.  Follow up actions/plans if indicated are seen below in the Assessment/Plan Section.  Pertinent information has been shared with the patient in the After Visit Summary.    Diagnoses and all orders for this visit:    1. Medicare annual wellness visit, subsequent (Primary)        Follow Up:   Return in about 6 months (around 1/12/2023), or if symptoms worsen or fail to improve, for Next scheduled follow up.     An After Visit Summary and PPPS were made available to the patient.          I spent 30 minutes caring for Nirav on this date of service. This time includes time spent by me in the following activities:preparing for the visit, obtaining and/or reviewing a separately obtained history and performing a medically appropriate examination and/or evaluation        Stacy Chisholm M.D. PGY 3  [facility]  [address] [place], [place] [id number]  Office:  [phone]  This document has been electronically signed by Marshal Chisholm MD on July 13, 2022 15:39 CDT

## 2022-07-18 NOTE — PROGRESS NOTES
I have spoken with the patient .     I have reviewed the notes, assessments, and/or procedures performed by Dr. Marshal Chisholm,   I concur with   his  documentation and assessment and plan for Nirav Lind.          This document has been electronically signed by Tray Acevedo MD on July 18, 2022 13:37 CDT

## 2022-08-12 ENCOUNTER — OFFICE VISIT (OUTPATIENT)
Dept: ENDOCRINOLOGY | Facility: CLINIC | Age: 57
End: 2022-08-12

## 2022-08-12 ENCOUNTER — LAB (OUTPATIENT)
Dept: LAB | Facility: HOSPITAL | Age: 57
End: 2022-08-12

## 2022-08-12 VITALS
OXYGEN SATURATION: 94 % | DIASTOLIC BLOOD PRESSURE: 60 MMHG | WEIGHT: 232 LBS | SYSTOLIC BLOOD PRESSURE: 110 MMHG | HEART RATE: 67 BPM | HEIGHT: 72 IN | BODY MASS INDEX: 31.42 KG/M2

## 2022-08-12 DIAGNOSIS — I10 ESSENTIAL HYPERTENSION: ICD-10-CM

## 2022-08-12 DIAGNOSIS — Z79.4 TYPE 2 DIABETES MELLITUS WITHOUT COMPLICATION, WITH LONG-TERM CURRENT USE OF INSULIN: ICD-10-CM

## 2022-08-12 DIAGNOSIS — E11.9 WELL CONTROLLED TYPE 2 DIABETES MELLITUS: Primary | ICD-10-CM

## 2022-08-12 DIAGNOSIS — N17.9 AKI (ACUTE KIDNEY INJURY): ICD-10-CM

## 2022-08-12 DIAGNOSIS — E78.2 MIXED HYPERLIPIDEMIA: ICD-10-CM

## 2022-08-12 DIAGNOSIS — E11.9 TYPE 2 DIABETES MELLITUS WITHOUT COMPLICATION, WITH LONG-TERM CURRENT USE OF INSULIN: ICD-10-CM

## 2022-08-12 LAB
ALBUMIN SERPL-MCNC: 4.4 G/DL (ref 3.5–5.2)
ALBUMIN/GLOB SERPL: 1.1 G/DL
ALP SERPL-CCNC: 138 U/L (ref 39–117)
ALT SERPL W P-5'-P-CCNC: 33 U/L (ref 1–41)
ANION GAP SERPL CALCULATED.3IONS-SCNC: 8 MMOL/L (ref 5–15)
AST SERPL-CCNC: 25 U/L (ref 1–40)
BASOPHILS # BLD AUTO: 0.09 10*3/MM3 (ref 0–0.2)
BASOPHILS NFR BLD AUTO: 0.9 % (ref 0–1.5)
BILIRUB SERPL-MCNC: 0.3 MG/DL (ref 0–1.2)
BUN SERPL-MCNC: 24 MG/DL (ref 6–20)
BUN/CREAT SERPL: 15.5 (ref 7–25)
CALCIUM SPEC-SCNC: 9.6 MG/DL (ref 8.6–10.5)
CHLORIDE SERPL-SCNC: 104 MMOL/L (ref 98–107)
CHOLEST SERPL-MCNC: 92 MG/DL (ref 0–200)
CO2 SERPL-SCNC: 28 MMOL/L (ref 22–29)
CREAT SERPL-MCNC: 1.55 MG/DL (ref 0.76–1.27)
DEPRECATED RDW RBC AUTO: 41.9 FL (ref 37–54)
EGFRCR SERPLBLD CKD-EPI 2021: 51.2 ML/MIN/1.73
EOSINOPHIL # BLD AUTO: 0.37 10*3/MM3 (ref 0–0.4)
EOSINOPHIL NFR BLD AUTO: 3.5 % (ref 0.3–6.2)
ERYTHROCYTE [DISTWIDTH] IN BLOOD BY AUTOMATED COUNT: 13.6 % (ref 12.3–15.4)
GLOBULIN UR ELPH-MCNC: 4 GM/DL
GLUCOSE SERPL-MCNC: 99 MG/DL (ref 65–99)
HBA1C MFR BLD: 7.5 % (ref 4.8–5.6)
HCT VFR BLD AUTO: 39.9 % (ref 37.5–51)
HDLC SERPL-MCNC: 25 MG/DL (ref 40–60)
HGB BLD-MCNC: 12.6 G/DL (ref 13–17.7)
IMM GRANULOCYTES # BLD AUTO: 0.08 10*3/MM3 (ref 0–0.05)
IMM GRANULOCYTES NFR BLD AUTO: 0.8 % (ref 0–0.5)
LDLC SERPL CALC-MCNC: 41 MG/DL (ref 0–100)
LDLC/HDLC SERPL: 1.5 {RATIO}
LYMPHOCYTES # BLD AUTO: 2.11 10*3/MM3 (ref 0.7–3.1)
LYMPHOCYTES NFR BLD AUTO: 20.2 % (ref 19.6–45.3)
MCH RBC QN AUTO: 26.5 PG (ref 26.6–33)
MCHC RBC AUTO-ENTMCNC: 31.6 G/DL (ref 31.5–35.7)
MCV RBC AUTO: 84 FL (ref 79–97)
MONOCYTES # BLD AUTO: 1.02 10*3/MM3 (ref 0.1–0.9)
MONOCYTES NFR BLD AUTO: 9.8 % (ref 5–12)
NEUTROPHILS NFR BLD AUTO: 6.77 10*3/MM3 (ref 1.7–7)
NEUTROPHILS NFR BLD AUTO: 64.8 % (ref 42.7–76)
NRBC BLD AUTO-RTO: 0 /100 WBC (ref 0–0.2)
PLATELET # BLD AUTO: 328 10*3/MM3 (ref 140–450)
PMV BLD AUTO: 9.8 FL (ref 6–12)
POTASSIUM SERPL-SCNC: 4.3 MMOL/L (ref 3.5–5.2)
PROT SERPL-MCNC: 8.4 G/DL (ref 6–8.5)
RBC # BLD AUTO: 4.75 10*6/MM3 (ref 4.14–5.8)
SODIUM SERPL-SCNC: 140 MMOL/L (ref 136–145)
TRIGL SERPL-MCNC: 147 MG/DL (ref 0–150)
TSH SERPL DL<=0.05 MIU/L-ACNC: 1.9 UIU/ML (ref 0.27–4.2)
VLDLC SERPL-MCNC: 26 MG/DL (ref 5–40)
WBC NRBC COR # BLD: 10.44 10*3/MM3 (ref 3.4–10.8)

## 2022-08-12 PROCEDURE — 82607 VITAMIN B-12: CPT | Performed by: INTERNAL MEDICINE

## 2022-08-12 PROCEDURE — 80053 COMPREHEN METABOLIC PANEL: CPT | Performed by: INTERNAL MEDICINE

## 2022-08-12 PROCEDURE — 82306 VITAMIN D 25 HYDROXY: CPT | Performed by: INTERNAL MEDICINE

## 2022-08-12 PROCEDURE — 84443 ASSAY THYROID STIM HORMONE: CPT | Performed by: INTERNAL MEDICINE

## 2022-08-12 PROCEDURE — 36415 COLL VENOUS BLD VENIPUNCTURE: CPT | Performed by: INTERNAL MEDICINE

## 2022-08-12 PROCEDURE — 85025 COMPLETE CBC W/AUTO DIFF WBC: CPT | Performed by: INTERNAL MEDICINE

## 2022-08-12 PROCEDURE — 99214 OFFICE O/P EST MOD 30 MIN: CPT | Performed by: INTERNAL MEDICINE

## 2022-08-12 PROCEDURE — 83036 HEMOGLOBIN GLYCOSYLATED A1C: CPT | Performed by: INTERNAL MEDICINE

## 2022-08-12 PROCEDURE — 95251 CONT GLUC MNTR ANALYSIS I&R: CPT | Performed by: INTERNAL MEDICINE

## 2022-08-12 PROCEDURE — 80061 LIPID PANEL: CPT | Performed by: INTERNAL MEDICINE

## 2022-08-12 PROCEDURE — 82570 ASSAY OF URINE CREATININE: CPT | Performed by: INTERNAL MEDICINE

## 2022-08-12 PROCEDURE — 82043 UR ALBUMIN QUANTITATIVE: CPT | Performed by: INTERNAL MEDICINE

## 2022-08-12 RX ORDER — METFORMIN HYDROCHLORIDE 750 MG/1
750 TABLET, EXTENDED RELEASE ORAL 2 TIMES DAILY
Qty: 90 TABLET | Refills: 3 | Status: SHIPPED | OUTPATIENT
Start: 2022-08-12 | End: 2023-02-27 | Stop reason: SDUPTHER

## 2022-08-12 RX ORDER — METFORMIN HYDROCHLORIDE 750 MG/1
750 TABLET, EXTENDED RELEASE ORAL 2 TIMES DAILY
Qty: 90 TABLET | Refills: 3 | Status: SHIPPED | OUTPATIENT
Start: 2022-08-12 | End: 2022-08-12 | Stop reason: SDUPTHER

## 2022-08-12 RX ORDER — PEN NEEDLE, DIABETIC 32GX 5/32"
NEEDLE, DISPOSABLE MISCELLANEOUS
Qty: 100 EACH | Refills: 3 | Status: SHIPPED | OUTPATIENT
Start: 2022-08-12 | End: 2022-10-26 | Stop reason: SDUPTHER

## 2022-08-12 RX ORDER — PEN NEEDLE, DIABETIC 32GX 5/32"
NEEDLE, DISPOSABLE MISCELLANEOUS
Qty: 100 EACH | Refills: 3 | Status: SHIPPED | OUTPATIENT
Start: 2022-08-12 | End: 2022-08-12 | Stop reason: SDUPTHER

## 2022-08-12 NOTE — PROGRESS NOTES
"  Subjective    Nirav Lind is a 57 y.o. male. he is here today for follow-up of diabetes    HPI    t2dm since age 51 w/o complications but this time he has evidence of CASS    His glucose is better controlled.        PE    /60   Pulse 67   Ht 182.9 cm (72\")   Wt 105 kg (232 lb)   SpO2 94%   BMI 31.46 kg/m²   AOx3  No visible goiter  Normal Respiratory Effort , Lung CTA  RRR  No Edema    Labs    Lab Results   Component Value Date    WBC 10.44 08/12/2022    HGB 12.6 (L) 08/12/2022    HCT 39.9 08/12/2022    MCV 84.0 08/12/2022     08/12/2022     Lab Results   Component Value Date    GLUCOSE 99 08/12/2022    BUN 24 (H) 08/12/2022    CREATININE 1.55 (H) 08/12/2022    EGFRIFAFRI 75 12/03/2021    BCR 15.5 08/12/2022    K 4.3 08/12/2022    CO2 28.0 08/12/2022    CALCIUM 9.6 08/12/2022    ALBUMIN 4.40 08/12/2022    AST 25 08/12/2022    ALT 33 08/12/2022         Assessment & Plan      1. Well controlled type 2 diabetes mellitus (HCC)    2. Mixed hyperlipidemia    3. Essential hypertension    4. CASS (acute kidney injury) (HCC)    .    Medications prescribed:      Orders placed during this encounter include:  Orders Placed This Encounter   Procedures   • Hemoglobin A1c     Order Specific Question:   Release to patient     Answer:   Routine Release   • Lipid Panel   • Microalbumin / Creatinine Urine Ratio - Urine, Clean Catch     Order Specific Question:   Release to patient     Answer:   Routine Release   • TSH     Order Specific Question:   Release to patient     Answer:   Routine Release   • Vitamin B12     Order Specific Question:   Release to patient     Answer:   Routine Release     Glycemic Management     Lab Results   Component Value Date    HGBA1C 7.50 (H) 08/12/2022        kayleigh 2 week report    Type 2 diabetes well controlled     Metformin bid,   soliqua , was doing 10 but now 5    humalog , only occasional , 3 or 4 untis for high carb meals    Couldn't tolerate even 0.125 mg ozempic hence doing " "soliqujeff riverauvia too expensive     Lipid Management        Atorvastatin 40 mg qhs      Lab Results   Component Value Date    CHOL 92 08/12/2022    CHLPL 139 11/04/2016    TRIG 147 08/12/2022    HDL 25 (L) 08/12/2022    LDL 41 08/12/2022           Non fasting      Blood Pressure Management   /60   Pulse 67   Ht 182.9 cm (72\")   Wt 105 kg (232 lb)   SpO2 94%   BMI 31.46 kg/m²     Per Jonathon, losartan, hctz  And toprol    Change losartan / hctz 100/25 to 100/12.5 due to CASS   If possible avoid naproxen      Microvascular Complication Monitoring: No Microalbuminuria, No Diabetic Retinopathy, positive  Diabetic Neuropathy        Neuropathy     Taking Neurontin before, resolved        Renal    Lab Results   Component Value Date    MALBCRERATIO 10.3 08/12/2022    MALBCRERATIO 8.2 12/03/2021     No retinopathy   Dr. Sharpe, scanned in system, Feb 2022      Preventive Care: Patient is not smoking        Weight Related: Obesity, Counseled on nutrition, Counseled on physical activity     Patient's Body mass index is 31.46 kg/m². BMI is above normal parameters. Recommendations include: educational material.       Decrease daily caloric intake by 500 calories per day        Dietary changes     Handout given diet and diabetes                Lab Results   Component Value Date     [id number] 413 03/05/2019                  Component      Latest Ref Rng & Units 4/11/2019   Hepatitis B Surface Ag      Non-Reactive Non-Reactive   Hep A IgM      Non-Reactive Non-Reactive   Hep B Core IgM      Non-Reactive Non-Reactive   Hepatitis C Ab      Non-Reactive Non-Reactive               4. Follow-up: Return in about 6 months (around 2/12/2023).              This document has been electronically signed by Joe Tan MD on September 26, 2022 13:50 CDT               "

## 2022-08-13 LAB
25(OH)D3 SERPL-MCNC: 32.2 NG/ML (ref 30–100)
ALBUMIN UR-MCNC: 2.8 MG/DL
CREAT UR-MCNC: 271.9 MG/DL
MICROALBUMIN/CREAT UR: 10.3 MG/G
VIT B12 BLD-MCNC: 379 PG/ML (ref 211–946)

## 2022-08-15 DIAGNOSIS — E11.9 TYPE 2 DIABETES MELLITUS WITHOUT COMPLICATION, WITH LONG-TERM CURRENT USE OF INSULIN: Primary | ICD-10-CM

## 2022-08-15 DIAGNOSIS — Z79.4 TYPE 2 DIABETES MELLITUS WITHOUT COMPLICATION, WITH LONG-TERM CURRENT USE OF INSULIN: Primary | ICD-10-CM

## 2022-08-15 RX ORDER — LOSARTAN POTASSIUM AND HYDROCHLOROTHIAZIDE 12.5; 1 MG/1; MG/1
1 TABLET ORAL DAILY
Qty: 90 TABLET | Refills: 3 | Status: SHIPPED | OUTPATIENT
Start: 2022-08-15 | End: 2023-02-13

## 2022-08-24 RX ORDER — NAPROXEN 500 MG/1
TABLET ORAL
Qty: 120 TABLET | Refills: 5 | Status: SHIPPED | OUTPATIENT
Start: 2022-08-24

## 2022-08-24 RX ORDER — TIOTROPIUM BROMIDE 18 UG/1
CAPSULE ORAL; RESPIRATORY (INHALATION)
Qty: 30 CAPSULE | Refills: 11 | Status: SHIPPED | OUTPATIENT
Start: 2022-08-24

## 2022-08-29 DIAGNOSIS — R39.12 BENIGN PROSTATIC HYPERPLASIA WITH WEAK URINARY STREAM: ICD-10-CM

## 2022-08-29 DIAGNOSIS — N40.1 BENIGN PROSTATIC HYPERPLASIA WITH WEAK URINARY STREAM: ICD-10-CM

## 2022-08-29 RX ORDER — TAMSULOSIN HYDROCHLORIDE 0.4 MG/1
CAPSULE ORAL
Qty: 90 CAPSULE | Refills: 3 | Status: SHIPPED | OUTPATIENT
Start: 2022-08-29 | End: 2023-03-20

## 2022-10-05 DIAGNOSIS — F32.5 MAJOR DEPRESSIVE DISORDER WITH SINGLE EPISODE, IN FULL REMISSION: Chronic | ICD-10-CM

## 2022-10-05 RX ORDER — BUSPIRONE HYDROCHLORIDE 5 MG/1
5 TABLET ORAL 2 TIMES DAILY
Qty: 180 TABLET | Refills: 3 | Status: SHIPPED | OUTPATIENT
Start: 2022-10-05

## 2022-10-07 ENCOUNTER — DOCUMENTATION (OUTPATIENT)
Dept: ENDOCRINOLOGY | Facility: CLINIC | Age: 57
End: 2022-10-07

## 2022-10-25 ENCOUNTER — TELEPHONE (OUTPATIENT)
Dept: ENDOCRINOLOGY | Facility: CLINIC | Age: 57
End: 2022-10-25

## 2022-10-25 NOTE — TELEPHONE ENCOUNTER
Pt called stating that he uses the Myrna 14 day sensors and was getting them through [facility] mail order, but he has changed insurance and now has to go through [email or web address] mail order. Pt stated he also needs his pen needles.    Please advise.    Thanks

## 2022-10-26 RX ORDER — PEN NEEDLE, DIABETIC 32GX 5/32"
NEEDLE, DISPOSABLE MISCELLANEOUS
Qty: 360 EACH | Refills: 3 | Status: SHIPPED | OUTPATIENT
Start: 2022-10-26

## 2022-10-28 ENCOUNTER — DOCUMENTATION (OUTPATIENT)
Dept: ENDOCRINOLOGY | Facility: CLINIC | Age: 57
End: 2022-10-28

## 2022-10-31 ENCOUNTER — DOCUMENTATION (OUTPATIENT)
Dept: ENDOCRINOLOGY | Facility: CLINIC | Age: 57
End: 2022-10-31

## 2022-12-07 DIAGNOSIS — F32.5 MAJOR DEPRESSIVE DISORDER WITH SINGLE EPISODE, IN FULL REMISSION: Chronic | ICD-10-CM

## 2022-12-07 RX ORDER — SERTRALINE HYDROCHLORIDE 100 MG/1
100 TABLET, FILM COATED ORAL DAILY
Qty: 90 TABLET | Refills: 4 | Status: SHIPPED | OUTPATIENT
Start: 2022-12-07

## 2023-01-19 DIAGNOSIS — E78.2 MIXED HYPERLIPIDEMIA: ICD-10-CM

## 2023-01-19 RX ORDER — METOPROLOL SUCCINATE 50 MG/1
50 TABLET, EXTENDED RELEASE ORAL DAILY
Qty: 90 TABLET | Refills: 3 | Status: SHIPPED | OUTPATIENT
Start: 2023-01-19

## 2023-01-19 RX ORDER — ATORVASTATIN CALCIUM 40 MG/1
40 TABLET, FILM COATED ORAL DAILY
Qty: 30 TABLET | Refills: 4 | Status: SHIPPED | OUTPATIENT
Start: 2023-01-19

## 2023-02-13 ENCOUNTER — LAB (OUTPATIENT)
Dept: LAB | Facility: HOSPITAL | Age: 58
End: 2023-02-13
Payer: MEDICARE

## 2023-02-13 ENCOUNTER — OFFICE VISIT (OUTPATIENT)
Dept: ENDOCRINOLOGY | Facility: CLINIC | Age: 58
End: 2023-02-13
Payer: MEDICARE

## 2023-02-13 ENCOUNTER — TELEPHONE (OUTPATIENT)
Dept: ENDOCRINOLOGY | Facility: CLINIC | Age: 58
End: 2023-02-13

## 2023-02-13 VITALS
DIASTOLIC BLOOD PRESSURE: 70 MMHG | BODY MASS INDEX: 31.29 KG/M2 | SYSTOLIC BLOOD PRESSURE: 124 MMHG | HEART RATE: 67 BPM | WEIGHT: 231 LBS | HEIGHT: 72 IN | OXYGEN SATURATION: 97 %

## 2023-02-13 DIAGNOSIS — R94.4 DECREASED GFR: ICD-10-CM

## 2023-02-13 DIAGNOSIS — E11.9 WELL CONTROLLED TYPE 2 DIABETES MELLITUS: Primary | ICD-10-CM

## 2023-02-13 DIAGNOSIS — E78.2 MIXED HYPERLIPIDEMIA: ICD-10-CM

## 2023-02-13 DIAGNOSIS — I10 ESSENTIAL HYPERTENSION: ICD-10-CM

## 2023-02-13 LAB
ALBUMIN SERPL-MCNC: 4.1 G/DL (ref 3.5–5.2)
ALBUMIN UR-MCNC: 3.6 MG/DL
ALBUMIN/GLOB SERPL: 1.1 G/DL
ALP SERPL-CCNC: 130 U/L (ref 39–117)
ALT SERPL W P-5'-P-CCNC: 26 U/L (ref 1–41)
ANION GAP SERPL CALCULATED.3IONS-SCNC: 9 MMOL/L (ref 5–15)
AST SERPL-CCNC: 25 U/L (ref 1–40)
BASOPHILS # BLD AUTO: 0.07 10*3/MM3 (ref 0–0.2)
BASOPHILS NFR BLD AUTO: 0.7 % (ref 0–1.5)
BILIRUB SERPL-MCNC: 0.3 MG/DL (ref 0–1.2)
BUN SERPL-MCNC: 18 MG/DL (ref 6–20)
BUN/CREAT SERPL: 12.9 (ref 7–25)
CALCIUM SPEC-SCNC: 9.3 MG/DL (ref 8.6–10.5)
CHLORIDE SERPL-SCNC: 104 MMOL/L (ref 98–107)
CHOLEST SERPL-MCNC: 81 MG/DL (ref 0–200)
CO2 SERPL-SCNC: 26 MMOL/L (ref 22–29)
CREAT SERPL-MCNC: 1.4 MG/DL (ref 0.76–1.27)
CREAT UR-MCNC: 179.7 MG/DL
DEPRECATED RDW RBC AUTO: 40.7 FL (ref 37–54)
EGFRCR SERPLBLD CKD-EPI 2021: 57.9 ML/MIN/1.73
EOSINOPHIL # BLD AUTO: 0.48 10*3/MM3 (ref 0–0.4)
EOSINOPHIL NFR BLD AUTO: 4.7 % (ref 0.3–6.2)
ERYTHROCYTE [DISTWIDTH] IN BLOOD BY AUTOMATED COUNT: 13.3 % (ref 12.3–15.4)
GLOBULIN UR ELPH-MCNC: 3.8 GM/DL
GLUCOSE SERPL-MCNC: 98 MG/DL (ref 65–99)
HBA1C MFR BLD: 6.9 % (ref 4.8–5.6)
HCT VFR BLD AUTO: 40.9 % (ref 37.5–51)
HDLC SERPL-MCNC: 24 MG/DL (ref 40–60)
HGB BLD-MCNC: 13 G/DL (ref 13–17.7)
IMM GRANULOCYTES # BLD AUTO: 0.09 10*3/MM3 (ref 0–0.05)
IMM GRANULOCYTES NFR BLD AUTO: 0.9 % (ref 0–0.5)
LDLC SERPL CALC-MCNC: 32 MG/DL (ref 0–100)
LDLC/HDLC SERPL: 1.15 {RATIO}
LYMPHOCYTES # BLD AUTO: 1.99 10*3/MM3 (ref 0.7–3.1)
LYMPHOCYTES NFR BLD AUTO: 19.6 % (ref 19.6–45.3)
MCH RBC QN AUTO: 26.9 PG (ref 26.6–33)
MCHC RBC AUTO-ENTMCNC: 31.8 G/DL (ref 31.5–35.7)
MCV RBC AUTO: 84.7 FL (ref 79–97)
MICROALBUMIN/CREAT UR: 20 MG/G
MONOCYTES # BLD AUTO: 0.91 10*3/MM3 (ref 0.1–0.9)
MONOCYTES NFR BLD AUTO: 9 % (ref 5–12)
NEUTROPHILS NFR BLD AUTO: 6.59 10*3/MM3 (ref 1.7–7)
NEUTROPHILS NFR BLD AUTO: 65.1 % (ref 42.7–76)
NRBC BLD AUTO-RTO: 0 /100 WBC (ref 0–0.2)
PLATELET # BLD AUTO: 315 10*3/MM3 (ref 140–450)
PMV BLD AUTO: 9.8 FL (ref 6–12)
POTASSIUM SERPL-SCNC: 4.7 MMOL/L (ref 3.5–5.2)
PROT SERPL-MCNC: 7.9 G/DL (ref 6–8.5)
RBC # BLD AUTO: 4.83 10*6/MM3 (ref 4.14–5.8)
SODIUM SERPL-SCNC: 139 MMOL/L (ref 136–145)
TRIGL SERPL-MCNC: 147 MG/DL (ref 0–150)
TSH SERPL DL<=0.05 MIU/L-ACNC: 2.27 UIU/ML (ref 0.27–4.2)
VIT B12 BLD-MCNC: 570 PG/ML (ref 211–946)
VLDLC SERPL-MCNC: 25 MG/DL (ref 5–40)
WBC NRBC COR # BLD: 10.13 10*3/MM3 (ref 3.4–10.8)

## 2023-02-13 PROCEDURE — 80053 COMPREHEN METABOLIC PANEL: CPT | Performed by: INTERNAL MEDICINE

## 2023-02-13 PROCEDURE — 80061 LIPID PANEL: CPT | Performed by: INTERNAL MEDICINE

## 2023-02-13 PROCEDURE — 99214 OFFICE O/P EST MOD 30 MIN: CPT | Performed by: INTERNAL MEDICINE

## 2023-02-13 PROCEDURE — 85025 COMPLETE CBC W/AUTO DIFF WBC: CPT | Performed by: INTERNAL MEDICINE

## 2023-02-13 PROCEDURE — 95251 CONT GLUC MNTR ANALYSIS I&R: CPT | Performed by: INTERNAL MEDICINE

## 2023-02-13 PROCEDURE — 83036 HEMOGLOBIN GLYCOSYLATED A1C: CPT | Performed by: INTERNAL MEDICINE

## 2023-02-13 PROCEDURE — 82043 UR ALBUMIN QUANTITATIVE: CPT | Performed by: INTERNAL MEDICINE

## 2023-02-13 PROCEDURE — 84443 ASSAY THYROID STIM HORMONE: CPT | Performed by: INTERNAL MEDICINE

## 2023-02-13 PROCEDURE — 36415 COLL VENOUS BLD VENIPUNCTURE: CPT | Performed by: INTERNAL MEDICINE

## 2023-02-13 PROCEDURE — 82607 VITAMIN B-12: CPT | Performed by: INTERNAL MEDICINE

## 2023-02-13 PROCEDURE — 82570 ASSAY OF URINE CREATININE: CPT | Performed by: INTERNAL MEDICINE

## 2023-02-13 RX ORDER — LOSARTAN POTASSIUM 100 MG/1
100 TABLET ORAL DAILY
Qty: 30 TABLET | Refills: 11 | Status: SHIPPED | OUTPATIENT
Start: 2023-02-13 | End: 2024-02-13

## 2023-02-13 RX ORDER — INSULIN GLARGINE AND LIXISENATIDE 100; 33 U/ML; UG/ML
60 INJECTION, SOLUTION SUBCUTANEOUS DAILY
Qty: 15 ML | Refills: 11 | Status: SHIPPED | OUTPATIENT
Start: 2023-02-13

## 2023-02-13 NOTE — TELEPHONE ENCOUNTER
Patient is told his A1c is 6.9. cholesterol is good. His renal function is low but better than before. He is to stop the Losart/HCTZ and take just Losartan. Rx is sent. Repeat labs one week before his next appointment.

## 2023-02-13 NOTE — PROGRESS NOTES
"  Subjective    Nirav Lind is a 57 y.o. male. he is here today for follow-up of diabetes    HPI    t2dm since age 51 w/o complications    His glucose is at goal        PE    /70   Pulse 67   Ht 182.9 cm (72\")   Wt 105 kg (231 lb)   SpO2 97%   BMI 31.33 kg/m²   AOx3  No visible goiter  Normal Respiratory Effort , Lung CTA  RRR  No Edema    Labs    Lab Results   Component Value Date    WBC 10.13 02/13/2023    HGB 13.0 02/13/2023    HCT 40.9 02/13/2023    MCV 84.7 02/13/2023     02/13/2023     Lab Results   Component Value Date    GLUCOSE 98 02/13/2023    BUN 18 02/13/2023    CREATININE 1.40 (H) 02/13/2023    EGFRIFAFRI 75 12/03/2021    BCR 12.9 02/13/2023    K 4.7 02/13/2023    CO2 26.0 02/13/2023    CALCIUM 9.3 02/13/2023    ALBUMIN 4.1 02/13/2023    AST 25 02/13/2023    ALT 26 02/13/2023         Assessment & Plan       Diagnosis Plan   1. Well controlled type 2 diabetes mellitus (HCC)  Insulin Glargine-Lixisenatide (Soliqua) 100-33 UNT-MCG/ML solution pen-injector injection    CBC Auto Differential    Comprehensive Metabolic Panel    Hemoglobin A1c    Lipid Panel    TSH    Vitamin B12      2. Mixed hyperlipidemia  Lipid Panel      3. Essential hypertension  CBC Auto Differential    Comprehensive Metabolic Panel    losartan (Cozaar) 100 MG tablet      4. Decreased GFR               Glycemic Management     Lab Results   Component Value Date    HGBA1C 6.90 (H) 02/13/2023       Ambulatory Glucose Profile Report    Days Analyzed : 2 week period ending on 02/13/23      Continuous Glucose Monitory Device:  FreeStyle Myrna 2     - 5% very high target range  - 19% high target range  - 76% in target range  - 0% below target range  - GMI 7.2 %  - Average glucose 161 mg/dl    Interpretation : Diabetes Type 2 well controlled            Metformin bid,   soliqua , 8 units at the present time   Has used as much as 60     humalog , only occasional , 3 or 4 untis for high carb meals    Couldn't tolerate even " "0.125 mg ozempic hence doing soliqua    januvia too expensive     Lipid Management        Atorvastatin 40 mg qhs      Lab Results   Component Value Date    CHOL 81 02/13/2023    CHLPL 139 11/04/2016    TRIG 147 02/13/2023    HDL 24 (L) 02/13/2023    LDL 32 02/13/2023           Non fasting      Blood Pressure Management   /70   Pulse 67   Ht 182.9 cm (72\")   Wt 105 kg (231 lb)   SpO2 97%   BMI 31.33 kg/m²     Per Jonathon, losartan, hctz  And toprol    Changed losartan / hctz 100/25 to 100/12.5 - change to losartan alone 100 mg daily   If possible avoid naproxen      Microvascular Complication Monitoring         Neuropathy resolved   No longer taking neurontin         Decreased GFR ,    All previous GFR were normal , could be naproxen related but taking much less  Never proteinuria     Lab Results   Component Value Date    MALBCRERATIO 10.3 08/12/2022    MALBCRERATIO 8.2 12/03/2021     No retinopathy   Dr. Sharpe, scanned in system, Feb 2022      Preventive Care: Patient is not smoking      Lab Results   Component Value Date    [id number] 379 08/12/2022              Component      Latest Ref Rng & Units 4/11/2019   Hepatitis B Surface Ag      Non-Reactive Non-Reactive   Hep A IgM      Non-Reactive Non-Reactive   Hep B Core IgM      Non-Reactive Non-Reactive   Hepatitis C Ab      Non-Reactive Non-Reactive               4. Follow-up: No follow-ups on file.              This document has been electronically signed by Joe Tan MD on February 13, 2023 15:31 CST               "

## 2023-02-13 NOTE — TELEPHONE ENCOUNTER
----- Message from Joe Tan MD sent at 2/13/2023  3:32 PM CST -----  Please let him know    1. Aic is 6.9 , diabetes w excellent control  2. Cholesterol at goal   3. Renal function is low but better than before   I will change his losartan / hctz to losartan 100 mg alone without hctz.     Please repeat labs 1 week before next appt    Thank you     Joe Tan MD

## 2023-02-27 DIAGNOSIS — N17.9 AKI (ACUTE KIDNEY INJURY): ICD-10-CM

## 2023-02-27 DIAGNOSIS — F07.81 POST CONCUSSION SYNDROME: ICD-10-CM

## 2023-02-27 RX ORDER — METFORMIN HYDROCHLORIDE 750 MG/1
750 TABLET, EXTENDED RELEASE ORAL 2 TIMES DAILY
Qty: 90 TABLET | Refills: 3 | Status: SHIPPED | OUTPATIENT
Start: 2023-02-27

## 2023-02-27 RX ORDER — ONDANSETRON 4 MG/1
TABLET, ORALLY DISINTEGRATING ORAL
Qty: 30 TABLET | Refills: 0 | Status: SHIPPED | OUTPATIENT
Start: 2023-02-27

## 2023-03-01 RX ORDER — CYCLOBENZAPRINE HCL 5 MG
TABLET ORAL
Qty: 135 TABLET | Refills: 0 | Status: SHIPPED | OUTPATIENT
Start: 2023-03-01

## 2023-03-17 DIAGNOSIS — R39.12 BENIGN PROSTATIC HYPERPLASIA WITH WEAK URINARY STREAM: ICD-10-CM

## 2023-03-17 DIAGNOSIS — N40.1 BENIGN PROSTATIC HYPERPLASIA WITH WEAK URINARY STREAM: ICD-10-CM

## 2023-03-20 RX ORDER — TAMSULOSIN HYDROCHLORIDE 0.4 MG/1
CAPSULE ORAL
Qty: 30 CAPSULE | Refills: 0 | Status: SHIPPED | OUTPATIENT
Start: 2023-03-20

## 2023-03-21 RX ORDER — PANTOPRAZOLE SODIUM 40 MG/1
40 TABLET, DELAYED RELEASE ORAL DAILY
Qty: 90 TABLET | Refills: 2 | Status: SHIPPED | OUTPATIENT
Start: 2023-03-21

## 2023-04-09 DIAGNOSIS — R39.12 BENIGN PROSTATIC HYPERPLASIA WITH WEAK URINARY STREAM: ICD-10-CM

## 2023-04-09 DIAGNOSIS — N40.1 BENIGN PROSTATIC HYPERPLASIA WITH WEAK URINARY STREAM: ICD-10-CM

## 2023-04-10 RX ORDER — TAMSULOSIN HYDROCHLORIDE 0.4 MG/1
CAPSULE ORAL
Qty: 30 CAPSULE | Refills: 0 | Status: SHIPPED | OUTPATIENT
Start: 2023-04-10

## 2023-05-16 ENCOUNTER — OFFICE VISIT (OUTPATIENT)
Dept: FAMILY MEDICINE CLINIC | Facility: CLINIC | Age: 58
End: 2023-05-16
Payer: MEDICARE

## 2023-05-16 VITALS
TEMPERATURE: 96.9 F | HEIGHT: 72 IN | BODY MASS INDEX: 31.97 KG/M2 | WEIGHT: 236 LBS | HEART RATE: 77 BPM | SYSTOLIC BLOOD PRESSURE: 122 MMHG | DIASTOLIC BLOOD PRESSURE: 74 MMHG | OXYGEN SATURATION: 99 %

## 2023-05-16 DIAGNOSIS — M62.838 MUSCLE SPASM: ICD-10-CM

## 2023-05-16 DIAGNOSIS — R39.12 BENIGN PROSTATIC HYPERPLASIA WITH WEAK URINARY STREAM: ICD-10-CM

## 2023-05-16 DIAGNOSIS — N40.1 BENIGN PROSTATIC HYPERPLASIA WITH WEAK URINARY STREAM: ICD-10-CM

## 2023-05-16 DIAGNOSIS — J45.902 ASTHMA WITH STATUS ASTHMATICUS, UNSPECIFIED ASTHMA SEVERITY, UNSPECIFIED WHETHER PERSISTENT: ICD-10-CM

## 2023-05-16 DIAGNOSIS — E11.9 TYPE 2 DIABETES MELLITUS WITHOUT COMPLICATION, WITHOUT LONG-TERM CURRENT USE OF INSULIN: ICD-10-CM

## 2023-05-16 DIAGNOSIS — Z00.00 ANNUAL PHYSICAL EXAM: Primary | ICD-10-CM

## 2023-05-16 DIAGNOSIS — E66.9 OBESITY (BMI 30.0-34.9): ICD-10-CM

## 2023-05-16 DIAGNOSIS — R11.2 NAUSEA AND VOMITING, UNSPECIFIED VOMITING TYPE: ICD-10-CM

## 2023-05-16 RX ORDER — CYCLOBENZAPRINE HCL 5 MG
TABLET ORAL
Qty: 135 TABLET | Refills: 0 | Status: CANCELLED | OUTPATIENT
Start: 2023-05-16

## 2023-05-16 RX ORDER — CYCLOBENZAPRINE HCL 5 MG
5 TABLET ORAL 2 TIMES DAILY PRN
Qty: 30 TABLET | Refills: 1 | Status: SHIPPED | OUTPATIENT
Start: 2023-05-16

## 2023-05-16 RX ORDER — ONDANSETRON 4 MG/1
4 TABLET, ORALLY DISINTEGRATING ORAL EVERY 8 HOURS PRN
Qty: 30 TABLET | Refills: 0 | Status: SHIPPED | OUTPATIENT
Start: 2023-05-16

## 2023-05-16 RX ORDER — TAMSULOSIN HYDROCHLORIDE 0.4 MG/1
CAPSULE ORAL
Qty: 30 CAPSULE | Refills: 0 | Status: SHIPPED | OUTPATIENT
Start: 2023-05-16

## 2023-05-16 NOTE — PROGRESS NOTES
Family Medicine Residency  Marshal Chisholm MD    Subjective:     Nirav Lind is a 58 y.o. male who presents for annual physical.     Diabetes is well controlled. Patient follow up endocrinology.    Asthma is well controlled. No recent PFT on record.     Obesity: does not exercise on a regular basis. States he is motivated about losing weight.     The following portions of the patient's history were reviewed and updated as appropriate: allergies, current medications, past family history, past medical history, past social history, past surgical history and problem list.    Past Medical Hx:  Past Medical History:   Diagnosis Date   • Abnormal computed tomography scan    • Adenomatous polyp of colon    • Allergic rhinitis    • Anemia    • Chronic back pain    • COPD (chronic obstructive pulmonary disease)    • Coronary arteriosclerosis    • Depression    • Diabetes mellitus    • Diabetic polyneuropathy associated with type 2 diabetes mellitus 7/17/2017   • Dizziness    • Dyspnea     unspecified   • Epigastric pain    • Essential hypertension    • Ex-smoker    • GERD (gastroesophageal reflux disease)    • Hemangioma of liver    • Hyperlipidemia    • Infected hydrocele     with orchitis and epididymis   • Nausea    • Nausea with vomiting    • Obesity with body mass index of 30.0 - 39.9    • Pain in right knee    • Right upper quadrant pain    • Type II diabetes mellitus, uncontrolled    • Upper respiratory infection    • Urgency of urination    • Villous adenoma of colon        Past Surgical Hx:  Past Surgical History:   Procedure Laterality Date   • CARDIAC CATHETERIZATION  11/30/2015    No evidence of any obstructive disease in the circumflex, the RCA , or LAD. 1st diagonal branch in the midportion with 20-30% stenosis. Preserved LV systolic function with EF 55%.   • CATARACT EXTRACTION W/ INTRAOCULAR LENS IMPLANT Left 2/26/2021    Procedure: REMOVE CATARACT AND IMPLANT INTRAOCULAR LENS;  Surgeon: Annemarie  Del Donovan MD;  Location: [facility] OR;  Service: Ophthalmology;  Laterality: Left;   • COLONOSCOPY  01/21/2013    Normal   • COLONOSCOPY N/A 12/15/2021    Procedure: COLONOSCOPY;  Surgeon: Raymundo Gan MD;  Location: [facility] ENDOSCOPY;  Service: Gastroenterology;  Laterality: N/A;   • COLONOSCOPY W/ POLYPECTOMY  10/13/2014    A single polyp was found in the colon; removed by snare cautery polypectomy.   • ENDOSCOPY  06/15/2016    Mildly severe esophagitis. Several biopsies obtained in the upper third of the esophagus.   • INCISION AND DRAINAGE ABSCESS  10/29/2014    Incision and drainage of scrotal abscess. Hydrocelectomy, bottle procedure.   • KNEE ARTHROSCOPY Right 12/15/2020    Procedure: KNEE ARTHROSCOPY WITH  MEDIAL MENISCECTOMY;  Surgeon: Jerry Oakes MD;  Location: [facility] OR;  Service: Orthopedics;  Laterality: Right;  24 pictures        Current Meds:    Current Outpatient Medications:   •  ondansetron ODT (ZOFRAN-ODT) 4 MG disintegrating tablet, Place 1 tablet on the tongue Every 8 (Eight) Hours As Needed for Nausea or Vomiting., Disp: 30 tablet, Rfl: 0  •  albuterol sulfate HFA (Ventolin HFA) 108 (90 Base) MCG/ACT inhaler, Inhale 2 puffs Every 4 (Four) Hours As Needed for Wheezing., Disp: 18 g, Rfl: 11  •  Alcohol Swabs ( STERILE ALCOHOL PREP) pads, 1 each 4 (Four) Times a Day., Disp: 200 each, Rfl: 3  •  aspirin 81 MG EC tablet, Take 81 mg by mouth Daily., Disp: , Rfl:   •  atorvastatin (LIPITOR) 40 MG tablet, TAKE 1 TABLET BY MOUTH DAILY, Disp: 30 tablet, Rfl: 4  •  budesonide-formoterol (SYMBICORT) 160-4.5 MCG/ACT inhaler, Inhale 2 puffs Daily As Needed., Disp: , Rfl:   •  busPIRone (BUSPAR) 5 MG tablet, Take 1 tablet by mouth 2 (Two) Times a Day., Disp: 180 tablet, Rfl: 3  •  Continuous Blood Gluc Sensor (FreeStyle Myrna 2 Sensor) misc, 1 each Every 14 (Fourteen) Days. Dx E11.65, Disp: 6 each, Rfl: 3  •  cyclobenzaprine (FLEXERIL) 5 MG tablet, Take 1 tablet by mouth 2 (Two) Times a Day  As Needed for Muscle Spasms., Disp: 30 tablet, Rfl: 1  •  fluticasone (VERAMYST) 27.5 MCG/SPRAY nasal spray, 2 sprays into the nostril(s) as directed by provider As Needed., Disp: , Rfl:   •  glucose blood (ONE TOUCH ULTRA TEST) test strip, Use to text 4 times a day.  Dx-e11.49, Disp: 200 each, Rfl: 11  •  glucose blood test strip, Use 4x day E.11.49, Disp: 200 each, Rfl: 11  •  Insulin Glargine-Lixisenatide (Soliqua) 100-33 UNT-MCG/ML solution pen-injector injection, Inject up to 60 Units under the skin into the appropriate area as directed Daily 30 to 60 minutes before breakfast., Disp: 15 mL, Rfl: 11  •  Insulin Lispro, 1 Unit Dial, (HumaLOG KwikPen) 100 UNIT/ML solution pen-injector, Up to 10 units with meals, Disp: 3 pen, Rfl: 11  •  Insulin Pen Needle (BD Pen Needle Lata 2nd Gen) 32G X 4 MM misc, 4 x a day., Disp: 360 each, Rfl: 3  •  losartan (Cozaar) 100 MG tablet, Take 1 tablet by mouth Daily. Discontinue losartan - hctz, Disp: 30 tablet, Rfl: 11  •  magnesium oxide (MAG-OX) 400 tablet tablet, Take 1 tablet by mouth 2 (Two) Times a Day., Disp: 60 each, Rfl: 3  •  metFORMIN ER (GLUCOPHAGE-XR) 750 MG 24 hr tablet, Take 1 tablet by mouth 2 (Two) Times a Day., Disp: 90 tablet, Rfl: 3  •  metoprolol succinate XL (TOPROL-XL) 50 MG 24 hr tablet, Take 1 tablet by mouth Daily., Disp: 90 tablet, Rfl: 3  •  naproxen (NAPROSYN) 500 MG tablet, TAKE 1 TABLET BY MOUTH  TWICE DAILY AS NEEDED FOR  MILD PAIN OR MODERATE PAIN, Disp: 120 tablet, Rfl: 5  •  pantoprazole (PROTONIX) 40 MG EC tablet, TAKE 1 TABLET BY MOUTH DAILY, Disp: 90 tablet, Rfl: 2  •  sertraline (ZOLOFT) 100 MG tablet, Take 1 tablet by mouth Daily., Disp: 90 tablet, Rfl: 4  •  Spiriva HandiHaler 18 MCG per inhalation capsule, INHALE THE CONTENTS OF 1  CAPSULE BY MOUTH VIA  HANDIHALER DAILY, Disp: 30 capsule, Rfl: 11  •  tamsulosin (FLOMAX) 0.4 MG capsule 24 hr capsule, TAKE 1 CAPSULE BY MOUTH EVERY NIGHT, Disp: 30 capsule, Rfl: 0    Allergies:  No Known  "Allergies    Family Hx:  Family History   Problem Relation Age of Onset   • Cancer Mother         leukemia   • Heart disease Mother    • Heart disease Sister    • Lung disease Father    • Heart disease Maternal Grandmother    • Heart disease Maternal Grandfather         Social History:  Social History     Socioeconomic History   • Marital status:    Tobacco Use   • Smoking status: Former     Packs/day: 0.25     Years: 15.00     Pack years: 3.75     Types: Cigarettes     Quit date: 2000     Years since quittin.3   • Smokeless tobacco: Never   Vaping Use   • Vaping Use: Never used   Substance and Sexual Activity   • Alcohol use: No   • Drug use: No   • Sexual activity: Defer       Review of Systems  Review of Systems   Constitutional: Negative for chills and fever.   HENT: Negative for congestion, rhinorrhea and sore throat.    Eyes: Negative for visual disturbance.   Respiratory: Negative for cough, chest tightness and shortness of breath.    Cardiovascular: Negative for chest pain, palpitations and leg swelling.   Gastrointestinal: Negative for constipation, nausea and vomiting.   Endocrine: Negative for polydipsia and polyuria.   Genitourinary: Negative for difficulty urinating, dysuria, frequency and urgency.   Musculoskeletal: Positive for myalgias. Negative for joint swelling.   Skin: Negative for rash and wound.   Neurological: Negative for dizziness, weakness, light-headedness and headaches.   Psychiatric/Behavioral: Negative for dysphoric mood and sleep disturbance.       Objective:     /74   Pulse 77   Temp 96.9 °F (36.1 °C)   Ht 182.9 cm (72\")   Wt 107 kg (236 lb)   SpO2 99%   BMI 32.01 kg/m²   Physical Exam  Vitals and nursing note reviewed.   Constitutional:       General: He is not in acute distress.     Appearance: He is obese. He is not diaphoretic.   HENT:      Head: Normocephalic and atraumatic.      Right Ear: External ear normal.      Left Ear: External ear normal.   Eyes: "      General: No scleral icterus.     Extraocular Movements: Extraocular movements intact.      Conjunctiva/sclera: Conjunctivae normal.      Pupils: Pupils are equal, round, and reactive to light.   Cardiovascular:      Rate and Rhythm: Normal rate and regular rhythm.      Heart sounds: Normal heart sounds.   Pulmonary:      Effort: Pulmonary effort is normal. No respiratory distress.      Breath sounds: Normal breath sounds.   Chest:      Chest wall: No tenderness.   Abdominal:      General: Bowel sounds are normal.      Palpations: Abdomen is soft.      Tenderness: There is no abdominal tenderness.   Musculoskeletal:         General: No swelling or tenderness.      Cervical back: Normal range of motion and neck supple.      Right lower leg: No edema.      Left lower leg: No edema.   Skin:     General: Skin is warm and dry.      Capillary Refill: Capillary refill takes less than 2 seconds.      Findings: No erythema or rash.   Neurological:      General: No focal deficit present.      Mental Status: He is alert and oriented to person, place, and time.      Motor: No weakness.   Psychiatric:         Behavior: Behavior normal.                Assessment/Plan:     Diagnoses and all orders for this visit:    1. Annual physical exam (Primary)    2. Nausea and vomiting, unspecified vomiting type  -     ondansetron ODT (ZOFRAN-ODT) 4 MG disintegrating tablet; Place 1 tablet on the tongue Every 8 (Eight) Hours As Needed for Nausea or Vomiting.  Dispense: 30 tablet; Refill: 0    3. Type 2 diabetes mellitus without complication, without long-term current use of insulin  -     Ambulatory Referral for Diabetic Eye Exam-Ophthalmology    4. Muscle spasm  -     cyclobenzaprine (FLEXERIL) 5 MG tablet; Take 1 tablet by mouth 2 (Two) Times a Day As Needed for Muscle Spasms.  Dispense: 30 tablet; Refill: 1    5. Asthma with status asthmaticus, unspecified asthma severity, unspecified whether persistent  -     Pulmonary Function Test;  Future    6. Obesity (BMI 30.0-34.9)       Recommend regular exercises to lose weight     Patient was counseled on DASH diet including sodium restriction. DASH diet emphasizes vegetables, fruits and low-fat dairy foods and moderate amounts of whole grains, fish, poultry and nuts. Provided educational material at checkout with access to document available on [employer].      Freestyle kayleigh 14 days reviewed. 37% sensor usage. Time at goal 89%. Very high: 0%. High: 11%. Low: 0%. Very low: 0%. 0 hypoglycemic episodes. Plan: continue current therapy    CareGaps discussed. Declined vaccinations today.     Depression screening: Up to date; last screen 5/16/2023     Follow-up:     Return in about 6 months (around 11/16/2023), or if symptoms worsen or fail to improve, for Next scheduled follow up.    Preventative:  Health Maintenance   Topic Date Due   • DIABETIC EYE EXAM  02/22/2023   • ANNUAL WELLNESS VISIT  07/12/2023   • INFLUENZA VACCINE  08/01/2023   • HEMOGLOBIN A1C  08/13/2023   • LIPID PANEL  02/13/2024   • URINE MICROALBUMIN  02/13/2024   • DIABETIC FOOT EXAM  05/16/2024   • COLORECTAL CANCER SCREENING  12/15/2026   • TDAP/TD VACCINES (3 - Td or Tdap) 10/16/2029   • HEPATITIS C SCREENING  Completed   • COVID-19 Vaccine  Discontinued   • Pneumococcal Vaccine 0-64  Discontinued   • ZOSTER VACCINE  Discontinued     Male Preventative: Exercises regularly      Weight  Body mass index is 32.01 kg/m².      Alcohol use:  reports no history of alcohol use.  Nicotine status  reports that he quit smoking about 23 years ago. His smoking use included cigarettes. He has a 3.75 pack-year smoking history. He has never used smokeless tobacco.     Goals     •  Exercise 150 minutes per week (moderate activity) (pt-stated)       - increasing exercise, walking  - improve diet            RISK SCORE: 4      Stacy Chisholm M.D. PGY 3  [facility]  [address] [id number]  Office:  [phone]  This document has been electronically signed by Marshal Chisholm MD on May 16, 2023 15:43 CDT  Part of this note may be an electronic transcription/translation of spoken language to printed text, using a dragon dictation system.

## 2023-05-22 NOTE — PROGRESS NOTES
I have reviewed the notes, assessments, and/or procedures performed by Marshal Chisholm MDduring office visit. I concur with her/his documentation and assessment and plan for Nirav Lind.          This document has been electronically signed by Rachell Oneill MD on May 22, 2023 14:56 CDT

## 2023-06-15 DIAGNOSIS — N40.1 BENIGN PROSTATIC HYPERPLASIA WITH WEAK URINARY STREAM: ICD-10-CM

## 2023-06-15 DIAGNOSIS — E78.2 MIXED HYPERLIPIDEMIA: ICD-10-CM

## 2023-06-15 DIAGNOSIS — R39.12 BENIGN PROSTATIC HYPERPLASIA WITH WEAK URINARY STREAM: ICD-10-CM

## 2023-06-15 RX ORDER — TAMSULOSIN HYDROCHLORIDE 0.4 MG/1
CAPSULE ORAL
Qty: 30 CAPSULE | Refills: 0 | Status: SHIPPED | OUTPATIENT
Start: 2023-06-15

## 2023-06-15 RX ORDER — ATORVASTATIN CALCIUM 40 MG/1
40 TABLET, FILM COATED ORAL DAILY
Qty: 30 TABLET | Refills: 4 | Status: SHIPPED | OUTPATIENT
Start: 2023-06-15

## 2023-08-15 ENCOUNTER — HOSPITAL ENCOUNTER (EMERGENCY)
Facility: HOSPITAL | Age: 58
Discharge: HOME | End: 2023-08-15
Attending: EMERGENCY MEDICINE | Admitting: EMERGENCY MEDICINE
Payer: MEDICARE

## 2023-08-15 ENCOUNTER — APPOINTMENT (OUTPATIENT)
Dept: GENERAL RADIOLOGY | Facility: HOSPITAL | Age: 58
End: 2023-08-15
Payer: MEDICARE

## 2023-08-15 VITALS
DIASTOLIC BLOOD PRESSURE: 87 MMHG | BODY MASS INDEX: 34.07 KG/M2 | HEART RATE: 54 BPM | HEIGHT: 69 IN | WEIGHT: 230 LBS | OXYGEN SATURATION: 98 % | SYSTOLIC BLOOD PRESSURE: 150 MMHG | RESPIRATION RATE: 16 BRPM | TEMPERATURE: 98.3 F

## 2023-08-15 DIAGNOSIS — J10.1 INFLUENZA A: Primary | ICD-10-CM

## 2023-08-15 LAB
ALBUMIN SERPL-MCNC: 4.1 G/DL (ref 3.5–5.2)
ALBUMIN/GLOB SERPL: 1 G/DL
ALP SERPL-CCNC: 131 U/L (ref 39–117)
ALT SERPL W P-5'-P-CCNC: 42 U/L (ref 1–41)
ANION GAP SERPL CALCULATED.3IONS-SCNC: 12 MMOL/L (ref 5–15)
AST SERPL-CCNC: 37 U/L (ref 1–40)
BACTERIA UR QL AUTO: ABNORMAL /HPF
BASOPHILS # BLD AUTO: 0.02 10*3/MM3 (ref 0–0.2)
BASOPHILS NFR BLD AUTO: 0.5 % (ref 0–1.5)
BILIRUB SERPL-MCNC: 0.3 MG/DL (ref 0–1.2)
BILIRUB UR QL STRIP: NEGATIVE
BUN SERPL-MCNC: 17 MG/DL (ref 8–23)
BUN/CREAT SERPL: 12.4 (ref 7–25)
CALCIUM SPEC-SCNC: 9 MG/DL (ref 8.6–10.5)
CHLORIDE SERPL-SCNC: 101 MMOL/L (ref 98–107)
CLARITY UR: CLEAR
CO2 SERPL-SCNC: 23 MMOL/L (ref 22–29)
COLOR UR: YELLOW
CREAT SERPL-MCNC: 1.37 MG/DL (ref 0.76–1.27)
DEPRECATED RDW RBC AUTO: 41.4 FL (ref 37–54)
EGFRCR SERPLBLD CKD-EPI 2021: 59.1 ML/MIN/1.73
EOSINOPHIL # BLD AUTO: 0.15 10*3/MM3 (ref 0–0.4)
EOSINOPHIL NFR BLD AUTO: 3.6 % (ref 0.3–6.2)
ERYTHROCYTE [DISTWIDTH] IN BLOOD BY AUTOMATED COUNT: 13.3 % (ref 12.3–15.4)
FLUAV RNA RESP QL NAA+PROBE: DETECTED
FLUBV RNA RESP QL NAA+PROBE: NOT DETECTED
GLOBULIN UR ELPH-MCNC: 4.2 GM/DL
GLUCOSE SERPL-MCNC: 119 MG/DL (ref 65–99)
GLUCOSE UR STRIP-MCNC: NEGATIVE MG/DL
HCT VFR BLD AUTO: 41.6 % (ref 37.5–51)
HGB BLD-MCNC: 13.2 G/DL (ref 13–17.7)
HGB UR QL STRIP.AUTO: ABNORMAL
HOLD SPECIMEN: NORMAL
HYALINE CASTS UR QL AUTO: ABNORMAL /LPF
IMM GRANULOCYTES # BLD AUTO: 0.02 10*3/MM3 (ref 0–0.05)
IMM GRANULOCYTES NFR BLD AUTO: 0.5 % (ref 0–0.5)
KETONES UR QL STRIP: ABNORMAL
LEUKOCYTE ESTERASE UR QL STRIP.AUTO: NEGATIVE
LIPASE SERPL-CCNC: 34 U/L (ref 13–60)
LYMPHOCYTES # BLD AUTO: 0.82 10*3/MM3 (ref 0.7–3.1)
LYMPHOCYTES NFR BLD AUTO: 19.5 % (ref 19.6–45.3)
MCH RBC QN AUTO: 27 PG (ref 26.6–33)
MCHC RBC AUTO-ENTMCNC: 31.7 G/DL (ref 31.5–35.7)
MCV RBC AUTO: 85.2 FL (ref 79–97)
MONOCYTES # BLD AUTO: 0.44 10*3/MM3 (ref 0.1–0.9)
MONOCYTES NFR BLD AUTO: 10.5 % (ref 5–12)
NEUTROPHILS NFR BLD AUTO: 2.75 10*3/MM3 (ref 1.7–7)
NEUTROPHILS NFR BLD AUTO: 65.4 % (ref 42.7–76)
NITRITE UR QL STRIP: NEGATIVE
NRBC BLD AUTO-RTO: 0 /100 WBC (ref 0–0.2)
PH UR STRIP.AUTO: 5.5 [PH] (ref 5–9)
PLATELET # BLD AUTO: 197 10*3/MM3 (ref 140–450)
PMV BLD AUTO: 9.9 FL (ref 6–12)
POTASSIUM SERPL-SCNC: 3.8 MMOL/L (ref 3.5–5.2)
PROT SERPL-MCNC: 8.3 G/DL (ref 6–8.5)
PROT UR QL STRIP: ABNORMAL
RBC # BLD AUTO: 4.88 10*6/MM3 (ref 4.14–5.8)
RBC # UR STRIP: ABNORMAL /HPF
REF LAB TEST METHOD: ABNORMAL
SARS-COV-2 RNA RESP QL NAA+PROBE: NOT DETECTED
SODIUM SERPL-SCNC: 136 MMOL/L (ref 136–145)
SP GR UR STRIP: 1.02 (ref 1–1.03)
SQUAMOUS #/AREA URNS HPF: ABNORMAL /HPF
UROBILINOGEN UR QL STRIP: ABNORMAL
WBC # UR STRIP: ABNORMAL /HPF
WBC NRBC COR # BLD: 4.2 10*3/MM3 (ref 3.4–10.8)
WHOLE BLOOD HOLD COAG: NORMAL

## 2023-08-15 PROCEDURE — 99284 EMERGENCY DEPT VISIT MOD MDM: CPT

## 2023-08-15 PROCEDURE — 83690 ASSAY OF LIPASE: CPT | Performed by: EMERGENCY MEDICINE

## 2023-08-15 PROCEDURE — 81001 URINALYSIS AUTO W/SCOPE: CPT | Performed by: EMERGENCY MEDICINE

## 2023-08-15 PROCEDURE — 80053 COMPREHEN METABOLIC PANEL: CPT

## 2023-08-15 PROCEDURE — 74022 RADEX COMPL AQT ABD SERIES: CPT

## 2023-08-15 PROCEDURE — 25010000002 ONDANSETRON PER 1 MG: Performed by: EMERGENCY MEDICINE

## 2023-08-15 PROCEDURE — 85025 COMPLETE CBC W/AUTO DIFF WBC: CPT

## 2023-08-15 PROCEDURE — 96374 THER/PROPH/DIAG INJ IV PUSH: CPT

## 2023-08-15 PROCEDURE — 36415 COLL VENOUS BLD VENIPUNCTURE: CPT

## 2023-08-15 PROCEDURE — 87636 SARSCOV2 & INF A&B AMP PRB: CPT

## 2023-08-15 PROCEDURE — 96361 HYDRATE IV INFUSION ADD-ON: CPT

## 2023-08-15 RX ORDER — LOPERAMIDE HYDROCHLORIDE 2 MG/1
2 CAPSULE ORAL 4 TIMES DAILY PRN
Qty: 6 CAPSULE | Refills: 0 | Status: SHIPPED | OUTPATIENT
Start: 2023-08-15

## 2023-08-15 RX ORDER — SODIUM CHLORIDE 9 MG/ML
125 INJECTION, SOLUTION INTRAVENOUS CONTINUOUS
Status: DISCONTINUED | OUTPATIENT
Start: 2023-08-15 | End: 2023-08-15 | Stop reason: HOSPADM

## 2023-08-15 RX ORDER — DEXTROMETHORPHAN HYDROBROMIDE AND PROMETHAZINE HYDROCHLORIDE 15; 6.25 MG/5ML; MG/5ML
5 SYRUP ORAL 4 TIMES DAILY PRN
Qty: 118 ML | Refills: 0 | Status: SHIPPED | OUTPATIENT
Start: 2023-08-15

## 2023-08-15 RX ORDER — ONDANSETRON 2 MG/ML
4 INJECTION INTRAMUSCULAR; INTRAVENOUS ONCE
Status: COMPLETED | OUTPATIENT
Start: 2023-08-15 | End: 2023-08-15

## 2023-08-15 RX ORDER — ACETAMINOPHEN 500 MG
1000 TABLET ORAL ONCE
Status: COMPLETED | OUTPATIENT
Start: 2023-08-15 | End: 2023-08-15

## 2023-08-15 RX ORDER — ONDANSETRON 4 MG/1
4 TABLET, ORALLY DISINTEGRATING ORAL EVERY 8 HOURS PRN
Qty: 10 TABLET | Refills: 0 | Status: SHIPPED | OUTPATIENT
Start: 2023-08-15

## 2023-08-15 RX ORDER — LOPERAMIDE HYDROCHLORIDE 2 MG/1
4 CAPSULE ORAL ONCE
Status: COMPLETED | OUTPATIENT
Start: 2023-08-15 | End: 2023-08-15

## 2023-08-15 RX ADMIN — SODIUM CHLORIDE 125 ML/HR: 9 INJECTION, SOLUTION INTRAVENOUS at 16:53

## 2023-08-15 RX ADMIN — ACETAMINOPHEN 1000 MG: 500 TABLET, FILM COATED ORAL at 15:58

## 2023-08-15 RX ADMIN — SODIUM CHLORIDE 1000 ML: 9 INJECTION, SOLUTION INTRAVENOUS at 15:57

## 2023-08-15 RX ADMIN — ONDANSETRON 4 MG: 2 INJECTION INTRAMUSCULAR; INTRAVENOUS at 15:58

## 2023-08-15 RX ADMIN — LOPERAMIDE HYDROCHLORIDE 4 MG: 2 CAPSULE ORAL at 15:58

## 2023-08-15 RX ADMIN — SODIUM CHLORIDE 125 ML/HR: 9 INJECTION, SOLUTION INTRAVENOUS at 15:58

## 2023-08-15 NOTE — DISCHARGE INSTRUCTIONS
Increase PO fluid intake  Return ED fever, shortness of air, vomiting, dehydration, abdominal pain, worse condition, any other concerns

## 2023-08-15 NOTE — ED PROVIDER NOTES
Subjective   History of Present Illness  59yo male pmh significant htn/dm2/asthma/obesity presents ED c/o 1wk hx nausea/vomiting/diarrhea/nonproductive cough/congestion/subjective fever/myalgias.  ROS neg hematochoezia/hematemesis/melena/dysuria/chest pain/soa/abd pain.    History provided by:  Patient  Weakness - Generalized  Severity:  Moderate  Onset quality:  Gradual  Duration:  1 week  Associated symptoms: cough, diarrhea, fever, myalgias, nausea and vomiting    Associated symptoms: no abdominal pain and no shortness of breath      Review of Systems   Constitutional:  Positive for fever.   HENT:  Positive for congestion.    Eyes: Negative.    Respiratory:  Positive for cough. Negative for shortness of breath.    Cardiovascular: Negative.    Gastrointestinal:  Positive for diarrhea, nausea and vomiting. Negative for abdominal pain.   Genitourinary: Negative.    Musculoskeletal:  Positive for myalgias.   Skin: Negative.    Allergic/Immunologic: Negative for immunocompromised state.   All other systems reviewed and are negative.    Past Medical History:   Diagnosis Date    Abnormal computed tomography scan     Adenomatous polyp of colon     Allergic rhinitis     Anemia     Chronic back pain     COPD (chronic obstructive pulmonary disease)     Coronary arteriosclerosis     Depression     Diabetes mellitus     Diabetic polyneuropathy associated with type 2 diabetes mellitus 7/17/2017    Dizziness     Dyspnea     unspecified    Epigastric pain     Essential hypertension     Ex-smoker     GERD (gastroesophageal reflux disease)     Hemangioma of liver     Hyperlipidemia     Infected hydrocele     with orchitis and epididymis    Nausea     Nausea with vomiting     Obesity with body mass index of 30.0 - 39.9     Pain in right knee     Right upper quadrant pain     Type II diabetes mellitus, uncontrolled     Upper respiratory infection     Urgency of urination     Villous adenoma of colon        No Known Allergies    Past  Surgical History:   Procedure Laterality Date    CARDIAC CATHETERIZATION  2015    No evidence of any obstructive disease in the circumflex, the RCA , or LAD. 1st diagonal branch in the midportion with 20-30% stenosis. Preserved LV systolic function with EF 55%.    CATARACT EXTRACTION W/ INTRAOCULAR LENS IMPLANT Left 2021    Procedure: REMOVE CATARACT AND IMPLANT INTRAOCULAR LENS;  Surgeon: Del Sharpe MD;  Location: [facility] OR;  Service: Ophthalmology;  Laterality: Left;    COLONOSCOPY  2013    Normal    COLONOSCOPY N/A 12/15/2021    Procedure: COLONOSCOPY;  Surgeon: Raymundo Gan MD;  Location: [facility] ENDOSCOPY;  Service: Gastroenterology;  Laterality: N/A;    COLONOSCOPY W/ POLYPECTOMY  10/13/2014    A single polyp was found in the colon; removed by snare cautery polypectomy.    ENDOSCOPY  06/15/2016    Mildly severe esophagitis. Several biopsies obtained in the upper third of the esophagus.    INCISION AND DRAINAGE ABSCESS  10/29/2014    Incision and drainage of scrotal abscess. Hydrocelectomy, bottle procedure.    KNEE ARTHROSCOPY Right 12/15/2020    Procedure: KNEE ARTHROSCOPY WITH  MEDIAL MENISCECTOMY;  Surgeon: Jerry Oakes MD;  Location: [facility];  Service: Orthopedics;  Laterality: Right;  24 pictures        Family History   Problem Relation Age of Onset    Cancer Mother         leukemia    Heart disease Mother     Heart disease Sister     Lung disease Father     Heart disease Maternal Grandmother     Heart disease Maternal Grandfather        Social History     Socioeconomic History    Marital status:    Tobacco Use    Smoking status: Former     Packs/day: 0.25     Years: 15.00     Pack years: 3.75     Types: Cigarettes     Quit date: 2000     Years since quittin.6    Smokeless tobacco: Never   Vaping Use    Vaping Use: Never used   Substance and Sexual Activity    Alcohol use: No    Drug use: No    Sexual activity: Defer           Objective   Physical  Exam  Vitals and nursing note reviewed.   Constitutional:       Appearance: Normal appearance.   HENT:      Head: Normocephalic and atraumatic.      Right Ear: External ear normal.      Left Ear: External ear normal.      Nose: Nose normal.      Mouth/Throat:      Mouth: Mucous membranes are moist.      Pharynx: Oropharynx is clear. No oropharyngeal exudate or posterior oropharyngeal erythema.   Eyes:      Pupils: Pupils are equal, round, and reactive to light.   Cardiovascular:      Rate and Rhythm: Normal rate and regular rhythm.      Pulses: Normal pulses.      Heart sounds: Normal heart sounds. No murmur heard.    No friction rub. No gallop.   Pulmonary:      Effort: Pulmonary effort is normal. No respiratory distress.      Breath sounds: Normal breath sounds. No wheezing, rhonchi or rales.   Abdominal:      General: Abdomen is flat. Bowel sounds are normal. There is no distension.      Palpations: Abdomen is soft.      Tenderness: There is no abdominal tenderness. There is no guarding or rebound.   Musculoskeletal:         General: No swelling or deformity.      Cervical back: Normal range of motion and neck supple. No rigidity.      Right lower leg: No edema.      Left lower leg: No edema.   Lymphadenopathy:      Cervical: No cervical adenopathy.   Skin:     General: Skin is warm and dry.   Neurological:      General: No focal deficit present.      Mental Status: He is alert and oriented to person, place, and time.      GCS: GCS eye subscore is 4. GCS verbal subscore is 5. GCS motor subscore is 6.      Sensory: Sensation is intact.      Motor: Motor function is intact.       Procedures           ED Course      Labs Reviewed   COVID-19 AND FLU A/B, NP SWAB IN TRANSPORT MEDIA 8-12 HR TAT - Abnormal; Notable for the following components:       Result Value    Influenza A PCR Detected (*)     All other components within normal limits    Narrative:     Fact sheet for providers:  https://www.fda.gov/media/666304/download    Fact sheet for patients: https://www.fda.gov/media/756542/download    Test performed by PCR.   COMPREHENSIVE METABOLIC PANEL - Abnormal; Notable for the following components:    Glucose 119 (*)     Creatinine 1.37 (*)     ALT (SGPT) 42 (*)     Alkaline Phosphatase 131 (*)     eGFR 59.1 (*)     All other components within normal limits    Narrative:     GFR Normal >60  Chronic Kidney Disease <60  Kidney Failure <15     CBC WITH AUTO DIFFERENTIAL - Abnormal; Notable for the following components:    Lymphocyte % 19.5 (*)     All other components within normal limits   URINALYSIS W/ MICROSCOPIC IF INDICATED (NO CULTURE) - Abnormal; Notable for the following components:    Ketones, UA Trace (*)     Blood, UA Trace (*)     Protein,  mg/dL (2+) (*)     All other components within normal limits   URINALYSIS, MICROSCOPIC ONLY - Abnormal; Notable for the following components:    RBC, UA 0-2 (*)     All other components within normal limits   LIPASE - Normal   CBC AND DIFFERENTIAL    Narrative:     The following orders were created for panel order CBC & Differential.  Procedure                               Abnormality         Status                     ---------                               -----------         ------                     CBC Auto Differential[[id number]]        Abnormal            Final result                 Please view results for these tests on the individual orders.   GOLD TOP - SST   LIGHT BLUE TOP   EXTRA TUBES    Narrative:     The following orders were created for panel order Extra Tubes.  Procedure                               Abnormality         Status                     ---------                               -----------         ------                     Gold Top - SST[[id number]]                                   Final result               Horan Top[[id number]]                                                                    Light Blue  Top[[id number]]                                   Final result                 Please view results for these tests on the individual orders.   GRAY TOP     XR Abdomen 2+ VW with Chest 1 VW    Result Date: 8/15/2023  Narrative: INDICATION: Vomiting. FINDINGS: Single view of the chest is normal. Two views of the abdomen demonstrate a normal bowel gas pattern. No suspect calcifications. SUMMARY: Negative chest and abdomen.                                        Medical Decision Making  Labs/radiographic studies reviewed. CXR: no active disease.  ABD XR: nonobstructive bowel gas pattern.  CBC/CMP/lipase/UA: unremarkable.  Covid19 (-)/influenza A (+).  Nontoxic appearance.  AFVSS. Sao2 98% RA. Stable dc with supportive care.    Problems Addressed:  Influenza A: complicated acute illness or injury    Amount and/or Complexity of Data Reviewed  Radiology: ordered.    Risk  OTC drugs.  Prescription drug management.        Final diagnoses:   Influenza A       ED Disposition  ED Disposition       ED Disposition   Discharge    Condition   Good    Comment   --               Galina Barroso MD  [address]  [place] [id number] [phone]    Schedule an appointment as soon as possible for a visit in 2 days           Medication List        New Prescriptions      loperamide 2 MG capsule  Commonly known as: IMODIUM  Take 1 capsule by mouth 4 (Four) Times a Day As Needed for Diarrhea.     promethazine-dextromethorphan 6.25-15 MG/5ML syrup  Commonly known as: PROMETHAZINE-DM  Take 5 mL by mouth 4 (Four) Times a Day As Needed for Cough.            Changed      * ondansetron ODT 4 MG disintegrating tablet  Commonly known as: ZOFRAN-ODT  Place 1 tablet on the tongue Every 8 (Eight) Hours As Needed for Nausea or Vomiting.  What changed: Another medication with the same name was added. Make sure you understand how and when to take each.     * ondansetron ODT 4 MG disintegrating tablet  Commonly known as: ZOFRAN-ODT  Place 1  tablet on the tongue Every 8 (Eight) Hours As Needed for Nausea or Vomiting.  What changed: You were already taking a medication with the same name, and this prescription was added. Make sure you understand how and when to take each.           * This list has 2 medication(s) that are the same as other medications prescribed for you. Read the directions carefully, and ask your doctor or other care provider to review them with you.                   Where to Get Your Medications        These medications were sent to [id number] - [place] [address] [facility] - [phone] [facility] [phone]   [address] [facility] [address]      Phone: [phone]   loperamide 2 MG capsule  ondansetron ODT 4 MG disintegrating tablet  promethazine-dextromethorphan 6.25-15 MG/5ML syrup            Bill Bragg MD  08/15/23 [id number]

## 2023-08-18 ENCOUNTER — TELEMEDICINE (OUTPATIENT)
Dept: ENDOCRINOLOGY | Facility: CLINIC | Age: 58
End: 2023-08-18
Payer: MEDICARE

## 2023-08-18 DIAGNOSIS — I10 ESSENTIAL HYPERTENSION: ICD-10-CM

## 2023-08-18 DIAGNOSIS — N17.9 AKI (ACUTE KIDNEY INJURY): ICD-10-CM

## 2023-08-18 DIAGNOSIS — R94.4 DECREASED GFR: ICD-10-CM

## 2023-08-18 DIAGNOSIS — E11.9 WELL CONTROLLED TYPE 2 DIABETES MELLITUS: Primary | ICD-10-CM

## 2023-08-18 DIAGNOSIS — E78.2 MIXED HYPERLIPIDEMIA: ICD-10-CM

## 2023-08-18 RX ORDER — ATORVASTATIN CALCIUM 40 MG/1
40 TABLET, FILM COATED ORAL DAILY
Qty: 30 TABLET | Refills: 4 | Status: SHIPPED | OUTPATIENT
Start: 2023-08-18

## 2023-08-18 RX ORDER — METFORMIN HYDROCHLORIDE 750 MG/1
750 TABLET, EXTENDED RELEASE ORAL 2 TIMES DAILY
Qty: 180 TABLET | Refills: 3 | Status: SHIPPED | OUTPATIENT
Start: 2023-08-18

## 2023-08-18 RX ORDER — LOSARTAN POTASSIUM 100 MG/1
100 TABLET ORAL DAILY
Qty: 90 TABLET | Refills: 3 | Status: SHIPPED | OUTPATIENT
Start: 2023-08-18 | End: 2024-08-17

## 2023-08-18 RX ORDER — METOPROLOL SUCCINATE 50 MG/1
50 TABLET, EXTENDED RELEASE ORAL DAILY
Qty: 90 TABLET | Refills: 3 | Status: SHIPPED | OUTPATIENT
Start: 2023-08-18

## 2023-08-18 NOTE — Clinical Note
He gets his kayleigh 2 sensors through [employer]  MED @ [phone]  He is down to his last sensor and has not heard from them, he may need resubmission for continuing approval for his kayleigh 2 sensors, please help me with this

## 2023-08-18 NOTE — PROGRESS NOTES
Subjective    Nirav Lind is a 58 y.o. male. he is here today for follow-up of diabetes                                        This was a Telehealth Encounter. Benefits and Disadvantages of a Telehealth Visit were discussed and accepted by patient.  Mode of Visit: Video  Location of patient: Home  You have chosen to receive care through a telehealth visit.  Does the patient consent to use a video/audio connection for your medical care today? Yes  The visit included audio and video interaction. No technical issues occurred during this visit    HPI    t2dm since age 51 w/o complications    His glucose is at goal        PE    There were no vitals taken for this visit.  AOx3  No visible goiter  Normal Respiratory Effort , Lung CTA  RRR  No Edema    Labs    Lab Results   Component Value Date    WBC 4.20 08/15/2023    HGB 13.2 08/15/2023    HCT 41.6 08/15/2023    MCV 85.2 08/15/2023     08/15/2023     Lab Results   Component Value Date    GLUCOSE 119 (H) 08/15/2023    BUN 17 08/15/2023    CREATININE 1.37 (H) 08/15/2023    EGFRIFAFRI 75 12/03/2021    BCR 12.4 08/15/2023    K 3.8 08/15/2023    CO2 23.0 08/15/2023    CALCIUM 9.0 08/15/2023    ALBUMIN 4.1 08/15/2023    AST 37 08/15/2023    ALT 42 (H) 08/15/2023         Assessment & Plan       Diagnosis Plan   1. Well controlled type 2 diabetes mellitus        2. Mixed hyperlipidemia        3. Essential hypertension        4. Decreased GFR                 Glycemic Management     Lab Results   Component Value Date    HGBA1C 6.90 (H) 02/13/2023     Using [employer]   Appt over the phone     2 week data read over the phone    Time in Target 93%   Lows 7%            Metformin bid,   soliqua , 8 units at the present time , decrease to 6   Has used as much as 60     humalog , only occasional , 3 or 4 untis for high carb meals    Couldn't tolerate even 0.125 mg ozempic hence doing soliqua    januvia too expensive     He has had more than 2 episodes of hypoglycemia in the  past 6 months highlighted by 7% time in range    , 5 episodes in the last 14 days     He needs renewal of his myrna 2 sensors through [email or web address]   MED @ [phone]       Criteria for continuation of Myrna 2     The patient has diabetes mellitus, insulin-dependent.    Our Diabetes Department has evaluated the patient in the last six months and will continue counseling on insulin adjustment.     The patient performs blood glucose testing at least four times daily      The patient is administering basal insulin and prandial insulin as much as four times per day for more than six months.    The patient uses myrna 2     The patient requires frequent adjustment of insulin treatment regimen based on blood glucose readings.  Patient uses sliding scale as follows    Takes 2 extra units per 50 above 150     And    Takes 1 to 2 units extra if the arrow trends up before the meal  Take s 1 to 2 units less if the arrow trends down before the meal    The patient has frequent variability in blood glucose readings due to activity and variability in meal content and time.     The patient has completed a diabetes education program with us.    The patient has demonstrated the ability to self-monitor her glucose.     The patient is motivated in improving diabetes     Patient has hypoglycemia unawareness           Lipid Management        Atorvastatin 40 mg qhs      Lab Results   Component Value Date    CHOL 81 02/13/2023    CHLPL 139 11/04/2016    TRIG 147 02/13/2023    HDL 24 (L) 02/13/2023    LDL 32 02/13/2023               Blood Pressure Management   There were no vitals taken for this visit.    Per Jonathon, losartan, hctz  And toprol    Changed losartan / hctz 100/25 to 100/12.5 - change to losartan alone 100 mg daily   If possible avoid naproxen      Microvascular Complication Monitoring         Neuropathy resolved   No longer taking neurontin         Decreased GFR ,    All previous GFR were normal , could be naproxen related but  taking much less  Never proteinuria     Lab Results   Component Value Date    MALBCRERATIO 20.0 02/13/2023    MALBCRERATIO 10.3 08/12/2022     No retinopathy   Dr. Sharpe, scanned in system, Feb 2022      Preventive Care: Patient is not smoking      Lab Results   Component Value Date    LINXBFGF20 570 02/13/2023              Component      Latest Ref Rng & Units 4/11/2019   Hepatitis B Surface Ag      Non-Reactive Non-Reactive   Hep A IgM      Non-Reactive Non-Reactive   Hep B Core IgM      Non-Reactive Non-Reactive   Hepatitis C Ab      Non-Reactive Non-Reactive               4. Follow-up: No follow-ups on file.              This document has been electronically signed by Joe Tan MD on August 18, 2023 08:07 CDT

## 2023-08-22 ENCOUNTER — TELEPHONE (OUTPATIENT)
Dept: ENDOCRINOLOGY | Facility: CLINIC | Age: 58
End: 2023-08-22
Payer: MEDICARE

## 2023-08-22 ENCOUNTER — DOCUMENTATION (OUTPATIENT)
Dept: ENDOCRINOLOGY | Facility: CLINIC | Age: 58
End: 2023-08-22
Payer: MEDICARE

## 2023-08-22 NOTE — TELEPHONE ENCOUNTER
Pt called during lunch and said he had missed a call from our office. He was just returning the call. I asked him if it was regarding his sensors and advised of the note that the sensors were mailed 2 days ago. If this was regarding something else, please call pt back.    Thanks

## 2023-08-22 NOTE — PROGRESS NOTES
Talked to [facility] per Dr. Wick regarding patients Myrna 2 sensors. Order for sensors was just mailed 2 days ago.

## 2023-09-06 ENCOUNTER — OFFICE VISIT (OUTPATIENT)
Dept: FAMILY MEDICINE CLINIC | Facility: CLINIC | Age: 58
End: 2023-09-06
Payer: MEDICARE

## 2023-09-06 VITALS
HEART RATE: 69 BPM | HEIGHT: 69 IN | OXYGEN SATURATION: 98 % | SYSTOLIC BLOOD PRESSURE: 142 MMHG | WEIGHT: 228.7 LBS | TEMPERATURE: 96.3 F | BODY MASS INDEX: 33.87 KG/M2 | DIASTOLIC BLOOD PRESSURE: 76 MMHG

## 2023-09-06 DIAGNOSIS — R39.12 BENIGN PROSTATIC HYPERPLASIA WITH WEAK URINARY STREAM: ICD-10-CM

## 2023-09-06 DIAGNOSIS — J10.1 INFLUENZA A: Primary | ICD-10-CM

## 2023-09-06 DIAGNOSIS — N40.1 BENIGN PROSTATIC HYPERPLASIA WITH WEAK URINARY STREAM: ICD-10-CM

## 2023-09-06 DIAGNOSIS — J44.9 CHRONIC OBSTRUCTIVE PULMONARY DISEASE, UNSPECIFIED COPD TYPE: Chronic | ICD-10-CM

## 2023-09-06 RX ORDER — TAMSULOSIN HYDROCHLORIDE 0.4 MG/1
CAPSULE ORAL
Qty: 90 CAPSULE | Refills: 0 | Status: SHIPPED | OUTPATIENT
Start: 2023-09-06

## 2023-09-06 RX ORDER — TAMSULOSIN HYDROCHLORIDE 0.4 MG/1
1 CAPSULE ORAL NIGHTLY
Qty: 30 CAPSULE | Refills: 0 | Status: SHIPPED | OUTPATIENT
Start: 2023-09-06 | End: 2023-09-06

## 2023-09-06 RX ORDER — ALBUTEROL SULFATE 90 UG/1
2 AEROSOL, METERED RESPIRATORY (INHALATION) EVERY 4 HOURS PRN
Qty: 18 G | Refills: 11 | Status: SHIPPED | OUTPATIENT
Start: 2023-09-06

## 2023-09-06 NOTE — PROGRESS NOTES
Family Medicine Residency  Galina Barroso Jasmin, MD    Subjective:     Nirav Lind is a 58 y.o. male who presents for an emergency room follow/up    The following portions of the patient's history were reviewed and updated as appropriate: allergies, current medications, past family history, past medical history, past social history, past surgical history, and problem list.    Past Medical Hx:  Past Medical History:   Diagnosis Date   • Abnormal computed tomography scan    • Adenomatous polyp of colon    • Allergic rhinitis    • Anemia    • Chronic back pain    • COPD (chronic obstructive pulmonary disease)    • Coronary arteriosclerosis    • Depression    • Diabetes mellitus    • Diabetic polyneuropathy associated with type 2 diabetes mellitus 7/17/2017   • Dizziness    • Dyspnea     unspecified   • Epigastric pain    • Essential hypertension    • Ex-smoker    • GERD (gastroesophageal reflux disease)    • Hemangioma of liver    • Hyperlipidemia    • Infected hydrocele     with orchitis and epididymis   • Nausea    • Nausea with vomiting    • Obesity with body mass index of 30.0 - 39.9    • Pain in right knee    • Right upper quadrant pain    • Type II diabetes mellitus, uncontrolled    • Upper respiratory infection    • Urgency of urination    • Villous adenoma of colon        Past Surgical Hx:  Past Surgical History:   Procedure Laterality Date   • CARDIAC CATHETERIZATION  11/30/2015    No evidence of any obstructive disease in the circumflex, the RCA , or LAD. 1st diagonal branch in the midportion with 20-30% stenosis. Preserved LV systolic function with EF 55%.   • CATARACT EXTRACTION W/ INTRAOCULAR LENS IMPLANT Left 2/26/2021    Procedure: REMOVE CATARACT AND IMPLANT INTRAOCULAR LENS;  Surgeon: Del Sharpe MD;  Location: [facility];  Service: Ophthalmology;  Laterality: Left;   • COLONOSCOPY  01/21/2013    Normal   • COLONOSCOPY N/A 12/15/2021    Procedure: COLONOSCOPY;  Surgeon:  Raymundo Gan MD;  Location: [facility] ENDOSCOPY;  Service: Gastroenterology;  Laterality: N/A;   • COLONOSCOPY W/ POLYPECTOMY  10/13/2014    A single polyp was found in the colon; removed by snare cautery polypectomy.   • ENDOSCOPY  06/15/2016    Mildly severe esophagitis. Several biopsies obtained in the upper third of the esophagus.   • INCISION AND DRAINAGE ABSCESS  10/29/2014    Incision and drainage of scrotal abscess. Hydrocelectomy, bottle procedure.   • KNEE ARTHROSCOPY Right 12/15/2020    Procedure: KNEE ARTHROSCOPY WITH  MEDIAL MENISCECTOMY;  Surgeon: Jerry Oakes MD;  Location: [facility] OR;  Service: Orthopedics;  Laterality: Right;  24 pictures        Current Meds:    Current Outpatient Medications:   •  albuterol sulfate HFA (Ventolin HFA) 108 (90 Base) MCG/ACT inhaler, Inhale 2 puffs Every 4 (Four) Hours As Needed for Wheezing., Disp: 18 g, Rfl: 11  •  Alcohol Swabs ( STERILE ALCOHOL PREP) pads, 1 each 4 (Four) Times a Day., Disp: 200 each, Rfl: 3  •  aspirin 81 MG EC tablet, Take 81 mg by mouth Daily., Disp: , Rfl:   •  atorvastatin (LIPITOR) 40 MG tablet, Take 1 tablet by mouth Daily., Disp: 30 tablet, Rfl: 4  •  budesonide-formoterol (SYMBICORT) 160-4.5 MCG/ACT inhaler, Inhale 2 puffs Daily As Needed., Disp: , Rfl:   •  busPIRone (BUSPAR) 5 MG tablet, TAKE 1 TABLET BY MOUTH TWICE DAILY, Disp: 180 tablet, Rfl: 3  •  Continuous Blood Gluc Sensor (FreeStyle Myrna 2 Sensor) misc, 1 each Every 14 (Fourteen) Days. Dx E11.65, Disp: 6 each, Rfl: 3  •  cyclobenzaprine (FLEXERIL) 5 MG tablet, Take 1 tablet by mouth 2 (Two) Times a Day As Needed for Muscle Spasms., Disp: 30 tablet, Rfl: 1  •  fluticasone (VERAMYST) 27.5 MCG/SPRAY nasal spray, 2 sprays into the nostril(s) as directed by provider As Needed., Disp: , Rfl:   •  glucose blood (ONE TOUCH ULTRA TEST) test strip, Use to text 4 times a day.  Dx-e11.49, Disp: 200 each, Rfl: 11  •  glucose blood test strip, Use 4x day E.11.49, Disp: 200 each,  Rfl: 11  •  Insulin Glargine-Lixisenatide (Soliqua) 100-33 UNT-MCG/ML solution pen-injector injection, Inject up to 60 Units under the skin into the appropriate area as directed Daily 30 to 60 minutes before breakfast., Disp: 15 mL, Rfl: 11  •  Insulin Lispro, 1 Unit Dial, (HumaLOG KwikPen) 100 UNIT/ML solution pen-injector, Up to 10 units with meals, Disp: 3 pen, Rfl: 11  •  Insulin Pen Needle (BD Pen Needle Lata 2nd Gen) 32G X 4 MM misc, 4 x a day., Disp: 360 each, Rfl: 3  •  loperamide (IMODIUM) 2 MG capsule, Take 1 capsule by mouth 4 (Four) Times a Day As Needed for Diarrhea., Disp: 6 capsule, Rfl: 0  •  losartan (Cozaar) 100 MG tablet, Take 1 tablet by mouth Daily., Disp: 90 tablet, Rfl: 3  •  magnesium oxide (MAG-OX) 400 tablet tablet, Take 1 tablet by mouth 2 (Two) Times a Day., Disp: 60 each, Rfl: 3  •  metFORMIN ER (GLUCOPHAGE-XR) 750 MG 24 hr tablet, Take 1 tablet by mouth 2 (Two) Times a Day., Disp: 180 tablet, Rfl: 3  •  metoprolol succinate XL (TOPROL-XL) 50 MG 24 hr tablet, Take 1 tablet by mouth Daily., Disp: 90 tablet, Rfl: 3  •  naproxen (NAPROSYN) 500 MG tablet, TAKE 1 TABLET BY MOUTH  TWICE DAILY AS NEEDED FOR  MILD PAIN OR MODERATE PAIN, Disp: 120 tablet, Rfl: 5  •  ondansetron ODT (ZOFRAN-ODT) 4 MG disintegrating tablet, Place 1 tablet on the tongue Every 8 (Eight) Hours As Needed for Nausea or Vomiting., Disp: 30 tablet, Rfl: 0  •  ondansetron ODT (ZOFRAN-ODT) 4 MG disintegrating tablet, Place 1 tablet on the tongue Every 8 (Eight) Hours As Needed for Nausea or Vomiting., Disp: 10 tablet, Rfl: 0  •  pantoprazole (PROTONIX) 40 MG EC tablet, TAKE 1 TABLET BY MOUTH DAILY, Disp: 90 tablet, Rfl: 2  •  promethazine-dextromethorphan (PROMETHAZINE-DM) 6.25-15 MG/5ML syrup, Take 5 mL by mouth 4 (Four) Times a Day As Needed for Cough., Disp: 118 mL, Rfl: 0  •  sertraline (ZOLOFT) 100 MG tablet, Take 1 tablet by mouth Daily., Disp: 90 tablet, Rfl: 4  •  Spiriva HandiHaler 18 MCG per inhalation capsule,  "INHALE THE CONTENTS OF 1  CAPSULE BY MOUTH VIA  HANDIHALER DAILY, Disp: 30 capsule, Rfl: 11    Allergies:  No Known Allergies    Family Hx:  Family History   Problem Relation Age of Onset   • Cancer Mother         leukemia   • Heart disease Mother    • Heart disease Sister    • Lung disease Father    • Heart disease Maternal Grandmother    • Heart disease Maternal Grandfather         Social History:  Social History     Socioeconomic History   • Marital status:    Tobacco Use   • Smoking status: Former     Packs/day: 0.25     Years: 15.00     Pack years: 3.75     Types: Cigarettes     Quit date:      Years since quittin.6   • Smokeless tobacco: Never   Vaping Use   • Vaping Use: Never used   Substance and Sexual Activity   • Alcohol use: No   • Drug use: No   • Sexual activity: Defer       Review of Systems  Review of Systems   Constitutional: Negative.    HENT: Negative.     Eyes: Negative.    Respiratory:  Positive for cough.    Cardiovascular: Negative.    Gastrointestinal: Negative.    Endocrine: Negative.    Genitourinary: Negative.    Musculoskeletal: Negative.    Skin: Negative.    Allergic/Immunologic: Negative.    Neurological: Negative.    Hematological: Negative.    Psychiatric/Behavioral: Negative.       Objective:     /76   Pulse 69   Temp 96.3 °F (35.7 °C)   Ht 175.3 cm (69\")   Wt 104 kg (228 lb 11.2 oz)   SpO2 98%   BMI 33.77 kg/m²   Physical Exam  Vitals reviewed.   Constitutional:       Appearance: Normal appearance.   HENT:      Head: Normocephalic and atraumatic.   Eyes:      Extraocular Movements: Extraocular movements intact.      Conjunctiva/sclera: Conjunctivae normal.   Cardiovascular:      Rate and Rhythm: Normal rate and regular rhythm.      Heart sounds: Normal heart sounds.   Pulmonary:      Breath sounds: Normal breath sounds.   Neurological:      Mental Status: He is alert and oriented to person, place, and time.   Psychiatric:         Mood and Affect: Mood " normal.      Assessment/Plan:     Diagnoses and all orders for this visit:    1. Influenza A (Primary)  Patient went to ER on 8/15/23 for cough, fever, vomiting and diarrhea. A flu test was positive for Influenza A. During his ER visit an abdominal/chest X-ray was done and was negative for any significant findings. He was given IV fluids, loperamide, zofran and promethazine and was discharged. Patient is in office today for follow-up. He states he is doing well today, still has minimal cough. No other complaints today. I advised patient to receive flu vaccine in about one month, pt voiced understanding    2. Chronic obstructive pulmonary disease, unspecified COPD type  Refill albuterol which patient uses as needed.    3. Benign prostatic hyperplasia with weak urinary stream  Patient is requesting refill for tamulosin       Rx changes: none    Depression screening: Up to date; last screen 5/16/2023     Follow-up:     Return if symptoms worsen or fail to improve.    Preventative:  Health Maintenance   Topic Date Due   • DIABETIC EYE EXAM  02/22/2023   • ANNUAL WELLNESS VISIT  07/12/2023   • HEMOGLOBIN A1C  08/13/2023   • INFLUENZA VACCINE  10/01/2023   • LIPID PANEL  02/13/2024   • URINE MICROALBUMIN  02/13/2024   • DIABETIC FOOT EXAM  05/16/2024   • BMI FOLLOWUP  05/16/2024   • COLORECTAL CANCER SCREENING  12/15/2026   • TDAP/TD VACCINES (3 - Td or Tdap) 10/16/2029   • HEPATITIS C SCREENING  Completed   • COVID-19 Vaccine  Discontinued   • Pneumococcal Vaccine 0-64  Discontinued   • ZOSTER VACCINE  Discontinued       Recommended: Influenza  Vaccine Counseling:  explained to pt he should receive the flu vaccine in about 1 month     Weight  -Class: Obese Class I: 30-34.9kg/m2  -    increase physical activity and reduce portion size    Alcohol use:  reports no history of alcohol use.  Nicotine status  reports that he quit smoking about 23 years ago. His smoking use included cigarettes. He has a 3.75 pack-year smoking  history. He has never used smokeless tobacco.     Goals       •  Exercise 150 minutes per week (moderate activity) (pt-stated)       - increasing exercise, walking  - improve diet              RISK SCORE: 3      [facility]  [address] [place] [address]  Office: [phone]  This document has been electronically signed by Galina Barroso Jasmin, MD on September 6, 2023 11:51 CDT

## 2023-09-11 NOTE — PROGRESS NOTES
Nirav Lind  9/6/23  Subjective:     Nirav Lind is a 58 y.o. male who presents for   Chief Complaint   Patient presents with    Follow-up       58-year-old male here today for ER follow-up.  Patient was seen on August 15 with 1 week history of vomiting and URI symptoms.  He did test positive for influenza negative for COVID and RSV patient was treated with IV fluids and supportive care and symptoms have since resolved.  Patient is also a diabetic and is followed by endocrinology blood sugars reportedly have been stable he currently is using the kayleigh device for CGM.  Please see Dr. Barroso's note for more detailed information regarding today's encounter physical exam findings and plan of care.        Past Medical Hx:  Past Medical History:   Diagnosis Date    Abnormal computed tomography scan     Adenomatous polyp of colon     Allergic rhinitis     Anemia     Chronic back pain     COPD (chronic obstructive pulmonary disease)     Coronary arteriosclerosis     Depression     Diabetes mellitus     Diabetic polyneuropathy associated with type 2 diabetes mellitus 7/17/2017    Dizziness     Dyspnea     unspecified    Epigastric pain     Essential hypertension     Ex-smoker     GERD (gastroesophageal reflux disease)     Hemangioma of liver     Hyperlipidemia     Infected hydrocele     with orchitis and epididymis    Nausea     Nausea with vomiting     Obesity with body mass index of 30.0 - 39.9     Pain in right knee     Right upper quadrant pain     Type II diabetes mellitus, uncontrolled     Upper respiratory infection     Urgency of urination     Villous adenoma of colon        Past Surgical Hx:  Past Surgical History:   Procedure Laterality Date    CARDIAC CATHETERIZATION  11/30/2015    No evidence of any obstructive disease in the circumflex, the RCA , or LAD. 1st diagonal branch in the midportion with 20-30% stenosis. Preserved LV systolic function with EF 55%.    CATARACT EXTRACTION W/ INTRAOCULAR LENS  IMPLANT Left 2/26/2021    Procedure: REMOVE CATARACT AND IMPLANT INTRAOCULAR LENS;  Surgeon: Del Sharpe MD;  Location: [facility] OR;  Service: Ophthalmology;  Laterality: Left;    COLONOSCOPY  01/21/2013    Normal    COLONOSCOPY N/A 12/15/2021    Procedure: COLONOSCOPY;  Surgeon: Raymundo Gan MD;  Location: [facility] ENDOSCOPY;  Service: Gastroenterology;  Laterality: N/A;    COLONOSCOPY W/ POLYPECTOMY  10/13/2014    A single polyp was found in the colon; removed by snare cautery polypectomy.    ENDOSCOPY  06/15/2016    Mildly severe esophagitis. Several biopsies obtained in the upper third of the esophagus.    INCISION AND DRAINAGE ABSCESS  10/29/2014    Incision and drainage of scrotal abscess. Hydrocelectomy, bottle procedure.    KNEE ARTHROSCOPY Right 12/15/2020    Procedure: KNEE ARTHROSCOPY WITH  MEDIAL MENISCECTOMY;  Surgeon: Jerry Oakes MD;  Location: [facility] OR;  Service: Orthopedics;  Laterality: Right;  24 pictures        Health Maintenance:  Health Maintenance   Topic Date Due    DIABETIC EYE EXAM  02/22/2023    ANNUAL WELLNESS VISIT  07/12/2023    HEMOGLOBIN A1C  08/13/2023    INFLUENZA VACCINE  10/01/2023    LIPID PANEL  02/13/2024    URINE MICROALBUMIN  02/13/2024    DIABETIC FOOT EXAM  05/16/2024    BMI FOLLOWUP  05/16/2024    COLORECTAL CANCER SCREENING  12/15/2026    TDAP/TD VACCINES (3 - Td or Tdap) 10/16/2029    HEPATITIS C SCREENING  Completed    COVID-19 Vaccine  Discontinued    Pneumococcal Vaccine 0-64  Discontinued    ZOSTER VACCINE  Discontinued       Current Meds:    Current Outpatient Medications:     albuterol sulfate HFA (Ventolin HFA) 108 (90 Base) MCG/ACT inhaler, Inhale 2 puffs Every 4 (Four) Hours As Needed for Wheezing., Disp: 18 g, Rfl: 11    Alcohol Swabs ( STERILE ALCOHOL PREP) pads, 1 each 4 (Four) Times a Day., Disp: 200 each, Rfl: 3    aspirin 81 MG EC tablet, Take 81 mg by mouth Daily., Disp: , Rfl:     atorvastatin (LIPITOR) 40 MG tablet, Take 1 tablet  by mouth Daily., Disp: 30 tablet, Rfl: 4    budesonide-formoterol (SYMBICORT) 160-4.5 MCG/ACT inhaler, Inhale 2 puffs Daily As Needed., Disp: , Rfl:     busPIRone (BUSPAR) 5 MG tablet, TAKE 1 TABLET BY MOUTH TWICE DAILY, Disp: 180 tablet, Rfl: 3    Continuous Blood Gluc Sensor (FreeStyle Myrna 2 Sensor) misc, 1 each Every 14 (Fourteen) Days. Dx E11.65, Disp: 6 each, Rfl: 3    cyclobenzaprine (FLEXERIL) 5 MG tablet, Take 1 tablet by mouth 2 (Two) Times a Day As Needed for Muscle Spasms., Disp: 30 tablet, Rfl: 1    fluticasone (VERAMYST) 27.5 MCG/SPRAY nasal spray, 2 sprays into the nostril(s) as directed by provider As Needed., Disp: , Rfl:     glucose blood (ONE TOUCH ULTRA TEST) test strip, Use to text 4 times a day.  Dx-e11.49, Disp: 200 each, Rfl: 11    glucose blood test strip, Use 4x day E.11.49, Disp: 200 each, Rfl: 11    Insulin Glargine-Lixisenatide (Soliqua) 100-33 UNT-MCG/ML solution pen-injector injection, Inject up to 60 Units under the skin into the appropriate area as directed Daily 30 to 60 minutes before breakfast., Disp: 15 mL, Rfl: 11    Insulin Lispro, 1 Unit Dial, (HumaLOG KwikPen) 100 UNIT/ML solution pen-injector, Up to 10 units with meals, Disp: 3 pen, Rfl: 11    Insulin Pen Needle (BD Pen Needle Lata 2nd Gen) 32G X 4 MM misc, 4 x a day., Disp: 360 each, Rfl: 3    loperamide (IMODIUM) 2 MG capsule, Take 1 capsule by mouth 4 (Four) Times a Day As Needed for Diarrhea., Disp: 6 capsule, Rfl: 0    losartan (Cozaar) 100 MG tablet, Take 1 tablet by mouth Daily., Disp: 90 tablet, Rfl: 3    magnesium oxide (MAG-OX) 400 tablet tablet, Take 1 tablet by mouth 2 (Two) Times a Day., Disp: 60 each, Rfl: 3    metFORMIN ER (GLUCOPHAGE-XR) 750 MG 24 hr tablet, Take 1 tablet by mouth 2 (Two) Times a Day., Disp: 180 tablet, Rfl: 3    metoprolol succinate XL (TOPROL-XL) 50 MG 24 hr tablet, Take 1 tablet by mouth Daily., Disp: 90 tablet, Rfl: 3    naproxen (NAPROSYN) 500 MG tablet, TAKE 1 TABLET BY MOUTH  TWICE  "DAILY AS NEEDED FOR  MILD PAIN OR MODERATE PAIN, Disp: 120 tablet, Rfl: 5    ondansetron ODT (ZOFRAN-ODT) 4 MG disintegrating tablet, Place 1 tablet on the tongue Every 8 (Eight) Hours As Needed for Nausea or Vomiting., Disp: 30 tablet, Rfl: 0    ondansetron ODT (ZOFRAN-ODT) 4 MG disintegrating tablet, Place 1 tablet on the tongue Every 8 (Eight) Hours As Needed for Nausea or Vomiting., Disp: 10 tablet, Rfl: 0    pantoprazole (PROTONIX) 40 MG EC tablet, TAKE 1 TABLET BY MOUTH DAILY, Disp: 90 tablet, Rfl: 2    promethazine-dextromethorphan (PROMETHAZINE-DM) 6.25-15 MG/5ML syrup, Take 5 mL by mouth 4 (Four) Times a Day As Needed for Cough., Disp: 118 mL, Rfl: 0    sertraline (ZOLOFT) 100 MG tablet, Take 1 tablet by mouth Daily., Disp: 90 tablet, Rfl: 4    Spiriva HandiHaler 18 MCG per inhalation capsule, INHALE THE CONTENTS OF 1  CAPSULE BY MOUTH VIA  HANDIHALER DAILY, Disp: 30 capsule, Rfl: 11    tamsulosin (FLOMAX) 0.4 MG capsule 24 hr capsule, TAKE 1 CAPSULE BY MOUTH EVERY NIGHT, Disp: 90 capsule, Rfl: 0    Allergies:  Patient has no known allergies.    Family Hx:  Family History   Problem Relation Age of Onset    Cancer Mother         leukemia    Heart disease Mother     Heart disease Sister     Lung disease Father     Heart disease Maternal Grandmother     Heart disease Maternal Grandfather         Social History:  Social History     Socioeconomic History    Marital status:    Tobacco Use    Smoking status: Former     Packs/day: 0.25     Years: 15.00     Pack years: 3.75     Types: Cigarettes     Quit date:      Years since quittin.    Smokeless tobacco: Never   Vaping Use    Vaping Use: Never used   Substance and Sexual Activity    Alcohol use: No    Drug use: No    Sexual activity: Defer       Review of Systems  Review of Systems    Objective:     /76   Pulse 69   Temp 96.3 °F (35.7 °C)   Ht 175.3 cm (69\")   Wt 104 kg (228 lb 11.2 oz)   SpO2 98%   BMI 33.77 kg/m²   Physical Exam " alert no distress please see Dr. Barroso's note for more detailed information regarding physical exam findings    Lab Review  Results for orders placed or performed during the hospital encounter of 08/15/23   COVID-19 and FLU A/B PCR - Swab, Nasopharynx    Specimen: Nasopharynx; Swab   Result Value Ref Range    COVID19 Not Detected Not Detected - Ref. Range    Influenza A PCR Detected (A) Not Detected    Influenza B PCR Not Detected Not Detected   Comprehensive Metabolic Panel    Specimen: Blood   Result Value Ref Range    Glucose 119 (H) 65 - 99 mg/dL    BUN 17 8 - 23 mg/dL    Creatinine 1.37 (H) 0.76 - 1.27 mg/dL    Sodium 136 136 - 145 mmol/L    Potassium 3.8 3.5 - 5.2 mmol/L    Chloride 101 98 - 107 mmol/L    CO2 23.0 22.0 - 29.0 mmol/L    Calcium 9.0 8.6 - 10.5 mg/dL    Total Protein 8.3 6.0 - 8.5 g/dL    Albumin 4.1 3.5 - 5.2 g/dL    ALT (SGPT) 42 (H) 1 - 41 U/L    AST (SGOT) 37 1 - 40 U/L    Alkaline Phosphatase 131 (H) 39 - 117 U/L    Total Bilirubin 0.3 0.0 - 1.2 mg/dL    Globulin 4.2 gm/dL    A/G Ratio 1.0 g/dL    BUN/Creatinine Ratio 12.4 7.0 - 25.0    Anion Gap 12.0 5.0 - 15.0 mmol/L    eGFR 59.1 (L) >60.0 mL/min/1.73   CBC Auto Differential    Specimen: Blood   Result Value Ref Range    WBC 4.20 3.40 - 10.80 10*3/mm3    RBC 4.88 4.14 - 5.80 10*6/mm3    Hemoglobin 13.2 13.0 - 17.7 g/dL    Hematocrit 41.6 37.5 - 51.0 %    MCV 85.2 79.0 - 97.0 fL    MCH 27.0 26.6 - 33.0 pg    MCHC 31.7 31.5 - 35.7 g/dL    RDW 13.3 12.3 - 15.4 %    RDW-SD 41.4 37.0 - 54.0 fl    MPV 9.9 6.0 - 12.0 fL    Platelets 197 140 - 450 10*3/mm3    Neutrophil % 65.4 42.7 - 76.0 %    Lymphocyte % 19.5 (L) 19.6 - 45.3 %    Monocyte % 10.5 5.0 - 12.0 %    Eosinophil % 3.6 0.3 - 6.2 %    Basophil % 0.5 0.0 - 1.5 %    Immature Grans % 0.5 0.0 - 0.5 %    Neutrophils, Absolute 2.75 1.70 - 7.00 10*3/mm3    Lymphocytes, Absolute 0.82 0.70 - 3.10 10*3/mm3    Monocytes, Absolute 0.44 0.10 - 0.90 10*3/mm3    Eosinophils, Absolute 0.15 0.00 - 0.40  10*3/mm3    Basophils, Absolute 0.02 0.00 - 0.20 10*3/mm3    Immature Grans, Absolute 0.02 0.00 - 0.05 10*3/mm3    nRBC 0.0 0.0 - 0.2 /100 WBC   Lipase    Specimen: Blood   Result Value Ref Range    Lipase 34 13 - 60 U/L   Urinalysis With Microscopic If Indicated (No Culture) - Urine, Clean Catch    Specimen: Urine, Clean Catch   Result Value Ref Range    Color, UA Yellow Yellow, Straw, Dark Yellow, Josselin    Appearance, UA Clear Clear    pH, UA 5.5 5.0 - 9.0    Specific Gravity, UA 1.025 1.003 - 1.030    Glucose, UA Negative Negative    Ketones, UA Trace (A) Negative    Bilirubin, UA Negative Negative    Blood, UA Trace (A) Negative    Protein,  mg/dL (2+) (A) Negative    Leuk Esterase, UA Negative Negative    Nitrite, UA Negative Negative    Urobilinogen, UA 0.2 E.U./dL 0.2 - 1.0 E.U./dL   Urinalysis, Microscopic Only - Urine, Clean Catch    Specimen: Urine, Clean Catch   Result Value Ref Range    RBC, UA 0-2 (A) None Seen /HPF    WBC, UA 0-2 None Seen, 0-2, 3-5 /HPF    Bacteria, UA None Seen None Seen /HPF    Squamous Epithelial Cells, UA 0-2 None Seen, 0-2 /HPF    Hyaline Casts, UA 3-6 None Seen /LPF    Methodology Automated Microscopy    Gold Top - SST   Result Value Ref Range    Extra Tube Hold for add-ons.    Light Blue Top   Result Value Ref Range    Extra Tube Hold for add-ons.           Assessment:     Diagnoses and all orders for this visit:    1. Influenza A (Primary)    2. Chronic obstructive pulmonary disease, unspecified COPD type  -     albuterol sulfate HFA (Ventolin HFA) 108 (90 Base) MCG/ACT inhaler; Inhale 2 puffs Every 4 (Four) Hours As Needed for Wheezing.  Dispense: 18 g; Refill: 11    3. Benign prostatic hyperplasia with weak urinary stream  -     Discontinue: tamsulosin (FLOMAX) 0.4 MG capsule 24 hr capsule; Take 1 capsule by mouth Every Night for 30 days.  Dispense: 30 capsule; Refill: 0        Plan:     I have seen and examined the patient.  I have reviewed the notes, assessments,  and/or procedures performed by Galina Barroso MD during the office visit.  I concur with her/his documentation and assessment and plan for Nirav Lind.        This document has been electronically signed by Donaldo Nolan MD on September 11, 2023 09:21 CDT

## 2024-06-21 NOTE — PROGRESS NOTES
I have reviewed the notes, assessments, and/or procedures performed by **Charles Olivares MD  *during office visit. I concur with her/his documentation and assessment and plan for Nirav Lind.          This document has been electronically signed by Jose Lopez MD on December 4, 2020 10:59 CST     PAST MEDICAL HISTORY:  Diabetes mellitus, type 2     Hyperlipidemia     Seizures

## (undated) DEVICE — SYR LL TP 10ML STRL

## (undated) DEVICE — SOL IRR LACT RNG BG 3000ML

## (undated) DEVICE — GLV SURG SENSICARE POLYISPRN W/ALOE PF LF 6.5 GRN STRL

## (undated) DEVICE — GLV SURG ORTHO BIOGEL OPTIFIT PF SZ9

## (undated) DEVICE — SOL IRR H2O BTL 1000ML STRL

## (undated) DEVICE — TBG PUMP ARTHSCP MAIN AR6400 16FT

## (undated) DEVICE — GLV SURG SIGNATURE ESSENTIAL PF LTX SZ6

## (undated) DEVICE — Device

## (undated) DEVICE — STERILE POLYISOPRENE POWDER-FREE SURGICAL GLOVES WITH EMOLLIENT COATING: Brand: PROTEXIS

## (undated) DEVICE — CANN SMPL SOFTECH BIFLO ETCO2 A/M 7FT

## (undated) DEVICE — GLV SURG SENSICARE POLYISPRN W/ALOE PF LF 6 GRN STRL

## (undated) DEVICE — DISPOSABLE TOURNIQUET CUFF SINGLE BLADDER, DUAL PORT AND QUICK CONNECT CONNECTOR: Brand: COLOR CUFF

## (undated) DEVICE — GLV SURG SENSICARE MICRO PF LF 7.5 STRL

## (undated) DEVICE — DRSNG GZ CURAD XEROFORM NONADHS 5X9IN STRL

## (undated) DEVICE — BLD SHAVER EXCALIBUR 4MM 13CM

## (undated) DEVICE — PK KN ARTHSCP LF 60

## (undated) DEVICE — SPNG GZ WOVN 4X4IN 12PLY 10/BX STRL

## (undated) DEVICE — TP SXN YANKR BLB TIP W/TBG 10F LF STRL

## (undated) DEVICE — GOWN,NON-REINFORCED,SIRUS,SET IN SLV,XL: Brand: MEDLINE

## (undated) DEVICE — SUT ETHLN 3-0 FS118IN 663H